# Patient Record
Sex: FEMALE | Race: WHITE | NOT HISPANIC OR LATINO | Employment: OTHER | URBAN - METROPOLITAN AREA
[De-identification: names, ages, dates, MRNs, and addresses within clinical notes are randomized per-mention and may not be internally consistent; named-entity substitution may affect disease eponyms.]

---

## 2017-02-22 ENCOUNTER — HOSPITAL ENCOUNTER (OUTPATIENT)
Dept: NUCLEAR MEDICINE | Facility: HOSPITAL | Age: 77
Discharge: HOME | End: 2017-02-22
Attending: INTERNAL MEDICINE | Admitting: INTERNAL MEDICINE

## 2017-04-10 ENCOUNTER — HOSPITAL ENCOUNTER (OUTPATIENT)
Dept: OTHER | Facility: HOSPITAL | Age: 77
Discharge: HOME | End: 2017-04-10
Attending: NURSE PRACTITIONER | Admitting: NURSE PRACTITIONER

## 2017-04-19 ENCOUNTER — OFFICE (AMBULATORY)
Dept: URBAN - METROPOLITAN AREA CLINIC 64 | Facility: CLINIC | Age: 77
End: 2017-04-19
Payer: COMMERCIAL

## 2017-04-19 VITALS
HEIGHT: 64 IN | DIASTOLIC BLOOD PRESSURE: 78 MMHG | HEART RATE: 64 BPM | WEIGHT: 200 LBS | SYSTOLIC BLOOD PRESSURE: 127 MMHG

## 2017-04-19 DIAGNOSIS — R07.9 CHEST PAIN, UNSPECIFIED: ICD-10-CM

## 2017-04-19 DIAGNOSIS — K21.9 GASTRO-ESOPHAGEAL REFLUX DISEASE WITHOUT ESOPHAGITIS: ICD-10-CM

## 2017-04-19 PROCEDURE — 99213 OFFICE O/P EST LOW 20 MIN: CPT

## 2017-05-17 ENCOUNTER — ON CAMPUS - OUTPATIENT (AMBULATORY)
Dept: URBAN - METROPOLITAN AREA HOSPITAL 2 | Facility: HOSPITAL | Age: 77
End: 2017-05-17
Payer: COMMERCIAL

## 2017-05-17 VITALS
RESPIRATION RATE: 16 BRPM | HEART RATE: 63 BPM | TEMPERATURE: 97.1 F | SYSTOLIC BLOOD PRESSURE: 142 MMHG | SYSTOLIC BLOOD PRESSURE: 158 MMHG | HEART RATE: 80 BPM | HEIGHT: 64 IN | OXYGEN SATURATION: 99 % | SYSTOLIC BLOOD PRESSURE: 148 MMHG | SYSTOLIC BLOOD PRESSURE: 164 MMHG | HEART RATE: 68 BPM | WEIGHT: 202 LBS | DIASTOLIC BLOOD PRESSURE: 89 MMHG | OXYGEN SATURATION: 97 % | SYSTOLIC BLOOD PRESSURE: 150 MMHG | HEART RATE: 77 BPM | DIASTOLIC BLOOD PRESSURE: 80 MMHG | OXYGEN SATURATION: 96 % | OXYGEN SATURATION: 100 % | DIASTOLIC BLOOD PRESSURE: 75 MMHG | HEART RATE: 60 BPM | OXYGEN SATURATION: 98 % | HEART RATE: 67 BPM | RESPIRATION RATE: 18 BRPM | DIASTOLIC BLOOD PRESSURE: 69 MMHG | DIASTOLIC BLOOD PRESSURE: 62 MMHG

## 2017-05-17 DIAGNOSIS — R10.13 EPIGASTRIC PAIN: ICD-10-CM

## 2017-05-17 DIAGNOSIS — R07.9 CHEST PAIN, UNSPECIFIED: ICD-10-CM

## 2017-05-17 PROCEDURE — 43235 EGD DIAGNOSTIC BRUSH WASH: CPT

## 2017-05-17 RX ADMIN — PROPOFOL: 10 INJECTION, EMULSION INTRAVENOUS at 13:23

## 2018-04-11 ENCOUNTER — HOSPITAL ENCOUNTER (OUTPATIENT)
Dept: GENERAL RADIOLOGY | Facility: HOSPITAL | Age: 78
Discharge: HOME | End: 2018-04-11
Attending: NURSE PRACTITIONER | Admitting: NURSE PRACTITIONER

## 2018-05-15 ENCOUNTER — HOSPITAL ENCOUNTER (OUTPATIENT)
Dept: CARDIOLOGY | Facility: HOSPITAL | Age: 78
Discharge: HOME | End: 2018-05-15
Attending: INTERNAL MEDICINE | Admitting: INTERNAL MEDICINE

## 2018-09-10 ENCOUNTER — HOSPITAL ENCOUNTER (OUTPATIENT)
Dept: PHYSICAL THERAPY | Facility: HOSPITAL | Age: 78
Setting detail: RECURRING SERIES
Discharge: HOME | End: 2018-11-07
Attending: ORTHOPAEDIC SURGERY | Admitting: ORTHOPAEDIC SURGERY

## 2019-04-30 ENCOUNTER — HOSPITAL ENCOUNTER (OUTPATIENT)
Dept: OTHER | Facility: HOSPITAL | Age: 79
Discharge: HOME | End: 2019-04-30
Attending: NURSE PRACTITIONER | Admitting: NURSE PRACTITIONER

## 2019-05-16 ENCOUNTER — HOSPITAL ENCOUNTER (OUTPATIENT)
Dept: NUCLEAR MEDICINE | Facility: HOSPITAL | Age: 79
Discharge: HOME | End: 2019-05-16
Attending: INTERNAL MEDICINE | Admitting: INTERNAL MEDICINE

## 2019-06-27 ENCOUNTER — OFFICE (AMBULATORY)
Dept: URBAN - METROPOLITAN AREA CLINIC 64 | Facility: CLINIC | Age: 79
End: 2019-06-27
Payer: COMMERCIAL

## 2019-06-27 VITALS
WEIGHT: 208 LBS | SYSTOLIC BLOOD PRESSURE: 136 MMHG | HEIGHT: 64 IN | DIASTOLIC BLOOD PRESSURE: 77 MMHG | HEART RATE: 77 BPM

## 2019-06-27 DIAGNOSIS — R13.10 DYSPHAGIA, UNSPECIFIED: ICD-10-CM

## 2019-06-27 PROCEDURE — 99214 OFFICE O/P EST MOD 30 MIN: CPT | Performed by: INTERNAL MEDICINE

## 2019-07-09 PROBLEM — R32 URINARY INCONTINENCE: Status: ACTIVE | Noted: 2019-04-25

## 2019-07-09 PROBLEM — R06.02 SHORTNESS OF BREATH: Status: ACTIVE | Noted: 2019-05-08

## 2019-07-09 PROBLEM — R93.6 ABNORMAL FINDINGS ON DIAGNOSTIC IMAGING OF LIMBS: Status: ACTIVE | Noted: 2018-08-24

## 2019-07-09 PROBLEM — R13.10 DYSPHAGIA: Status: ACTIVE | Noted: 2019-04-25

## 2019-07-09 PROBLEM — K76.0 FATTY LIVER: Status: ACTIVE | Noted: 2017-06-06

## 2019-07-09 PROBLEM — R94.39 ABNORMAL CARDIOVASCULAR STRESS TEST: Status: ACTIVE | Noted: 2017-03-21

## 2019-07-09 PROBLEM — M51.34 DEGENERATION OF INTERVERTEBRAL DISC OF THORACIC REGION: Status: ACTIVE | Noted: 2017-03-18

## 2019-07-09 PROBLEM — I51.7 CARDIOMEGALY: Status: ACTIVE | Noted: 2017-06-06

## 2019-07-09 PROBLEM — E66.3 OVERWEIGHT: Status: ACTIVE | Noted: 2018-03-29

## 2019-07-09 PROBLEM — K82.8 BILIARY DYSKINESIA: Status: ACTIVE | Noted: 2017-06-08

## 2019-07-09 PROBLEM — I10 HYPERTENSION: Status: ACTIVE | Noted: 2019-07-09

## 2019-07-09 PROBLEM — Z95.0 PRESENCE OF CARDIAC PACEMAKER: Status: ACTIVE | Noted: 2017-10-27

## 2019-07-09 PROBLEM — I34.0 MITRAL VALVE INSUFFICIENCY: Status: ACTIVE | Noted: 2017-03-20

## 2019-07-09 RX ORDER — LEVOTHYROXINE SODIUM 0.05 MG/1
TABLET ORAL
COMMUNITY
Start: 2018-05-08 | End: 2019-08-05 | Stop reason: SDUPTHER

## 2019-07-09 RX ORDER — NYSTATIN 100000 [USP'U]/G
POWDER TOPICAL
COMMUNITY
Start: 2018-03-29 | End: 2019-10-15

## 2019-07-09 RX ORDER — METOPROLOL TARTRATE 50 MG/1
25 TABLET, FILM COATED ORAL EVERY 12 HOURS
COMMUNITY
Start: 2018-07-03 | End: 2020-07-06

## 2019-07-09 RX ORDER — SODIUM PHOSPHATE,MONO-DIBASIC 19G-7G/118
1 ENEMA (ML) RECTAL EVERY 12 HOURS
COMMUNITY
Start: 2014-12-29 | End: 2021-01-21

## 2019-07-09 RX ORDER — ZONISAMIDE 100 MG/1
CAPSULE ORAL
COMMUNITY
Start: 2012-11-21 | End: 2019-10-16

## 2019-07-09 RX ORDER — DEXLANSOPRAZOLE 60 MG/1
1 CAPSULE, DELAYED RELEASE ORAL EVERY 24 HOURS
COMMUNITY
Start: 2019-01-21 | End: 2019-07-22 | Stop reason: SDUPTHER

## 2019-07-09 RX ORDER — ESCITALOPRAM OXALATE 10 MG/1
5 TABLET ORAL EVERY 24 HOURS
COMMUNITY
Start: 2018-07-26 | End: 2021-06-03

## 2019-07-09 RX ORDER — LOSARTAN POTASSIUM 25 MG/1
25 TABLET ORAL EVERY 24 HOURS
COMMUNITY
Start: 2018-05-01 | End: 2020-05-18

## 2019-07-18 ENCOUNTER — CLINICAL SUPPORT (OUTPATIENT)
Dept: FAMILY MEDICINE CLINIC | Facility: CLINIC | Age: 79
End: 2019-07-18

## 2019-07-18 DIAGNOSIS — M81.0 OSTEOPOROSIS, UNSPECIFIED OSTEOPOROSIS TYPE, UNSPECIFIED PATHOLOGICAL FRACTURE PRESENCE: Primary | ICD-10-CM

## 2019-07-18 PROCEDURE — 96372 THER/PROPH/DIAG INJ SC/IM: CPT | Performed by: NURSE PRACTITIONER

## 2019-07-22 RX ORDER — DEXLANSOPRAZOLE 60 MG/1
CAPSULE, DELAYED RELEASE ORAL
Qty: 90 CAPSULE | Refills: 1 | Status: SHIPPED | OUTPATIENT
Start: 2019-07-22 | End: 2020-01-13

## 2019-07-25 ENCOUNTER — OFFICE VISIT (OUTPATIENT)
Dept: FAMILY MEDICINE CLINIC | Facility: CLINIC | Age: 79
End: 2019-07-25

## 2019-07-25 ENCOUNTER — LAB REQUISITION (OUTPATIENT)
Dept: LAB | Facility: HOSPITAL | Age: 79
End: 2019-07-25

## 2019-07-25 VITALS
TEMPERATURE: 97.7 F | OXYGEN SATURATION: 96 % | WEIGHT: 205 LBS | BODY MASS INDEX: 34.11 KG/M2 | SYSTOLIC BLOOD PRESSURE: 125 MMHG | RESPIRATION RATE: 18 BRPM | HEART RATE: 79 BPM | DIASTOLIC BLOOD PRESSURE: 76 MMHG

## 2019-07-25 DIAGNOSIS — M51.36 DDD (DEGENERATIVE DISC DISEASE), LUMBAR: ICD-10-CM

## 2019-07-25 DIAGNOSIS — M54.50 CHRONIC MIDLINE LOW BACK PAIN WITHOUT SCIATICA: Primary | ICD-10-CM

## 2019-07-25 DIAGNOSIS — D48.5 NEOPLASM OF UNCERTAIN BEHAVIOR OF SKIN: ICD-10-CM

## 2019-07-25 DIAGNOSIS — M43.06 PARS DEFECT OF LUMBAR SPINE: ICD-10-CM

## 2019-07-25 DIAGNOSIS — M43.10 ANTEROLISTHESIS: ICD-10-CM

## 2019-07-25 DIAGNOSIS — G89.29 CHRONIC MIDLINE LOW BACK PAIN WITHOUT SCIATICA: Primary | ICD-10-CM

## 2019-07-25 PROBLEM — M51.369 DDD (DEGENERATIVE DISC DISEASE), LUMBAR: Status: ACTIVE | Noted: 2019-07-25

## 2019-07-25 PROCEDURE — 88305 TISSUE EXAM BY PATHOLOGIST: CPT | Performed by: PLASTIC SURGERY

## 2019-07-25 PROCEDURE — 99213 OFFICE O/P EST LOW 20 MIN: CPT | Performed by: NURSE PRACTITIONER

## 2019-07-25 NOTE — PATIENT INSTRUCTIONS
Degenerative Disk Disease  Degenerative disk disease is a condition caused by the changes that occur in spinal disks as you grow older. Spinal disks are soft and compressible disks located between the bones of your spine (vertebrae). These disks act like shock absorbers. Degenerative disk disease can affect the whole spine. However, the neck and lower back are most commonly affected. Many changes can occur in the spinal disks with aging, such as:  · The spinal disks may dry and shrink.  · Small tears may occur in the tough, outer covering of the disk (annulus).  · The disk space may become smaller due to loss of water.  · Abnormal growths in the bone (spurs) may occur. This can put pressure on the nerve roots exiting the spinal canal, causing pain.  · The spinal canal may become narrowed.    What increases the risk?  · Being overweight.  · Having a family history of degenerative disk disease.  · Smoking.  · There is increased risk if you are doing heavy lifting or have a sudden injury.  What are the signs or symptoms?  Symptoms vary from person to person and may include:  · Pain that varies in intensity. Some people have no pain, while others have severe pain. The location of the pain depends on the part of your backbone that is affected.  ? You will have neck or arm pain if a disk in the neck area is affected.  ? You will have pain in your back, buttocks, or legs if a disk in the lower back is affected.  · Pain that becomes worse while bending, reaching up, or with twisting movements.  · Pain that may start gradually and then get worse as time passes. It may also start after a major or minor injury.  · Numbness or tingling in the arms or legs.    How is this diagnosed?  Your health care provider will ask you about your symptoms and about activities or habits that may cause the pain. He or she may also ask about any injuries, diseases, or treatments you have had. Your health care provider will examine you to check  for the range of movement that is possible in the affected area, to check for strength in your extremities, and to check for sensation in the areas of the arms and legs supplied by different nerve roots. You may also have:  · An X-ray of the spine.  · Other imaging tests, such as MRI.    How is this treated?  Your health care provider will advise you on the best plan for treatment. Treatment may include:  · Medicines.  · Rehabilitation exercises.    Follow these instructions at home:  · Follow proper lifting and walking techniques as advised by your health care provider.  · Maintain good posture.  · Exercise regularly as advised by your health care provider.  · Perform relaxation exercises.  · Change your sitting, standing, and sleeping habits as advised by your health care provider.  · Change positions frequently.  · Lose weight or maintain a healthy weight as advised by your health care provider.  · Do not use any tobacco products, including cigarettes, chewing tobacco, or electronic cigarettes. If you need help quitting, ask your health care provider.  · Wear supportive footwear.  · Take medicines only as directed by your health care provider.  Contact a health care provider if:  · Your pain does not go away within 1-4 weeks.  · You have significant appetite or weight loss.  Get help right away if:  · Your pain is severe.  · You notice weakness in your arms, hands, or legs.  · You begin to lose control of your bladder or bowel movements.  · You have fevers or night sweats.  This information is not intended to replace advice given to you by your health care provider. Make sure you discuss any questions you have with your health care provider.  Document Released: 10/14/2008 Document Revised: 05/25/2017 Document Reviewed: 04/21/2015  [employer] Interactive Patient Education © 2019 [employer]

## 2019-07-25 NOTE — PROGRESS NOTES
Chief Complaint   Patient presents with   • Back Pain     f/u on xray        Subjective     Anila Spencer  has a past medical history of Allergic rhinitis, Biliary dyskinesia, Cardiomegaly, Cervical spinal stenosis, Chronic constipation, Chronic insomnia, DDD (degenerative disc disease), cervical, Diplopia, Emphysema of lung (CMS/HCC), Fatty liver, Fracture of multiple ribs (07/2018), GERD (gastroesophageal reflux disease), Hypertension, Hypothyroidism, Kidney stone, Mitral regurgitation, Nasal fracture (07/2018), Osteoarthritis, Osteoporosis, Prediabetes, Rotator cuff tear, Seizure disorder (CMS/HCC), Sick sinus syndrome (CMS/HCC), Squamous cell skin cancer, face, Traumatic brain injury (CMS/HCC), and Urinary incontinence.    Back Pain   This is a chronic problem. The current episode started more than 1 year ago. The problem occurs constantly. The problem has been gradually worsening since onset. The pain is present in the lumbar spine. The quality of the pain is described as aching. The pain does not radiate. The pain is at a severity of 10/10. The pain is severe. The pain is worse during the day. The symptoms are aggravated by bending, sitting, standing and twisting. Stiffness is present all day. Pertinent negatives include no abdominal pain, chest pain or weight loss. Risk factors include menopause, obesity, lack of exercise, poor posture, sedentary lifestyle and history of osteoporosis. She has tried analgesics for the symptoms. The treatment provided mild relief.       No Known Allergies    Current Outpatient Medications:   •  Calcium Carbonate-Vitamin D3 (CALCIUM 600-D) 600-400 MG-UNIT tablet, Daily., Disp: , Rfl:   •  DEXILANT 60 MG capsule, TAKE ONE CAPSULE BY MOUTH ONCE DAILY, Disp: 90 capsule, Rfl: 1  •  escitalopram (LEXAPRO) 10 MG tablet, Daily., Disp: , Rfl:   •  glucosamine-chondroitin (GLUCOSAMINE CHONDR COMPLEX) 500-400 MG capsule capsule, 1 capsule Every 12 (Twelve) Hours., Disp: , Rfl:   •   levothyroxine (SYNTHROID, LEVOTHROID) 50 MCG tablet, LEVOTHYROXINE SODIUM 50 MCG TABS, Disp: , Rfl:   •  losartan (COZAAR) 25 MG tablet, Daily., Disp: , Rfl:   •  metoprolol tartrate (LOPRESSOR) 50 MG tablet, Every 12 (Twelve) Hours., Disp: , Rfl:   •  Multiple Vitamins-Minerals (MULTI VITAMIN/MINERALS) tablet, MULTI VITAMIN/MINERALS TABS, Disp: , Rfl:   •  nystatin (MYCOSTATIN) 489059 UNIT/GM powder, NYSTATIN [id number] UNIT/GM POWD, Disp: , Rfl:   •  zonisamide (ZONEGRAN) 100 MG capsule, ZONISAMIDE 100 MG CAPS, Disp: , Rfl:   Past Medical History:   Diagnosis Date   • Allergic rhinitis    • Biliary dyskinesia    • Cardiomegaly    • Cervical spinal stenosis    • Chronic constipation    • Chronic insomnia    • DDD (degenerative disc disease), cervical     C-spine, T-spine, L-Spine   • Diplopia     Dr. Laird   • Emphysema of lung (CMS/HCC)    • Fatty liver    • Fracture of multiple ribs 07/2018    Left 4th-8th   • GERD (gastroesophageal reflux disease)    • Hypertension    • Hypothyroidism    • Kidney stone    • Mitral regurgitation     Dr. Agrawal   • Nasal fracture 07/2018   • Osteoarthritis    • Osteoporosis     h/o tx with Fosamax but quit in 2012 due to fears of complications   • Prediabetes    • Rotator cuff tear     right   • Seizure disorder (CMS/HCC)     Dr. Shannon Bennett   • Sick sinus syndrome (CMS/HCC)     Dr. Agrawal   • Squamous cell skin cancer, face    • Traumatic brain injury (CMS/HCC)     from fall   • Urinary incontinence      Past Surgical History:   Procedure Laterality Date   • BILATERAL SALPINGO OOPHORECTOMY      for benign cysts   • CARDIAC CATHETERIZATION  08/25/2009    25% LAD. L Cx luminal irregularities. Normal L main   • CARDIAC CATHETERIZATION  03/29/2017    25% LAD. 10-20% LCX. ER 65%. Dr. Agrawal.    • CATARACT EXTRACTION  2006   • ENDOSCOPY  05/17/2017    Normal, Dr. Gutierrez   • KNEE ARTHROSCOPY Left 1992   • OTHER SURGICAL HISTORY Right 07/2017    Right eye, YAG Capsuloyomy , Dr. Lemus   •  PACEMAKER IMPLANTATION  2009   • ROTATOR CUFF REPAIR Right     Dr. Goldberg   • TONSILLECTOMY     • TOTAL HIP ARTHROPLASTY Bilateral      Social History     Socioeconomic History   • Marital status:      Spouse name: Not on file   • Number of children: Not on file   • Years of education: Not on file   • Highest education level: Not on file   Tobacco Use   • Smoking status: Never Smoker   • Smokeless tobacco: Never Used   Substance and Sexual Activity   • Alcohol use: No     Frequency: Never   • Drug use: No     Family History   Problem Relation Age of Onset   • Heart disease Mother    • Pancreatic cancer Mother    • Prostate cancer Father    • Breast cancer Sister    • Pancreatic cancer Brother    • Colon cancer Brother    • Stroke Maternal Grandmother        Family history, surgical history, past medical history, Allergies and med's reviewed with patient today and updated in [facility] EMR.     ROS:  Review of Systems   Constitutional: Negative for activity change, appetite change, diaphoresis, fatigue, unexpected weight gain and unexpected weight loss.   Eyes: Negative for blurred vision and visual disturbance.   Respiratory: Negative for apnea, cough, shortness of breath and wheezing.    Cardiovascular: Negative for chest pain, palpitations and leg swelling.   Gastrointestinal: Negative for abdominal pain, constipation, diarrhea, nausea and vomiting.   Endocrine: Negative for cold intolerance and heat intolerance.   Genitourinary: Negative for frequency.   Musculoskeletal: Positive for back pain and gait problem. Negative for myalgias.   Neurological: Negative for dizziness, syncope and headache.   Psychiatric/Behavioral: Negative for depressed mood.       OBJECTIVE:  Vitals:    07/25/19 1032   BP: 125/76   BP Location: Right arm   Patient Position: Sitting   Cuff Size: Large Adult   Pulse: 79   Resp: 18   Temp: 97.7 °F (36.5 °C)   TempSrc: Oral   SpO2: 96%   Weight: 93 kg (205 lb)     Body mass index is 34.11  kg/m².    Physical Exam  General:      Alert. Appears stated age. Cooperative. In no apparent distress. Well nourished. Well hydrated.  Neck:      Non-tender. No lymphadenopathy or masses.   Trachea midline. No bruit.  Thyroid of normal size and consistency.   Chest Wall:      Normal excursion with symmetric chest walls.   Quiet, even and easy respiratory effort without use of accessory muscles.  Lungs:      Clear breath sounds bilaterally without rales, rhonchi or wheezes.  Heart:      No gallop or rub. Normal heart sounds. Regular rate and rhythm. No murmurs.   Abdomen:      Soft, nontender. No palpable masses. No hernia. Bowel sounds normal. No hepatosplenomegaly.   Msk:      Slow, cautious gate, walking with cane. Stooped forward. Generalized weakness. Decreased ROM of all extremities.  No deformities noted.   Pain with palpation across left-side of lower back, no abnormalities palpated. -SLR bilaterally.   Extremities:      no clubbing, cyanosis, edema  Neurologic:      no focal deficits  Skin:      Warm, dry. No rashes noted.      No visits with results within 30 Day(s) from this visit.   Latest known visit with results is:   Office Visit Converted on 04/25/2019   Component Date Value Ref Range Status   • Albumin 03/23/2017 4.0  3.6 - 5.1 g/dL Final   • Alkaline Phosphatase 03/23/2017 71  33 - 130 units/L Final   • Basophils Absolute 03/23/2017 50 CELLS/UL  0 - 200 10*3/mm3 Final   • Basophil Rel % 03/23/2017 0.6  % Final   • Total Bilirubin 03/23/2017 0.5  0.2 - 1.2 mg/dL Final   • BUN 03/23/2017 26* 7 - 25 mg/dL Final   • BUN/Creatinine Ratio 03/23/2017 31 (calc)* 6 - 22 Final   • Calcium 03/23/2017 9.1  8.6 - 10.4 mg/dL Final   • Chloride 03/23/2017 110  98 - 110 mmol/L Final   • CO2 CONTENT  03/23/2017 28  20 - 31 mmol/L Final   • Creatinine 03/23/2017 0.85  0.60 - 0.93 mg/dL Final   • eGFR African Am 03/23/2017 66  > OR = 60 mL/min/1.73m2 Final   • eGFR Non  Am 03/23/2017 76  > OR = 60  mL/min/1.73m2 Final   • Eosinophils Absolute 03/23/2017 92 CELLS/UL  15 - 500 10*3/mm3 Final   • Eosinophil Rel % 03/23/2017 1.1  % Final   • Globulin 03/23/2017 2.8 G/DL (CALC)  1.9 - 3.7 g/dL Final   • Glucose 03/23/2017 93  65 - 99 mg/dL Final   • Hematocrit 03/23/2017 42.5  35.0 - 45.0 % Final   • Hemoglobin 03/23/2017 13.5  11.7 - 15.5 g/dL Final   • % Hgb A1C 03/23/2017 5.7 % OF TOTAL HGB* <5.7 % Final   • Lymphocytes Absolute 03/23/2017 2428 CELLS/UL  850 - 3900 10*3/mm3 Final   • Lymphocyte Rel % 03/23/2017 28.9  % Final   • MCH 03/23/2017 30.0  27.0 - 33.0 pg Final   • MCHC 03/23/2017 31.8 G/DL* 32.0 - 36.0 % Final   • MCV 03/23/2017 94.2  80.0 - 100.0 fL Final   • Monocyte Rel % 03/23/2017 6.4  % Final   • Monocytes Absolute 03/23/2017 538 CELLS/UL  200 - 950 10*3/microliter Final   • MPV 03/23/2017 9.4  7.5 - 12.5 fL Final   • Neutrophils Absolute 03/23/2017 5292 CELLS/UL  1500 - 7800 10*3/mm3 Final   • Platelets 03/23/2017 222 THOUSAND/UL  140 - 400 10*3/mm3 Final   • Neutrophils, Fluid 03/23/2017 63.0  % Final   • Potassium 03/23/2017 4.5  3.5 - 5.3 mmol/L Final   • Total Protein 03/23/2017 6.8  6.1 - 8.1 g/dL Final   • RBC 03/23/2017 4.51 MILLION/UL  3.80 - 5.10 10*6/mm3 Final   • RDW 03/23/2017 14.9  11.0 - 15.0 % Final   • AST (SGOT) 03/23/2017 13  10 - 35 units/L Final   • ALT (SGPT) 03/23/2017 9  6 - 29 units/L Final   • Sodium 03/23/2017 143  135 - 146 mmol/L Final   • TSH 03/23/2017 4.61* 0.40 - 4.50 microintl units/mL Final   • WBC 03/23/2017 8.4 THOUSAND/UL  3.8 - 10.8 K/uL Final   • A/G Ratio 03/23/2017 1.4 (calc)  1.0 - 2.5 Final   • TSH 06/09/2017 3.12  0.40 - 4.50 microintl units/mL Final   • Albumin 09/27/2017 3.8  3.6 - 5.1 g/dL Final   • Alkaline Phosphatase 09/27/2017 74  33 - 130 units/L Final   • Basophils Absolute 09/27/2017 33 CELLS/UL  0 - 200 10*3/mm3 Final   • Basophil Rel % 09/27/2017 0.4  % Final   • Total Bilirubin 09/27/2017 0.4  0.2 - 1.2 mg/dL Final   • BUN 09/27/2017 23  7  - 25 mg/dL Final   • BUN/Creatinine Ratio 09/27/2017 24 (calc)* 6 - 22 Final   • Calcium 09/27/2017 8.8  8.6 - 10.4 mg/dL Final   • Chloride 09/27/2017 109  98 - 110 mmol/L Final   • CO2 CONTENT  09/27/2017 24  20 - 31 mmol/L Final   • Creatinine 09/27/2017 0.95* 0.60 - 0.93 mg/dL Final   • eGFR  Am 09/27/2017 57* > OR = 60 mL/min/1.73m2 Final   • eGFR Non  Am 09/27/2017 66  > OR = 60 mL/min/1.73m2 Final   • Eosinophils Absolute 09/27/2017 74 CELLS/UL  15 - 500 10*3/mm3 Final   • Eosinophil Rel % 09/27/2017 0.9  % Final   • Globulin 09/27/2017 2.6 G/DL (CALC)  1.9 - 3.7 g/dL Final   • Glucose 09/27/2017 91  65 - 99 mg/dL Final   • Hematocrit 09/27/2017 39.1  35.0 - 45.0 % Final   • Hemoglobin 09/27/2017 13.3  11.7 - 15.5 g/dL Final   • % Hgb A1C 09/27/2017 5.2 % OF TOTAL HGB  <5.7 % Final   • Lymphocytes Absolute 09/27/2017 2714 CELLS/UL  850 - 3900 10*3/mm3 Final   • Lymphocyte Rel % 09/27/2017 33.1  % Final   • MCH 09/27/2017 32.2  27.0 - 33.0 pg Final   • MCHC 09/27/2017 34.0 G/DL  32.0 - 36.0 % Final   • MCV 09/27/2017 94.7  80.0 - 100.0 fL Final   • Monocyte Rel % 09/27/2017 7.9  % Final   • Monocytes Absolute 09/27/2017 648 CELLS/UL  200 - 950 10*3/microliter Final   • MPV 09/27/2017 11.0  7.5 - 12.5 fL Final   • Neutrophils Absolute 09/27/2017 4731 CELLS/UL  1500 - 7800 10*3/mm3 Final   • Platelets 09/27/2017 235 THOUSAND/UL  140 - 400 10*3/mm3 Final   • Neutrophils, Fluid 09/27/2017 57.7  % Final   • Potassium 09/27/2017 4.1  3.5 - 5.3 mmol/L Final   • Total Protein 09/27/2017 6.4  6.1 - 8.1 g/dL Final   • RBC 09/27/2017 4.13 MILLION/UL  3.80 - 5.10 10*6/mm3 Final   • RDW 09/27/2017 12.7  11.0 - 15.0 % Final   • AST (SGOT) 09/27/2017 14  10 - 35 units/L Final   • ALT (SGPT) 09/27/2017 11  6 - 29 units/L Final   • Sodium 09/27/2017 141  135 - 146 mmol/L Final   • TSH 09/27/2017 2.39  0.40 - 4.50 microintl units/mL Final   • WBC 09/27/2017 8.2 THOUSAND/UL  3.8 - 10.8 K/uL Final   • A/G Ratio  09/27/2017 1.5 (calc)  1.0 - 2.5 Final   • BUN 01/09/2018 23  7 - 25 mg/dL Final   • BUN/Creatinine Ratio 01/09/2018 NOT APPLICABLE (calc)  6 - 22 Final   • Calcium 01/09/2018 9.4  8.6 - 10.4 mg/dL Final   • Chloride 01/09/2018 106  98 - 110 mmol/L Final   • CO2 CONTENT  01/09/2018 24  20 - 31 mmol/L Final   • Creatinine 01/09/2018 0.84  0.60 - 0.93 mg/dL Final   • eGFR African Am 01/09/2018 66  > OR = 60 mL/min/1.73m2 Final   • eGFR Non  Am 01/09/2018 77  > OR = 60 mL/min/1.73m2 Final   • Glucose 01/09/2018 86  65 - 99 mg/dL Final   • Potassium 01/09/2018 4.5  3.5 - 5.3 mmol/L Final   • Sodium 01/09/2018 139  135 - 146 mmol/L Final   • Albumin 03/29/2018 4.1  3.6 - 5.1 g/dL Final   • Alkaline Phosphatase 03/29/2018 85  33 - 130 units/L Final   • Basophils Absolute 03/29/2018 31 CELLS/UL  0 - 200 10*3/mm3 Final   • Basophil Rel % 03/29/2018 0.3  % Final   • Total Bilirubin 03/29/2018 0.4  0.2 - 1.2 mg/dL Final   • BUN 03/29/2018 24  7 - 25 mg/dL Final   • BUN/Creatinine Ratio 03/29/2018 NOT APPLICABLE (calc)  6 - 22 Final   • Calcium 03/29/2018 9.3  8.6 - 10.4 mg/dL Final   • Chloride 03/29/2018 106  98 - 110 mmol/L Final   • CO2 CONTENT  03/29/2018 24  20 - 31 mmol/L Final   • Creatinine 03/29/2018 0.92  0.60 - 0.93 mg/dL Final   • eGFR African Am 03/29/2018 59* > OR = 60 mL/min/1.73m2 Final   • eGFR Non African Am 03/29/2018 69  > OR = 60 mL/min/1.73m2 Final   • Eosinophils Absolute 03/29/2018 71 CELLS/UL  15 - 500 10*3/mm3 Final   • Eosinophil Rel % 03/29/2018 0.7  % Final   • Globulin 03/29/2018 2.9 G/DL (CALC)  1.9 - 3.7 g/dL Final   • Glucose 03/29/2018 89  65 - 99 mg/dL Final   • Hematocrit 03/29/2018 40.8  35.0 - 45.0 % Final   • Hemoglobin 03/29/2018 14.3  11.7 - 15.5 g/dL Final   • % Hgb A1C 03/29/2018 5.4 % OF TOTAL HGB  <5.7 % Final   • Lymphocytes Absolute 03/29/2018 3560 CELLS/UL  850 - 3900 10*3/mm3 Final   • Lymphocyte Rel % 03/29/2018 34.9  % Final   • MCH 03/29/2018 31.9  27.0 - 33.0 pg  Final   • MCHC 03/29/2018 35.0 G/DL  32.0 - 36.0 % Final   • MCV 03/29/2018 91.1  80.0 - 100.0 fL Final   • Monocyte Rel % 03/29/2018 6.7  % Final   • Monocytes Absolute 03/29/2018 683 CELLS/UL  200 - 950 10*3/microliter Final   • MPV 03/29/2018 10.8  7.5 - 12.5 fL Final   • Neutrophils Absolute 03/29/2018 5855 CELLS/UL  1500 - 7800 10*3/mm3 Final   • Platelets 03/29/2018 288 THOUSAND/UL  140 - 400 10*3/mm3 Final   • Neutrophils, Fluid 03/29/2018 57.4  % Final   • Potassium 03/29/2018 4.7  3.5 - 5.3 mmol/L Final   • Total Protein 03/29/2018 7.0  6.1 - 8.1 g/dL Final   • RBC 03/29/2018 4.48 MILLION/UL  3.80 - 5.10 10*6/mm3 Final   • RDW 03/29/2018 12.5  11.0 - 15.0 % Final   • AST (SGOT) 03/29/2018 14  10 - 35 units/L Final   • ALT (SGPT) 03/29/2018 10  6 - 29 units/L Final   • Sodium 03/29/2018 141  135 - 146 mmol/L Final   • TSH 03/29/2018 2.34  0.40 - 4.50 microintl units/mL Final   • WBC 03/29/2018 10.2 THOUSAND/UL  3.8 - 10.8 K/uL Final   • A/G Ratio 03/29/2018 1.4 (calc)  1.0 - 2.5 Final   • Urine Culture 04/25/2019 *   Final       ASSESSMENT/ PLAN:    Diagnoses and all orders for this visit:    1. Chronic midline low back pain without sciatica (Primary)  Comments:  Reviewed x-ray results w/pt. Pain worsening. Will proceed with CT.   May need Pain Mgmt and/or Spine referral.  Orders:  -     CT Lumbar Spine Without Contrast; Future    2. Anterolisthesis  -     CT Lumbar Spine Without Contrast; Future    3. Pars defect of lumbar spine  -     CT Lumbar Spine Without Contrast; Future    4. DDD (degenerative disc disease), lumbar        Orders Placed Today:     No orders of the defined types were placed in this encounter.       Management Plan:     An After Visit Summary was printed and given to the patient at discharge.    Follow-up: No Follow-up on file.    VALENTINO Power 7/25/2019 11:15 AM  This note was electronically signed.Referring Provider:  Helena Mas NP  Primary Provider:  Tg FREEDMAN NP

## 2019-07-27 LAB
CYTO UR: NORMAL
LAB AP CASE REPORT: NORMAL
LAB AP CLINICAL INFORMATION: NORMAL
PATH REPORT.FINAL DX SPEC: NORMAL
PATH REPORT.GROSS SPEC: NORMAL

## 2019-07-31 DIAGNOSIS — M54.50 CHRONIC MIDLINE LOW BACK PAIN WITHOUT SCIATICA: ICD-10-CM

## 2019-07-31 DIAGNOSIS — M48.062 SPINAL STENOSIS OF LUMBAR REGION WITH NEUROGENIC CLAUDICATION: Primary | ICD-10-CM

## 2019-07-31 DIAGNOSIS — M43.06 PARS DEFECT OF LUMBAR SPINE: ICD-10-CM

## 2019-07-31 DIAGNOSIS — M43.10 ANTEROLISTHESIS: ICD-10-CM

## 2019-07-31 DIAGNOSIS — M51.36 DDD (DEGENERATIVE DISC DISEASE), LUMBAR: ICD-10-CM

## 2019-07-31 DIAGNOSIS — G89.29 CHRONIC MIDLINE LOW BACK PAIN WITHOUT SCIATICA: ICD-10-CM

## 2019-08-06 RX ORDER — LEVOTHYROXINE SODIUM 0.05 MG/1
TABLET ORAL
Qty: 90 TABLET | Refills: 1 | Status: SHIPPED | OUTPATIENT
Start: 2019-08-06 | End: 2020-07-30

## 2019-08-15 PROCEDURE — 88305 TISSUE EXAM BY PATHOLOGIST: CPT | Performed by: PLASTIC SURGERY

## 2019-08-16 ENCOUNTER — LAB REQUISITION (OUTPATIENT)
Dept: LAB | Facility: HOSPITAL | Age: 79
End: 2019-08-16

## 2019-08-16 DIAGNOSIS — D48.5 NEOPLASM OF UNCERTAIN BEHAVIOR OF SKIN: ICD-10-CM

## 2019-08-19 LAB
CYTO UR: NORMAL
LAB AP CASE REPORT: NORMAL
LAB AP CLINICAL INFORMATION: NORMAL
LAB AP DIAGNOSIS COMMENT: NORMAL
PATH REPORT.FINAL DX SPEC: NORMAL
PATH REPORT.GROSS SPEC: NORMAL

## 2019-08-21 ENCOUNTER — ANESTHESIA EVENT (OUTPATIENT)
Dept: GASTROENTEROLOGY | Facility: HOSPITAL | Age: 79
End: 2019-08-21

## 2019-08-22 ENCOUNTER — ON CAMPUS - OUTPATIENT (AMBULATORY)
Dept: URBAN - METROPOLITAN AREA HOSPITAL 85 | Facility: HOSPITAL | Age: 79
End: 2019-08-22
Payer: COMMERCIAL

## 2019-08-22 ENCOUNTER — HOSPITAL ENCOUNTER (OUTPATIENT)
Facility: HOSPITAL | Age: 79
Setting detail: HOSPITAL OUTPATIENT SURGERY
Discharge: HOME | End: 2019-08-22
Attending: INTERNAL MEDICINE | Admitting: INTERNAL MEDICINE

## 2019-08-22 ENCOUNTER — ANESTHESIA (OUTPATIENT)
Dept: GASTROENTEROLOGY | Facility: HOSPITAL | Age: 79
End: 2019-08-22

## 2019-08-22 VITALS
OXYGEN SATURATION: 96 % | SYSTOLIC BLOOD PRESSURE: 172 MMHG | HEIGHT: 64 IN | TEMPERATURE: 98.2 F | WEIGHT: 205.91 LBS | HEART RATE: 60 BPM | DIASTOLIC BLOOD PRESSURE: 73 MMHG | BODY MASS INDEX: 35.15 KG/M2 | RESPIRATION RATE: 16 BRPM

## 2019-08-22 DIAGNOSIS — R13.10 DYSPHAGIA, UNSPECIFIED: ICD-10-CM

## 2019-08-22 PROCEDURE — 43450 DILATE ESOPHAGUS 1/MULT PASS: CPT | Performed by: INTERNAL MEDICINE

## 2019-08-22 PROCEDURE — 25010000002 PROPOFOL 200 MG/20ML EMULSION: Performed by: ANESTHESIOLOGY

## 2019-08-22 PROCEDURE — 43235 EGD DIAGNOSTIC BRUSH WASH: CPT | Performed by: INTERNAL MEDICINE

## 2019-08-22 RX ORDER — SODIUM CHLORIDE 0.9 % (FLUSH) 0.9 %
3 SYRINGE (ML) INJECTION EVERY 12 HOURS SCHEDULED
Status: DISCONTINUED | OUTPATIENT
Start: 2019-08-22 | End: 2019-08-22 | Stop reason: HOSPADM

## 2019-08-22 RX ORDER — LIDOCAINE HYDROCHLORIDE 20 MG/ML
INJECTION, SOLUTION EPIDURAL; INFILTRATION; INTRACAUDAL; PERINEURAL AS NEEDED
Status: DISCONTINUED | OUTPATIENT
Start: 2019-08-22 | End: 2019-08-22 | Stop reason: SURG

## 2019-08-22 RX ORDER — SODIUM CHLORIDE 0.9 % (FLUSH) 0.9 %
3-10 SYRINGE (ML) INJECTION AS NEEDED
Status: DISCONTINUED | OUTPATIENT
Start: 2019-08-22 | End: 2019-08-22 | Stop reason: HOSPADM

## 2019-08-22 RX ORDER — PROPOFOL 10 MG/ML
INJECTION, EMULSION INTRAVENOUS AS NEEDED
Status: DISCONTINUED | OUTPATIENT
Start: 2019-08-22 | End: 2019-08-22 | Stop reason: SURG

## 2019-08-22 RX ORDER — ONDANSETRON 2 MG/ML
4 INJECTION INTRAMUSCULAR; INTRAVENOUS ONCE AS NEEDED
Status: DISCONTINUED | OUTPATIENT
Start: 2019-08-22 | End: 2019-08-22 | Stop reason: HOSPADM

## 2019-08-22 RX ORDER — SODIUM CHLORIDE 9 MG/ML
9 INJECTION, SOLUTION INTRAVENOUS CONTINUOUS PRN
Status: DISCONTINUED | OUTPATIENT
Start: 2019-08-22 | End: 2019-08-22 | Stop reason: HOSPADM

## 2019-08-22 RX ADMIN — LIDOCAINE HYDROCHLORIDE 80 MG: 20 INJECTION, SOLUTION EPIDURAL; INFILTRATION; INTRACAUDAL; PERINEURAL at 11:10

## 2019-08-22 RX ADMIN — PROPOFOL 150 MG: 10 INJECTION, EMULSION INTRAVENOUS at 11:21

## 2019-08-22 RX ADMIN — SODIUM CHLORIDE 9 ML/HR: 0.9 INJECTION, SOLUTION INTRAVENOUS at 09:48

## 2019-08-22 NOTE — ANESTHESIA POSTPROCEDURE EVALUATION
Patient: Anila Spencer    Procedure Summary     Date:  08/22/19 Room / Location:  [facility] ENDOSCOPY 4 / [facility] ENDOSCOPY    Anesthesia Start:  1109 Anesthesia Stop:  1124    Procedure:  ESOPHAGOGASTRODUODENOSCOPY WITH DILATATION (BOUGIE #46,50) (N/A ) Diagnosis:  (DYSPHAGIA)    Surgeon:  Joaquin Story MD Provider:  Jocelin Joseph MD    Anesthesia Type:  MAC ASA Status:  3          Anesthesia Type: MAC  Last vitals  BP   172/73 (08/22/19 1145)   Temp   98.2 °F (36.8 °C) (08/22/19 0920)   Pulse   60 (08/22/19 1145)   Resp   16 (08/22/19 0920)     SpO2   96 % (08/22/19 1145)     Post Anesthesia Care and Evaluation    Patient location during evaluation: PACU  Patient participation: complete - patient participated  Level of consciousness: awake  Pain scale: See nurse's notes for pain score.  Pain management: adequate  Airway patency: patent  Anesthetic complications: No anesthetic complications  PONV Status: none  Cardiovascular status: acceptable  Respiratory status: acceptable  Hydration status: acceptable    Comments: Patient seen and examined postoperatively; vital signs stable; SpO2 greater than or equal to 90%; cardiopulmonary status stable; nausea/vomiting adequately controlled; pain adequately controlled; no apparent anesthesia complications; patient discharged from anesthesia care when discharge criteria were met

## 2019-08-22 NOTE — OP NOTE
ESOPHAGOGASTRODUODENOSCOPY Procedure Report    Patient Name:  Anila Spencer  YOB: 1940    Date of Surgery:  8/22/2019     Pre-Op Diagnosis:  DYSPHAGIA         Procedure/CPT® Codes:      Procedure(s):  ESOPHAGOGASTRODUODENOSCOPY WITH DILATATION (BOUGIE #46,50)    Staff:  Surgeon(s):  Joaquin Story MD      Anesthesia: Monitored Anesthesia Care    Description of Procedure:  A description of the procedure as well as risks, benefits and alternative methods were explained to the patient who voiced understanding and signed the corresponding consent form. A physical exam was performed and vital signs were monitored throughout the procedure.    An upper GI endoscope was placed into the mouth and proceeded through the esophagus, stomach and second portion of the duodenum without difficulty. The scope was then retroflexed and the fundus was visualized. The procedure was not difficult and there were no immediate complications.    Findings:  Normal EGD.  Esophagus normal.  Stomach normal.  Duodenum normal.  Because of the patient's history of dysphasia a 46 French Myles dilator was passed with minimal resistance felt.  The endoscope was passed and there was no trauma noted.  Therefore a 50 French Myles dilator was passed with mild resistance felt.  The endoscope was passed and there was no trauma noted.    Impression:  Normal EGD status post 50 French Myles dilation    Recommendations:  Regular diet as tolerated.  Follow-up in my office as needed.  If she has recurrent dysphasia I would recommend esophageal manometry.      Joaquin Story MD     Date: 8/22/2019    Time: 11:22 AM      .

## 2019-08-22 NOTE — CONSULTS
GI CONSULT  NOTE:    Referring Provider:    Helena Mas, VALENTINO  [unfilled]    Chief complaint: <principal problem not specified>    Subjective .     History of present illness:      Anila Spencer is a 78 y.o. female who presents today for Procedure(s):  ESOPHAGOGASTRODUODENOSCOPY for the indications listed below.     The updated Patient Profile was reviewed prior to the procedure, in conjunction with the Physical Exam, including medical conditions, surgical procedures, medications, allergies, family history and social history.     Pre-operatively, I reviewed the indication(s) for the procedure, the risks of the procedure [including but not limited to: unexpected bleeding possibly requiring hospitalization and/or unplanned repeat procedures, perforation possibly requiring surgical treatment, missed lesions and complications of sedation/MAC (also explained by anesthesia staff)].     I have evaluated the patient for risks associated with the planned anesthesia and the procedure to be performed and find the patient an acceptable candidate for IV sedation.    Multiple opportunities were provided for any questions or concerns, and all questions were answered satisfactorily before any anesthesia was administered. We will proceed with the planned procedure.    Past Medical History:  Past Medical History:   Diagnosis Date   • Allergic rhinitis    • Basal cell carcinoma (BCC) of face    • Biliary dyskinesia    • Cardiomegaly    • Cervical spinal stenosis    • Chronic constipation    • Chronic insomnia    • DDD (degenerative disc disease), cervical     C-spine, T-spine, L-Spine   • Diplopia     Dr. Laird   • Emphysema of lung (CMS/HCC)    • Fatty liver    • Fracture of multiple ribs 07/2018    Left 4th-8th   • GERD (gastroesophageal reflux disease)    • Hypertension    • Hypothyroidism    • Kidney stone    • Mitral regurgitation     Dr. Agrawal   • Nasal fracture 07/2018   • Osteoarthritis    • Osteoporosis     h/o tx  with Fosamax but quit in 2012 due to fears of complications   • Prediabetes    • Rotator cuff tear     right   • Seizure disorder (CMS/HCC)     Dr. Shannon Bennett   • Sick sinus syndrome (CMS/HCC)     Dr. Agrawal   • Squamous cell skin cancer, face    • Traumatic brain injury (CMS/[facility])     from fall   • Urinary incontinence        Past Surgical History:  Past Surgical History:   Procedure Laterality Date   • BILATERAL SALPINGO OOPHORECTOMY      for benign cysts   • CARDIAC CATHETERIZATION  08/25/2009    25% LAD. L Cx luminal irregularities. Normal L main   • CARDIAC CATHETERIZATION  03/29/2017    25% LAD. 10-20% LCX. ER 65%. Dr. Agrawal.    • CATARACT EXTRACTION  2006   • ENDOSCOPY  05/17/2017    Normal, Dr. Gutierrez   • KNEE ARTHROSCOPY Left 1992   • OTHER SURGICAL HISTORY Right 07/2017    Right eye, YAG Capsuloyomy , Dr. Lemus   • PACEMAKER IMPLANTATION  2009   • ROTATOR CUFF REPAIR Right     Dr. Goldberg   • TONSILLECTOMY     • TOTAL HIP ARTHROPLASTY Bilateral        Social History:  Social History     Tobacco Use   • Smoking status: Never Smoker   • Smokeless tobacco: Never Used   Substance Use Topics   • Alcohol use: No     Frequency: Never   • Drug use: No       Family History:  Family History   Problem Relation Age of Onset   • Heart disease Mother    • Pancreatic cancer Mother    • Prostate cancer Father    • Breast cancer Sister    • Pancreatic cancer Brother    • Colon cancer Brother    • Stroke Maternal Grandmother        Medications:  Medications Prior to Admission   Medication Sig Dispense Refill Last Dose   • Calcium Carbonate-Vitamin D3 (CALCIUM 600-D) 600-400 MG-UNIT tablet Daily.   Taking   • DEXILANT 60 MG capsule TAKE ONE CAPSULE BY MOUTH ONCE DAILY (Patient taking differently: TAKE ONE CAPSULE BY MOUTH EVERY NIGHT) 90 capsule 1 Taking   • escitalopram (LEXAPRO) 10 MG tablet Daily.   Taking   • glucosamine-chondroitin (GLUCOSAMINE CHONDR COMPLEX) 500-400 MG capsule capsule 1 capsule Every 12 (Twelve)  "Hours.   Taking   • levothyroxine (SYNTHROID, LEVOTHROID) 50 MCG tablet TAKE ONE TABLET BY MOUTH EVERY MORNING 30 MINUTES BEFORE EATING OR TAKING ANY OTHER MEDICATION 90 tablet 1    • losartan (COZAAR) 25 MG tablet Daily.   Taking   • metoprolol tartrate (LOPRESSOR) 50 MG tablet Every 12 (Twelve) Hours.   Taking   • Multiple Vitamins-Minerals (MULTI VITAMIN/MINERALS) tablet MULTI VITAMIN/MINERALS TABS   Taking   • nystatin (MYCOSTATIN) 844735 UNIT/GM powder NYSTATIN 541207 UNIT/GM POWD   Taking   • zonisamide (ZONEGRAN) 100 MG capsule ZONISAMIDE 100 MG CAPS EVERY NIGHT   Taking       Scheduled Meds:  sodium chloride 3 mL Intravenous Q12H     Continuous Infusions:  sodium chloride 9 mL/hr     PRN Meds:.sodium chloride  •  sodium chloride    ALLERGIES:  Patient has no known allergies.    ROS:  The following systems were reviewed and negative; none  Constitution:  No fevers, chills, no unintentional weight loss  Skin: no rash, no jaundice  Eyes:  No blurry vision, no eye pain  HENT:  No change in hearing or smell  Resp:  No dyspnea or cough  CV:  No chest pain or palpitations  :  No dysuria, hematuria  Musculoskeletal:  No leg cramps or arthralgias  Neuro:  No tremor, no numbness  Psych:  No depression or confsuion    Objective     Vital Signs:   Vitals:    08/15/19 1425   Weight: 90.7 kg (200 lb)   Height: 162.6 cm (64\")       Physical Exam: Abdomen nondistended good bowel sounds nontender      General Appearance:    Awake and alert, in no acute distress   Head:    Normocephalic, without obvious abnormality, atraumatic   Throat:   No oral lesions, no thrush, oral mucosa moist   Lungs:     respirations regular, even and unlabored   Skin:   No rash, no jaundice       Results Review:  Lab Results (last 24 hours)     ** No results found for the last 24 hours. **          Imaging Results (last 24 hours)     ** No results found for the last 24 hours. **           I reviewed the patient's labs and imaging.    ASSESSMENT " AND PLAN: 78-year-old woman with dysphagia.  Plan EGD today.      Active Problems:    * No active hospital problems. *       Procedure(s):  ESOPHAGOGASTRODUODENOSCOPY      I discussed the patients findings and my recommendations with the patient.    Joaquin Story MD  08/22/19  9:17 AM

## 2019-08-22 NOTE — ANESTHESIA PREPROCEDURE EVALUATION
Anesthesia Evaluation     Nursing notes reviewed   NPO Solid Status: > 8 hours  NPO Liquid Status: > 8 hours           Airway   No difficulty expected  Dental      Pulmonary    (+) COPD, shortness of breath,   Cardiovascular     (+) hypertension, valvular problems/murmurs,     ROS comment: Cardiomegaly.  pacemaker    Neuro/Psych  (+) seizures,     GI/Hepatic/Renal/Endo    (+) obesity,  GERD,  liver disease fatty liver disease, hypothyroidism,     ROS Comment: bmi 35.3    Musculoskeletal     Abdominal   (+) obese,    Substance History      OB/GYN          Other   (+) arthritis   history of cancer                    Anesthesia Plan    ASA 3     MAC     Anesthetic plan, all risks, benefits, and alternatives have been provided, discussed and informed consent has been obtained with: patient.

## 2019-09-17 ENCOUNTER — OFFICE VISIT (OUTPATIENT)
Dept: ORTHOPEDIC SURGERY | Facility: CLINIC | Age: 79
End: 2019-09-17

## 2019-09-17 VITALS
HEART RATE: 73 BPM | DIASTOLIC BLOOD PRESSURE: 81 MMHG | SYSTOLIC BLOOD PRESSURE: 137 MMHG | BODY MASS INDEX: 34.72 KG/M2 | WEIGHT: 203.4 LBS | HEIGHT: 64 IN

## 2019-09-17 DIAGNOSIS — M43.17 SPONDYLOLISTHESIS OF LUMBOSACRAL REGION: Primary | ICD-10-CM

## 2019-09-17 PROCEDURE — 99204 OFFICE O/P NEW MOD 45 MIN: CPT | Performed by: ORTHOPAEDIC SURGERY

## 2019-09-17 RX ORDER — LORATADINE 10 MG/1
10 TABLET ORAL DAILY
COMMUNITY
End: 2019-11-05

## 2019-09-17 NOTE — PROGRESS NOTES
[facility] New patient    Patient Name and Age: Anila Spencer         78 y.o.    Chief Complaint :  Chief Complaint   Patient presents with   • Lumbar Spine - Pain     X several months. Did have a fall on Sunday, blacked out after standing from sitting position, hit face on floor, cut mouth. Did not seek treatment at that time, head hurts and now front of right shoulder hurts.       History of Present Illness: Patient is a pleasant 78 years old white female who is here for the first time patient is a complaining of intractable axial lower back pain with radiation of pain to her buttocks and difficulty walking the patient was diagnosed with heart block patient underwent a plantation of a pacemaker in 2009 the patient blacked out recently and she fell the patient has been seen by Dr. Agrawal for evaluation of her heart the patient is going to have a complete work-up with Dr. Agrawal and due to having back pain patient had a CT scan of lumbar spine referred here for further evaluation and treatment.  Patient stated to me her pain is getting better with lying down aggravated with doing activity, walking and standing the patient denies any bowel and bladder problem.    Vitals:  Vitals:    09/17/19 1405   BP: 137/81   Pulse: 73       Patient's Family and Social History:  Family History   Problem Relation Age of Onset   • Heart disease Mother    • Pancreatic cancer Mother    • Prostate cancer Father    • Breast cancer Sister    • Pancreatic cancer Brother    • Colon cancer Brother    • Stroke Maternal Grandmother      Social History     Socioeconomic History   • Marital status:      Spouse name: Not on file   • Number of children: Not on file   • Years of education: Not on file   • Highest education level: Not on file   Tobacco Use   • Smoking status: Never Smoker   • Smokeless tobacco: Never Used   Substance and Sexual Activity   • Alcohol use: No     Frequency: Never   • Drug use: No   • Sexual  activity: Defer       Patients Medical and Surgical History:  Past Medical History:   Diagnosis Date   • Allergic rhinitis    • Basal cell carcinoma (BCC) of face    • Biliary dyskinesia    • Cardiomegaly    • Cervical spinal stenosis    • Chronic constipation    • Chronic insomnia    • DDD (degenerative disc disease), cervical     C-spine, T-spine, L-Spine   • Diplopia     Dr. Laird   • Emphysema of lung (CMS/HCC)    • Fatty liver    • Fracture of multiple ribs 07/2018    Left 4th-8th   • GERD (gastroesophageal reflux disease)    • Hypertension    • Hypothyroidism    • Kidney stone    • Mitral regurgitation     Dr. Agrawal   • Nasal fracture 07/2018   • Osteoarthritis    • Osteoporosis     h/o tx with Fosamax but quit in 2012 due to fears of complications   • Prediabetes    • Rotator cuff tear     right   • Seizure disorder (CMS/HCC)     Dr. Shannon Bennett   • Sick sinus syndrome (CMS/HCC)     Dr. Agrawal   • Squamous cell skin cancer, face    • Traumatic brain injury (CMS/HCC)     from fall   • Urinary incontinence      Past Surgical History:   Procedure Laterality Date   • BILATERAL SALPINGO OOPHORECTOMY      for benign cysts   • CARDIAC CATHETERIZATION  08/25/2009    25% LAD. L Cx luminal irregularities. Normal L main   • CARDIAC CATHETERIZATION  03/29/2017    25% LAD. 10-20% LCX. ER 65%. Dr. Agrawal.    • CATARACT EXTRACTION  2006   • ENDOSCOPY  05/17/2017    Normal, Dr. Gutierrez   • ENDOSCOPY N/A 8/22/2019    Procedure: ESOPHAGOGASTRODUODENOSCOPY WITH DILATATION (BOUGIE #[id number]);  Surgeon: Joaquin Story MD;  Location: [facility] ENDOSCOPY;  Service: Gastroenterology   • KNEE ARTHROSCOPY Left 1992   • OTHER SURGICAL HISTORY Right 07/2017    Right eye, YAG Capsuloyomy , Dr. Lemus   • PACEMAKER IMPLANTATION  2009   • ROTATOR CUFF REPAIR Right     Dr. Goldberg   • TONSILLECTOMY     • TOTAL HIP ARTHROPLASTY Bilateral          Review of Systems   Constitutional: Negative for activity change and diaphoresis.   HENT: Negative  for congestion, mouth sores and voice change.    Eyes: Negative for discharge, redness and visual disturbance.   Respiratory: Negative for cough, chest tightness and shortness of breath.    Cardiovascular: Positive for palpitations and leg swelling.   Gastrointestinal: Negative for abdominal distention, nausea and vomiting.   Endocrine: Negative for cold intolerance, polydipsia and polyphagia.   Genitourinary: Negative for difficulty urinating, frequency and urgency.   Musculoskeletal: Positive for back pain and myalgias.   Skin: Negative for color change, pallor and rash.   Neurological: Positive for dizziness, syncope and light-headedness.   Hematological: Negative for adenopathy. Bruises/bleeds easily.   Psychiatric/Behavioral: Negative for agitation and confusion.       Physical Exam   Constitutional: She is oriented to person, place, and time. She appears well-developed and well-nourished.   HENT:   Head: Normocephalic.   Nose: Nose normal.   Eyes: Conjunctivae and EOM are normal.   Multiple bruised area in her face and forehead   Neck: Normal range of motion. Neck supple.   Cardiovascular: Normal rate.   History of blackouts in the past underwent implantation of pacemaker   Pulmonary/Chest: Effort normal and breath sounds normal. No respiratory distress. She exhibits no tenderness.   Abdominal: Bowel sounds are normal. She exhibits no distension and no mass.   Musculoskeletal: She exhibits tenderness.   Neurological: She is alert and oriented to person, place, and time. She displays normal reflexes. No cranial nerve deficit.   Skin: Skin is warm and dry. Capillary refill takes more than 3 seconds.   Psychiatric: Her behavior is normal. Thought content normal.   Nursing note and vitals reviewed.    Back Exam     Tenderness   The patient is experiencing tenderness in the lumbar.    Range of Motion   Extension:  30 abnormal   Flexion:  50 abnormal   Lateral bend right:  50 abnormal   Lateral bend left:  50  abnormal   Rotation right:  20 abnormal   Rotation left:  30 abnormal     Muscle Strength   The patient has normal back strength.    Tests   Straight leg raise right: negative  Straight leg raise left: negative    Reflexes   Patellar: normal  Achilles: normal  Biceps: normal  Babinski's sign: normal     Other   Toe walk: normal  Heel walk: normal  Gait: abnormal               MEDICAL DECISION MAKING    Imaging: CT scan of the lumbar spine is demonstrating a spondylolytic spondylolisthesis of L5-S1 profound facet arthropathy central and foraminal stenosis.    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 with facet arthropathy of L5-S1    Plan: I will dispense patient to proceed with pain management, lumbar epidural block and facet block of L5-S1 see this patient in my office next 3 months    Orders Placed This Encounter   Procedures   • Ambulatory Referral to Physical Therapy Evaluate and treat     Referral Priority:   Routine     Referral Type:   Therapy     Referral Reason:   Specialty Services Required     Requested Specialty:   Physical Therapy     Number of Visits Requested:   1   • Ambulatory Referral to Pain Management     Referral Priority:   Routine     Referral Type:   Pain Management     Referral Reason:   Specialty Services Required     Referred to Provider:   Christiano Langford MD     Requested Specialty:   Pain Medicine     Number of Visits Requested:   1                 Jeanne Rowell MD  09/17/19  4:04 PM

## 2019-09-18 ENCOUNTER — TELEPHONE (OUTPATIENT)
Dept: CARDIOLOGY | Facility: CLINIC | Age: 79
End: 2019-09-18

## 2019-09-20 ENCOUNTER — HOSPITAL ENCOUNTER (OUTPATIENT)
Dept: OTHER | Facility: HOSPITAL | Age: 79
Discharge: HOME | End: 2019-09-20

## 2019-09-20 DIAGNOSIS — Z00.6 EXAMINATION FOR NORMAL COMPARISON OR CONTROL IN CLINICAL RESEARCH: ICD-10-CM

## 2019-09-20 NOTE — TELEPHONE ENCOUNTER
Sunday night having using the bathroom she stood up and passed out has been feeling fine but doesn't know if she should be seen  I told her she needs to be seen

## 2019-09-25 ENCOUNTER — OFFICE VISIT (OUTPATIENT)
Dept: CARDIOLOGY | Facility: CLINIC | Age: 79
End: 2019-09-25

## 2019-09-25 VITALS
DIASTOLIC BLOOD PRESSURE: 80 MMHG | HEART RATE: 68 BPM | WEIGHT: 205 LBS | SYSTOLIC BLOOD PRESSURE: 132 MMHG | BODY MASS INDEX: 35 KG/M2 | HEIGHT: 64 IN

## 2019-09-25 DIAGNOSIS — I34.0 MITRAL VALVE INSUFFICIENCY, UNSPECIFIED ETIOLOGY: ICD-10-CM

## 2019-09-25 DIAGNOSIS — R55 SYNCOPE AND COLLAPSE: Primary | ICD-10-CM

## 2019-09-25 DIAGNOSIS — R07.89 CHEST PAIN, ATYPICAL: ICD-10-CM

## 2019-09-25 DIAGNOSIS — I10 ESSENTIAL HYPERTENSION: ICD-10-CM

## 2019-09-25 DIAGNOSIS — Z95.0 PRESENCE OF CARDIAC PACEMAKER: ICD-10-CM

## 2019-09-25 DIAGNOSIS — I51.7 CARDIOMEGALY: ICD-10-CM

## 2019-09-25 PROBLEM — R32 URINARY INCONTINENCE: Status: RESOLVED | Noted: 2019-04-25 | Resolved: 2019-09-25

## 2019-09-25 PROBLEM — K82.8 BILIARY DYSKINESIA: Status: RESOLVED | Noted: 2017-06-08 | Resolved: 2019-09-25

## 2019-09-25 PROBLEM — K76.0 FATTY LIVER: Status: RESOLVED | Noted: 2017-06-06 | Resolved: 2019-09-25

## 2019-09-25 PROBLEM — M51.34 DEGENERATION OF INTERVERTEBRAL DISC OF THORACIC REGION: Status: RESOLVED | Noted: 2017-03-18 | Resolved: 2019-09-25

## 2019-09-25 PROBLEM — M51.369 DDD (DEGENERATIVE DISC DISEASE), LUMBAR: Status: RESOLVED | Noted: 2019-07-25 | Resolved: 2019-09-25

## 2019-09-25 PROBLEM — R13.10 DYSPHAGIA: Status: RESOLVED | Noted: 2019-04-25 | Resolved: 2019-09-25

## 2019-09-25 PROBLEM — R06.02 SHORTNESS OF BREATH: Status: RESOLVED | Noted: 2019-05-08 | Resolved: 2019-09-25

## 2019-09-25 PROBLEM — R94.39 ABNORMAL CARDIOVASCULAR STRESS TEST: Status: RESOLVED | Noted: 2017-03-21 | Resolved: 2019-09-25

## 2019-09-25 PROBLEM — M51.36 DDD (DEGENERATIVE DISC DISEASE), LUMBAR: Status: RESOLVED | Noted: 2019-07-25 | Resolved: 2019-09-25

## 2019-09-25 PROCEDURE — 93000 ELECTROCARDIOGRAM COMPLETE: CPT | Performed by: INTERNAL MEDICINE

## 2019-09-25 PROCEDURE — 99214 OFFICE O/P EST MOD 30 MIN: CPT | Performed by: INTERNAL MEDICINE

## 2019-09-25 NOTE — PROGRESS NOTES
Date of Office Visit: 2019  Encounter Provider: Dr. Adam Agrawal  Place of Service: [facility] CARDIOLOGY [place]  Patient Name: Anila Spencer  :1940  Helena Mas APRN    Chief Complaint   Patient presents with   • Chest Pain     4 month follow up /stress test   • Syncope   • Hypertension     History of Present Illness    I am pleased to see Mrs. Anila Spencer in my office today as a followup.    As you know, patient is 78-year-old white female whose past medical history significant for hypertension, sick sinus syndrome, tachybradycardia syndrome, history of syncope, who came today for followup.    Previously patient had repeated episodes of syncope. She had extensive workup which showed that she had underlying sick sinus syndrome. She underwent permanent P pacemaker implantation. Since then patient symptom of syncope have completely resolved. Previously, patient was also evaluated with cardiac catheterization because of her symptoms of angina pectoris and she was noted to have no significant coronary artery disease. Her previous CAT scan of chest was also negative for pulmonary embolism and she had pulmonary function test which was negative for any significant pulmonary airway disease. It was noted that her symptoms of chest pain at that time was probably related to gastroesophageal reflux disease and she was appropriately treated.    Since the previous visit, patient came today for unscheduled visit.  About 2 weeks ago patient had episode of syncope.  Patient reports that early morning she went to restroom and when she stood up she fell down.  She did not have any recollection.  She probably lost the consciousness.  Patient did not have any prodromal symptoms.  Patient denies any dizziness and lightheadedness before that.  Patient denies any prodromal symptoms of palpitation or chest pain.  Patient complain of occasional dizziness now.  She had significant impact to her face and had  bruising but she did not go to the hospital.  Patient is not having any active chest pain.  Patient denies any palpitation.    EKG showed atrial paced rhythm.  No ST changes.    Patient had an episode of syncope.  I am very concerned about that.  I strongly believe that it could be due to her underlying seizure as she did not have any prodromal symptoms.  Possibility of vasovagal/neurocardiogenic syncope may be there.  I would proceed with tilt table test.  I would also proceed with echocardiogram.  In May 2019, patient had stress test which was negative.  Pacemaker would be interrogated in 2 weeks.  Past Medical History:   Diagnosis Date   • Abnormal cardiovascular stress test 3/21/2017   • Allergic rhinitis    • Basal cell carcinoma (BCC) of face    • Biliary dyskinesia    • Cardiomegaly    • Cervical spinal stenosis    • Chronic constipation    • Chronic insomnia    • DDD (degenerative disc disease), cervical     C-spine, T-spine, L-Spine   • DDD (degenerative disc disease), lumbar 7/25/2019   • Degeneration of intervertebral disc of thoracic region 3/18/2017   • Diplopia     Dr. Laird   • Dysphagia 4/25/2019   • Emphysema of lung (CMS/HCC)    • Fatty liver    • Fracture of multiple ribs 07/2018    Left 4th-8th   • Gastroesophageal reflux disease 12/4/2012   • GERD (gastroesophageal reflux disease)    • Hematuria 12/29/2014   • Hypertension    • Hypothyroidism    • Insomnia 3/7/2016   • Kidney stone    • Mitral regurgitation     Dr. Agrawal   • Nasal fracture 07/2018   • Osteoarthritis    • Osteoporosis     h/o tx with Fosamax but quit in 2012 due to fears of complications   • Prediabetes    • Rotator cuff tear     right   • Seizure disorder (CMS/HCC)     Dr. Shannon Bennett   • Shortness of breath 5/8/2019   • Sick sinus syndrome (CMS/HCC)     Dr. Agrawal   • Spinal stenosis of cervical region 2/11/2015   • Squamous cell skin cancer, face    • Traumatic brain injury (CMS/HCC)     from fall   • Urinary incontinence           Past Surgical History:   Procedure Laterality Date   • BILATERAL SALPINGO OOPHORECTOMY      for benign cysts   • CARDIAC CATHETERIZATION  08/25/2009    25% LAD. L Cx luminal irregularities. Normal L main   • CARDIAC CATHETERIZATION  03/29/2017    25% LAD. 10-20% LCX. ER 65%. Dr. Agrawal.    • CATARACT EXTRACTION  2006   • ENDOSCOPY  05/17/2017    Normal, Dr. Gutierrez   • ENDOSCOPY N/A 8/22/2019    Procedure: ESOPHAGOGASTRODUODENOSCOPY WITH DILATATION (BOUGIE #[id number]);  Surgeon: Joaquin Story MD;  Location: [facility] ENDOSCOPY;  Service: Gastroenterology   • INSERT / REPLACE / REMOVE PACEMAKER     • KNEE ARTHROSCOPY Left 1992   • OTHER SURGICAL HISTORY Right 07/2017    Right eye, YAG Capsuloyomy , Dr. Lemus   • PACEMAKER IMPLANTATION  2009   • ROTATOR CUFF REPAIR Right     Dr. Goldberg   • TONSILLECTOMY     • TOTAL HIP ARTHROPLASTY Bilateral            Current Outpatient Medications:   •  Calcium Carbonate-Vitamin D3 (CALCIUM 600-D) 600-400 MG-UNIT tablet, Daily., Disp: , Rfl:   •  DEXILANT 60 MG capsule, TAKE ONE CAPSULE BY MOUTH ONCE DAILY (Patient taking differently: TAKE ONE CAPSULE BY MOUTH EVERY NIGHT), Disp: 90 capsule, Rfl: 1  •  escitalopram (LEXAPRO) 10 MG tablet, Daily., Disp: , Rfl:   •  glucosamine-chondroitin (GLUCOSAMINE CHONDR COMPLEX) 500-400 MG capsule capsule, 1 capsule Every 12 (Twelve) Hours., Disp: , Rfl:   •  levothyroxine (SYNTHROID, LEVOTHROID) 50 MCG tablet, TAKE ONE TABLET BY MOUTH EVERY MORNING 30 MINUTES BEFORE EATING OR TAKING ANY OTHER MEDICATION, Disp: 90 tablet, Rfl: 1  •  loratadine (CLARITIN) 10 MG tablet, Take 10 mg by mouth Daily., Disp: , Rfl:   •  losartan (COZAAR) 25 MG tablet, Take 25 mg by mouth Daily., Disp: , Rfl:   •  metoprolol tartrate (LOPRESSOR) 50 MG tablet, Take 50 mg by mouth Every 12 (Twelve) Hours., Disp: , Rfl:   •  Multiple Vitamins-Minerals (MULTI VITAMIN/MINERALS) tablet, MULTI VITAMIN/MINERALS TABS, Disp: , Rfl:   •  nystatin (MYCOSTATIN) 261163  "UNIT/GM powder, NYSTATIN 274187 UNIT/GM POWD, as needed, Disp: , Rfl:   •  zonisamide (ZONEGRAN) 100 MG capsule, 3 capsules daily, Disp: , Rfl:       Social History     Socioeconomic History   • Marital status:      Spouse name: Not on file   • Number of children: Not on file   • Years of education: Not on file   • Highest education level: Not on file   Tobacco Use   • Smoking status: Never Smoker   • Smokeless tobacco: Never Used   Substance and Sexual Activity   • Alcohol use: No     Frequency: Never   • Drug use: No   • Sexual activity: Defer         Review of Systems   Constitution: Negative for chills and fever.   HENT: Negative for ear discharge and nosebleeds.    Eyes: Negative for discharge and redness.   Cardiovascular: Positive for syncope. Negative for chest pain, orthopnea, palpitations and paroxysmal nocturnal dyspnea.   Respiratory: Positive for shortness of breath. Negative for cough and wheezing.    Endocrine: Negative for heat intolerance.   Skin: Negative for rash.   Musculoskeletal: Positive for arthritis. Negative for myalgias.   Gastrointestinal: Negative for abdominal pain, melena, nausea and vomiting.   Genitourinary: Negative for dysuria and hematuria.   Neurological: Positive for dizziness. Negative for light-headedness, numbness and tremors.   Psychiatric/Behavioral: Negative for depression. The patient is not nervous/anxious.        Procedures      ECG 12 Lead  Date/Time: 9/25/2019 3:39 PM  Performed by: Adam Agrawal MD  Authorized by: Adam Agrawal MD   Comparison: compared with previous ECG   Comparison to previous ECG: No change from previous EKG  Rhythm: paced    Clinical impression: abnormal EKG            ECG 12 Lead    (Results Pending)           Objective:    /80   Pulse 68   Ht 162.6 cm (64\")   Wt 93 kg (205 lb)   BMI 35.19 kg/m²         Physical Exam   Constitutional: She is oriented to person, place, and time. She appears well-developed and well-nourished. "   HENT:   Head: Normocephalic and atraumatic.   Eyes: No scleral icterus.   Neck: No thyromegaly present.   Cardiovascular: Normal rate, regular rhythm and normal heart sounds. Exam reveals no gallop and no friction rub.   No murmur heard.  Pulmonary/Chest: Effort normal and breath sounds normal. No respiratory distress. She has no wheezes. She has no rales.   Abdominal: There is no tenderness.   Musculoskeletal: She exhibits no edema.   Lymphadenopathy:     She has no cervical adenopathy.   Neurological: She is alert and oriented to person, place, and time.   Skin: No rash noted. No erythema.   Psychiatric: She has a normal mood and affect.           Assessment:       Diagnosis Plan   1. Syncope and collapse  ECG 12 Lead    Tilt Table    Adult Transthoracic Echo Complete W/ Cont if Necessary Per Protocol   2. Cardiomegaly  ECG 12 Lead    Tilt Table    Adult Transthoracic Echo Complete W/ Cont if Necessary Per Protocol   3. Essential hypertension  ECG 12 Lead    Tilt Table    Adult Transthoracic Echo Complete W/ Cont if Necessary Per Protocol   4. Mitral valve insufficiency, unspecified etiology  ECG 12 Lead    Tilt Table    Adult Transthoracic Echo Complete W/ Cont if Necessary Per Protocol   5. Presence of cardiac pacemaker  ECG 12 Lead    Tilt Table    Adult Transthoracic Echo Complete W/ Cont if Necessary Per Protocol   6. Chest pain, atypical  ECG 12 Lead    Tilt Table    Adult Transthoracic Echo Complete W/ Cont if Necessary Per Protocol            Plan:       At this stage, I would recommend to proceed with echocardiogram and tilt table test.  Patient is advised to monitor the blood pressure at home.  I advised strongly to the patient that she should call our neurologist for possible underlying seizure disorder aggravation.  Pacemaker would be interrogated next week

## 2019-09-26 ENCOUNTER — TREATMENT (OUTPATIENT)
Dept: PHYSICAL THERAPY | Facility: CLINIC | Age: 79
End: 2019-09-26

## 2019-09-26 DIAGNOSIS — G89.29 CHRONIC BILATERAL LOW BACK PAIN WITH BILATERAL SCIATICA: Primary | ICD-10-CM

## 2019-09-26 DIAGNOSIS — M43.17 SPONDYLOLISTHESIS AT L5-S1 LEVEL: ICD-10-CM

## 2019-09-26 DIAGNOSIS — M48.061 SPINAL STENOSIS OF LUMBAR REGION, UNSPECIFIED WHETHER NEUROGENIC CLAUDICATION PRESENT: ICD-10-CM

## 2019-09-26 DIAGNOSIS — M54.41 CHRONIC BILATERAL LOW BACK PAIN WITH BILATERAL SCIATICA: Primary | ICD-10-CM

## 2019-09-26 DIAGNOSIS — M54.42 CHRONIC BILATERAL LOW BACK PAIN WITH BILATERAL SCIATICA: Primary | ICD-10-CM

## 2019-09-26 PROCEDURE — 97140 MANUAL THERAPY 1/> REGIONS: CPT | Performed by: PHYSICAL THERAPIST

## 2019-09-26 PROCEDURE — 97110 THERAPEUTIC EXERCISES: CPT | Performed by: PHYSICAL THERAPIST

## 2019-09-26 PROCEDURE — 97116 GAIT TRAINING THERAPY: CPT | Performed by: PHYSICAL THERAPIST

## 2019-09-26 PROCEDURE — 97161 PT EVAL LOW COMPLEX 20 MIN: CPT | Performed by: PHYSICAL THERAPIST

## 2019-09-26 NOTE — PROGRESS NOTES
Physical Therapy Initial Evaluation and Plan of Care    Patient: Anila Spencer   : 1940  Diagnosis/ICD-10 Code:  Chronic bilateral low back pain with bilateral sciatica [M54.42, M54.41, G89.29]  Referring practitioner: Jeanne Rowell MD  Date of Initial Visit: 2019  Today's Date: 2019  Patient seen for 1 sessions             Subjective Evaluation    History of Present Illness  Mechanism of injury: Patient is an 78 y.o. WF who presents with worsening chronic B back pain.  Recent diagnosis of L5-S1 spondylolisthesis and stenosis.  Pain increases with standing and walking and decreases with sitting.  She is only able to perform 15 min washing dishes or cooking before needing to sit until pain subsides.  She has difficulty stepping up curbs due to L knee pain and weakness L leg.  She has difficulty rolling in bed so sleeps in recliner.  Discussed rollator walker to aid walking tolerance and hospital bed with rails for better bed mobility.  Patient attends aquatic exercise class 2-3x's per week.      Quality of life: good    Pain  Current pain ratin  At best pain ratin  At worst pain rating: 10  Location: B low back    Patient Goals  Patient goals for therapy: decreased pain and return to sport/leisure activities  Patient goal: increased standing, walking tolerance           Objective  Oswestry indicates 54% impairment with limitations in pain, walking, standing, walking.  Lumbar ROM is WFL except extension.  MMT:  4/5 L hip flexion, knee extension.  Slow gait speed, short step length, scuffs feet, using cane.  Repeated sit to stand = 6 reps in 30 sec using arm rests.      Assessment & Plan     Assessment  Impairments: abnormal or restricted ROM, activity intolerance and lacks appropriate home exercise program  Barriers to therapy: obesity, OA B knees, B KELECHI  Prognosis: good  Functional Limitations: carrying objects, lifting, walking, uncomfortable because of pain, moving in bed and  standing  Goals  Plan Goals: Patient independent with HEP in 2 weeks  Tolerate 10 min Nustep in 2 weeks  Able to roll in bed and step up curb in 2 weeks  Improve function as evidenced by Oswestry score of 20% or less by discharge (54% impairment initially)  Able to ambulate with rollator 6 min by discharge  Perform 10 reps of repeated sit to stand without arms in 30 seconds by discharge       Plan  Planned modality interventions: thermotherapy (hydrocollator packs)  Other planned modality interventions: PACEMAKER CONTRAINDICATIONS for modalities  Planned therapy interventions: transfer training, strengthening, manual therapy, abdominal trunk stabilization, flexibility, gait training and home exercise program  Treatment plan discussed with: patient  Plan details: Plan to continue PT 2x's per week up to 30 visits if needed.        Timed:         Manual Therapy:    15     mins  31994;     Therapeutic Exercise:    15     mins  32059;     Neuromuscular Mike:        mins  91467;    Therapeutic Activity:          mins  39033;     Gait Training:      15     mins  41203;     Ultrasound:          mins  56064;    Ionto                                   mins   19733  Self-care  ____ mins 06536    Un-Timed:  Electrical Stimulation:         mins  04503 ( );  Dry Needling          mins self-pay  Traction          mins 01410  Low Eval     15     Mins  72036  Mod Eval          Mins  16397  High Eval                            Mins  52204  Canal repositioning _____ mins  24231        Timed Treatment:   45   mins   Total Treatment:     60   mins    PT SIGNATURE: Robin A Sprigler, PT   DATE TREATMENT INITIATED: 9/26/2019    Initial Certification  Certification Period: 12/25/2019  I certify that the therapy services are furnished while this patient is under my care.  The services outlined above are required by this patient, and will be reviewed every 90 days.     PHYSICIAN: Jeanne Rowell,  MD ___________________________________________________________________________________________________     DATE: _________________________________________________________________________________________________________________________________    Please sign and return via fax to  .. Thank you, [facility].

## 2019-10-07 ENCOUNTER — TREATMENT (OUTPATIENT)
Dept: PHYSICAL THERAPY | Facility: CLINIC | Age: 79
End: 2019-10-07

## 2019-10-07 DIAGNOSIS — M48.061 SPINAL STENOSIS OF LUMBAR REGION, UNSPECIFIED WHETHER NEUROGENIC CLAUDICATION PRESENT: ICD-10-CM

## 2019-10-07 DIAGNOSIS — G89.29 CHRONIC BILATERAL LOW BACK PAIN WITH BILATERAL SCIATICA: Primary | ICD-10-CM

## 2019-10-07 DIAGNOSIS — M54.42 CHRONIC BILATERAL LOW BACK PAIN WITH BILATERAL SCIATICA: Primary | ICD-10-CM

## 2019-10-07 DIAGNOSIS — M43.17 SPONDYLOLISTHESIS AT L5-S1 LEVEL: ICD-10-CM

## 2019-10-07 DIAGNOSIS — M54.41 CHRONIC BILATERAL LOW BACK PAIN WITH BILATERAL SCIATICA: Primary | ICD-10-CM

## 2019-10-07 PROCEDURE — 97110 THERAPEUTIC EXERCISES: CPT | Performed by: PHYSICAL THERAPIST

## 2019-10-07 NOTE — PROGRESS NOTES
Physical Therapy Daily Progress Note      Patient: Anila Spencer   : 1940  Diagnosis/ICD-10 Code:  Chronic bilateral low back pain with bilateral sciatica [M54.42, M54.41, G89.29]  Referring practitioner: Jeanne Rowell MD  Date of Initial Visit: Type: THERAPY  Noted: 2019  Today's Date: 10/7/2019  Patient seen for 2 sessions             Subjective Pt says that she had an injection the other day and she felt better for a while.  However, the injection seems to have worn off and now her back is hurting.    Objective   See Exercise, Manual, and Modality Logs for complete treatment.       Assessment/Plan  Pt tolerated progression and addition of strengthening exercises well today.  She does need some assistance with sit to stand and with standing up from leg press machine.      Progress per Plan of Care           Timed:         Manual Therapy:         mins  81248;     Therapeutic Exercise:    38     mins  53755;     Neuromuscular Mike:        mins  24077;    Therapeutic Activity:          mins  68108;     Gait Training:           mins  29498;     Ultrasound:          mins  18943;    Ionto                                   mins   86108  Self Care                            mins   39937      Un-Timed:  Electrical Stimulation:         mins  27474 ( );  Dry Needling          mins self-pay  Traction          mins 49418      Timed Treatment:   38   mins   Total Treatment:     48   mins    Fredy Henry PTA  Physical Therapist

## 2019-10-09 ENCOUNTER — TREATMENT (OUTPATIENT)
Dept: PHYSICAL THERAPY | Facility: CLINIC | Age: 79
End: 2019-10-09

## 2019-10-09 DIAGNOSIS — M43.17 SPONDYLOLISTHESIS AT L5-S1 LEVEL: ICD-10-CM

## 2019-10-09 DIAGNOSIS — M48.061 SPINAL STENOSIS OF LUMBAR REGION, UNSPECIFIED WHETHER NEUROGENIC CLAUDICATION PRESENT: ICD-10-CM

## 2019-10-09 DIAGNOSIS — G89.29 CHRONIC BILATERAL LOW BACK PAIN WITH BILATERAL SCIATICA: Primary | ICD-10-CM

## 2019-10-09 DIAGNOSIS — M54.41 CHRONIC BILATERAL LOW BACK PAIN WITH BILATERAL SCIATICA: Primary | ICD-10-CM

## 2019-10-09 DIAGNOSIS — M54.42 CHRONIC BILATERAL LOW BACK PAIN WITH BILATERAL SCIATICA: Primary | ICD-10-CM

## 2019-10-09 PROCEDURE — 97110 THERAPEUTIC EXERCISES: CPT | Performed by: PHYSICAL THERAPIST

## 2019-10-09 NOTE — PROGRESS NOTES
Physical Therapy Daily Progress Note      Patient: Anila Spencer   : 1940  Diagnosis/ICD-10 Code:  Chronic bilateral low back pain with bilateral sciatica [M54.42, M54.41, G89.29]  Referring practitioner: Jeanne Rowell MD  Date of Initial Visit: Type: THERAPY  Noted: 2019  Today's Date: 10/9/2019  Patient seen for 3 sessions             Subjective Pt does not have anything new to report this afternoon.      Objective   See Exercise, Manual, and Modality Logs for complete treatment.       Assessment/Plan  Overall, pt tolerated progression and addition of strengthening exercises.  However, she did have c/o fatigue and slight difficulty with balance exercises and trying to perform sit to stand without using arm rests.  Lack of confidence is big factor.    Progress per Plan of Care           Timed:         Manual Therapy:         mins  17916;     Therapeutic Exercise:    38     mins  29325;     Neuromuscular Mike:    6    mins  05875;    Therapeutic Activity:          mins  38163;     Gait Training:           mins  36095;     Ultrasound:          mins  68018;    Ionto                                   mins   27525  Self Care                            mins   77157      Un-Timed:  Electrical Stimulation:         mins  69381 ( );  Dry Needling          mins self-pay  Traction          mins 99484      Timed Treatment:   44   mins   Total Treatment:     60   mins    Fredy Henry PTA  Physical Therapist

## 2019-10-14 ENCOUNTER — TREATMENT (OUTPATIENT)
Dept: PHYSICAL THERAPY | Facility: CLINIC | Age: 79
End: 2019-10-14

## 2019-10-14 DIAGNOSIS — M54.41 CHRONIC BILATERAL LOW BACK PAIN WITH BILATERAL SCIATICA: Primary | ICD-10-CM

## 2019-10-14 DIAGNOSIS — G89.29 CHRONIC BILATERAL LOW BACK PAIN WITH BILATERAL SCIATICA: Primary | ICD-10-CM

## 2019-10-14 DIAGNOSIS — M48.061 SPINAL STENOSIS OF LUMBAR REGION, UNSPECIFIED WHETHER NEUROGENIC CLAUDICATION PRESENT: ICD-10-CM

## 2019-10-14 DIAGNOSIS — M54.42 CHRONIC BILATERAL LOW BACK PAIN WITH BILATERAL SCIATICA: Primary | ICD-10-CM

## 2019-10-14 DIAGNOSIS — M43.17 SPONDYLOLISTHESIS AT L5-S1 LEVEL: ICD-10-CM

## 2019-10-14 PROCEDURE — 97110 THERAPEUTIC EXERCISES: CPT | Performed by: PHYSICAL THERAPIST

## 2019-10-14 NOTE — PROGRESS NOTES
Physical Therapy Daily Progress Note    Patient: Anila Spencer   : 1940  Diagnosis/ICD-10 Code:  Chronic bilateral low back pain with bilateral sciatica [M54.42, M54.41, G89.29]  Referring practitioner: Jeanne Rowell MD  Date of Initial Visit: Type: THERAPY  Noted: 2019  Today's Date: 10/14/2019  Patient seen for 4 sessions             Subjective Patient reports she's just come from the therapy pool at the  and requests  prior to exercise.  Still sleeping in recliner.  Has had one epidural, second one scheduled for 10/18.    Objective   See Exercise, Manual, and Modality Logs for complete treatment. Ambulates with cane with antalgia, slow speed. Able to walk 6 min with rollator walker before needing rest break.  VCs to avoid scuffing heels when walking. Able to perform 10 repeated sit to  30 sec using arms.  Moist heat prior to exercise. Improved sit to stand transfer.    Assessment/Plan  Patient independent with HEP in 2 weeks - PARTIALLY MET  Tolerate 10 min Nustep in 2 weeks - MET  Able to roll in bed and step up curb in 2 weeks - NOT MET  Improve function as evidenced by Oswestry score of 20% or less by discharge (54% impairment initially) - NOT MET  Able to ambulate with rollator 6 min by discharge - MET  Perform 10 reps of repeated sit to stand without arms in 30 seconds by discharge -  NOT MET    Progress per Plan of Care           Timed:         Manual Therapy:         mins  81334;     Therapeutic Exercise:    38     mins  43685;     Neuromuscular Mike:    6    mins  08497;    Therapeutic Activity:          mins  90188;     Gait Training:           mins  19981;     Ultrasound:          mins  85325;    Ionto                                   mins   95172  Self Care                            mins   94212      Un-Timed:  Electrical Stimulation:         mins  26194 ( );  Dry Needling          mins self-pay  Traction          mins 98817  Low Eval          Mins  27685  Mod  Eval          Mins  88313  High Eval                            Mins  40796  Canalith Repos                   mins  95146    Timed Treatment:   44   mins   Total Treatment:     60   mins    Robin A Sprigler, PT  Physical Therapist

## 2019-10-15 ENCOUNTER — HOSPITAL ENCOUNTER (OUTPATIENT)
Dept: CARDIOLOGY | Facility: HOSPITAL | Age: 79
Discharge: HOME | End: 2019-10-15

## 2019-10-15 ENCOUNTER — HOSPITAL ENCOUNTER (OUTPATIENT)
Dept: CARDIOLOGY | Facility: HOSPITAL | Age: 79
Discharge: HOME | End: 2019-10-15
Admitting: INTERNAL MEDICINE

## 2019-10-15 VITALS
TEMPERATURE: 97.7 F | BODY MASS INDEX: 36.09 KG/M2 | HEIGHT: 63 IN | RESPIRATION RATE: 17 BRPM | SYSTOLIC BLOOD PRESSURE: 155 MMHG | WEIGHT: 203.71 LBS | DIASTOLIC BLOOD PRESSURE: 76 MMHG | OXYGEN SATURATION: 96 % | HEART RATE: 62 BPM

## 2019-10-15 VITALS
SYSTOLIC BLOOD PRESSURE: 155 MMHG | DIASTOLIC BLOOD PRESSURE: 76 MMHG | BODY MASS INDEX: 36.65 KG/M2 | HEIGHT: 65 IN | WEIGHT: 220 LBS

## 2019-10-15 DIAGNOSIS — R07.89 CHEST PAIN, ATYPICAL: ICD-10-CM

## 2019-10-15 DIAGNOSIS — I34.0 MITRAL VALVE INSUFFICIENCY, UNSPECIFIED ETIOLOGY: ICD-10-CM

## 2019-10-15 DIAGNOSIS — Z95.0 PRESENCE OF CARDIAC PACEMAKER: ICD-10-CM

## 2019-10-15 DIAGNOSIS — R55 SYNCOPE AND COLLAPSE: ICD-10-CM

## 2019-10-15 DIAGNOSIS — I51.7 CARDIOMEGALY: ICD-10-CM

## 2019-10-15 DIAGNOSIS — I10 ESSENTIAL HYPERTENSION: ICD-10-CM

## 2019-10-15 LAB — BH CV TILT AGENT USED: NORMAL

## 2019-10-15 PROCEDURE — 93306 TTE W/DOPPLER COMPLETE: CPT

## 2019-10-15 PROCEDURE — 93660 TILT TABLE EVALUATION: CPT

## 2019-10-15 PROCEDURE — 93660 TILT TABLE EVALUATION: CPT | Performed by: INTERNAL MEDICINE

## 2019-10-15 PROCEDURE — 63710000001 NITROGLYCERIN 0.4 MG/SPRAY SOLUTION 4.9 G BOTTLE: Performed by: INTERNAL MEDICINE

## 2019-10-15 PROCEDURE — A9270 NON-COVERED ITEM OR SERVICE: HCPCS | Performed by: INTERNAL MEDICINE

## 2019-10-15 RX ORDER — ZONISAMIDE 100 MG/1
300 CAPSULE ORAL EVERY EVENING
COMMUNITY
Start: 2019-09-30

## 2019-10-15 RX ORDER — NITROGLYCERIN 400 UG/1
1 SPRAY ORAL
Status: COMPLETED | OUTPATIENT
Start: 2019-10-15 | End: 2019-10-15

## 2019-10-15 RX ADMIN — NITROGLYCERIN 1 SPRAY: 400 SPRAY ORAL at 12:34

## 2019-10-16 ENCOUNTER — TELEPHONE (OUTPATIENT)
Dept: CARDIOLOGY | Facility: CLINIC | Age: 79
End: 2019-10-16

## 2019-10-16 ENCOUNTER — OFFICE VISIT (OUTPATIENT)
Dept: CARDIOLOGY | Facility: CLINIC | Age: 79
End: 2019-10-16

## 2019-10-16 ENCOUNTER — CLINICAL SUPPORT NO REQUIREMENTS (OUTPATIENT)
Dept: CARDIOLOGY | Facility: CLINIC | Age: 79
End: 2019-10-16

## 2019-10-16 ENCOUNTER — TREATMENT (OUTPATIENT)
Dept: PHYSICAL THERAPY | Facility: CLINIC | Age: 79
End: 2019-10-16

## 2019-10-16 VITALS
HEIGHT: 63 IN | BODY MASS INDEX: 37.03 KG/M2 | DIASTOLIC BLOOD PRESSURE: 76 MMHG | SYSTOLIC BLOOD PRESSURE: 136 MMHG | HEART RATE: 80 BPM | WEIGHT: 209 LBS

## 2019-10-16 DIAGNOSIS — M54.42 CHRONIC BILATERAL LOW BACK PAIN WITH BILATERAL SCIATICA: Primary | ICD-10-CM

## 2019-10-16 DIAGNOSIS — I49.5 SICK SINUS SYNDROME (HCC): ICD-10-CM

## 2019-10-16 DIAGNOSIS — I10 ESSENTIAL HYPERTENSION: Primary | ICD-10-CM

## 2019-10-16 DIAGNOSIS — G89.29 CHRONIC BILATERAL LOW BACK PAIN WITH BILATERAL SCIATICA: Primary | ICD-10-CM

## 2019-10-16 DIAGNOSIS — R55 SYNCOPE AND COLLAPSE: ICD-10-CM

## 2019-10-16 DIAGNOSIS — M54.41 CHRONIC BILATERAL LOW BACK PAIN WITH BILATERAL SCIATICA: Primary | ICD-10-CM

## 2019-10-16 DIAGNOSIS — M48.061 SPINAL STENOSIS OF LUMBAR REGION, UNSPECIFIED WHETHER NEUROGENIC CLAUDICATION PRESENT: ICD-10-CM

## 2019-10-16 DIAGNOSIS — Z95.0 PRESENCE OF CARDIAC PACEMAKER: ICD-10-CM

## 2019-10-16 DIAGNOSIS — M43.17 SPONDYLOLISTHESIS AT L5-S1 LEVEL: ICD-10-CM

## 2019-10-16 PROCEDURE — 93280 PM DEVICE PROGR EVAL DUAL: CPT | Performed by: NURSE PRACTITIONER

## 2019-10-16 PROCEDURE — 97110 THERAPEUTIC EXERCISES: CPT | Performed by: PHYSICAL THERAPIST

## 2019-10-16 PROCEDURE — 99213 OFFICE O/P EST LOW 20 MIN: CPT | Performed by: NURSE PRACTITIONER

## 2019-10-16 PROCEDURE — 93000 ELECTROCARDIOGRAM COMPLETE: CPT | Performed by: NURSE PRACTITIONER

## 2019-10-16 PROCEDURE — 97112 NEUROMUSCULAR REEDUCATION: CPT | Performed by: PHYSICAL THERAPIST

## 2019-10-16 RX ORDER — MIDODRINE HYDROCHLORIDE 5 MG/1
TABLET ORAL
COMMUNITY
Start: 2019-10-15 | End: 2019-11-05 | Stop reason: SDUPTHER

## 2019-10-16 NOTE — PROGRESS NOTES
"     Physical Therapy Daily Progress Note      Patient: Anila Spencer   : 1940  Diagnosis/ICD-10 Code:  Chronic bilateral low back pain with bilateral sciatica [M54.42, M54.41, G89.29]  Referring practitioner: Jeanne Rowell MD  Date of Initial Visit: Type: THERAPY  Noted: 2019  Today's Date: 10/16/2019  Patient seen for 5 sessions         Anila Spencer reports: she is doing much better today however yesterday she was completely \"wiped out\" by her tilt table test and EKG testing at the hospital.     Objective   See Exercise, Manual, and Modality Logs for complete treatment.     Assessment/Plan  Pt. Tolerated treatment well today stating she felt good throughout however fatigue was noted at the end of treatment and Pt. Was instructed rest would appropriate this evening to recoup from today and yesterday.    Progress per Plan of Care           Timed:         Manual Therapy:         mins  61410;     Therapeutic Exercise:    18     mins  94243;     Neuromuscular Mike:    11    mins  53860;    Therapeutic Activity:          mins  21392;     Gait Training:           mins  31582;     Ultrasound:          mins  92441;    Ionto                                   mins   20186  Self Care                            mins   27515  Canalith Repos                   mins  4209    Un-Timed:  Electrical Stimulation:         mins  47693 ( );  Dry Needling          mins self-pay  Traction          mins 26000  Low Eval          Mins  93981  Mod Eval          Mins  05219  High Eval                            Mins  90256    Timed Treatment:   29   mins   Total Treatment:     39   mins    Phyllis Ross PTA  Physical Therapist Assistant License #[id number]      "

## 2019-10-19 LAB
BH CV ECHO MEAS - ACS: 1.8 CM
BH CV ECHO MEAS - AO MAX PG (FULL): 1.5 MMHG
BH CV ECHO MEAS - AO MAX PG: 4.5 MMHG
BH CV ECHO MEAS - AO MEAN PG (FULL): 0.92 MMHG
BH CV ECHO MEAS - AO MEAN PG: 2.5 MMHG
BH CV ECHO MEAS - AO ROOT AREA (BSA CORRECTED): 1.5
BH CV ECHO MEAS - AO ROOT AREA: 7 CM^2
BH CV ECHO MEAS - AO ROOT DIAM: 3 CM
BH CV ECHO MEAS - AO V2 MAX: 106.1 CM/SEC
BH CV ECHO MEAS - AO V2 MEAN: 73.3 CM/SEC
BH CV ECHO MEAS - AO V2 VTI: 25 CM
BH CV ECHO MEAS - ASC AORTA: 3 CM
BH CV ECHO MEAS - AVA(I,A): 2.5 CM^2
BH CV ECHO MEAS - AVA(I,D): 2.5 CM^2
BH CV ECHO MEAS - AVA(V,A): 2.5 CM^2
BH CV ECHO MEAS - AVA(V,D): 2.5 CM^2
BH CV ECHO MEAS - BSA(HAYCOCK): 2.1 M^2
BH CV ECHO MEAS - BSA: 1.9 M^2
BH CV ECHO MEAS - BZI_BMI: 36 KILOGRAMS/M^2
BH CV ECHO MEAS - BZI_METRIC_HEIGHT: 160 CM
BH CV ECHO MEAS - BZI_METRIC_WEIGHT: 92.1 KG
BH CV ECHO MEAS - EDV(CUBED): 101.8 ML
BH CV ECHO MEAS - EDV(MOD-SP4): 65.5 ML
BH CV ECHO MEAS - EDV(TEICH): 100.8 ML
BH CV ECHO MEAS - EF(CUBED): 73.4 %
BH CV ECHO MEAS - EF(MOD-BP): 68 %
BH CV ECHO MEAS - EF(MOD-SP4): 68.2 %
BH CV ECHO MEAS - EF(TEICH): 65.2 %
BH CV ECHO MEAS - ESV(CUBED): 27.1 ML
BH CV ECHO MEAS - ESV(MOD-SP4): 20.8 ML
BH CV ECHO MEAS - ESV(TEICH): 35.1 ML
BH CV ECHO MEAS - FS: 35.7 %
BH CV ECHO MEAS - IVS/LVPW: 1.1
BH CV ECHO MEAS - IVSD: 1.3 CM
BH CV ECHO MEAS - LA DIMENSION(2D): 2.7 CM
BH CV ECHO MEAS - LV DIASTOLIC VOL/BSA (35-75): 33.7 ML/M^2
BH CV ECHO MEAS - LV MASS(C)D: 219 GRAMS
BH CV ECHO MEAS - LV MASS(C)DI: 112.6 GRAMS/M^2
BH CV ECHO MEAS - LV MAX PG: 3 MMHG
BH CV ECHO MEAS - LV MEAN PG: 1.5 MMHG
BH CV ECHO MEAS - LV SYSTOLIC VOL/BSA (12-30): 10.7 ML/M^2
BH CV ECHO MEAS - LV V1 MAX: 86.9 CM/SEC
BH CV ECHO MEAS - LV V1 MEAN: 57.9 CM/SEC
BH CV ECHO MEAS - LV V1 VTI: 20.6 CM
BH CV ECHO MEAS - LVIDD: 4.7 CM
BH CV ECHO MEAS - LVIDS: 3 CM
BH CV ECHO MEAS - LVOT AREA: 3 CM^2
BH CV ECHO MEAS - LVOT DIAM: 2 CM
BH CV ECHO MEAS - LVPWD: 1.2 CM
BH CV ECHO MEAS - MV A MAX VEL: 100.9 CM/SEC
BH CV ECHO MEAS - MV DEC SLOPE: 324 CM/SEC^2
BH CV ECHO MEAS - MV DEC TIME: 0.21 SEC
BH CV ECHO MEAS - MV E MAX VEL: 67 CM/SEC
BH CV ECHO MEAS - MV E/A: 0.66
BH CV ECHO MEAS - MV MAX PG: 5.9 MMHG
BH CV ECHO MEAS - MV MEAN PG: 1.8 MMHG
BH CV ECHO MEAS - MV V2 MAX: 121.1 CM/SEC
BH CV ECHO MEAS - MV V2 MEAN: 62.8 CM/SEC
BH CV ECHO MEAS - MV V2 VTI: 28.7 CM
BH CV ECHO MEAS - MVA(VTI): 2.2 CM^2
BH CV ECHO MEAS - PA ACC TIME: 0.16 SEC
BH CV ECHO MEAS - PA MAX PG (FULL): 0 MMHG
BH CV ECHO MEAS - PA MAX PG: 2.6 MMHG
BH CV ECHO MEAS - PA MEAN PG (FULL): 0.45 MMHG
BH CV ECHO MEAS - PA MEAN PG: 1.7 MMHG
BH CV ECHO MEAS - PA PR(ACCEL): 6.3 MMHG
BH CV ECHO MEAS - PA V2 MAX: 81.3 CM/SEC
BH CV ECHO MEAS - PA V2 MEAN: 62.1 CM/SEC
BH CV ECHO MEAS - PA V2 VTI: 20.6 CM
BH CV ECHO MEAS - PULM A REVS DUR: 0.15 SEC
BH CV ECHO MEAS - PULM A REVS VEL: 26.2 CM/SEC
BH CV ECHO MEAS - PULM DIAS VEL: 35.7 CM/SEC
BH CV ECHO MEAS - PULM S/D: 2
BH CV ECHO MEAS - PULM SYS VEL: 69.9 CM/SEC
BH CV ECHO MEAS - RAP SYSTOLE: 3 MMHG
BH CV ECHO MEAS - RV MAX PG: 2.6 MMHG
BH CV ECHO MEAS - RV MEAN PG: 1.2 MMHG
BH CV ECHO MEAS - RV V1 MAX: 81.3 CM/SEC
BH CV ECHO MEAS - RV V1 MEAN: 51.7 CM/SEC
BH CV ECHO MEAS - RV V1 VTI: 18.6 CM
BH CV ECHO MEAS - RVDD: 1.7 CM
BH CV ECHO MEAS - RVSP: 29.7 MMHG
BH CV ECHO MEAS - SI(AO): 90.5 ML/M^2
BH CV ECHO MEAS - SI(CUBED): 38.4 ML/M^2
BH CV ECHO MEAS - SI(LVOT): 32 ML/M^2
BH CV ECHO MEAS - SI(MOD-SP4): 23 ML/M^2
BH CV ECHO MEAS - SI(TEICH): 33.8 ML/M^2
BH CV ECHO MEAS - SV(AO): 176.1 ML
BH CV ECHO MEAS - SV(CUBED): 74.8 ML
BH CV ECHO MEAS - SV(LVOT): 62.4 ML
BH CV ECHO MEAS - SV(MOD-SP4): 44.7 ML
BH CV ECHO MEAS - SV(TEICH): 65.8 ML
BH CV ECHO MEAS - TR MAX VEL: 258.5 CM/SEC

## 2019-10-19 PROCEDURE — 93306 TTE W/DOPPLER COMPLETE: CPT | Performed by: INTERNAL MEDICINE

## 2019-10-21 ENCOUNTER — TREATMENT (OUTPATIENT)
Dept: PHYSICAL THERAPY | Facility: CLINIC | Age: 79
End: 2019-10-21

## 2019-10-21 ENCOUNTER — TELEPHONE (OUTPATIENT)
Dept: CARDIOLOGY | Facility: CLINIC | Age: 79
End: 2019-10-21

## 2019-10-21 DIAGNOSIS — G89.29 CHRONIC BILATERAL LOW BACK PAIN WITH BILATERAL SCIATICA: Primary | ICD-10-CM

## 2019-10-21 DIAGNOSIS — M54.42 CHRONIC BILATERAL LOW BACK PAIN WITH BILATERAL SCIATICA: Primary | ICD-10-CM

## 2019-10-21 DIAGNOSIS — M48.061 SPINAL STENOSIS OF LUMBAR REGION, UNSPECIFIED WHETHER NEUROGENIC CLAUDICATION PRESENT: ICD-10-CM

## 2019-10-21 DIAGNOSIS — M54.41 CHRONIC BILATERAL LOW BACK PAIN WITH BILATERAL SCIATICA: Primary | ICD-10-CM

## 2019-10-21 DIAGNOSIS — M43.17 SPONDYLOLISTHESIS AT L5-S1 LEVEL: ICD-10-CM

## 2019-10-21 PROCEDURE — 97110 THERAPEUTIC EXERCISES: CPT | Performed by: PHYSICAL THERAPIST

## 2019-10-21 PROCEDURE — 97112 NEUROMUSCULAR REEDUCATION: CPT | Performed by: PHYSICAL THERAPIST

## 2019-10-21 NOTE — PROGRESS NOTES
Physical Therapy Daily Progress Note    Patient: Anila Spencer   : 1940  Diagnosis/ICD-10 Code:  Chronic bilateral low back pain with bilateral sciatica [M54.42, M54.41, G89.29]  Referring practitioner: Jeanne Rowell MD  Date of Initial Visit: Type: THERAPY  Noted: 2019  Today's Date: 10/21/2019  Patient seen for 6 sessions             Subjective Patient reports she's doing well today.  Her 2nd epidural on 10/18 went much better than her first one.  The 3rd epidural is scheduled , then she follows up with the nurse practitioner on  to see how her pain is doing.    Objective   See Exercise, Manual, and Modality Logs for complete treatment.        Assessment/Plan  Patient independent with HEP in 2 weeks - MET  Tolerate 10 min Nustep in 2 weeks - MET  Able to roll in bed and step up curb in 2 weeks - NOT MET  Improve function as evidenced by Oswestry score of 20% or less by discharge (54% impairment initially) - NOT MET  Able to ambulate with rollator 6 min by discharge - NOT MET  Perform 10 reps of repeated sit to stand without arms in 30 seconds by discharge - NOT MET    Progress per Plan of Care  Add step up NEXT           Timed:         Manual Therapy:         mins  07208;     Therapeutic Exercise:    18     mins  22396;     Neuromuscular Mike:    12    mins  97001;    Therapeutic Activity:          mins  84186;     Gait Training:           mins  14422;     Ultrasound:          mins  40727;    Ionto                                   mins   95936  Self Care                            mins   07468      Un-Timed:  Electrical Stimulation:         mins  80058 ( );  Dry Needling          mins self-pay  Traction          mins 66668  Low Eval          Mins  28224  Mod Eval          Mins  12489  High Eval                            Mins  89422  Canalith Repos                   mins  49831    Timed Treatment:   30   mins   Total Treatment:     30 mins    Robin A Sprigler, PT  Physical  Therapist

## 2019-10-22 PROBLEM — R55 SYNCOPE AND COLLAPSE: Status: ACTIVE | Noted: 2019-10-22

## 2019-10-22 PROBLEM — I49.5 SICK SINUS SYNDROME (HCC): Status: ACTIVE | Noted: 2019-10-22

## 2019-10-22 NOTE — PROGRESS NOTES
Cardiology Office Follow Up Visit      Primary Care Provider:  Helena Mas APRN    Reason for f/u:     syncope      Subjective     CC:    No recurrent syncope, chest pain or dyspnea      History of Present Illness       Anila Spencer is a 78 y.o. female. Her past medical history significant for hypertension, sick sinus syndrome, tachybradycardia syndrome, history of syncope.     Previously patient had repeated episodes of syncope. She had extensive workup which showed that she had underlying sick sinus syndrome. She underwent permanent  pacemaker implantation. Since then patient symptoms of syncope had completely resolved. Previously, patient was also evaluated with cardiac catheterization because of her symptoms of angina pectoris and she was noted to have no significant coronary artery disease. Her previous CAT scan of chest was also negative for pulmonary embolism and she had pulmonary function test which was negative for any significant pulmonary airway disease. It was noted that her symptoms of chest pain at that time was probably related to gastroesophageal reflux disease and she was appropriately treated.     About 4 weeks ago patient had episode of syncope.  Patient reports that early in the  morning she went to restroom and when she stood up she fell down.  She did not have any recollection.  She probably lost the consciousness.  Patient did not have any prodromal symptoms.  Patient denies any dizziness and lightheadedness before that.  Patient denies any prodromal symptoms of palpitation or chest pain.    She had significant impact to her face and had bruising but she did not go to the hospital.  Patient is not having any active chest pain.  Patient denies any palpitation.     PM interrogation did not show any arrhythmias.     Past Medical History:   Diagnosis Date   • Abnormal cardiovascular stress test 3/21/2017   • Allergic rhinitis    • Anxiety    • Basal cell carcinoma (BCC) of face    • Biliary  dyskinesia    • Cardiomegaly    • Cataract    • Cervical spinal stenosis    • Chronic constipation    • Chronic insomnia    • DDD (degenerative disc disease), cervical     C-spine, T-spine, L-Spine   • DDD (degenerative disc disease), lumbar 7/25/2019   • Degeneration of intervertebral disc of thoracic region 3/18/2017   • Diplopia     Dr. Laird   • Dysphagia 4/25/2019   • Emphysema of lung (CMS/[facility])    • Fatty liver    • Fracture of multiple ribs 07/2018    Left 4th-8th   • Gastroesophageal reflux disease 12/4/2012   • GERD (gastroesophageal reflux disease)    • Hematuria 12/29/2014   • Hypothyroidism    • Injury of back    • Insomnia 3/7/2016   • Kidney stone    • Nasal fracture 07/2018   • Osteoarthritis    • Osteoporosis     h/o tx with Fosamax but quit in 2012 due to fears of complications   • Prediabetes    • Rotator cuff tear     right   • Seizure disorder (CMS/[facility])     Dr. Shannon Bennett   • Shortness of breath 5/8/2019   • Sick sinus syndrome (CMS/[facility])     Dr. Agrawal   • Spinal stenosis of cervical region 2/11/2015   • Squamous cell skin cancer, face    • Traumatic brain injury (CMS/[facility])     from fall   • Urinary incontinence        Past Surgical History:   Procedure Laterality Date   • BILATERAL SALPINGO OOPHORECTOMY      for benign cysts   • CARDIAC CATHETERIZATION  08/25/2009    25% LAD. L Cx luminal irregularities. Normal L main   • CARDIAC CATHETERIZATION  03/29/2017    25% LAD. 10-20% LCX. ER 65%. Dr. Agrawal.    • CATARACT EXTRACTION  2006   • ENDOSCOPY  05/17/2017    Normal, Dr. Gutierrez   • ENDOSCOPY N/A 8/22/2019    Procedure: ESOPHAGOGASTRODUODENOSCOPY WITH DILATATION (BOUGIE #[id number]);  Surgeon: Joaquin Story MD;  Location: [facility] ENDOSCOPY;  Service: Gastroenterology   • INSERT / REPLACE / REMOVE PACEMAKER     • KNEE ARTHROSCOPY Left 1992   • OTHER SURGICAL HISTORY Right 07/2017    Right eye, YAG Capsuloyomy , Dr. Lemus   • PACEMAKER IMPLANTATION  2009   • ROTATOR CUFF REPAIR Right       Yusuf   • TONSILLECTOMY     • TOTAL HIP ARTHROPLASTY Bilateral          Current Outpatient Medications:   •  Calcium Carbonate-Vitamin D3 (CALCIUM 600-D) 600-400 MG-UNIT tablet, Take 2 capsules by mouth Daily., Disp: , Rfl:   •  DEXILANT 60 MG capsule, TAKE ONE CAPSULE BY MOUTH ONCE DAILY (Patient taking differently: TAKE ONE CAPSULE BY MOUTH EVERY NIGHT), Disp: 90 capsule, Rfl: 1  •  escitalopram (LEXAPRO) 10 MG tablet, Daily., Disp: , Rfl:   •  glucosamine-chondroitin (GLUCOSAMINE CHONDR COMPLEX) 500-400 MG capsule capsule, 1 capsule Every 12 (Twelve) Hours., Disp: , Rfl:   •  levothyroxine (SYNTHROID, LEVOTHROID) 50 MCG tablet, TAKE ONE TABLET BY MOUTH EVERY MORNING 30 MINUTES BEFORE EATING OR TAKING ANY OTHER MEDICATION, Disp: 90 tablet, Rfl: 1  •  loratadine (CLARITIN) 10 MG tablet, Take 10 mg by mouth Daily., Disp: , Rfl:   •  losartan (COZAAR) 25 MG tablet, Take 25 mg by mouth Daily., Disp: , Rfl:   •  metoprolol tartrate (LOPRESSOR) 50 MG tablet, Take 25 mg by mouth Every 12 (Twelve) Hours., Disp: , Rfl:   •  midodrine (PROAMATINE) 5 MG tablet, , Disp: , Rfl:   •  Multiple Vitamins-Minerals (MULTI VITAMIN/MINERALS) tablet, MULTI VITAMIN/MINERALS TABS, Disp: , Rfl:   •  zonisamide (ZONEGRAN) 100 MG capsule, TAKE FOUR CAPSULES BY MOUTH ONCE DAILY, Disp: , Rfl:     Social History     Socioeconomic History   • Marital status:      Spouse name: Not on file   • Number of children: Not on file   • Years of education: Not on file   • Highest education level: Not on file   Tobacco Use   • Smoking status: Never Smoker   • Smokeless tobacco: Never Used   Substance and Sexual Activity   • Alcohol use: No     Frequency: Never   • Drug use: No   • Sexual activity: Defer       Family History   Problem Relation Age of Onset   • Heart disease Mother    • Pancreatic cancer Mother    • Prostate cancer Father    • Breast cancer Sister    • Pancreatic cancer Brother    • Colon cancer Brother    • Stroke Maternal  "Grandmother        The following portions of the patient's history were reviewed and updated as appropriate: allergies, current medications, past family history, past medical history, past social history, past surgical history and problem list.    Review of Systems   Constitution: Negative for decreased appetite, diaphoresis and weakness.   HENT: Negative for congestion, hearing loss and nosebleeds.    Cardiovascular: Negative for chest pain, claudication, dyspnea on exertion, irregular heartbeat, leg swelling, near-syncope, orthopnea, palpitations, paroxysmal nocturnal dyspnea and syncope.   Respiratory: Negative for cough, shortness of breath and sleep disturbances due to breathing.    Endocrine: Negative for polyuria.   Hematologic/Lymphatic: Does not bruise/bleed easily.   Skin: Negative for itching and rash.   Musculoskeletal: Negative for back pain, muscle weakness and myalgias.   Gastrointestinal: Negative for abdominal pain, change in bowel habit and nausea.   Genitourinary: Negative for dysuria, flank pain, frequency and hesitancy.   Neurological: Negative for dizziness and tremors.   Psychiatric/Behavioral: Negative for altered mental status. The patient does not have insomnia.      /76   Pulse 80   Ht 160 cm (63\")   Wt 94.8 kg (209 lb)   BMI 37.02 kg/m² .  Objective     Physical Exam   Constitutional: She is oriented to person, place, and time. She appears well-developed and well-nourished. No distress.   HENT:   Head: Normocephalic and atraumatic.   Eyes: Pupils are equal, round, and reactive to light.   Neck: Normal range of motion. Neck supple. No JVD present.   Cardiovascular: Normal rate, regular rhythm, S1 normal, S2 normal, normal heart sounds and intact distal pulses.   No murmur heard.  Pulmonary/Chest: Effort normal and breath sounds normal.   Abdominal: Soft. Normal appearance. She exhibits no distension. There is no tenderness.   Musculoskeletal: Normal range of motion. She exhibits " no edema.   Neurological: She is alert and oriented to person, place, and time.   Skin: Skin is warm and dry.   Psychiatric: She has a normal mood and affect.           ECG 12 Lead  Date/Time: 10/16/2019 1:29 PM  Performed by: Zee Bunch APRN  Authorized by: Zee Bunch APRN   Rhythm: paced  Rate: normal  BPM: 80  Comments: Atrial paced intact ventricular conduction no ST or T wave abnormalities                In Office Device Interrogation:     DEVICE INTERROGATION:  IN OFFICE    DEVICE TYPE:   Dual-chamber pacemaker    :   Tronic    BATTERY:  Stable    TIME TO ELECTIVE REPLACEMENT INDICATORS:   16 months    CHARGE TIME:   Not applicable        LEAD DATA:    Atrial:   Paced mV, 440 ohms, 0.65 V@0.4 ms    Ventricular:     16 mV, 476 ohms, 0.75 V@0.4 ms    LV:      Atrial pacing percentage: 100 %    Ventricular pacing percentage: Less than 1 %      Arrhythmia Logbook Reviewed: No A. fib        Summary:    Stable Device Function    No significant arhythmia burden.     Battery status is stable.      NEXT IN OFFICE DEVICE CHECK DUE: 6 months    REMOTE DEVICE INTERROGATIONS: Not applicable      Diagnoses and all orders for this visit:    1. Essential hypertension (Primary)  Comments:  Stable on current medications  Orders:  -     ECG 12 Lead    2. Sick sinus syndrome (CMS/HCC)  Comments:  Resolved after pacemaker implant    3. Presence of cardiac pacemaker  Comments:  Stable device function    4. Syncope and collapse  Comments:  Has had no recurrent syncope, no associated arrhythmias           Plan:  No evidence of arrhythmia.  The patient had no recurrent syncopal episodes.  Continue current medications and treatment her Midrin has been refilled patient advised to contact office if she has new or worsening problems.

## 2019-10-25 ENCOUNTER — TREATMENT (OUTPATIENT)
Dept: PHYSICAL THERAPY | Facility: CLINIC | Age: 79
End: 2019-10-25

## 2019-10-25 DIAGNOSIS — M48.061 SPINAL STENOSIS OF LUMBAR REGION, UNSPECIFIED WHETHER NEUROGENIC CLAUDICATION PRESENT: ICD-10-CM

## 2019-10-25 DIAGNOSIS — M43.17 SPONDYLOLISTHESIS AT L5-S1 LEVEL: ICD-10-CM

## 2019-10-25 DIAGNOSIS — M54.41 CHRONIC BILATERAL LOW BACK PAIN WITH BILATERAL SCIATICA: Primary | ICD-10-CM

## 2019-10-25 DIAGNOSIS — G89.29 CHRONIC BILATERAL LOW BACK PAIN WITH BILATERAL SCIATICA: Primary | ICD-10-CM

## 2019-10-25 DIAGNOSIS — M54.42 CHRONIC BILATERAL LOW BACK PAIN WITH BILATERAL SCIATICA: Primary | ICD-10-CM

## 2019-10-25 PROCEDURE — 97110 THERAPEUTIC EXERCISES: CPT | Performed by: PHYSICAL THERAPIST

## 2019-10-25 PROCEDURE — 97112 NEUROMUSCULAR REEDUCATION: CPT | Performed by: PHYSICAL THERAPIST

## 2019-10-25 NOTE — PROGRESS NOTES
Physical Therapy Daily Progress Note      Patient: Anila Spencer   : 1940  Diagnosis/ICD-10 Code:  Chronic bilateral low back pain with bilateral sciatica [M54.42, M54.41, G89.29]  Referring practitioner: Jeanne Rowell MD  Date of Initial Visit: Type: THERAPY  Noted: 2019  Today's Date: 10/25/2019  Patient seen for 7 sessions         Anila Spencer reports: she is doing well today and would like to skip heat to start because her back is feeling good.    Objective   See Exercise, Manual, and Modality Logs for complete treatment.     Assessment/Plan   Pt. Continues to show improved ability from sit to stand and tolerates balance activities well. Pt. ambulates with Rolator for 6 minutes without complaints of fatigue and maintains upright posture well with VC's.    Progress per Plan of Care           Timed:         Manual Therapy:         mins  55161;     Therapeutic Exercise:    17     mins  95860;     Neuromuscular Mike:    18    mins  08419;    Therapeutic Activity:          mins  54809;     Gait Training:           mins  70742;     Ultrasound:          mins  17072;    Ionto                                   mins   99502  Self Care                            mins   10507  Canalith Repos                   mins  4209    Un-Timed:  Electrical Stimulation:         mins  71865 ( );  Dry Needling          mins self-pay  Traction          mins 52952  Low Eval          Mins  44194  Mod Eval          Mins  93871  High Eval                            Mins  92066    Timed Treatment:   35   mins   Total Treatment:     35   mins    Phyllis Ross PTA  Physical Therapist Assistant License #[id number]

## 2019-10-28 ENCOUNTER — TREATMENT (OUTPATIENT)
Dept: PHYSICAL THERAPY | Facility: CLINIC | Age: 79
End: 2019-10-28

## 2019-10-28 DIAGNOSIS — M48.061 SPINAL STENOSIS OF LUMBAR REGION, UNSPECIFIED WHETHER NEUROGENIC CLAUDICATION PRESENT: ICD-10-CM

## 2019-10-28 DIAGNOSIS — M43.17 SPONDYLOLISTHESIS AT L5-S1 LEVEL: ICD-10-CM

## 2019-10-28 DIAGNOSIS — M54.41 CHRONIC BILATERAL LOW BACK PAIN WITH BILATERAL SCIATICA: Primary | ICD-10-CM

## 2019-10-28 DIAGNOSIS — G89.29 CHRONIC BILATERAL LOW BACK PAIN WITH BILATERAL SCIATICA: Primary | ICD-10-CM

## 2019-10-28 DIAGNOSIS — M54.42 CHRONIC BILATERAL LOW BACK PAIN WITH BILATERAL SCIATICA: Primary | ICD-10-CM

## 2019-10-28 PROCEDURE — 97112 NEUROMUSCULAR REEDUCATION: CPT | Performed by: PHYSICAL THERAPIST

## 2019-10-28 PROCEDURE — 97110 THERAPEUTIC EXERCISES: CPT | Performed by: PHYSICAL THERAPIST

## 2019-10-28 NOTE — PROGRESS NOTES
"     Physical Therapy Daily Progress Note    Patient: Anila Spencer   : 1940  Diagnosis/ICD-10 Code:  Chronic bilateral low back pain with bilateral sciatica [M54.42, M54.41, G89.29]  Referring practitioner: Jeanne Rowell MD  Date of Initial Visit: Type: THERAPY  Noted: 2019  Today's Date: 10/28/2019  Patient seen for 8 sessions             Subjective Patient has her 3rd epidural on , scheduled for follow up with Dr. Rowell on .  She sees her PCP next week and plans to ask for prescription for rollator walker, recommended DR[employer] brand with seat and basket.      Objective   See Exercise, Manual, and Modality Logs for complete treatment. Added 4\" step up lateral B to help with navigating curbs.  Reassess Oswestry NEXT.      Assessment/Plan  Patient independent with HEP in 2 weeks - MET  Tolerate 10 min Nustep in 2 weeks - MET  Able to roll in bed and step up curb in 2 weeks - NOT MET  Improve function as evidenced by Oswestry score of 20% or less by discharge (54% impairment initially) - NOT MET  Able to ambulate with rollator 6 min by discharge - MET  Perform 10 reps of repeated sit to stand without arms in 30 seconds by discharge - NOT MET    Progress per Plan of Care           Timed:         Manual Therapy:         mins  49846;     Therapeutic Exercise:    15     mins  52551;     Neuromuscular Mike:    15    mins  17421;    Therapeutic Activity:          mins  95637;     Gait Training:           mins  12932;     Ultrasound:          mins  84531;    Ionto                                   mins   11755  Self Care                            mins   21129      Un-Timed:  Electrical Stimulation:         mins  79718 ( );  Dry Needling          mins self-pay  Traction          mins 62407  Low Eval          Mins  72845  Mod Eval          Mins  37584  High Eval                            Mins  94555  Canalith Repos                   mins  70164    Timed Treatment:   30   mins   Total Treatment: "     30   mins    Robin A Sprigler, PT  Physical Therapist

## 2019-10-31 ENCOUNTER — TREATMENT (OUTPATIENT)
Dept: PHYSICAL THERAPY | Facility: CLINIC | Age: 79
End: 2019-10-31

## 2019-10-31 DIAGNOSIS — M43.17 SPONDYLOLISTHESIS AT L5-S1 LEVEL: ICD-10-CM

## 2019-10-31 DIAGNOSIS — M54.42 CHRONIC BILATERAL LOW BACK PAIN WITH BILATERAL SCIATICA: Primary | ICD-10-CM

## 2019-10-31 DIAGNOSIS — M54.41 CHRONIC BILATERAL LOW BACK PAIN WITH BILATERAL SCIATICA: Primary | ICD-10-CM

## 2019-10-31 DIAGNOSIS — M48.061 SPINAL STENOSIS OF LUMBAR REGION, UNSPECIFIED WHETHER NEUROGENIC CLAUDICATION PRESENT: ICD-10-CM

## 2019-10-31 DIAGNOSIS — G89.29 CHRONIC BILATERAL LOW BACK PAIN WITH BILATERAL SCIATICA: Primary | ICD-10-CM

## 2019-10-31 PROCEDURE — 97110 THERAPEUTIC EXERCISES: CPT | Performed by: PHYSICAL THERAPIST

## 2019-10-31 PROCEDURE — 97530 THERAPEUTIC ACTIVITIES: CPT | Performed by: PHYSICAL THERAPIST

## 2019-10-31 NOTE — PROGRESS NOTES
Physical Therapy Daily Progress Note    Patient: Anila Spencer   : 1940  Diagnosis/ICD-10 Code:  Chronic bilateral low back pain with bilateral sciatica [M54.42, M54.41, G89.29]  Referring practitioner: Jeanne Rowell MD  Date of Initial Visit: Type: THERAPY  Noted: 2019  Today's Date: 10/31/2019  Patient seen for 9 sessions             Subjective last epidural scheduled tomorrow.  Sees PCP and pain management .  Patient voices readiness to decrease to once per week next week so she can get back to the [facility].  She was able to step up on her van running board yesterday so she can tell she's getting stronger.    Objective   See Exercise, Manual, and Modality Logs for complete treatment. Reassessed Oswestry today, indicates 38% impairment, improved from 54% initially.      Assessment/Plan  Patient independent with HEP in 2 weeks - MET  Tolerate 10 min Nustep in 2 weeks - MET  Able to roll in bed and step up curb in 2 weeks - NOT MET  Improve function as evidenced by Oswestry score of 20% or less by discharge (54% impairment initially) - NOT MET, 38% as of 10/31  Able to ambulate with rollator 6 min by discharge - MET  Perform 10 reps of repeated sit to stand without arms in 30 seconds by discharge - NOT MET, still needs to use arms    Progress per Plan of Care           Timed:         Manual Therapy:         mins  68171;     Therapeutic Exercise:    15     mins  41716;     Neuromuscular Mike:        mins  86582;    Therapeutic Activity:     15     mins  07376;     Gait Training:           mins  06372;     Ultrasound:          mins  76059;    Ionto                                   mins   97740  Self Care                            mins   12740      Un-Timed:  Electrical Stimulation:         mins  28162 ( );  Dry Needling          mins self-pay  Traction          mins 91747  Low Eval          Mins  46891  Mod Eval          Mins  60873  High Eval                            Mins   68975  [facility]                   mins  53577    Timed Treatment:   30   mins   Total Treatment:     30   mins    Robin A Sprigler, PT  Physical Therapist

## 2019-11-05 ENCOUNTER — OFFICE VISIT (OUTPATIENT)
Dept: FAMILY MEDICINE CLINIC | Facility: CLINIC | Age: 79
End: 2019-11-05

## 2019-11-05 VITALS
SYSTOLIC BLOOD PRESSURE: 164 MMHG | RESPIRATION RATE: 18 BRPM | TEMPERATURE: 97.6 F | HEART RATE: 67 BPM | OXYGEN SATURATION: 96 % | BODY MASS INDEX: 36.89 KG/M2 | HEIGHT: 63 IN | WEIGHT: 208.2 LBS | DIASTOLIC BLOOD PRESSURE: 80 MMHG

## 2019-11-05 DIAGNOSIS — R73.03 PREDIABETES: ICD-10-CM

## 2019-11-05 DIAGNOSIS — E03.9 ACQUIRED HYPOTHYROIDISM: ICD-10-CM

## 2019-11-05 DIAGNOSIS — Z74.09 MOBILITY IMPAIRED: ICD-10-CM

## 2019-11-05 DIAGNOSIS — I10 ESSENTIAL HYPERTENSION: Primary | ICD-10-CM

## 2019-11-05 DIAGNOSIS — S01.511S LIP LACERATION, SEQUELA: ICD-10-CM

## 2019-11-05 PROCEDURE — 99214 OFFICE O/P EST MOD 30 MIN: CPT | Performed by: NURSE PRACTITIONER

## 2019-11-05 RX ORDER — MIDODRINE HYDROCHLORIDE 5 MG/1
TABLET ORAL
Qty: 30 TABLET | Refills: 0 | Status: SHIPPED | OUTPATIENT
Start: 2019-11-05 | End: 2019-12-13 | Stop reason: SDUPTHER

## 2019-11-05 NOTE — PROGRESS NOTES
Chief Complaint   Patient presents with   • Hypothyroidism   • Prediabetes      Subjective     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 (3/18/2017), Diplopia, Dysphagia (4/25/2019), Emphysema of lung (CMS/[facility]), Fatty liver, Fracture of multiple ribs (07/2018), Gastroesophageal reflux disease (12/4/2012), GERD (gastroesophageal reflux disease), Hematuria (12/29/2014), Hypothyroidism, Injury of back, Insomnia (3/7/2016), Kidney stone, Nasal fracture (07/2018), Osteoarthritis, Osteoporosis, Prediabetes, Rotator cuff tear, Seizure disorder (CMS/[facility]), Shortness of breath (5/8/2019), Sick sinus syndrome (CMS/[facility]), Spinal stenosis of cervical region (2/11/2015), Squamous cell skin cancer, face, Traumatic brain injury (CMS/[facility]), and Urinary incontinence.    Hypothyroidism   This is a chronic problem. The current episode started more than 1 year ago. The problem occurs constantly. The problem has been unchanged. Associated symptoms include fatigue and weakness. Pertinent negatives include no abdominal pain, chest pain, coughing, diaphoresis, myalgias, nausea, numbness, rash or vomiting. Treatments tried: Synthroid.   Blood Sugar Problem   This is a chronic problem. The current episode started more than 1 year ago. The problem has been unchanged. Associated symptoms include fatigue and weakness. Pertinent negatives include no abdominal pain, chest pain, coughing, diaphoresis, myalgias, nausea, numbness, rash or vomiting.   Hypertension   This is a chronic problem. The current episode started more than 1 year ago. The problem has been waxing and waning  since onset. Pertinent negatives include no blurred vision, chest pain, palpitations or shortness of breath. Associated agents: Midodrine. Current antihypertension treatment includes beta blockers and angiotensin blockers. The current treatment provides moderate improvement.      Scar on lip:  She has a scar on her upper lip that is tender and makes it difficult to move the upper lip. She developed this scar after a fall and teeth went through the upper lip. Her dentist said that she might need to see a Plastic Surgeon about getting this repaired.    Difficulty walking:  She has been seeing physical therapy for her back pain and to work on leg strength. She is still weak and has to stop to rest while walking frequently. She has had falls in the past year. Her physical therapist recommended that she get a rollator walker.       PHQ-2 Depression Screening  Little interest or pleasure in doing things? 0   Feeling down, depressed, or hopeless? 0   PHQ-2 Total Score 0     No Known Allergies      Current Outpatient Medications:   •  Calcium Carbonate-Vitamin D3 (CALCIUM 600-D) 600-400 MG-UNIT tablet, Take 2 capsules by mouth Daily., Disp: , Rfl:   •  DEXILANT 60 MG capsule, TAKE ONE CAPSULE BY MOUTH ONCE DAILY (Patient taking differently: TAKE ONE CAPSULE BY MOUTH EVERY NIGHT), Disp: 90 capsule, Rfl: 1  •  escitalopram (LEXAPRO) 10 MG tablet, Daily., Disp: , Rfl:   •  glucosamine-chondroitin (GLUCOSAMINE CHONDR COMPLEX) 500-400 MG capsule capsule, 1 capsule Every 12 (Twelve) Hours., Disp: , Rfl:   •  levothyroxine (SYNTHROID, LEVOTHROID) 50 MCG tablet, TAKE ONE TABLET BY MOUTH EVERY MORNING 30 MINUTES BEFORE EATING OR TAKING ANY OTHER MEDICATION, Disp: 90 tablet, Rfl: 1  •  losartan (COZAAR) 25 MG tablet, Take 25 mg by mouth Daily., Disp: , Rfl:   •  metoprolol tartrate (LOPRESSOR) 50 MG tablet, Take 25 mg by mouth Every 12 (Twelve) Hours., Disp: , Rfl:   •  midodrine (PROAMATINE) 5 MG tablet, TAKE ONE TABLET BY MOUTH  ONCE DAILY, Disp: 30 tablet, Rfl: 0  •  Multiple Vitamins-Minerals (MULTI VITAMIN/MINERALS) tablet, MULTI VITAMIN/MINERALS TABS, Disp: , Rfl:   •  zonisamide (ZONEGRAN) 100 MG capsule, TAKE FOUR CAPSULES BY MOUTH ONCE DAILY, Disp: , Rfl:     Past Medical History:   Diagnosis Date   • Abnormal cardiovascular stress test 3/21/2017   • Allergic rhinitis    • Anxiety    • Basal cell carcinoma (BCC) of face    • Biliary dyskinesia    • Cardiomegaly    • Cataract    • Cervical spinal stenosis    • Chronic constipation    • Chronic insomnia    • DDD (degenerative disc disease), cervical     C-spine, T-spine, L-Spine   • DDD (degenerative disc disease), lumbar 7/25/2019   • Degeneration of intervertebral disc of thoracic region 3/18/2017   • Diplopia     Dr. Laird   • Dysphagia 4/25/2019   • Emphysema of lung (CMS/HCC)    • Fatty liver    • Fracture of multiple ribs 07/2018    Left 4th-8th   • Gastroesophageal reflux disease 12/4/2012   • GERD (gastroesophageal reflux disease)    • Hematuria 12/29/2014   • Hypothyroidism    • Injury of back    • Insomnia 3/7/2016   • Kidney stone    • Nasal fracture 07/2018   • Osteoarthritis    • Osteoporosis     h/o tx with Fosamax but quit in 2012 due to fears of complications   • Prediabetes    • Rotator cuff tear     right   • Seizure disorder (CMS/HCC)     Dr. Shannon Bennett   • Shortness of breath 5/8/2019   • Sick sinus syndrome (CMS/HCC)     Dr. Agrawal   • Spinal stenosis of cervical region 2/11/2015   • Squamous cell skin cancer, face    • Traumatic brain injury (CMS/HCC)     from fall   • Urinary incontinence        Past Surgical History:   Procedure Laterality Date   • BILATERAL SALPINGO OOPHORECTOMY      for benign cysts   • CARDIAC CATHETERIZATION  08/25/2009    25% LAD. L Cx luminal irregularities. Normal L main   • CARDIAC CATHETERIZATION  03/29/2017    25% LAD. 10-20% LCX. ER 65%. Dr. Agrawal.    • CATARACT EXTRACTION  2006   • ENDOSCOPY  05/17/2017    [facility], Dr. Gutierrez   •  ENDOSCOPY N/A 8/22/2019    Procedure: ESOPHAGOGASTRODUODENOSCOPY WITH DILATATION (BOUGIE #46,50);  Surgeon: Joaquin Story MD;  Location: [facility] ENDOSCOPY;  Service: Gastroenterology   • INSERT / REPLACE / REMOVE PACEMAKER     • KNEE ARTHROSCOPY Left 1992   • OTHER SURGICAL HISTORY Right 07/2017    Right eye, YAG Capsuloyomy , Dr. Lemus   • PACEMAKER IMPLANTATION  2009   • ROTATOR CUFF REPAIR Right     Dr. Goldberg   • TONSILLECTOMY     • TOTAL HIP ARTHROPLASTY Bilateral        Social History     Socioeconomic History   • Marital status:      Spouse name: Not on file   • Number of children: Not on file   • Years of education: Not on file   • Highest education level: Not on file   Tobacco Use   • Smoking status: Never Smoker   • Smokeless tobacco: Never Used   Substance and Sexual Activity   • Alcohol use: No     Frequency: Never   • Drug use: No   • Sexual activity: Defer       Family History   Problem Relation Age of Onset   • Heart disease Mother    • Pancreatic cancer Mother    • Prostate cancer Father    • Breast cancer Sister    • Pancreatic cancer Brother    • Colon cancer Brother    • Stroke Maternal Grandmother      Family history, surgical history, past medical history, Allergies and med's reviewed with patient today and updated in [facility] EMR.     ROS:  Review of Systems   Constitutional: Positive for fatigue. Negative for activity change, appetite change, diaphoresis, unexpected weight gain and unexpected weight loss.   Eyes: Negative for blurred vision and visual disturbance.   Respiratory: Negative for apnea, cough, shortness of breath and wheezing.    Cardiovascular: Negative for chest pain, palpitations and leg swelling.   Gastrointestinal: Negative for abdominal pain, constipation, diarrhea, nausea and vomiting.   Endocrine: Negative for cold intolerance, heat intolerance, polydipsia, polyphagia and polyuria.   Genitourinary: Negative for frequency.   Musculoskeletal: Negative for  "myalgias.   Skin: Negative for rash.   Neurological: Positive for weakness. Negative for dizziness, syncope, numbness and headache.   Psychiatric/Behavioral: Negative for depressed mood.       OBJECTIVE:  Vitals:    11/05/19 1120   BP: 164/80   BP Location: Right arm   Patient Position: Sitting   Cuff Size: Large Adult   Pulse: 67   Resp: 18   Temp: 97.6 °F (36.4 °C)   TempSrc: Oral   SpO2: 96%   Weight: 94.4 kg (208 lb 3.2 oz)   Height: 160 cm (63\")       Body mass index is 36.88 kg/m².      Physical Exam   Constitutional: She is oriented to person, place, and time. She appears well-developed and well-nourished.   Neck: Trachea normal and normal range of motion. Neck supple. Carotid bruit is not present. No thyromegaly present.   Cardiovascular: Normal rate, regular rhythm, normal heart sounds and intact distal pulses. Exam reveals no gallop and no friction rub.   No murmur heard.  Pulmonary/Chest: Effort normal and breath sounds normal. She has no wheezes. She has no rales.   Abdominal: Soft. Bowel sounds are normal. There is no hepatosplenomegaly. No hernia.   Musculoskeletal: Normal range of motion. She exhibits no edema.   Generalized weakness   Lymphadenopathy:     She has no cervical adenopathy.   Neurological: She is alert and oriented to person, place, and time. No cranial nerve deficit or sensory deficit. Gait abnormal.   Skin: Skin is warm and dry.   Pale area of scarring at the center of the upper lip.   Psychiatric: She has a normal mood and affect. Her behavior is normal. Judgment and thought content normal.       ASSESSMENT/ PLAN:    Diagnoses and all orders for this visit:    1. Essential hypertension (Primary)  Comments:  Elevated, but will defer management to Cardiology given her syncopal episodes and + tilt table testing.   Follow-up with Dr. Agrawal.  Orders:  -     CBC & Differential  -     Comprehensive Metabolic Panel    2. Acquired hypothyroidism  -     TSH    3. Prediabetes  -     Hemoglobin " A1c    4. Lip laceration, sequela  -     Ambulatory Referral to Plastic Surgery    5. Mobility impaired  Comments:  Recommend use of rollator device for her safety.         Orders Placed Today:     No orders of the defined types were placed in this encounter.       Management Plan:     An After Visit Summary was printed and given to the patient at discharge.    Follow-up: Return in about 6 months (around 4/30/2020) for Medicare Wellness.    VALENTINO Power 11/5/2019 12:53 PM  This note was electronically signed.

## 2019-11-06 LAB
ALBUMIN SERPL-MCNC: 4.1 G/DL (ref 3.5–4.7)
ALBUMIN/GLOB SERPL: 1.7 {RATIO} (ref 1.2–2.2)
ALP SERPL-CCNC: 61 IU/L (ref 39–117)
ALT SERPL-CCNC: 15 IU/L (ref 0–32)
AST SERPL-CCNC: 15 IU/L (ref 0–40)
BASOPHILS # BLD AUTO: 0 X10E3/UL (ref 0–0.2)
BASOPHILS NFR BLD AUTO: 0 %
BILIRUB SERPL-MCNC: 0.3 MG/DL (ref 0–1.2)
BUN SERPL-MCNC: 25 MG/DL (ref 8–27)
BUN/CREAT SERPL: 27 (ref 12–28)
CALCIUM SERPL-MCNC: 9.2 MG/DL (ref 8.7–10.3)
CHLORIDE SERPL-SCNC: 108 MMOL/L (ref 96–106)
CO2 SERPL-SCNC: 20 MMOL/L (ref 20–29)
CREAT SERPL-MCNC: 0.92 MG/DL (ref 0.57–1)
EOSINOPHIL # BLD AUTO: 0.1 X10E3/UL (ref 0–0.4)
EOSINOPHIL NFR BLD AUTO: 1 %
ERYTHROCYTE [DISTWIDTH] IN BLOOD BY AUTOMATED COUNT: 13.6 % (ref 12.3–15.4)
GLOBULIN SER CALC-MCNC: 2.4 G/DL (ref 1.5–4.5)
GLUCOSE SERPL-MCNC: 89 MG/DL (ref 65–99)
HBA1C MFR BLD: 5.4 % (ref 4.8–5.6)
HCT VFR BLD AUTO: 40.5 % (ref 34–46.6)
HGB BLD-MCNC: 13.2 G/DL (ref 11.1–15.9)
IMM GRANULOCYTES # BLD AUTO: 0 X10E3/UL (ref 0–0.1)
IMM GRANULOCYTES NFR BLD AUTO: 0 %
LYMPHOCYTES # BLD AUTO: 3.2 X10E3/UL (ref 0.7–3.1)
LYMPHOCYTES NFR BLD AUTO: 32 %
MCH RBC QN AUTO: 32.2 PG (ref 26.6–33)
MCHC RBC AUTO-ENTMCNC: 32.6 G/DL (ref 31.5–35.7)
MCV RBC AUTO: 99 FL (ref 79–97)
MONOCYTES # BLD AUTO: 0.8 X10E3/UL (ref 0.1–0.9)
MONOCYTES NFR BLD AUTO: 8 %
NEUTROPHILS # BLD AUTO: 5.9 X10E3/UL (ref 1.4–7)
NEUTROPHILS NFR BLD AUTO: 59 %
PLATELET # BLD AUTO: 257 X10E3/UL (ref 150–450)
POTASSIUM SERPL-SCNC: 4.6 MMOL/L (ref 3.5–5.2)
PROT SERPL-MCNC: 6.5 G/DL (ref 6–8.5)
RBC # BLD AUTO: 4.1 X10E6/UL (ref 3.77–5.28)
SODIUM SERPL-SCNC: 144 MMOL/L (ref 134–144)
TSH SERPL DL<=0.005 MIU/L-ACNC: 2.84 UIU/ML (ref 0.45–4.5)
WBC # BLD AUTO: 10 X10E3/UL (ref 3.4–10.8)

## 2019-11-07 ENCOUNTER — TREATMENT (OUTPATIENT)
Dept: PHYSICAL THERAPY | Facility: CLINIC | Age: 79
End: 2019-11-07

## 2019-11-07 ENCOUNTER — TELEPHONE (OUTPATIENT)
Dept: FAMILY MEDICINE CLINIC | Facility: CLINIC | Age: 79
End: 2019-11-07

## 2019-11-07 DIAGNOSIS — G89.29 CHRONIC BILATERAL LOW BACK PAIN WITH BILATERAL SCIATICA: Primary | ICD-10-CM

## 2019-11-07 DIAGNOSIS — M54.42 CHRONIC BILATERAL LOW BACK PAIN WITH BILATERAL SCIATICA: Primary | ICD-10-CM

## 2019-11-07 DIAGNOSIS — M54.41 CHRONIC BILATERAL LOW BACK PAIN WITH BILATERAL SCIATICA: Primary | ICD-10-CM

## 2019-11-07 DIAGNOSIS — M43.17 SPONDYLOLISTHESIS AT L5-S1 LEVEL: ICD-10-CM

## 2019-11-07 DIAGNOSIS — M48.061 SPINAL STENOSIS OF LUMBAR REGION, UNSPECIFIED WHETHER NEUROGENIC CLAUDICATION PRESENT: ICD-10-CM

## 2019-11-07 PROCEDURE — 97530 THERAPEUTIC ACTIVITIES: CPT | Performed by: PHYSICAL THERAPIST

## 2019-11-07 PROCEDURE — 97110 THERAPEUTIC EXERCISES: CPT | Performed by: PHYSICAL THERAPIST

## 2019-11-07 NOTE — PROGRESS NOTES
Physical Therapy Daily Progress Note    Patient: Anila Spencer   : 1940  Diagnosis/ICD-10 Code:  Chronic bilateral low back pain with bilateral sciatica [M54.42, M54.41, G89.29]  Referring practitioner: Jeanne Rowell MD  Date of Initial Visit: Type: THERAPY  Noted: 2019  Today's Date: 2019  Patient seen for 10 sessions             Subjective Patient had her last epidural and was not impressed by the provider, plans to inform her surgeon.  PCP is going to order rollator walker for her.    Objective   See Exercise, Manual, and Modality Logs for complete treatment. Able to perform sit to stand without arms with 2 inch cushion on chair.      Assessment/Plan  Patient independent with HEP in 2 weeks - MET  Tolerate 10 min Nustep in 2 weeks - MET  Able to roll in bed and step up curb in 2 weeks - NOT MET  Improve function as evidenced by Oswestry score of 20% or less by discharge (54% impairment initially) - NOT MET, 38% as of 10/31  Able to ambulate with rollator 6 min by discharge - MET  Perform 10 reps of repeated sit to stand without arms in 30 seconds by discharge - NOT MET, still needs to use arms     Progress per Plan of Care           Timed:         Manual Therapy:         mins  49612;     Therapeutic Exercise:    15     mins  66282;     Neuromuscular Mike:        mins  96706;    Therapeutic Activity:     15     mins  41265;     Gait Training:           mins  11442;     Ultrasound:          mins  46474;    Ionto                                   mins   05093  Self Care                            mins   28249      Un-Timed:  Electrical Stimulation:         mins  48766 ( );  Dry Needling          mins self-pay  Traction          mins 32501  Low Eval          Mins  75716  Mod Eval          Mins  55224  High Eval                            Mins  53903  Canalith Repos                   mins  09113    Timed Treatment:   30   mins   Total Treatment:     30   mins    Robin A Sprigler,  PT  Physical Therapist

## 2019-11-07 NOTE — TELEPHONE ENCOUNTER
[place] pharmacy doesn't bill for walkers any more and the walker that patient is wanting is $99. [facility] pharmacy doesn't have them. Patient is going to call around and find out who has these walkers and will let me know where she wants an Rx sent.     [employer] Rollator Walker.

## 2019-11-14 ENCOUNTER — TREATMENT (OUTPATIENT)
Dept: PHYSICAL THERAPY | Facility: CLINIC | Age: 79
End: 2019-11-14

## 2019-11-14 DIAGNOSIS — M54.42 CHRONIC BILATERAL LOW BACK PAIN WITH BILATERAL SCIATICA: Primary | ICD-10-CM

## 2019-11-14 DIAGNOSIS — M54.41 CHRONIC BILATERAL LOW BACK PAIN WITH BILATERAL SCIATICA: Primary | ICD-10-CM

## 2019-11-14 DIAGNOSIS — M48.061 SPINAL STENOSIS OF LUMBAR REGION, UNSPECIFIED WHETHER NEUROGENIC CLAUDICATION PRESENT: ICD-10-CM

## 2019-11-14 DIAGNOSIS — G89.29 CHRONIC BILATERAL LOW BACK PAIN WITH BILATERAL SCIATICA: Primary | ICD-10-CM

## 2019-11-14 DIAGNOSIS — M43.17 SPONDYLOLISTHESIS AT L5-S1 LEVEL: ICD-10-CM

## 2019-11-14 PROCEDURE — 97530 THERAPEUTIC ACTIVITIES: CPT | Performed by: PHYSICAL THERAPIST

## 2019-11-14 PROCEDURE — 97110 THERAPEUTIC EXERCISES: CPT | Performed by: PHYSICAL THERAPIST

## 2019-11-14 NOTE — PROGRESS NOTES
"     Physical Therapy Daily Progress Note    Patient: Anila Spencer   : 1940  Diagnosis/ICD-10 Code:  Chronic bilateral low back pain with bilateral sciatica [M54.42, M54.41, G89.29]  Referring practitioner: Jeanne Rowell MD  Date of Initial Visit: Type: THERAPY  Noted: 2019  Today's Date: 2019  Patient seen for 11 sessions             Subjective Patient is doing well, has not yet purchased her rollator, having trouble finding the preferred brand.  Will try [employer].  She went back to aquatic class at the  with good tolerance.    Objective   See Exercise, Manual, and Modality Logs for complete treatment. Increased step up to 6\" with good tolerance.      Assessment/Plan  Patient independent with HEP in 2 weeks - MET  Tolerate 10 min Nustep in 2 weeks - MET  Able to roll in bed and step up curb in 2 weeks - NOT MET  Improve function as evidenced by Oswestry score of 20% or less by discharge (54% impairment initially) - NOT MET, 38% as of 10/31  Able to ambulate with rollator 6 min by discharge - MET  Perform 10 reps of repeated sit to stand without arms in 30 seconds by discharge - MET    Progress per Plan of Care Plan to continue one more visit then attempt self-management.           Timed:         Manual Therapy:         mins  31935;     Therapeutic Exercise:    15     mins  76378;     Neuromuscular Mike:        mins  00765;    Therapeutic Activity:     15     mins  95978;     Gait Training:           mins  90484;     Ultrasound:          mins  49005;    Ionto                                   mins   31412  Self Care                            mins   27943      Un-Timed:  Electrical Stimulation:         mins  72123 ( );  Dry Needling          mins self-pay  Traction          mins 43657  Low Eval          Mins  49504  Mod Eval          Mins  88640  High Eval                            Mins  87205  Canalith Repos                   mins  73707    Timed Treatment:   30   mins "   Total Treatment:     30   mins    Robin A Sprigler, PT  Physical Therapist

## 2019-11-19 ENCOUNTER — OFFICE VISIT (OUTPATIENT)
Dept: ORTHOPEDIC SURGERY | Facility: CLINIC | Age: 79
End: 2019-11-19

## 2019-11-19 VITALS
SYSTOLIC BLOOD PRESSURE: 148 MMHG | HEIGHT: 63 IN | BODY MASS INDEX: 36.36 KG/M2 | HEART RATE: 83 BPM | DIASTOLIC BLOOD PRESSURE: 83 MMHG | WEIGHT: 205.2 LBS

## 2019-11-19 DIAGNOSIS — M43.17 SPONDYLOLISTHESIS OF LUMBOSACRAL REGION: Primary | ICD-10-CM

## 2019-11-19 PROCEDURE — 99213 OFFICE O/P EST LOW 20 MIN: CPT | Performed by: ORTHOPAEDIC SURGERY

## 2019-11-19 NOTE — PROGRESS NOTES
Visit Type: Follow Up  Referring Provider: No ref. provider found  Primary Care Provider: Helena Mas APRN    Patient Name: Anila Spencer  Patient Age: 79 y.o.  Chief Complaint: had concerns including Follow-up of the Lumbar Spine (PT at [facility] and has had 3 shots in back at [facility].).    Subjective   History of Present Illness: This patient is a pleasant 79 years old female who complains of axial lower back pain with radiation of pain to her buttocks and bilateral knee pain.  She was diagnosed with bilateral knee osteoarthritis, spondylolytic a spondylolisthesis of L5-S1 patient tried physical therapy with good benefit the patient tried pain management without any benefit.      Review of Systems   Musculoskeletal: Positive for arthralgias, back pain and myalgias.       The following portions of the patient's history were reviewed and updated as appropriate: allergies, current medications, past family history, past medical history, past social history, past surgical history and problem list.    Past Medical History:   Diagnosis Date   • Abnormal cardiovascular stress test 3/21/2017   • Allergic rhinitis    • Anxiety    • Basal cell carcinoma (BCC) of face    • Biliary dyskinesia    • Cardiomegaly    • Cataract    • Cervical spinal stenosis    • Chronic constipation    • Chronic insomnia    • DDD (degenerative disc disease), cervical     C-spine, T-spine, L-Spine   • DDD (degenerative disc disease), lumbar 7/25/2019   • Degeneration of intervertebral disc of thoracic region 3/18/2017   • Diplopia     Dr. Laird   • Dysphagia 4/25/2019   • Emphysema of lung (CMS/HCC)    • Fatty liver    • Fracture of multiple ribs 07/2018    Left 4th-8th   • Gastroesophageal reflux disease 12/4/2012   • GERD (gastroesophageal reflux disease)    • Hematuria 12/29/2014   • Hypothyroidism    • Injury of back    • Insomnia 3/7/2016   • Kidney stone    • Nasal fracture 07/2018   • Osteoarthritis    • Osteoporosis      h/o tx with Fosamax but quit in 2012 due to fears of complications   • Prediabetes    • Rotator cuff tear     right   • Seizure disorder (CMS/HCC)     Dr. Shannon Bennett   • Shortness of breath 5/8/2019   • Sick sinus syndrome (CMS/HCC)     Dr. Agrawal   • Spinal stenosis of cervical region 2/11/2015   • Squamous cell skin cancer, face    • Traumatic brain injury (CMS/HCC)     from fall   • Urinary incontinence        Past Surgical History:   Procedure Laterality Date   • BILATERAL SALPINGO OOPHORECTOMY      for benign cysts   • CARDIAC CATHETERIZATION  08/25/2009    25% LAD. L Cx luminal irregularities. Normal L main   • CARDIAC CATHETERIZATION  03/29/2017    25% LAD. 10-20% LCX. ER 65%. Dr. Agrawal.    • CATARACT EXTRACTION  2006   • ENDOSCOPY  05/17/2017    [facility], Dr. Gutierrez   • ENDOSCOPY N/A 8/22/2019    Procedure: ESOPHAGOGASTRODUODENOSCOPY WITH DILATATION (BOUGIE #46,50);  Surgeon: Joaquin Story MD;  Location: [facility] ENDOSCOPY;  Service: Gastroenterology   • INSERT / REPLACE / REMOVE PACEMAKER     • KNEE ARTHROSCOPY Left 1992   • OTHER SURGICAL HISTORY Right 07/2017    Right eye, YAG Capsuloyomy , Dr. Lemus   • PACEMAKER IMPLANTATION  2009   • ROTATOR CUFF REPAIR Right     Dr. Goldberg   • TONSILLECTOMY     • TOTAL HIP ARTHROPLASTY Bilateral        Vitals:    11/19/19 1422   BP: 148/83   Pulse: 83       Patient Active Problem List   Diagnosis   • Cardiomegaly   • Essential hypertension   • Mitral valve insufficiency   • Presence of cardiac pacemaker   • DDD (degenerative disc disease), lumbar   • Chest pain, atypical   • Sick sinus syndrome (CMS/HCC)   • Syncope and collapse       Family History Summary:  family history includes Breast cancer in her sister; Colon cancer in her brother; Heart disease in her mother; Pancreatic cancer in her brother and mother; Prostate cancer in her father; Stroke in her maternal grandmother.    Social History     Socioeconomic History   • Marital status:       Spouse name: Not on file   • Number of children: Not on file   • Years of education: Not on file   • Highest education level: Not on file   Tobacco Use   • Smoking status: Never Smoker   • Smokeless tobacco: Never Used   Substance and Sexual Activity   • Alcohol use: No     Frequency: Never   • Drug use: No   • Sexual activity: Defer         Current Outpatient Medications:   •  Calcium Carbonate-Vitamin D3 (CALCIUM 600-D) 600-400 MG-UNIT tablet, Take 2 capsules by mouth Daily., Disp: , Rfl:   •  DEXILANT 60 MG capsule, TAKE ONE CAPSULE BY MOUTH ONCE DAILY (Patient taking differently: TAKE ONE CAPSULE BY MOUTH EVERY NIGHT), Disp: 90 capsule, Rfl: 1  •  escitalopram (LEXAPRO) 10 MG tablet, Daily., Disp: , Rfl:   •  glucosamine-chondroitin (GLUCOSAMINE CHONDR COMPLEX) 500-400 MG capsule capsule, 1 capsule Every 12 (Twelve) Hours., Disp: , Rfl:   •  levothyroxine (SYNTHROID, LEVOTHROID) 50 MCG tablet, TAKE ONE TABLET BY MOUTH EVERY MORNING 30 MINUTES BEFORE EATING OR TAKING ANY OTHER MEDICATION, Disp: 90 tablet, Rfl: 1  •  losartan (COZAAR) 25 MG tablet, Take 25 mg by mouth Daily., Disp: , Rfl:   •  metoprolol tartrate (LOPRESSOR) 50 MG tablet, Take 25 mg by mouth Every 12 (Twelve) Hours., Disp: , Rfl:   •  midodrine (PROAMATINE) 5 MG tablet, TAKE ONE TABLET BY MOUTH ONCE DAILY, Disp: 30 tablet, Rfl: 0  •  Multiple Vitamins-Minerals (MULTI VITAMIN/MINERALS) tablet, MULTI VITAMIN/MINERALS TABS, Disp: , Rfl:   •  zonisamide (ZONEGRAN) 100 MG capsule, TAKE FOUR CAPSULES BY MOUTH ONCE DAILY, Disp: , Rfl:     No Known Allergies        Objective   Physical Exam  Back Exam     Tenderness   The patient is experiencing tenderness in the lumbar.    Range of Motion   Extension:  40 abnormal   Flexion:  30 abnormal   Lateral bend right:  30 abnormal   Lateral bend left:  30 abnormal   Rotation right:  20 abnormal   Rotation left:  20 abnormal     Muscle Strength   The patient has normal back strength.    Reflexes   Patellar:  Hyporeflexic  Biceps: Hyporeflexic    Other   Toe walk: normal  Heel walk: normal  Sensation: normal  Gait: normal               Impression and Plan: This patient is here today for follow-up the patient was diagnosed with spondylolytic a spondylolisthesis of L5-S1 degenerative disc disease of L5-S1 and bilateral knee osteoarthritis the patient tried pain management without any benefit she tried physical therapy and she is pretty happy with the outcome of her treatment with physical therapist, I do not see any red flag I do not see any surgical solution for her problem at this time due to her osteoporosis, her age and multiple medical issue my recommendation is to continue physical therapy and rehabilitation I would like to see this patient my office in a as needed basis I am going to give her rolling walker    Spondylolisthesis of lumbosacral region [M43.17]     Orders Placed This Encounter   Procedures   • Miscellaneous DME     Order Specific Question:   Type of DME     Answer:   walker with front wheels     Order Specific Question:   Length of Need (99 Months = Lifetime)     Answer:   99 Months = Lifetime               Jeanne Rowell MD  11/19/19  3:14 PM

## 2019-11-20 ENCOUNTER — TREATMENT (OUTPATIENT)
Dept: PHYSICAL THERAPY | Facility: CLINIC | Age: 79
End: 2019-11-20

## 2019-11-20 DIAGNOSIS — M54.42 CHRONIC BILATERAL LOW BACK PAIN WITH BILATERAL SCIATICA: Primary | ICD-10-CM

## 2019-11-20 DIAGNOSIS — M54.41 CHRONIC BILATERAL LOW BACK PAIN WITH BILATERAL SCIATICA: Primary | ICD-10-CM

## 2019-11-20 DIAGNOSIS — M43.17 SPONDYLOLISTHESIS AT L5-S1 LEVEL: ICD-10-CM

## 2019-11-20 DIAGNOSIS — G89.29 CHRONIC BILATERAL LOW BACK PAIN WITH BILATERAL SCIATICA: Primary | ICD-10-CM

## 2019-11-20 DIAGNOSIS — M48.061 SPINAL STENOSIS OF LUMBAR REGION, UNSPECIFIED WHETHER NEUROGENIC CLAUDICATION PRESENT: ICD-10-CM

## 2019-11-20 PROCEDURE — 97110 THERAPEUTIC EXERCISES: CPT | Performed by: PHYSICAL THERAPIST

## 2019-11-20 PROCEDURE — 97530 THERAPEUTIC ACTIVITIES: CPT | Performed by: PHYSICAL THERAPIST

## 2019-11-20 NOTE — PROGRESS NOTES
Physical Therapy Daily Progress Note    Patient: Anila Spencer   : 1940  Diagnosis/ICD-10 Code:  Chronic bilateral low back pain with bilateral sciatica [M54.42, M54.41, G89.29]  Referring practitioner: Jeanne Rowell MD  Date of Initial Visit: Type: THERAPY  Noted: 2019  Today's Date: 2019  Patient seen for 12 sessions             Subjective Patient saw Dr. Rowell, he wants her to continue 1x per week as long as it is helpful.    Objective   See Exercise, Manual, and Modality Logs for complete treatment.       Assessment/Plan  Patient independent with HEP in 2 weeks - MET  Tolerate 10 min Nustep in 2 weeks - MET  Able to roll in bed and step up curb in 2 weeks - NOT MET  Improve function as evidenced by Oswestry score of 20% or less by discharge (54% impairment initially) - NOT MET, 38% as of 10/31  Able to ambulate with rollator 6 min by discharge - MET  Perform 10 reps of repeated sit to stand without arms in 30 seconds by discharge - MET    Progress per Plan of Care           Timed:         Manual Therapy:         mins  51000;     Therapeutic Exercise:    15     mins  87100;     Neuromuscular Mike:        mins  39787;    Therapeutic Activity:     15     mins  60402;     Gait Training:           mins  17094;     Ultrasound:          mins  22955;    Ionto                                   mins   98062  Self Care                            mins   64680      Un-Timed:  Electrical Stimulation:         mins  19763 ( );  Dry Needling          mins self-pay  Traction          mins 69391  Low Eval          Mins  45891  Mod Eval          Mins  32148  High Eval                            Mins  69521  Canalith Repos                   mins  78909    Timed Treatment:   30   mins   Total Treatment:     30   mins    Robin A Sprigler, PT  Physical Therapist

## 2019-11-21 ENCOUNTER — OFFICE VISIT (OUTPATIENT)
Dept: CARDIOLOGY | Facility: CLINIC | Age: 79
End: 2019-11-21

## 2019-11-21 VITALS
WEIGHT: 205 LBS | BODY MASS INDEX: 36.32 KG/M2 | HEART RATE: 79 BPM | SYSTOLIC BLOOD PRESSURE: 148 MMHG | DIASTOLIC BLOOD PRESSURE: 90 MMHG | HEIGHT: 63 IN

## 2019-11-21 DIAGNOSIS — I34.0 MITRAL VALVE INSUFFICIENCY, UNSPECIFIED ETIOLOGY: ICD-10-CM

## 2019-11-21 DIAGNOSIS — Z95.0 PRESENCE OF CARDIAC PACEMAKER: ICD-10-CM

## 2019-11-21 DIAGNOSIS — R07.89 CHEST PAIN, ATYPICAL: ICD-10-CM

## 2019-11-21 DIAGNOSIS — I51.7 CARDIOMEGALY: Primary | ICD-10-CM

## 2019-11-21 DIAGNOSIS — I10 ESSENTIAL HYPERTENSION: ICD-10-CM

## 2019-11-21 DIAGNOSIS — R55 SYNCOPE AND COLLAPSE: ICD-10-CM

## 2019-11-21 PROCEDURE — 99214 OFFICE O/P EST MOD 30 MIN: CPT | Performed by: INTERNAL MEDICINE

## 2019-11-21 NOTE — PROGRESS NOTES
Date of Office Visit: 2019  Encounter Provider: Dr. Adam Agrawal  Place of Service: [facility] CARDIOLOGY [place]  Patient Name: Anila Spencer  :1940  Helena Mas APRN    Chief Complaint   Patient presents with   • Chest Pain     1 month follow up echo/tilt table/stress test   • Hypertension   • Syncope   • Mitral Valve Prolapse     History of Present Illness    Anila Spencer is a 79 y.o. female. Her past medical history significant for hypertension, sick sinus syndrome, tachybradycardia syndrome, history of syncope.     Previously patient had repeated episodes of syncope. She had extensive workup which showed that she had underlying sick sinus syndrome. She underwent permanent  pacemaker implantation. Since then patient symptoms of syncope had completely resolved. Previously, patient was also evaluated with cardiac catheterization because of her symptoms of angina pectoris and she was noted to have no significant coronary artery disease. Her previous CAT scan of chest was also negative for pulmonary embolism and she had pulmonary function test which was negative for any significant pulmonary airway disease. It was noted that her symptoms of chest pain at that time was probably related to gastroesophageal reflux disease and she was appropriately treated.    In 2019, patient had an episode of syncope at home.  Patient underwent tilt table test and it showed hypotensive response to head up tilt.  Patient was started on midodrine 5 mg twice daily.  Echocardiogram showed normal left ventricular size and function.  LVEF was 60 to 65%.  Mild mitral regurgitation was noted.  Stress test was negative for ischemia.    At this stage, I will continue current treatment.  Patient denies any symptom of angina pectoris or congestive heart failure.  Since the midodrine is started patient has not had any further dizzy spells or lightheaded or dizziness.  No syncope or presyncope.  No chest pain.   No orthopnea or PND.    Her blood pressure is slightly elevated today in my office but I would accept high pressure in this lady because of high risk for fall.  Continue midodrine.  Blood pressure monitoring is recommended    Past Medical History:   Diagnosis Date   • Abnormal cardiovascular stress test 3/21/2017   • Allergic rhinitis    • Anxiety    • Basal cell carcinoma (BCC) of face    • Biliary dyskinesia    • Cardiomegaly    • Cataract    • Cervical spinal stenosis    • Chronic constipation    • Chronic insomnia    • DDD (degenerative disc disease), cervical     C-spine, T-spine, L-Spine   • DDD (degenerative disc disease), lumbar 7/25/2019   • Degeneration of intervertebral disc of thoracic region 3/18/2017   • Diplopia     Dr. Laird   • Dysphagia 4/25/2019   • Emphysema of lung (CMS/HCC)    • Fatty liver    • Fracture of multiple ribs 07/2018    Left 4th-8th   • Gastroesophageal reflux disease 12/4/2012   • GERD (gastroesophageal reflux disease)    • Hematuria 12/29/2014   • Hypothyroidism    • Injury of back    • Insomnia 3/7/2016   • Kidney stone    • Nasal fracture 07/2018   • Osteoarthritis    • Osteoporosis     h/o tx with Fosamax but quit in 2012 due to fears of complications   • Prediabetes    • Rotator cuff tear     right   • Seizure disorder (CMS/HCC)     Dr. Shannon Bennett   • Shortness of breath 5/8/2019   • Sick sinus syndrome (CMS/HCC)     Dr. Agrawal   • Spinal stenosis of cervical region 2/11/2015   • Squamous cell skin cancer, face    • Traumatic brain injury (CMS/HCC)     from fall   • Urinary incontinence          Past Surgical History:   Procedure Laterality Date   • BILATERAL SALPINGO OOPHORECTOMY      for benign cysts   • CARDIAC CATHETERIZATION  08/25/2009    25% LAD. L Cx luminal irregularities. Normal L main   • CARDIAC CATHETERIZATION  03/29/2017    25% LAD. 10-20% LCX. ER 65%. Dr. Agrawal.    • CARDIOVASCULAR STRESS TEST  2019   • CATARACT EXTRACTION  2006   • ECHO - CONVERTED  2019   •  ENDOSCOPY  05/17/2017    Normal, Dr. Gutierrez   • ENDOSCOPY N/A 8/22/2019    Procedure: ESOPHAGOGASTRODUODENOSCOPY WITH DILATATION (BOUGIE #[id number]);  Surgeon: Joaquin Story MD;  Location: [facility] ENDOSCOPY;  Service: Gastroenterology   • INSERT / REPLACE / REMOVE PACEMAKER     • KNEE ARTHROSCOPY Left 1992   • OTHER SURGICAL HISTORY Right 07/2017    Right eye, YAG Capsuloyomy , Dr. Lemus   • PACEMAKER IMPLANTATION  2009   • ROTATOR CUFF REPAIR Right     Dr. Goldberg   • TONSILLECTOMY     • TOTAL HIP ARTHROPLASTY Bilateral            Current Outpatient Medications:   •  Calcium Carbonate-Vitamin D3 (CALCIUM 600-D) 600-400 MG-UNIT tablet, Take 2 capsules by mouth Daily., Disp: , Rfl:   •  DEXILANT 60 MG capsule, TAKE ONE CAPSULE BY MOUTH ONCE DAILY (Patient taking differently: TAKE ONE CAPSULE BY MOUTH EVERY NIGHT), Disp: 90 capsule, Rfl: 1  •  escitalopram (LEXAPRO) 10 MG tablet, Daily., Disp: , Rfl:   •  glucosamine-chondroitin (GLUCOSAMINE CHONDR COMPLEX) 500-400 MG capsule capsule, 1 capsule Every 12 (Twelve) Hours., Disp: , Rfl:   •  levothyroxine (SYNTHROID, LEVOTHROID) 50 MCG tablet, TAKE ONE TABLET BY MOUTH EVERY MORNING 30 MINUTES BEFORE EATING OR TAKING ANY OTHER MEDICATION, Disp: 90 tablet, Rfl: 1  •  losartan (COZAAR) 25 MG tablet, Take 25 mg by mouth Daily., Disp: , Rfl:   •  metoprolol tartrate (LOPRESSOR) 50 MG tablet, Take 25 mg by mouth Every 12 (Twelve) Hours., Disp: , Rfl:   •  midodrine (PROAMATINE) 5 MG tablet, TAKE ONE TABLET BY MOUTH ONCE DAILY, Disp: 30 tablet, Rfl: 0  •  Multiple Vitamins-Minerals (MULTI VITAMIN/MINERALS) tablet, MULTI VITAMIN/MINERALS TABS, Disp: , Rfl:   •  zonisamide (ZONEGRAN) 100 MG capsule, TAKE FOUR CAPSULES BY MOUTH ONCE DAILY, Disp: , Rfl:       Social History     Socioeconomic History   • Marital status:      Spouse name: Not on file   • Number of children: Not on file   • Years of education: Not on file   • Highest education level: Not on file   Tobacco  "Use   • Smoking status: Never Smoker   • Smokeless tobacco: Never Used   Substance and Sexual Activity   • Alcohol use: No     Frequency: Never   • Drug use: No   • Sexual activity: Defer         Review of Systems   Constitution: Negative for chills and fever.   HENT: Negative for ear discharge and nosebleeds.    Eyes: Negative for discharge and redness.   Cardiovascular: Negative for chest pain, orthopnea, palpitations, paroxysmal nocturnal dyspnea and syncope.   Respiratory: Positive for shortness of breath. Negative for cough and wheezing.    Endocrine: Negative for heat intolerance.   Skin: Negative for rash.   Musculoskeletal: Positive for arthritis and joint swelling. Negative for myalgias.   Gastrointestinal: Negative for abdominal pain, melena, nausea and vomiting.   Genitourinary: Negative for dysuria and hematuria.   Neurological: Negative for dizziness, light-headedness, numbness and tremors.   Psychiatric/Behavioral: Negative for depression. The patient is not nervous/anxious.        Procedures    Procedures    No orders to display           Objective:    /90   Pulse 79   Ht 160 cm (63\")   Wt 93 kg (205 lb)   BMI 36.31 kg/m²         Physical Exam   Constitutional: She is oriented to person, place, and time. She appears well-developed and well-nourished.   HENT:   Head: Normocephalic and atraumatic.   Eyes: No scleral icterus.   Neck: No thyromegaly present.   Cardiovascular: Normal rate, regular rhythm and normal heart sounds. Exam reveals no gallop and no friction rub.   No murmur heard.  Pulmonary/Chest: Effort normal and breath sounds normal. No respiratory distress. She has no wheezes. She has no rales.   Abdominal: There is no tenderness.   Musculoskeletal: She exhibits no edema.   Lymphadenopathy:     She has no cervical adenopathy.   Neurological: She is alert and oriented to person, place, and time.   Skin: No rash noted. No erythema.   Psychiatric: She has a normal mood and affect. "           Assessment:       Diagnosis Plan   1. Cardiomegaly     2. Essential hypertension     3. Presence of cardiac pacemaker     4. Mitral valve insufficiency, unspecified etiology     5. Syncope and collapse     6. Chest pain, atypical              Plan:       At this stage, I would recommend that patient should proceed with current therapy.  Blood pressure monitoring is recommended.  I will see the patient in 6 months.  Patient had abnormal tilt table test with hypotensive response.  Midodrine is working.

## 2019-11-25 ENCOUNTER — TREATMENT (OUTPATIENT)
Dept: PHYSICAL THERAPY | Facility: CLINIC | Age: 79
End: 2019-11-25

## 2019-11-25 DIAGNOSIS — G89.29 CHRONIC BILATERAL LOW BACK PAIN WITH BILATERAL SCIATICA: Primary | ICD-10-CM

## 2019-11-25 DIAGNOSIS — M54.41 CHRONIC BILATERAL LOW BACK PAIN WITH BILATERAL SCIATICA: Primary | ICD-10-CM

## 2019-11-25 DIAGNOSIS — M43.17 SPONDYLOLISTHESIS AT L5-S1 LEVEL: ICD-10-CM

## 2019-11-25 DIAGNOSIS — M48.061 SPINAL STENOSIS OF LUMBAR REGION, UNSPECIFIED WHETHER NEUROGENIC CLAUDICATION PRESENT: ICD-10-CM

## 2019-11-25 DIAGNOSIS — M54.42 CHRONIC BILATERAL LOW BACK PAIN WITH BILATERAL SCIATICA: Primary | ICD-10-CM

## 2019-11-25 PROCEDURE — 97530 THERAPEUTIC ACTIVITIES: CPT | Performed by: PHYSICAL THERAPIST

## 2019-11-25 PROCEDURE — 97110 THERAPEUTIC EXERCISES: CPT | Performed by: PHYSICAL THERAPIST

## 2019-11-25 NOTE — PROGRESS NOTES
Physical Therapy Daily Progress Note    Patient: Anila Spencer   : 1940  Diagnosis/ICD-10 Code:  Chronic bilateral low back pain with bilateral sciatica [M54.42, M54.41, G89.29]  Referring practitioner: Jeanne Rowell MD  Date of Initial Visit: Type: THERAPY  Noted: 2019  Today's Date: 2019  Patient seen for 13 sessions             Subjective Patient reports she already exercised in the warm water pool today and is tired, asks to hold walking here.    Objective   See Exercise, Manual, and Modality Logs for complete treatment.       Assessment/Plan  Patient independent with HEP in 2 weeks - MET  Tolerate 10 min Nustep in 2 weeks - MET  Able to roll in bed and step up curb in 2 weeks - NOT MET  Improve function as evidenced by Oswestry score of 20% or less by discharge (54% impairment initially) - NOT MET, 38% as of 10/31  Able to ambulate with rollator 6 min by discharge - MET  Perform 10 reps of repeated sit to stand without arms in 30 seconds by discharge - MET    Progress per Plan of Care           Timed:         Manual Therapy:         mins  27919;     Therapeutic Exercise:    15     mins  54368;     Neuromuscular Mike:        mins  99982;    Therapeutic Activity:     15     mins  52358;     Gait Training:           mins  03253;     Ultrasound:          mins  80972;    Ionto                                   mins   42365  Self Care                            mins   91473      Un-Timed:  Electrical Stimulation:         mins  90900 ( );  Dry Needling          mins self-pay  Traction          mins 94362  Low Eval          Mins  08118  Mod Eval          Mins  14227  High Eval                            Mins  43730  Canalith Repos                   mins  43624    Timed Treatment:   30   mins   Total Treatment:     30   mins    Robin A Sprigler, PT  Physical Therapist

## 2019-12-02 ENCOUNTER — TREATMENT (OUTPATIENT)
Dept: PHYSICAL THERAPY | Facility: CLINIC | Age: 79
End: 2019-12-02

## 2019-12-02 DIAGNOSIS — M48.061 SPINAL STENOSIS OF LUMBAR REGION, UNSPECIFIED WHETHER NEUROGENIC CLAUDICATION PRESENT: ICD-10-CM

## 2019-12-02 DIAGNOSIS — M43.17 SPONDYLOLISTHESIS AT L5-S1 LEVEL: ICD-10-CM

## 2019-12-02 DIAGNOSIS — M54.42 CHRONIC BILATERAL LOW BACK PAIN WITH BILATERAL SCIATICA: Primary | ICD-10-CM

## 2019-12-02 DIAGNOSIS — M54.41 CHRONIC BILATERAL LOW BACK PAIN WITH BILATERAL SCIATICA: Primary | ICD-10-CM

## 2019-12-02 DIAGNOSIS — G89.29 CHRONIC BILATERAL LOW BACK PAIN WITH BILATERAL SCIATICA: Primary | ICD-10-CM

## 2019-12-02 PROCEDURE — 97110 THERAPEUTIC EXERCISES: CPT | Performed by: PHYSICAL THERAPIST

## 2019-12-02 PROCEDURE — 97530 THERAPEUTIC ACTIVITIES: CPT | Performed by: PHYSICAL THERAPIST

## 2019-12-02 NOTE — PROGRESS NOTES
Physical Therapy Daily Progress Note    Patient: Anila Spencer   : 1940  Diagnosis/ICD-10 Code:  Chronic bilateral low back pain with bilateral sciatica [M54.42, M54.41, G89.29]  Referring practitioner: Jeanne Rowell MD  Date of Initial Visit: Type: THERAPY  Noted: 2019  Today's Date: 2019  Patient seen for 14 sessions             Subjective No new complaints. She left a message with PCP to order rollator at [facility].    Objective   See Exercise, Manual, and Modality Logs for complete treatment. Good balance and improved speed and balance today.      Assessment/Plan  Patient independent with HEP in 2 weeks - MET  Tolerate 10 min Nustep in 2 weeks - MET  Able to roll in bed and step up curb in 2 weeks - NOT MET  Improve function as evidenced by Oswestry score of 20% or less by discharge (54% impairment initially) - NOT MET, 38% as of 10/31  Able to ambulate with rollator 6 min by discharge - MET  Perform 10 reps of repeated sit to stand without arms in 30 seconds by discharge - MET     Progress per Plan of Care           Timed:         Manual Therapy:         mins  93196;     Therapeutic Exercise:    15     mins  00329;     Neuromuscular Mike:        mins  13990;    Therapeutic Activity:     15     mins  64696;     Gait Training:           mins  97488;     Ultrasound:          mins  90974;    Ionto                                   mins   27040  Self Care                            mins   36989      Un-Timed:  Electrical Stimulation:         mins  04377 ( );  Dry Needling          mins self-pay  Traction          mins 55289  Low Eval          Mins  30486  Mod Eval          Mins  62270  High Eval                            Mins  29802  Canalith Repos                   mins  13043    Timed Treatment:   30   mins   Total Treatment:     30   mins    Robin A Sprigler, PT  Physical Therapist

## 2019-12-12 ENCOUNTER — TREATMENT (OUTPATIENT)
Dept: PHYSICAL THERAPY | Facility: CLINIC | Age: 79
End: 2019-12-12

## 2019-12-12 DIAGNOSIS — G89.29 CHRONIC BILATERAL LOW BACK PAIN WITH BILATERAL SCIATICA: Primary | ICD-10-CM

## 2019-12-12 DIAGNOSIS — M43.17 SPONDYLOLISTHESIS AT L5-S1 LEVEL: ICD-10-CM

## 2019-12-12 DIAGNOSIS — M48.061 SPINAL STENOSIS OF LUMBAR REGION, UNSPECIFIED WHETHER NEUROGENIC CLAUDICATION PRESENT: ICD-10-CM

## 2019-12-12 DIAGNOSIS — M54.42 CHRONIC BILATERAL LOW BACK PAIN WITH BILATERAL SCIATICA: Primary | ICD-10-CM

## 2019-12-12 DIAGNOSIS — M54.41 CHRONIC BILATERAL LOW BACK PAIN WITH BILATERAL SCIATICA: Primary | ICD-10-CM

## 2019-12-12 PROCEDURE — 97110 THERAPEUTIC EXERCISES: CPT | Performed by: PHYSICAL THERAPIST

## 2019-12-12 PROCEDURE — 97530 THERAPEUTIC ACTIVITIES: CPT | Performed by: PHYSICAL THERAPIST

## 2019-12-12 NOTE — PROGRESS NOTES
Physical Therapy Daily Progress Note    Patient: Anila Spencer   : 1940  Diagnosis/ICD-10 Code:  Chronic bilateral low back pain with bilateral sciatica [M54.42, M54.41, G89.29]  Referring practitioner: Jeanne Rowell MD  Date of Initial Visit: Type: THERAPY  Noted: 2019  Today's Date: 2019  Patient seen for 15 sessions             Subjective No new complaints, patient is doing well.  She went out of town and was able to get up/down from a chair without arms or assist from her .  She is excited not to need his help so much as he is tired from dialysis.  She purchased a rollator walker and is enjoying having that for community ambulation.    Objective   See Exercise, Manual, and Modality Logs for complete treatment. VCs for longer step length, no scuffing.      Assessment/Plan  Patient independent with HEP in 2 weeks - MET  Tolerate 10 min Nustep in 2 weeks - MET  Able to roll in bed and step up curb in 2 weeks - NOT MET  Improve function as evidenced by Oswestry score of 20% or less by discharge (54% impairment initially) - NOT MET, 38% as of 10/31  Able to ambulate with rollator 6 min by discharge - MET  Perform 10 reps of repeated sit to stand without arms in 30 seconds by discharge - MET     Progress per Plan of Care           Timed:         Manual Therapy:         mins  81289;     Therapeutic Exercise:    15     mins  57315;     Neuromuscular Mike:        mins  35384;    Therapeutic Activity:     15     mins  49960;     Gait Training:           mins  77725;     Ultrasound:          mins  26990;    Ionto                                   mins   28694  Self Care                            mins   99587      Un-Timed:  Electrical Stimulation:         mins  68929 ( );  Dry Needling          mins self-pay  Traction          mins 23581  Low Eval          Mins  14680  Mod Eval          Mins  76573  High Eval                            Mins  21687  [facility]                    mins  89045    Timed Treatment:   30   mins   Total Treatment:     30   mins    Robin A Sprigler, PT  Physical Therapist

## 2019-12-13 ENCOUNTER — TELEPHONE (OUTPATIENT)
Dept: CARDIOLOGY | Facility: CLINIC | Age: 79
End: 2019-12-13

## 2019-12-13 RX ORDER — MIDODRINE HYDROCHLORIDE 5 MG/1
5 TABLET ORAL DAILY
Qty: 90 TABLET | Refills: 1 | Status: SHIPPED | OUTPATIENT
Start: 2019-12-13 | End: 2020-06-02

## 2019-12-13 NOTE — TELEPHONE ENCOUNTER
Needs a refill on midodrine 5 mg  She would like a 90 day supply   said to call the office and it would be filled for 90 days  She is out

## 2019-12-19 ENCOUNTER — TREATMENT (OUTPATIENT)
Dept: PHYSICAL THERAPY | Facility: CLINIC | Age: 79
End: 2019-12-19

## 2019-12-19 DIAGNOSIS — G89.29 CHRONIC BILATERAL LOW BACK PAIN WITH BILATERAL SCIATICA: Primary | ICD-10-CM

## 2019-12-19 DIAGNOSIS — M43.17 SPONDYLOLISTHESIS AT L5-S1 LEVEL: ICD-10-CM

## 2019-12-19 DIAGNOSIS — M54.41 CHRONIC BILATERAL LOW BACK PAIN WITH BILATERAL SCIATICA: Primary | ICD-10-CM

## 2019-12-19 DIAGNOSIS — M48.061 SPINAL STENOSIS OF LUMBAR REGION, UNSPECIFIED WHETHER NEUROGENIC CLAUDICATION PRESENT: ICD-10-CM

## 2019-12-19 DIAGNOSIS — M54.42 CHRONIC BILATERAL LOW BACK PAIN WITH BILATERAL SCIATICA: Primary | ICD-10-CM

## 2019-12-19 PROCEDURE — 97110 THERAPEUTIC EXERCISES: CPT | Performed by: PHYSICAL THERAPIST

## 2019-12-19 PROCEDURE — 97530 THERAPEUTIC ACTIVITIES: CPT | Performed by: PHYSICAL THERAPIST

## 2019-12-19 NOTE — PROGRESS NOTES
Physical Therapy Daily Progress Note    Patient: Anila Spencer   : 1940  Diagnosis/ICD-10 Code:  Chronic bilateral low back pain with bilateral sciatica [M54.42, M54.41, G89.29]  Referring practitioner: Jeanne Rowell MD  Date of Initial Visit: Type: THERAPY  Noted: 2019  Today's Date: 2019  Patient seen for 16 sessions             Subjective Patient reports no new complaints.    Objective   See Exercise, Manual, and Modality Logs for complete treatment. Leg movement a little sluggish today but able to perform all activities.      Assessment/Plan  Patient independent with HEP in 2 weeks - MET  Tolerate 10 min Nustep in 2 weeks - MET  Able to roll in bed and step up curb in 2 weeks - NOT MET  Improve function as evidenced by Oswestry score of 20% or less by discharge (54% impairment initially) - NOT MET, 38% as of 10/31  Able to ambulate with rollator 6 min by discharge - MET  Perform 10 reps of repeated sit to stand without arms in 30 seconds by discharge - MET     Progress per Plan of Care           Timed:         Manual Therapy:         mins  34190;     Therapeutic Exercise:    15     mins  79818;     Neuromuscular Mike:        mins  44956;    Therapeutic Activity:     15     mins  33002;     Gait Training:           mins  83518;     Ultrasound:          mins  57334;    Ionto                                   mins   66887  Self Care                            mins   42469      Un-Timed:  Electrical Stimulation:         mins  55387 ( );  Dry Needling          mins self-pay  Traction          mins 18927  Low Eval          Mins  91534  Mod Eval          Mins  73235  High Eval                            Mins  20798  Canalith Repos                   mins  86964    Timed Treatment:   30   mins   Total Treatment:     30   mins    Robin A Sprigler, PT  Physical Therapist

## 2019-12-30 ENCOUNTER — TREATMENT (OUTPATIENT)
Dept: PHYSICAL THERAPY | Facility: CLINIC | Age: 79
End: 2019-12-30

## 2019-12-30 DIAGNOSIS — M48.061 SPINAL STENOSIS OF LUMBAR REGION, UNSPECIFIED WHETHER NEUROGENIC CLAUDICATION PRESENT: ICD-10-CM

## 2019-12-30 DIAGNOSIS — M43.17 SPONDYLOLISTHESIS AT L5-S1 LEVEL: ICD-10-CM

## 2019-12-30 DIAGNOSIS — M54.41 CHRONIC BILATERAL LOW BACK PAIN WITH BILATERAL SCIATICA: Primary | ICD-10-CM

## 2019-12-30 DIAGNOSIS — G89.29 CHRONIC BILATERAL LOW BACK PAIN WITH BILATERAL SCIATICA: Primary | ICD-10-CM

## 2019-12-30 DIAGNOSIS — M54.42 CHRONIC BILATERAL LOW BACK PAIN WITH BILATERAL SCIATICA: Primary | ICD-10-CM

## 2019-12-30 PROCEDURE — 97530 THERAPEUTIC ACTIVITIES: CPT | Performed by: PHYSICAL THERAPIST

## 2019-12-30 PROCEDURE — 97110 THERAPEUTIC EXERCISES: CPT | Performed by: PHYSICAL THERAPIST

## 2019-12-30 NOTE — PROGRESS NOTES
Physical Therapy Daily Progress Note    Patient: Anila Spencer   : 1940  Diagnosis/ICD-10 Code:  Chronic bilateral low back pain with bilateral sciatica [M54.42, M54.41, G89.29]  Referring practitioner: Jeanne Rowell MD  Date of Initial Visit: Type: THERAPY  Noted: 2019  Today's Date: 2019  Patient seen for 17 sessions             Subjective Patient reports she is doing well, better with sit to stand without assist.  Going to the  2-3 days per week.    Objective   See Exercise, Manual, and Modality Logs for complete treatment. 5/6 goals met.      Assessment/Plan  Patient independent with HEP in 2 weeks - MET  Tolerate 10 min Nustep in 2 weeks - MET  Able to roll in bed and step up curb in 2 weeks - MET  Improve function as evidenced by Oswestry score of 20% or less by discharge (54% impairment initially) - NOT MET, 38% as of 10/31  Able to ambulate with rollator 6 min by discharge - MET  Perform 10 reps of repeated sit to stand without arms in 30 seconds by discharge - MET     Progress per Plan of Care           Timed:         Manual Therapy:         mins  63594;     Therapeutic Exercise:    15     mins  99158;     Neuromuscular Mike:        mins  97364;    Therapeutic Activity:     15     mins  25285;     Gait Training:           mins  88083;     Ultrasound:          mins  18870;    Ionto                                   mins   54969  Self Care                            mins   43507      Un-Timed:  Electrical Stimulation:         mins  43551 ( );  Dry Needling          mins self-pay  Traction          mins 33253  Low Eval          Mins  82244  Mod Eval          Mins  90131  High Eval                            Mins  63986  Canalith Repos                   mins  27400    Timed Treatment:   30   mins   Total Treatment:     30   mins    Robin A Sprigler, PT  Physical Therapist

## 2020-01-09 ENCOUNTER — TREATMENT (OUTPATIENT)
Dept: PHYSICAL THERAPY | Facility: CLINIC | Age: 80
End: 2020-01-09

## 2020-01-09 DIAGNOSIS — M54.41 CHRONIC BILATERAL LOW BACK PAIN WITH BILATERAL SCIATICA: Primary | ICD-10-CM

## 2020-01-09 DIAGNOSIS — G89.29 CHRONIC BILATERAL LOW BACK PAIN WITH BILATERAL SCIATICA: Primary | ICD-10-CM

## 2020-01-09 DIAGNOSIS — M54.42 CHRONIC BILATERAL LOW BACK PAIN WITH BILATERAL SCIATICA: Primary | ICD-10-CM

## 2020-01-09 DIAGNOSIS — M43.17 SPONDYLOLISTHESIS AT L5-S1 LEVEL: ICD-10-CM

## 2020-01-09 DIAGNOSIS — M48.061 SPINAL STENOSIS OF LUMBAR REGION, UNSPECIFIED WHETHER NEUROGENIC CLAUDICATION PRESENT: ICD-10-CM

## 2020-01-09 PROCEDURE — 97110 THERAPEUTIC EXERCISES: CPT | Performed by: PHYSICAL THERAPIST

## 2020-01-09 PROCEDURE — 97530 THERAPEUTIC ACTIVITIES: CPT | Performed by: PHYSICAL THERAPIST

## 2020-01-09 NOTE — PROGRESS NOTES
Physical Therapy Daily Progress Note    Patient: Anila Spencer   : 1940  Diagnosis/ICD-10 Code:  Chronic bilateral low back pain with bilateral sciatica [M54.42, M54.41, G89.29]  Referring practitioner: Jeanne Rowell MD  Date of Initial Visit: Type: THERAPY  Noted: 2019  Today's Date: 2020  Patient seen for 18 sessions             Subjective Patient received Notification that Dr. Rowell is no longer with [facility] and she will be transferred to Dr. Samuel Johnson.  She plans to call the office and schedule for February if possible.  She plans to continue PT until then.  She is continuing to do well.    Objective   See Exercise, Manual, and Modality Logs for complete treatment. 5/6 goals met.      Assessment/Plan  Patient independent with HEP in 2 weeks - MET  Tolerate 10 min Nustep in 2 weeks - MET  Able to roll in bed and step up curb in 2 weeks - MET  Improve function as evidenced by Oswestry score of 20% or less by discharge (54% impairment initially) - NOT MET, 38% as of 10/31  Able to ambulate with rollator 6 min by discharge - MET  Perform 10 reps of repeated sit to stand without arms in 30 seconds by discharge - MET     Progress per Plan of Care up to 30 visits if needed.           Timed:         Manual Therapy:         mins  22014;     Therapeutic Exercise:    15     mins  13496;     Neuromuscular Mike:        mins  32960;    Therapeutic Activity:     15     mins  36177;     Gait Training:           mins  02214;     Ultrasound:          mins  41786;    Ionto                                   mins   43994  Self Care                            mins   32291      Un-Timed:  Electrical Stimulation:         mins  86346 ( );  Dry Needling          mins self-pay  Traction          mins 69108  Low Eval          Mins  00051  Mod Eval          Mins  14286  High Eval                            Mins  17518  Canalith Repos                   mins  46691    Timed Treatment:   30   mins   Total  Treatment:     30   mins    Robin A Sprigler, PT  Physical Therapist

## 2020-01-13 RX ORDER — DEXLANSOPRAZOLE 60 MG/1
CAPSULE, DELAYED RELEASE ORAL
Qty: 90 CAPSULE | Refills: 1 | Status: SHIPPED | OUTPATIENT
Start: 2020-01-13 | End: 2020-10-08

## 2020-01-16 ENCOUNTER — TREATMENT (OUTPATIENT)
Dept: PHYSICAL THERAPY | Facility: CLINIC | Age: 80
End: 2020-01-16

## 2020-01-16 DIAGNOSIS — M54.41 CHRONIC BILATERAL LOW BACK PAIN WITH BILATERAL SCIATICA: Primary | ICD-10-CM

## 2020-01-16 DIAGNOSIS — M43.17 SPONDYLOLISTHESIS AT L5-S1 LEVEL: ICD-10-CM

## 2020-01-16 DIAGNOSIS — G89.29 CHRONIC BILATERAL LOW BACK PAIN WITH BILATERAL SCIATICA: Primary | ICD-10-CM

## 2020-01-16 DIAGNOSIS — M48.061 SPINAL STENOSIS OF LUMBAR REGION, UNSPECIFIED WHETHER NEUROGENIC CLAUDICATION PRESENT: ICD-10-CM

## 2020-01-16 DIAGNOSIS — M54.42 CHRONIC BILATERAL LOW BACK PAIN WITH BILATERAL SCIATICA: Primary | ICD-10-CM

## 2020-01-16 PROCEDURE — 97530 THERAPEUTIC ACTIVITIES: CPT | Performed by: PHYSICAL THERAPIST

## 2020-01-16 PROCEDURE — 97110 THERAPEUTIC EXERCISES: CPT | Performed by: PHYSICAL THERAPIST

## 2020-01-16 NOTE — PROGRESS NOTES
Physical Therapy Daily Progress Note    Patient: Anila Spencer   : 1940  Diagnosis/ICD-10 Code:  Chronic bilateral low back pain with bilateral sciatica [M54.42, M54.41, G89.29]  Referring practitioner: Jeanne Rowell MD  Date of Initial Visit: Type: THERAPY  Noted: 2019  Today's Date: 2020  Patient seen for 19 sessions             Subjective Patient [place] [facility] [place] [facility] [place].  Her  used to have to hold on to her, now he trusts her to do it herself.  She is scheduled with Dr. Johnson in early April, he is taking over her care from Dr. Rowell and told her to continue PT until she sees him since he can't see her before earlier.    Objective   See Exercise, Manual, and Modality Logs for complete treatment.       Assessment/Plan  Patient independent with HEP in 2 weeks - MET  Tolerate 10 min Nustep in 2 weeks - MET  Able to roll in bed and step up curb in 2 weeks - MET  Improve function as evidenced by Oswestry score of 20% or less by discharge (54% impairment initially) - NOT MET, 38% as of 10/31  Able to ambulate with rollator 6 min by discharge - MET  Perform 10 reps of repeated sit to stand without arms in 30 seconds by discharge - MET     Progress per Plan of Care up to 30 visits if needed.           Timed:         Manual Therapy:         mins  80695;     Therapeutic Exercise:    15     mins  73451;     Neuromuscular Mike:        mins  92681;    Therapeutic Activity:     15     mins  55866;     Gait Training:           mins  61592;     Ultrasound:          mins  14414;    Ionto                                   mins   53206  Self Care                            mins   18196      Un-Timed:  Electrical Stimulation:         mins  52256 ( );  Dry Needling          mins self-pay  Traction          mins 37260  Low Eval          Mins  00950  Mod Eval          Mins  01590  High Eval                            Mins  41016  Isael  Repos                   mins  37120    Timed Treatment:   30   mins   Total Treatment:     30   mins    Robin A Sprigler, PT  Physical Therapist

## 2020-01-20 ENCOUNTER — TREATMENT (OUTPATIENT)
Dept: PHYSICAL THERAPY | Facility: CLINIC | Age: 80
End: 2020-01-20

## 2020-01-20 DIAGNOSIS — M43.17 SPONDYLOLISTHESIS AT L5-S1 LEVEL: ICD-10-CM

## 2020-01-20 DIAGNOSIS — M54.42 CHRONIC BILATERAL LOW BACK PAIN WITH BILATERAL SCIATICA: Primary | ICD-10-CM

## 2020-01-20 DIAGNOSIS — M48.061 SPINAL STENOSIS OF LUMBAR REGION, UNSPECIFIED WHETHER NEUROGENIC CLAUDICATION PRESENT: ICD-10-CM

## 2020-01-20 DIAGNOSIS — M54.41 CHRONIC BILATERAL LOW BACK PAIN WITH BILATERAL SCIATICA: Primary | ICD-10-CM

## 2020-01-20 DIAGNOSIS — G89.29 CHRONIC BILATERAL LOW BACK PAIN WITH BILATERAL SCIATICA: Primary | ICD-10-CM

## 2020-01-20 PROCEDURE — 97110 THERAPEUTIC EXERCISES: CPT | Performed by: PHYSICAL THERAPIST

## 2020-01-20 PROCEDURE — 97530 THERAPEUTIC ACTIVITIES: CPT | Performed by: PHYSICAL THERAPIST

## 2020-01-20 NOTE — PROGRESS NOTES
Physical Therapy Daily Progress Note    Patient: Anila Spencer   : 1940  Diagnosis/ICD-10 Code:  Chronic bilateral low back pain with bilateral sciatica [M54.42, M54.41, G89.29]  Referring practitioner: Jeanne Rowell MD  Date of Initial Visit: Type: THERAPY  Noted: 2019  Today's Date: 2020  Patient seen for 20 sessions             Subjective Patient reports no new complaints other than stiffness from cold weather.    Objective   See Exercise, Manual, and Modality Logs for complete treatment.       Assessment/Plan  Patient independent with HEP in 2 weeks - MET  Tolerate 10 min Nustep in 2 weeks - MET  Able to roll in bed and step up curb in 2 weeks - MET  Improve function as evidenced by Oswestry score of 20% or less by discharge (54% impairment initially) - NOT MET, 38% as of 10/31  Able to ambulate with rollator 6 min by discharge - MET  Perform 10 reps of repeated sit to stand without arms in 30 seconds by discharge - MET     Progress per Plan of Care up to 30 visits if needed           Timed:         Manual Therapy:         mins  72498;     Therapeutic Exercise:    15     mins  44866;     Neuromuscular Mike:        mins  21430;    Therapeutic Activity:     15     mins  57228;     Gait Training:           mins  61809;     Ultrasound:          mins  78372;    Ionto                                   mins   04042  Self Care                            mins   97751      Un-Timed:  Electrical Stimulation:         mins  02861 ( );  Dry Needling          mins self-pay  Traction          mins 69157  Low Eval          Mins  94397  Mod Eval          Mins  13696  High Eval                            Mins  58169  Canalith Repos                   mins  08556    Timed Treatment:   30   mins   Total Treatment:     30   mins    Robin A Sprigler, PT  Physical Therapist

## 2020-01-30 ENCOUNTER — TREATMENT (OUTPATIENT)
Dept: PHYSICAL THERAPY | Facility: CLINIC | Age: 80
End: 2020-01-30

## 2020-01-30 DIAGNOSIS — M43.17 SPONDYLOLISTHESIS AT L5-S1 LEVEL: ICD-10-CM

## 2020-01-30 DIAGNOSIS — G89.29 CHRONIC BILATERAL LOW BACK PAIN WITH BILATERAL SCIATICA: Primary | ICD-10-CM

## 2020-01-30 DIAGNOSIS — M54.42 CHRONIC BILATERAL LOW BACK PAIN WITH BILATERAL SCIATICA: Primary | ICD-10-CM

## 2020-01-30 DIAGNOSIS — M54.41 CHRONIC BILATERAL LOW BACK PAIN WITH BILATERAL SCIATICA: Primary | ICD-10-CM

## 2020-01-30 DIAGNOSIS — M48.061 SPINAL STENOSIS OF LUMBAR REGION, UNSPECIFIED WHETHER NEUROGENIC CLAUDICATION PRESENT: ICD-10-CM

## 2020-01-30 PROCEDURE — 97530 THERAPEUTIC ACTIVITIES: CPT | Performed by: PHYSICAL THERAPIST

## 2020-01-30 PROCEDURE — 97110 THERAPEUTIC EXERCISES: CPT | Performed by: PHYSICAL THERAPIST

## 2020-01-30 NOTE — PROGRESS NOTES
Physical Therapy Daily Progress Note    Patient: Anila Spencer   : 1940  Diagnosis/ICD-10 Code:  Chronic bilateral low back pain with bilateral sciatica [M54.42, M54.41, G89.29]  Referring practitioner: Jeanne Rowell MD  Date of Initial Visit: Type: THERAPY  Noted: 2019  Today's Date: 2020  Patient seen for 21 sessions             Subjective Patient has no new complaints.      Objective   See Exercise, Manual, and Modality Logs for complete treatment. Reassessed Oswestry score today, showing 66% impairment, this is higher than last score but appears to be an accurate reflection of function as this time.        Assessment/Plan  Patient independent with HEP in 2 weeks - MET  Tolerate 10 min Nustep in 2 weeks - MET  Able to roll in bed and step up curb in 2 weeks - MET  Improve function as evidenced by Oswestry score of 20% or less by discharge (54% impairment initially) - NOT MET, 38% as of 10/31  Able to ambulate with rollator 6 min by discharge - MET  Perform 10 reps of repeated sit to stand without arms in 30 seconds by discharge - MET     Progress per Plan of Care up to 30 visits if needed           Timed:         Manual Therapy:         mins  96117;     Therapeutic Exercise:    15     mins  10187;     Neuromuscular Mike:        mins  83220;    Therapeutic Activity:     15     mins  76089;     Gait Training:           mins  44832;     Ultrasound:          mins  64473;    Ionto                                   mins   83085  Self Care                            mins   13990      Un-Timed:  Electrical Stimulation:         mins  62969 (MC );  Dry Needling          mins self-pay  Traction          mins 90134  Low Eval          Mins  64954  Mod Eval          Mins  54774  High Eval                            Mins  37993  Canalith Repos                   mins  70570    Timed Treatment:   30   mins   Total Treatment:     30   mins    Robin A Sprigler, PT  Physical Therapist

## 2020-02-06 ENCOUNTER — TREATMENT (OUTPATIENT)
Dept: PHYSICAL THERAPY | Facility: CLINIC | Age: 80
End: 2020-02-06

## 2020-02-06 DIAGNOSIS — G89.29 CHRONIC BILATERAL LOW BACK PAIN WITH BILATERAL SCIATICA: Primary | ICD-10-CM

## 2020-02-06 DIAGNOSIS — M54.41 CHRONIC BILATERAL LOW BACK PAIN WITH BILATERAL SCIATICA: Primary | ICD-10-CM

## 2020-02-06 DIAGNOSIS — M48.061 SPINAL STENOSIS OF LUMBAR REGION, UNSPECIFIED WHETHER NEUROGENIC CLAUDICATION PRESENT: ICD-10-CM

## 2020-02-06 DIAGNOSIS — M54.42 CHRONIC BILATERAL LOW BACK PAIN WITH BILATERAL SCIATICA: Primary | ICD-10-CM

## 2020-02-06 DIAGNOSIS — M43.17 SPONDYLOLISTHESIS AT L5-S1 LEVEL: ICD-10-CM

## 2020-02-06 PROCEDURE — 97110 THERAPEUTIC EXERCISES: CPT | Performed by: PHYSICAL THERAPIST

## 2020-02-06 PROCEDURE — 97530 THERAPEUTIC ACTIVITIES: CPT | Performed by: PHYSICAL THERAPIST

## 2020-02-06 NOTE — PROGRESS NOTES
Physical Therapy Daily Progress Note    Patient: Anila Spencer   : 1940  Diagnosis/ICD-10 Code:  Chronic bilateral low back pain with bilateral sciatica [M54.42, M54.41, G89.29]  Referring practitioner: Jeanne Rowell MD  Date of Initial Visit: Type: THERAPY  Noted: 2019  Today's Date: 2020  Patient seen for 22 sessions             Subjective No new complaints.    Objective   See Exercise, Manual, and Modality Logs for complete treatment. Diffiulty raising R foot up today after Nustep.  Able to perform after rest break and doing other exercises first.      Assessment/Plan  Patient independent with HEP in 2 weeks - MET  Tolerate 10 min Nustep in 2 weeks - MET  Able to roll in bed and step up curb in 2 weeks - MET  Improve function as evidenced by Oswestry score of 20% or less by discharge (54% impairment initially) - NOT MET, 38% as of 10/31  Able to ambulate with rollator 6 min by discharge - MET  Perform 10 reps of repeated sit to stand without arms in 30 seconds by discharge - MET     Progress per Plan of Care up to 30 visits if needed           Timed:         Manual Therapy:         mins  07238;     Therapeutic Exercise:    15     mins  58671;     Neuromuscular Mike:        mins  51588;    Therapeutic Activity:     15     mins  41302;     Gait Training:           mins  60342;     Ultrasound:          mins  39191;    Ionto                                   mins   21087  Self Care                            mins   81579      Un-Timed:  Electrical Stimulation:         mins  65334 (MC );  Dry Needling          mins self-pay  Traction          mins 03576  Low Eval          Mins  84863  Mod Eval          Mins  08284  High Eval                            Mins  78651  Canalith Repos                   mins  84191    Timed Treatment:   30   mins   Total Treatment:     30   mins    Robin A Sprigler, PT  Physical Therapist

## 2020-02-13 ENCOUNTER — TREATMENT (OUTPATIENT)
Dept: PHYSICAL THERAPY | Facility: CLINIC | Age: 80
End: 2020-02-13

## 2020-02-13 DIAGNOSIS — M54.42 CHRONIC BILATERAL LOW BACK PAIN WITH BILATERAL SCIATICA: Primary | ICD-10-CM

## 2020-02-13 DIAGNOSIS — M43.17 SPONDYLOLISTHESIS AT L5-S1 LEVEL: ICD-10-CM

## 2020-02-13 DIAGNOSIS — M54.41 CHRONIC BILATERAL LOW BACK PAIN WITH BILATERAL SCIATICA: Primary | ICD-10-CM

## 2020-02-13 DIAGNOSIS — G89.29 CHRONIC BILATERAL LOW BACK PAIN WITH BILATERAL SCIATICA: Primary | ICD-10-CM

## 2020-02-13 DIAGNOSIS — M48.061 SPINAL STENOSIS OF LUMBAR REGION, UNSPECIFIED WHETHER NEUROGENIC CLAUDICATION PRESENT: ICD-10-CM

## 2020-02-13 PROCEDURE — 97530 THERAPEUTIC ACTIVITIES: CPT | Performed by: PHYSICAL THERAPIST

## 2020-02-13 PROCEDURE — 97110 THERAPEUTIC EXERCISES: CPT | Performed by: PHYSICAL THERAPIST

## 2020-02-13 NOTE — PROGRESS NOTES
Physical Therapy Daily Progress Note    Patient: Anila Spencer   : 1940  Diagnosis/ICD-10 Code:  Chronic bilateral low back pain with bilateral sciatica [M54.42, M54.41, G89.29]  Referring practitioner: Jeanne Rowell MD  Date of Initial Visit: Type: THERAPY  Noted: 2019  Today's Date: 2020  Patient seen for 23 sessions             Subjective Patient reports she had a rough day yesterday, feeling weak, her  had to help her up the curb when she went to the  but she went and did her exercises anyway.    Objective   See Exercise, Manual, and Modality Logs for complete treatment. Slower gait speed than usual, lower energy level noted but patient denies feeling ill, SOA, congestion, etc.      Assessment/Plan  Patient independent with HEP in 2 weeks - MET  Tolerate 10 min Nustep in 2 weeks - MET  Able to roll in bed and step up curb in 2 weeks - MET  Improve function as evidenced by Oswestry score of 20% or less by discharge (54% impairment initially) - NOT MET, 38% as of 10/31  Able to ambulate with rollator 6 min by discharge - MET  Perform 10 reps of repeated sit to stand without arms in 30 seconds by discharge - MET     Progress per Plan of Care up to 30 visits if needed           Timed:         Manual Therapy:         mins  30178;     Therapeutic Exercise:    15     mins  77716;     Neuromuscular Mike:        mins  12193;    Therapeutic Activity:     15     mins  48342;     Gait Training:           mins  55951;     Ultrasound:          mins  75301;    Ionto                                   mins   02221  Self Care                            mins   87547      Un-Timed:  Electrical Stimulation:         mins  74215 ( );  Dry Needling          mins self-pay  Traction          mins 92529  Low Eval          Mins  40965  Mod Eval          Mins  50414  High Eval                            Mins  33429  Canalith Repos                   mins  75284    Timed Treatment:   30   mins    Total Treatment:     30   mins    Robin A Sprigler, PT  Physical Therapist

## 2020-02-20 ENCOUNTER — TREATMENT (OUTPATIENT)
Dept: PHYSICAL THERAPY | Facility: CLINIC | Age: 80
End: 2020-02-20

## 2020-02-20 DIAGNOSIS — M43.17 SPONDYLOLISTHESIS AT L5-S1 LEVEL: ICD-10-CM

## 2020-02-20 DIAGNOSIS — G89.29 CHRONIC BILATERAL LOW BACK PAIN WITH BILATERAL SCIATICA: Primary | ICD-10-CM

## 2020-02-20 DIAGNOSIS — M54.42 CHRONIC BILATERAL LOW BACK PAIN WITH BILATERAL SCIATICA: Primary | ICD-10-CM

## 2020-02-20 DIAGNOSIS — M54.41 CHRONIC BILATERAL LOW BACK PAIN WITH BILATERAL SCIATICA: Primary | ICD-10-CM

## 2020-02-20 DIAGNOSIS — M48.061 SPINAL STENOSIS OF LUMBAR REGION, UNSPECIFIED WHETHER NEUROGENIC CLAUDICATION PRESENT: ICD-10-CM

## 2020-02-20 PROCEDURE — 97110 THERAPEUTIC EXERCISES: CPT | Performed by: PHYSICAL THERAPIST

## 2020-02-20 PROCEDURE — 97530 THERAPEUTIC ACTIVITIES: CPT | Performed by: PHYSICAL THERAPIST

## 2020-02-20 NOTE — PROGRESS NOTES
Physical Therapy Daily Progress Note    Patient: Anila Spencer   : 1940  Diagnosis/ICD-10 Code:  Chronic bilateral low back pain with bilateral sciatica [M54.42, M54.41, G89.29]  Referring practitioner: Jeanne Rowell MD  Date of Initial Visit: Type: THERAPY  Noted: 2019  Today's Date: 2020  Patient seen for 24 sessions             Subjective Patient reports she was so tired last week after having a mild seizure.  She believes it was brought on by stress over scheduling a surgery on her lip.  She is scheduled for a pacemaker check next Friday and will reschedule the surgery for March.    Objective   See Exercise, Manual, and Modality Logs for complete treatment. Better tolerance of activity today compared to last week.      Assessment/Plan  Patient independent with HEP in 2 weeks - MET  Tolerate 10 min Nustep in 2 weeks - MET  Able to roll in bed and step up curb in 2 weeks - MET  Improve function as evidenced by Oswestry score of 20% or less by discharge (54% impairment initially) - NOT MET, 38% as of 10/31  Able to ambulate with rollator 6 min by discharge - MET  Perform 10 reps of repeated sit to stand without arms in 30 seconds by discharge - MET     Progress per Plan of Care up to 30 visits if needed           Timed:         Manual Therapy:         mins  56709;     Therapeutic Exercise:    15     mins  40295;     Neuromuscular Mike:        mins  08438;    Therapeutic Activity:     15     mins  61789;     Gait Training:           mins  26531;     Ultrasound:          mins  44239;    Ionto                                   mins   97218  Self Care                            mins   36525      Un-Timed:  Electrical Stimulation:         mins  25820 ( );  Dry Needling          mins self-pay  Traction          mins 08021  Low Eval          Mins  14249  Mod Eval          Mins  39440  High Eval                            Mins  38534  Canalith Repos                   mins  37689    Timed  Treatment:   30   mins   Total Treatment:     30   mins    Robin A Sprigler, PT  Physical Therapist

## 2020-02-25 PROBLEM — H26.493 PCO (POSTERIOR CAPSULAR OPACIFICATION), BILATERAL: Status: ACTIVE | Noted: 2020-02-25

## 2020-02-25 PROBLEM — H53.2 DIPLOPIA: Status: ACTIVE | Noted: 2017-03-15

## 2020-02-25 PROBLEM — Z96.1 PSEUDOPHAKIA OF BOTH EYES: Status: ACTIVE | Noted: 2020-02-25

## 2020-02-25 PROBLEM — H40.009 GLAUCOMA SUSPECT: Status: ACTIVE | Noted: 2017-03-15

## 2020-02-25 RX ORDER — LORATADINE 10 MG/1
1 TABLET ORAL DAILY
COMMUNITY
Start: 2019-10-29 | End: 2020-02-28

## 2020-02-25 RX ORDER — METOPROLOL SUCCINATE 50 MG/1
1 TABLET, EXTENDED RELEASE ORAL DAILY
COMMUNITY
Start: 2019-10-29 | End: 2020-02-28

## 2020-02-27 ENCOUNTER — TREATMENT (OUTPATIENT)
Dept: PHYSICAL THERAPY | Facility: CLINIC | Age: 80
End: 2020-02-27

## 2020-02-27 ENCOUNTER — CLINICAL SUPPORT (OUTPATIENT)
Dept: FAMILY MEDICINE CLINIC | Facility: CLINIC | Age: 80
End: 2020-02-27

## 2020-02-27 DIAGNOSIS — M54.42 CHRONIC BILATERAL LOW BACK PAIN WITH BILATERAL SCIATICA: Primary | ICD-10-CM

## 2020-02-27 DIAGNOSIS — G89.29 CHRONIC BILATERAL LOW BACK PAIN WITH BILATERAL SCIATICA: Primary | ICD-10-CM

## 2020-02-27 DIAGNOSIS — M81.0 OSTEOPOROSIS, UNSPECIFIED OSTEOPOROSIS TYPE, UNSPECIFIED PATHOLOGICAL FRACTURE PRESENCE: Primary | ICD-10-CM

## 2020-02-27 DIAGNOSIS — M54.41 CHRONIC BILATERAL LOW BACK PAIN WITH BILATERAL SCIATICA: Primary | ICD-10-CM

## 2020-02-27 DIAGNOSIS — M48.061 SPINAL STENOSIS OF LUMBAR REGION, UNSPECIFIED WHETHER NEUROGENIC CLAUDICATION PRESENT: ICD-10-CM

## 2020-02-27 DIAGNOSIS — M43.17 SPONDYLOLISTHESIS AT L5-S1 LEVEL: ICD-10-CM

## 2020-02-27 PROCEDURE — 96372 THER/PROPH/DIAG INJ SC/IM: CPT | Performed by: NURSE PRACTITIONER

## 2020-02-27 PROCEDURE — 97110 THERAPEUTIC EXERCISES: CPT | Performed by: PHYSICAL THERAPIST

## 2020-02-27 PROCEDURE — 97530 THERAPEUTIC ACTIVITIES: CPT | Performed by: PHYSICAL THERAPIST

## 2020-02-27 NOTE — PROGRESS NOTES
Physical Therapy Daily Progress Note    Patient: Anila Spencer   : 1940  Diagnosis/ICD-10 Code:  Chronic bilateral low back pain with bilateral sciatica [M54.42, M54.41, G89.29]  Referring practitioner: Jeanne Rowell MD  Date of Initial Visit: Type: THERAPY  Noted: 2019  Today's Date: 2020  Patient seen for 25 sessions             Subjective Patient doing well except for various aches and pains.    Objective   See Exercise, Manual, and Modality Logs for complete treatment. Continuing improved strength and balance with dynamic standing activities noted.      Assessment/Plan  Patient independent with HEP in 2 weeks - MET  Tolerate 10 min Nustep in 2 weeks - MET  Able to roll in bed and step up curb in 2 weeks - MET  Improve function as evidenced by Oswestry score of 20% or less by discharge (54% impairment initially) - NOT MET, 38% as of 10/31  Able to ambulate with rollator 6 min by discharge - MET  Perform 10 reps of repeated sit to stand without arms in 30 seconds by discharge - MET     Progress per Plan of Care up to 30 visits if needed           Timed:         Manual Therapy:         mins  30566;     Therapeutic Exercise:    15     mins  29659;     Neuromuscular Mike:        mins  68731;    Therapeutic Activity:     15     mins  31605;     Gait Training:           mins  24364;     Ultrasound:          mins  94274;    Ionto                                   mins   98559  Self Care                            mins   14506      Un-Timed:  Electrical Stimulation:         mins  64798 ( );  Dry Needling          mins self-pay  Traction          mins 87744  Low Eval          Mins  82122  Mod Eval          Mins  61950  High Eval                            Mins  33898  Canalith Repos                   mins  28972    Timed Treatment:   30   mins   Total Treatment:     30   mins    Robin A Sprigler, PT  Physical Therapist

## 2020-02-28 ENCOUNTER — OFFICE VISIT (OUTPATIENT)
Dept: CARDIOLOGY | Facility: CLINIC | Age: 80
End: 2020-02-28

## 2020-02-28 VITALS
DIASTOLIC BLOOD PRESSURE: 70 MMHG | WEIGHT: 207 LBS | BODY MASS INDEX: 36.68 KG/M2 | SYSTOLIC BLOOD PRESSURE: 107 MMHG | HEART RATE: 81 BPM | HEIGHT: 63 IN

## 2020-02-28 DIAGNOSIS — R55 SYNCOPE AND COLLAPSE: ICD-10-CM

## 2020-02-28 DIAGNOSIS — I49.5 SICK SINUS SYNDROME (HCC): ICD-10-CM

## 2020-02-28 DIAGNOSIS — Z95.0 PRESENCE OF CARDIAC PACEMAKER: ICD-10-CM

## 2020-02-28 DIAGNOSIS — R07.89 CHEST PAIN, ATYPICAL: ICD-10-CM

## 2020-02-28 DIAGNOSIS — I10 ESSENTIAL HYPERTENSION: Primary | ICD-10-CM

## 2020-02-28 DIAGNOSIS — I34.0 MITRAL VALVE INSUFFICIENCY, UNSPECIFIED ETIOLOGY: ICD-10-CM

## 2020-02-28 PROCEDURE — 99214 OFFICE O/P EST MOD 30 MIN: CPT | Performed by: INTERNAL MEDICINE

## 2020-02-28 NOTE — PROGRESS NOTES
Date of Office Visit: 2020  Encounter Provider: Dr. Adam Agrawal  Place of Service: [facility]ARDIOLOG[facility] [place]  Patient Name: Anila Spencer  :1940  Helena Mas APRN    Chief Complaint   Patient presents with   • Hypertension     3 Month follow up device check   • Chest Pain   • Syncope   • Mitral Insufficiency     History of Present Illness    Anila Spencer is a 79 y.o. female. Her past medical history significant for hypertension, sick sinus syndrome, tachybradycardia syndrome, history of syncope came today for follow-up.     Previously patient had repeated episodes of syncope. She had extensive workup which showed that she had underlying sick sinus syndrome. She underwent permanent  pacemaker implantation. Since then patient symptoms of syncope had completely resolved. Previously, patient was also evaluated with cardiac catheterization because of her symptoms of angina pectoris and she was noted to have no significant coronary artery disease. Her previous CAT scan of chest was also negative for pulmonary embolism and she had pulmonary function test which was negative for any significant pulmonary airway disease. It was noted that her symptoms of chest pain at that time was probably related to gastroesophageal reflux disease and she was appropriately treated.    In 2019, patient had an episode of syncope at home.  Patient underwent tilt table test and it showed hypotensive response to head up tilt.  Patient was started on midodrine 5 mg twice daily.  Echocardiogram showed normal left ventricular size and function.  LVEF was 60 to 65%.  Mild mitral regurgitation was noted.  Stress test was negative for ischemia.    This is unscheduled visit.  Patient is being considered for upper lip surgery.  Patient came today for evaluation.  Patient denies any chest pain.  Patient has not fallen down again.  No syncopal episode.  Her blood pressure is still low occasionally.  Patient denies  any orthopnea, PND, leg edema.    Pacemaker is interrogated today and it is functioning appropriately.    At this stage, patient is cleared for the surgery.  Pacemaker is functioning appropriately.  Her blood pressure is still low.  I would recommend to decrease Lopressor to 25 mg twice daily.  Patient is advised to not take antihypertensive if the blood pressure is less than 110.  Continue Midrin    Past Medical History:   Diagnosis Date   • Abnormal cardiovascular stress test 3/21/2017   • Allergic rhinitis    • Anxiety    • Basal cell carcinoma (BCC) of face    • Biliary dyskinesia    • Cardiomegaly    • Cataract    • Cervical spinal stenosis    • Chronic constipation    • Chronic insomnia    • DDD (degenerative disc disease), cervical     C-spine, T-spine, L-Spine   • DDD (degenerative disc disease), lumbar 7/25/2019   • Degeneration of intervertebral disc of thoracic region 3/18/2017   • Diplopia     Dr. Laird   • Dysphagia 4/25/2019   • Emphysema of lung (CMS/HCC)    • Fatty liver    • Fracture of multiple ribs 07/2018    Left 4th-8th   • Gastroesophageal reflux disease 12/4/2012   • GERD (gastroesophageal reflux disease)    • Hematuria 12/29/2014   • Hypothyroidism    • Injury of back    • Insomnia 3/7/2016   • Kidney stone    • Nasal fracture 07/2018   • Osteoarthritis    • Osteoporosis     h/o tx with Fosamax but quit in 2012 due to fears of complications   • Prediabetes    • Rotator cuff tear     right   • Seizure disorder (CMS/HCC)     Dr. Shannon Bennett   • Shortness of breath 5/8/2019   • Sick sinus syndrome (CMS/HCC)     Dr. Agrawal   • Spinal stenosis of cervical region 2/11/2015   • Squamous cell skin cancer, face    • Traumatic brain injury (CMS/HCC)     from fall   • Urinary incontinence          Past Surgical History:   Procedure Laterality Date   • BILATERAL SALPINGO OOPHORECTOMY      for benign cysts   • CARDIAC CATHETERIZATION  08/25/2009    25% LAD. L Cx luminal irregularities. Normal L main   •  CARDIAC CATHETERIZATION  03/29/2017    25% LAD. 10-20% LCX. ER 65%. Dr. Agrawal.    • CARDIOVASCULAR STRESS TEST  2019   • CATARACT EXTRACTION  2006   • ECHO - CONVERTED  2019   • ENDOSCOPY  05/17/2017    Normal, Dr. Gutierrez   • ENDOSCOPY N/A 8/22/2019    Procedure: ESOPHAGOGASTRODUODENOSCOPY WITH DILATATION (BOUGIE #[id number]);  Surgeon: Joaquin Story MD;  Location: [facility] END[facility];  Service: Gastroenterology   • INSERT / REPLACE / REMOVE PACEMAKER     • KNEE ARTHROSCOPY Left 1992   • OTHER SURGICAL HISTORY Right 07/2017    Right eye, YAG Capsuloyomy , Dr. Lemus   • PACEMAKER IMPLANTATION  2009   • ROTATOR CUFF REPAIR Right     Dr. Goldberg   • TONSILLECTOMY     • TOTAL HIP ARTHROPLASTY Bilateral            Current Outpatient Medications:   •  Calcium Carbonate-Vitamin D3 (CALCIUM 600-D) 600-400 MG-UNIT tablet, Take 2 capsules by mouth Daily., Disp: , Rfl:   •  DEXILANT 60 MG capsule, TAKE ONE CAPSULE BY MOUTH ONCE DAILY, Disp: 90 capsule, Rfl: 1  •  escitalopram (LEXAPRO) 10 MG tablet, Take 10 mg by mouth Daily., Disp: , Rfl:   •  glucosamine-chondroitin (GLUCOSAMINE CHONDR COMPLEX) 500-400 MG capsule capsule, 1 capsule Every 12 (Twelve) Hours., Disp: , Rfl:   •  levothyroxine (SYNTHROID, LEVOTHROID) 50 MCG tablet, TAKE ONE TABLET BY MOUTH EVERY MORNING 30 MINUTES BEFORE EATING OR TAKING ANY OTHER MEDICATION, Disp: 90 tablet, Rfl: 1  •  losartan (COZAAR) 25 MG tablet, Take 25 mg by mouth Daily., Disp: , Rfl:   •  metoprolol tartrate (LOPRESSOR) 50 MG tablet, Take 25 mg by mouth Every 12 (Twelve) Hours., Disp: , Rfl:   •  midodrine (PROAMATINE) 5 MG tablet, Take 1 tablet by mouth Daily., Disp: 90 tablet, Rfl: 1  •  Multiple Vitamins-Minerals (MULTI VITAMIN/MINERALS) tablet, MULTI VITAMIN/MINERALS TABS, Disp: , Rfl:   •  zonisamide (ZONEGRAN) 100 MG capsule, TAKE FOUR CAPSULES BY MOUTH ONCE DAILY, Disp: , Rfl:       Social History     Socioeconomic History   • Marital status:      Spouse name: Not on file  "  • Number of children: Not on file   • Years of education: Not on file   • Highest education level: Not on file   Tobacco Use   • Smoking status: Never Smoker   • Smokeless tobacco: Never Used   • Tobacco comment: Patient is advised not to smoke.   Substance and Sexual Activity   • Alcohol use: No     Frequency: Never   • Drug use: No   • Sexual activity: Defer         Review of Systems   Constitution: Negative for chills and fever.   HENT: Negative for ear discharge and nosebleeds.    Eyes: Negative for discharge and redness.   Cardiovascular: Negative for chest pain, orthopnea, palpitations, paroxysmal nocturnal dyspnea and syncope.   Respiratory: Positive for shortness of breath. Negative for cough and wheezing.    Endocrine: Negative for heat intolerance.   Skin: Negative for rash.   Musculoskeletal: Positive for arthritis and joint pain. Negative for myalgias.   Gastrointestinal: Negative for abdominal pain, melena, nausea and vomiting.   Genitourinary: Negative for dysuria and hematuria.   Neurological: Negative for dizziness, light-headedness, numbness and tremors.   Psychiatric/Behavioral: Negative for depression. The patient is not nervous/anxious.        Procedures    Procedures    No orders to display           Objective:    /70   Pulse 81   Ht 160 cm (62.99\")   Wt 93.9 kg (207 lb)   BMI 36.68 kg/m²         Physical Exam   Constitutional: She is oriented to person, place, and time. She appears well-developed and well-nourished.   HENT:   Head: Normocephalic and atraumatic.   Eyes: No scleral icterus.   Neck: No thyromegaly present.   Cardiovascular: Normal rate, regular rhythm and normal heart sounds. Exam reveals no gallop and no friction rub.   No murmur heard.  Pulmonary/Chest: Effort normal and breath sounds normal. No respiratory distress. She has no wheezes. She has no rales.   Abdominal: There is no tenderness.   Musculoskeletal: She exhibits no edema.   Lymphadenopathy:     She has no " cervical adenopathy.   Neurological: She is alert and oriented to person, place, and time.   Skin: No rash noted. No erythema.   Psychiatric: She has a normal mood and affect.           Assessment:       Diagnosis Plan   1. Essential hypertension     2. Mitral valve insufficiency, unspecified etiology     3. Presence of cardiac pacemaker     4. Sick sinus syndrome (CMS/HCC)     5. Syncope and collapse     6. Chest pain, atypical              Plan:       I would recommend that patient should proceed with surgery as planned.  I would decrease Lopressor to 25 mg daily.  Twice daily.  Blood pressure monitoring is recommended

## 2020-03-06 ENCOUNTER — TREATMENT (OUTPATIENT)
Dept: PHYSICAL THERAPY | Facility: CLINIC | Age: 80
End: 2020-03-06

## 2020-03-06 DIAGNOSIS — G89.29 CHRONIC BILATERAL LOW BACK PAIN WITH BILATERAL SCIATICA: Primary | ICD-10-CM

## 2020-03-06 DIAGNOSIS — M43.17 SPONDYLOLISTHESIS AT L5-S1 LEVEL: ICD-10-CM

## 2020-03-06 DIAGNOSIS — M54.42 CHRONIC BILATERAL LOW BACK PAIN WITH BILATERAL SCIATICA: Primary | ICD-10-CM

## 2020-03-06 DIAGNOSIS — M54.41 CHRONIC BILATERAL LOW BACK PAIN WITH BILATERAL SCIATICA: Primary | ICD-10-CM

## 2020-03-06 DIAGNOSIS — M48.061 SPINAL STENOSIS OF LUMBAR REGION, UNSPECIFIED WHETHER NEUROGENIC CLAUDICATION PRESENT: ICD-10-CM

## 2020-03-06 PROCEDURE — 97530 THERAPEUTIC ACTIVITIES: CPT | Performed by: PHYSICAL THERAPIST

## 2020-03-06 PROCEDURE — 97110 THERAPEUTIC EXERCISES: CPT | Performed by: PHYSICAL THERAPIST

## 2020-03-06 NOTE — PROGRESS NOTES
Physical Therapy Daily Progress Note    Patient: Anila Spencer   : 1940  Diagnosis/ICD-10 Code:  Chronic bilateral low back pain with bilateral sciatica [M54.42, M54.41, G89.29]  Referring practitioner: Jeanne Rowell MD  Date of Initial Visit: Type: THERAPY  Noted: 2019  Today's Date: 3/6/2020  Patient seen for 26 sessions             Subjective Patient reports she's tired but doing well.    Objective   See Exercise, Manual, and Modality Logs for complete treatment. Continued strength and balance improvement noted with dynamic standing activities.      Assessment/Plan  Patient independent with HEP in 2 weeks - MET  Tolerate 10 min Nustep in 2 weeks - MET  Able to roll in bed and step up curb in 2 weeks - MET  Improve function as evidenced by Oswestry score of 20% or less by discharge (54% impairment initially) - NOT MET, 38% as of 10/31  Able to ambulate with rollator 6 min by discharge - MET  Perform 10 reps of repeated sit to stand without arms in 30 seconds by discharge - MET    Progress per Plan of Care up to 30 sessions if needed           Timed:         Manual Therapy:         mins  37510;     Therapeutic Exercise:    15     mins  90644;     Neuromuscular Mike:        mins  33966;    Therapeutic Activity:     15     mins  94598;     Gait Training:           mins  66787;     Ultrasound:          mins  44598;    Ionto                                   mins   36811  Self Care                            mins   91709      Un-Timed:  Electrical Stimulation:         mins  17838 ( );  Dry Needling          mins self-pay  Traction          mins 12667  Low Eval          Mins  75956  Mod Eval          Mins  73385  High Eval                            Mins  11107  Canalith Repos                   mins  86110    Timed Treatment:   30   mins   Total Treatment:     30   mins    Robin A Sprigler, PT  Physical Therapist

## 2020-03-23 ENCOUNTER — DOCUMENTATION (OUTPATIENT)
Dept: PHYSICAL THERAPY | Facility: CLINIC | Age: 80
End: 2020-03-23

## 2020-03-23 NOTE — PROGRESS NOTES
Discharge Summary  Discharge Summary from Physical Therapy Report        Number of Visits: 26     Goals: Partially Met    Discharge Plan: Continue with current home exercise program as instructed    Comments   Last seen on 3/6/2020  Patient seen for 26 sessions                  Subjective Patient reports she's tired but doing well.  Opts to discharge to HEP and self-management at this time due to COVID-19 concerns.     Objective   See Exercise, Manual, and Modality Logs for complete treatment. Continued strength and balance improvement noted with dynamic standing activities.        Assessment/Plan  Patient independent with HEP in 2 weeks - MET  Tolerate 10 min Nustep in 2 weeks - MET  Able to roll in bed and step up curb in 2 weeks - MET  Improve function as evidenced by Oswestry score of 20% or less by discharge (54% impairment initially) - NOT MET, 38% as of 10/31  Able to ambulate with rollator 6 min by discharge - MET  Perform 10 reps of repeated sit to stand without arms in 30 seconds by discharge - MET      Date of Discharge 3/23/2020        Robin A Sprigler, PT  Physical Therapist

## 2020-05-18 RX ORDER — LOSARTAN POTASSIUM 25 MG/1
TABLET ORAL
Qty: 90 TABLET | Refills: 3 | Status: SHIPPED | OUTPATIENT
Start: 2020-05-18 | End: 2021-05-17

## 2020-06-02 RX ORDER — MIDODRINE HYDROCHLORIDE 5 MG/1
TABLET ORAL
Qty: 90 TABLET | Refills: 1 | Status: SHIPPED | OUTPATIENT
Start: 2020-06-02 | End: 2020-12-06

## 2020-07-06 RX ORDER — METOPROLOL TARTRATE 50 MG/1
TABLET, FILM COATED ORAL
Qty: 180 TABLET | Refills: 3 | Status: SHIPPED | OUTPATIENT
Start: 2020-07-06 | End: 2021-04-13 | Stop reason: SDUPTHER

## 2020-07-16 ENCOUNTER — OFFICE VISIT (OUTPATIENT)
Dept: FAMILY MEDICINE CLINIC | Facility: CLINIC | Age: 80
End: 2020-07-16

## 2020-07-16 VITALS
SYSTOLIC BLOOD PRESSURE: 150 MMHG | DIASTOLIC BLOOD PRESSURE: 70 MMHG | HEART RATE: 80 BPM | BODY MASS INDEX: 35.61 KG/M2 | WEIGHT: 201 LBS | RESPIRATION RATE: 16 BRPM | HEIGHT: 63 IN | OXYGEN SATURATION: 96 % | TEMPERATURE: 97.9 F

## 2020-07-16 DIAGNOSIS — R07.89 CHEST PAIN, ATYPICAL: ICD-10-CM

## 2020-07-16 DIAGNOSIS — J43.9 PULMONARY EMPHYSEMA, UNSPECIFIED EMPHYSEMA TYPE (HCC): ICD-10-CM

## 2020-07-16 DIAGNOSIS — E03.9 ACQUIRED HYPOTHYROIDISM: ICD-10-CM

## 2020-07-16 DIAGNOSIS — N39.46 MIXED STRESS AND URGE URINARY INCONTINENCE: ICD-10-CM

## 2020-07-16 DIAGNOSIS — F51.04 CHRONIC INSOMNIA: ICD-10-CM

## 2020-07-16 DIAGNOSIS — K21.9 GASTROESOPHAGEAL REFLUX DISEASE, ESOPHAGITIS PRESENCE NOT SPECIFIED: ICD-10-CM

## 2020-07-16 DIAGNOSIS — I10 ESSENTIAL HYPERTENSION: ICD-10-CM

## 2020-07-16 DIAGNOSIS — F41.9 ANXIETY: ICD-10-CM

## 2020-07-16 DIAGNOSIS — Z12.4 SCREENING FOR CERVICAL CANCER: ICD-10-CM

## 2020-07-16 DIAGNOSIS — J30.9 ALLERGIC RHINITIS, UNSPECIFIED SEASONALITY, UNSPECIFIED TRIGGER: ICD-10-CM

## 2020-07-16 DIAGNOSIS — G40.909 SEIZURE DISORDER (HCC): ICD-10-CM

## 2020-07-16 DIAGNOSIS — M15.9 PRIMARY OSTEOARTHRITIS INVOLVING MULTIPLE JOINTS: ICD-10-CM

## 2020-07-16 DIAGNOSIS — R73.03 PREDIABETES: ICD-10-CM

## 2020-07-16 DIAGNOSIS — E66.01 CLASS 2 SEVERE OBESITY DUE TO EXCESS CALORIES WITH SERIOUS COMORBIDITY AND BODY MASS INDEX (BMI) OF 35.0 TO 35.9 IN ADULT (HCC): ICD-10-CM

## 2020-07-16 DIAGNOSIS — S06.9X0D TRAUMATIC BRAIN INJURY, WITHOUT LOSS OF CONSCIOUSNESS, SUBSEQUENT ENCOUNTER: ICD-10-CM

## 2020-07-16 DIAGNOSIS — Z00.01 ENCOUNTER FOR GENERAL ADULT MEDICAL EXAMINATION WITH ABNORMAL FINDINGS: Primary | ICD-10-CM

## 2020-07-16 DIAGNOSIS — Z12.31 BREAST CANCER SCREENING BY MAMMOGRAM: ICD-10-CM

## 2020-07-16 DIAGNOSIS — K59.09 CHRONIC CONSTIPATION: ICD-10-CM

## 2020-07-16 DIAGNOSIS — M81.0 AGE-RELATED OSTEOPOROSIS WITHOUT CURRENT PATHOLOGICAL FRACTURE: ICD-10-CM

## 2020-07-16 PROBLEM — M51.34 DEGENERATION OF INTERVERTEBRAL DISC OF THORACIC REGION: Status: ACTIVE | Noted: 2017-03-18

## 2020-07-16 PROBLEM — S22.49XA FRACTURE OF MULTIPLE RIBS: Status: ACTIVE | Noted: 2018-07-01

## 2020-07-16 PROBLEM — E66.812 CLASS 2 SEVERE OBESITY DUE TO EXCESS CALORIES WITH SERIOUS COMORBIDITY AND BODY MASS INDEX (BMI) OF 35.0 TO 35.9 IN ADULT: Status: ACTIVE | Noted: 2020-07-16

## 2020-07-16 PROBLEM — S02.2XXA NASAL FRACTURE: Status: ACTIVE | Noted: 2018-07-01

## 2020-07-16 LAB
BILIRUB BLD-MCNC: NEGATIVE MG/DL
CLARITY, POC: CLEAR
COLOR UR: YELLOW
GLUCOSE UR STRIP-MCNC: NEGATIVE MG/DL
KETONES UR QL: NEGATIVE
LEUKOCYTE EST, POC: NEGATIVE
NITRITE UR-MCNC: NEGATIVE MG/ML
PH UR: 7 [PH] (ref 5–8)
PROT UR STRIP-MCNC: NEGATIVE MG/DL
RBC # UR STRIP: NEGATIVE /UL
SP GR UR: 1.02 (ref 1–1.03)
UROBILINOGEN UR QL: NORMAL

## 2020-07-16 PROCEDURE — 99214 OFFICE O/P EST MOD 30 MIN: CPT | Performed by: NURSE PRACTITIONER

## 2020-07-16 PROCEDURE — G0439 PPPS, SUBSEQ VISIT: HCPCS | Performed by: NURSE PRACTITIONER

## 2020-07-16 PROCEDURE — 81003 URINALYSIS AUTO W/O SCOPE: CPT | Performed by: NURSE PRACTITIONER

## 2020-07-16 PROCEDURE — G0101 CA SCREEN;PELVIC/BREAST EXAM: HCPCS | Performed by: NURSE PRACTITIONER

## 2020-07-16 RX ORDER — DOCUSATE SODIUM 100 MG/1
200 CAPSULE, LIQUID FILLED ORAL NIGHTLY
Qty: 180 CAPSULE | Refills: 3 | Status: SHIPPED | OUTPATIENT
Start: 2020-07-16 | End: 2021-04-20 | Stop reason: SDUPTHER

## 2020-07-16 NOTE — ASSESSMENT & PLAN NOTE
Encouraged weightbearing exercise and increased calcium intake.  She has declined prescription medication treatment.  We will repeat DEXA in 2021.

## 2020-07-16 NOTE — ASSESSMENT & PLAN NOTE
Elevated today.  She has a history of hypotension and midodrine has been added by Dr. Agrawal.  We will defer to Dr. Agrawal.  She will be seeing him next month.

## 2020-07-16 NOTE — ASSESSMENT & PLAN NOTE
Encouraged healthy diet.  She is walking in her driveway every evening.  Will call with lab results and further recommendations.

## 2020-07-16 NOTE — PROGRESS NOTES
Medicare Annual Wellness Visit  Chief Complaint   Patient presents with   • Medicare Wellness-subsequent   • Hypertension   • Hypothyroidism   • Prediabetes     Subjective     Anila Spencer is a 79 y.o. female who presents for an Annual Wellness Visit. In addition, we addressed the following health issues:    Hypothyroidism   This is a chronic problem. The current episode started more than 1 year ago. The problem occurs constantly. The problem has been unchanged. Associated symptoms include arthralgias, chest pain, fatigue, neck pain and weakness. Pertinent negatives include no abdominal pain, chills, congestion, coughing, diaphoresis, fever, joint swelling, myalgias, nausea, numbness, rash, sore throat, swollen glands or vomiting. Treatments tried: Synthroid.   Blood Sugar Problem   This is a chronic problem. The current episode started more than 1 year ago. The problem has been unchanged. Associated symptoms include arthralgias, chest pain, fatigue, neck pain and weakness. Pertinent negatives include no abdominal pain, chills, congestion, coughing, diaphoresis, fever, joint swelling, myalgias, nausea, numbness, rash, sore throat, swollen glands or vomiting.   Hypertension   This is a chronic problem. The current episode started more than 1 year ago. The problem has been waxing and waning since onset. Associated symptoms include chest pain, neck pain and shortness of breath. Pertinent negatives include no blurred vision or palpitations. Associated agents: Midodrine. Current antihypertension treatment includes beta blockers and angiotensin blockers. The current treatment provides moderate improvement.     Medications    Current Outpatient Medications:   •  Calcium Carbonate-Vitamin D3 (CALCIUM 600-D) 600-400 MG-UNIT tablet, Take 2 capsules by mouth Daily., Disp: , Rfl:   •  DEXILANT 60 MG capsule, TAKE ONE CAPSULE BY MOUTH ONCE DAILY, Disp: 90 capsule, Rfl: 1  •  escitalopram (LEXAPRO) 10 MG tablet, Take 5 mg by  mouth Daily., Disp: , Rfl:   •  glucosamine-chondroitin (GLUCOSAMINE CHONDR COMPLEX) 500-400 MG capsule capsule, 1 capsule Every 12 (Twelve) Hours., Disp: , Rfl:   •  levothyroxine (SYNTHROID, LEVOTHROID) 50 MCG tablet, TAKE ONE TABLET BY MOUTH EVERY MORNING 30 MINUTES BEFORE EATING OR TAKING ANY OTHER MEDICATION, Disp: 90 tablet, Rfl: 1  •  losartan (COZAAR) 25 MG tablet, TAKE ONE TABLET BY MOUTH ONCE DAILY FOR BLOOD PRESSURE, Disp: 90 tablet, Rfl: 3  •  metoprolol tartrate (LOPRESSOR) 50 MG tablet, TAKE ONE TABLET BY MOUTH TWO TIMES A DAY FOR HEART AND FOR BLOOD PRESSURE, Disp: 180 tablet, Rfl: 3  •  midodrine (PROAMATINE) 5 MG tablet, TAKE ONE TABLET BY MOUTH ONCE DAILY, Disp: 90 tablet, Rfl: 1  •  Multiple Vitamins-Minerals (MULTI VITAMIN/MINERALS) tablet, MULTI VITAMIN/MINERALS TABS, Disp: , Rfl:   •  zonisamide (ZONEGRAN) 100 MG capsule, TAKE FOUR CAPSULES BY MOUTH ONCE DAILY, Disp: , Rfl:   •  docusate sodium (Colace) 100 MG capsule, Take 2 capsules by mouth Every Night., Disp: 180 capsule, Rfl: 3     Problem List  Patient Active Problem List   Diagnosis   • Cardiomegaly   • Essential hypertension   • Mitral valve insufficiency   • Presence of cardiac pacemaker   • DDD (degenerative disc disease), lumbar   • Chest pain, atypical   • Sick sinus syndrome (CMS/HCC)   • Syncope and collapse   • PCO (posterior capsular opacification), bilateral   • Diplopia   • Myopia   • Glaucoma suspect   • Presbyopia   • Pseudophakia of both eyes   • Prediabetes   • Hypothyroidism   • Urinary incontinence   • Squamous cell skin cancer, face   • Spinal stenosis of cervical region   • Seizure disorder (CMS/HCC)   • Rotator cuff tear   • Osteoporosis   • Osteoarthritis   • Kidney stone   • GERD (gastroesophageal reflux disease)   • Fracture of multiple ribs   • Emphysema of lung (CMS/HCC)   • Fatty liver   • Degeneration of intervertebral disc of thoracic region   • DDD (degenerative disc disease), cervical   • Chronic  constipation   • Cervical spinal stenosis   • Biliary dyskinesia   • Basal cell carcinoma (BCC) of face   • Anxiety   • Traumatic brain injury (CMS/HCC)   • Nasal fracture   • Chronic insomnia   • Allergic rhinitis   • Class 2 severe obesity due to excess calories with serious comorbidity and body mass index (BMI) of 35.0 to 35.9 in adult (CMS/HCC)       Surgical History  Past Surgical History:   Procedure Laterality Date   • BILATERAL SALPINGO OOPHORECTOMY      for benign cysts   • CARDIAC CATHETERIZATION  08/25/2009    25% LAD. L Cx luminal irregularities. Normal L main   • CARDIAC CATHETERIZATION  03/29/2017    25% LAD. 10-20% LCX. ER 65%. Dr. gArawal.    • CARDIOVASCULAR STRESS TEST  2019   • CATARACT EXTRACTION  2006   • COSMETIC SURGERY      Lip   • ECHO - CONVERTED  2019   • ENDOSCOPY  05/17/2017    [place], Dr. Gutierrez   • ENDOSCOPY N/A 8/22/2019    Procedure: ESOPHAGOGASTRODUODENOSCOPY WITH DILATATION (BOUGIE #[id number]);  Surgeon: Joaquin Story MD;  Location: [facility] ENDOSCOPY;  Service: Gastroenterology   • INSERT / REPLACE / REMOVE PACEMAKER     • KNEE ARTHROSCOPY Left 1992   • OTHER SURGICAL HISTORY Right 07/2017    Right eye, YAG Capsuloyomy , Dr. Lemus   • PACEMAKER IMPLANTATION  2009   • ROTATOR CUFF REPAIR Right     Dr. Goldberg   • TONSILLECTOMY     • TOTAL HIP ARTHROPLASTY Bilateral         Social History  Social History     Socioeconomic History   • Marital status:      Spouse name: Not on file   • Number of children: Not on file   • Years of education: Not on file   • Highest education level: Not on file   Tobacco Use   • Smoking status: Never Smoker   • Smokeless tobacco: Never Used   • Tobacco comment: Patient is advised not to smoke.   Substance and Sexual Activity   • Alcohol use: No     Frequency: Never   • Drug use: No   • Sexual activity: Defer        Family History  Family History   Problem Relation Age of Onset   • Heart disease Mother    • Pancreatic cancer Mother    • Prostate  cancer Father    • Breast cancer Sister    • Pancreatic cancer Brother    • Colon cancer Brother    • Stroke Maternal Grandmother         Health Risk Assessment:  The patient has completed a Health Risk Assessment and it has been reviewed.    Current Medical Providers:  Patient Care Team:  Helena Mas APRN as PCP - General  Adam Agrawal MD as PCP - Shannon Grimes DO as Consulting Physician (Neurology)  Adam Agrawal MD as Consulting Physician (Cardiology)  Deneen Laird MD (Ophthalmology)  León Berg MD (Orthopedic Surgery)  Joaquin Story MD as Consulting Physician (Gastroenterology)  Banet, Duane Edward, MD as Consulting Physician (Dermatology)    Age-appropriate Screening Schedule:  Refer to the list below for future screening recommendations based on patient's age. Orders for these recommended tests are listed in the plan section. The patient has been provided with a written plan.    Health Maintenance   Topic Date Due   • INFLUENZA VACCINE  08/01/2020   • DXA SCAN  05/03/2021   • MEDICARE ANNUAL WELLNESS  07/16/2021   • TDAP/TD VACCINES (2 - Td) 04/10/2028   • Pneumococcal Vaccine Once at 65 Years Old  Completed   • ZOSTER VACCINE  Completed       Depression Screen:   PHQ-2/PHQ-9 Depression Screening 7/16/2020   Little interest or pleasure in doing things 0   Feeling down, depressed, or hopeless 0   Trouble falling or staying asleep, or sleeping too much 0   Feeling tired or having little energy 0   Poor appetite or overeating 0   Feeling bad about yourself - or that you are a failure or have let yourself or your family down 0   Trouble concentrating on things, such as reading the newspaper or watching television 0   Moving or speaking so slowly that other people could have noticed. Or the opposite - being so fidgety or restless that you have been moving around a lot more than usual 0   Thoughts that you would be better off dead, or of hurting yourself in some  way 0   Total Score 0   If you checked off any problems, how difficult have these problems made it for you to do your work, take care of things at home, or get along with other people? Not difficult at all       Functional and Cognitive Screening:  Functional & Cognitive Status 7/16/2020   Do you have difficulty preparing food and eating? No   Do you have difficulty bathing yourself, getting dressed or grooming yourself? No   Do you have difficulty using the toilet? No   Do you have difficulty moving around from place to place? No   Do you have trouble with steps or getting out of a bed or a chair? No   Current Diet Well Balanced Diet   Dental Exam Up to date   Eye Exam Up to date   Exercise (times per week) 5 times per week   Current Exercise Activities Include Walking   Do you need help using the phone?  No   Are you deaf or do you have serious difficulty hearing?  No   Do you need help with transportation? No   Do you need help shopping? No   Do you need help preparing meals?  No   Do you need help with housework?  No   Do you need help with laundry? No   Do you need help taking your medications? No   Do you need help managing money? No   Do you ever drive or ride in a car without wearing a seat belt? No   Have you felt unusual stress, anger or loneliness in the last month? No   Who do you live with? Spouse   If you need help, do you have trouble finding someone available to you? No   Have you been bothered in the last four weeks by sexual problems? No   Do you have difficulty concentrating, remembering or making decisions? No       Does the patient have evidence of cognitive impairment? No    ATTENTION  What is the year: correct (07/16/20 1400)  What is the month of the year: correct (07/16/20 1400)  What is the day of the week?: correct (07/16/20 1400)  What is the date?: correct (07/16/20 1400)  MEMORY  Repeat address three times, only score third attempt: Aleksandar Jernigan [address] [place] [address]: 7  (20 1400)  HOW MANY ANIMALS DID THE PATIENT NAME  Verbal Fluency -- Animal Names (0-25): 22+ (20 1400)  CLOCK DRAWING  Clock Drawing: All Correct (20)  MEMORY RECALL  Tell me what you remember about that name and address we were repeating at the beginnin (20 1400)  ACE TOTAL SCORE  Total ACE Score - <25/30 strongly suggests cognitive impairment; <21/30 almost certainly shows dementia: 30 (20 1400)    Advanced Care Planning:  ACP discussion was held with the patient during this visit. Patient has an advance directive in EMR which is still valid.     Identification of Risk Factors:  Risk factors include: Advance Directive Discussion  Breast Cancer/Mammogram Screening  Fall Risk  Immunizations Discussed/Encouraged (specific immunizations; Influenza )  Obesity/Overweight   Osteoprorosis Risk.    Review of Systems   Constitutional: Positive for fatigue. Negative for appetite change, chills, diaphoresis, fever, unexpected weight gain and unexpected weight loss.   HENT: Negative for congestion, ear discharge, ear pain, hearing loss, mouth sores, nosebleeds, postnasal drip, rhinorrhea, sinus pressure, sneezing, sore throat, swollen glands and trouble swallowing.    Eyes: Positive for double vision. Negative for blurred vision, pain, discharge, redness and itching.   Respiratory: Positive for shortness of breath. Negative for cough, choking, wheezing and stridor.         Mild ZARCO - no worsening   Cardiovascular: Positive for chest pain. Negative for palpitations and leg swelling.        Mild exertional chest pain - Dr. Agrawal aware and has worked up   Gastrointestinal: Positive for constipation. Negative for abdominal distention, abdominal pain, anal bleeding, blood in stool, diarrhea, nausea, rectal pain, vomiting, GERD and indigestion.   Endocrine: Negative for cold intolerance, heat intolerance, polydipsia, polyphagia and polyuria.   Genitourinary: Positive for urinary incontinence.  "Negative for breast discharge, breast lump, breast pain, difficulty urinating, dysuria, flank pain, frequency, genital sores, hematuria, pelvic pain, urgency, vaginal bleeding, vaginal discharge and vaginal pain.   Musculoskeletal: Positive for arthralgias, back pain, gait problem and neck pain. Negative for joint swelling, myalgias and neck stiffness.   Skin: Negative for color change, rash and skin lesions.   Neurological: Positive for weakness. Negative for dizziness, tremors, seizures, syncope, speech difficulty, light-headedness, numbness, headache, memory problem and confusion.   Hematological: Negative for adenopathy. Does not bruise/bleed easily.   Psychiatric/Behavioral: Negative for self-injury, sleep disturbance, suicidal ideas, depressed mood and stress. The patient is not nervous/anxious.        Objective     Vitals:    07/16/20 1410 07/16/20 1453   BP: 152/81 150/70   BP Location: Left arm    Patient Position: Sitting    Cuff Size: Large Adult    Pulse: 80    Resp: 16    Temp: 97.9 °F (36.6 °C)    TempSrc: Temporal    SpO2: 96%    Weight: 91.2 kg (201 lb)    Height: 160 cm (63\")          07/16/20  1410   Weight: 91.2 kg (201 lb)     Body mass index is 35.61 kg/m².    Physical Exam   Constitutional: She is oriented to person, place, and time. She appears well-developed and well-nourished. She is active. No distress.   HENT:   Head: Normocephalic and atraumatic.   Right Ear: Tympanic membrane, external ear and ear canal normal. Tympanic membrane is not injected, not erythematous, not retracted and not bulging.   Left Ear: Tympanic membrane, external ear and ear canal normal. Tympanic membrane is not injected, not erythematous, not retracted and not bulging.   Nose: Nose normal. No mucosal edema or rhinorrhea. Right sinus exhibits no maxillary sinus tenderness and no frontal sinus tenderness. Left sinus exhibits no maxillary sinus tenderness and no frontal sinus tenderness.   Mouth/Throat: Uvula is " midline, oropharynx is clear and moist and mucous membranes are normal. No oral lesions. No oropharyngeal exudate, posterior oropharyngeal edema or posterior oropharyngeal erythema.   Eyes: Pupils are equal, round, and reactive to light. Conjunctivae, EOM and lids are normal. Right eye exhibits no discharge. Left eye exhibits no discharge.   Neck: Normal range of motion. Neck supple. No JVD present. Carotid bruit is not present. No tracheal deviation present. No thyroid mass and no thyromegaly present.   Cardiovascular: Normal rate, regular rhythm, normal heart sounds and intact distal pulses. Exam reveals no gallop and no friction rub.   No murmur heard.  Pulmonary/Chest: Effort normal and breath sounds normal. No respiratory distress. She has no wheezes. She has no rales. Right breast exhibits no inverted nipple, no mass, no nipple discharge, no skin change and no tenderness. Left breast exhibits no inverted nipple, no mass, no nipple discharge, no skin change and no tenderness. Breasts are symmetrical.   Abdominal: Soft. Bowel sounds are normal. She exhibits no distension and no mass. There is no hepatosplenomegaly. There is no tenderness. No hernia.   Genitourinary: Rectum normal and uterus normal. Pelvic exam was performed with patient supine. There is no rash, tenderness or lesion on the right labia. There is no rash, tenderness or lesion on the left labia. Uterus is not tender. Cervix exhibits no motion tenderness, no discharge and no friability. Right adnexum displays no mass, no tenderness and no fullness. Left adnexum displays no mass, no tenderness and no fullness. No erythema or tenderness in the vagina. No vaginal discharge found.   Genitourinary Comments: Atrophic vaginal mucosa   Musculoskeletal: Normal range of motion. She exhibits no edema or deformity.   Lymphadenopathy:     She has no cervical adenopathy.     She has no axillary adenopathy. No inguinal adenopathy noted on the right or left side.    Neurological: She is alert and oriented to person, place, and time. She has normal strength and normal reflexes. No cranial nerve deficit or sensory deficit. Gait abnormal.   Walking with cane   Skin: Skin is warm, dry and intact. No lesion and no rash noted.   Psychiatric: She has a normal mood and affect. Her speech is normal and behavior is normal. Judgment and thought content normal. Cognition and memory are normal.   Vitals reviewed.    Office Visit on 07/16/2020   Component Date Value Ref Range Status   • Color 07/16/2020 Yellow  Yellow, Straw, Dark Yellow, Fariha Final   • Clarity, UA 07/16/2020 Clear  Clear Final   • Specific Gravity  07/16/2020 1.020  1.005 - 1.030 Final   • pH, Urine 07/16/2020 7.0  5.0 - 8.0 Final   • Leukocytes 07/16/2020 Negative  Negative Final   • Nitrite, UA 07/16/2020 Negative  Negative Final   • Protein, POC 07/16/2020 Negative  Negative mg/dL Final   • Glucose, UA 07/16/2020 Negative  Negative, 1000 mg/dL (3+) mg/dL Final   • Ketones, UA 07/16/2020 Negative  Negative Final   • Urobilinogen, UA 07/16/2020 Normal  Normal Final   • Bilirubin 07/16/2020 Negative  Negative Final   • Blood, UA 07/16/2020 Negative  Negative Final   ]    Assessment/Plan   Patient Self-Management and Personalized Health Advice  The patient has been provided with information about: diet, exercise, weight management, prevention of cardiac or vascular disease, fall prevention and designing advance directives and preventive services including:   · Annual Wellness Visit (AWV)  · Influenza Vaccine and Administration  · Screening Mammography   · Screening Pelvic Examinations (Includes a clinical breast examination).    Discussed the patient's BMI with her. The BMI is above average; BMI management plan is completed.    Diagnoses and all orders for this visit:    1. Encounter for general adult medical examination with abnormal findings (Primary)  Comments:  Reviewed age-appropriate health maintenance.  Recommended  influenza vaccine this fall.    2. Prediabetes  Assessment & Plan:  Encouraged healthy diet.  She is walking in her driveway every evening.  Will call with lab results and further recommendations.    Orders:  -     Cancel: Hemoglobin A1c  -     Hemoglobin A1c; Future    3. Acquired hypothyroidism  -     Cancel: TSH  -     TSH; Future    4. Mixed stress and urge urinary incontinence  Assessment & Plan:  She declines trial of medication but is agreeable to urology evaluation.    Orders:  -     Ambulatory Referral to Urology    5. Chronic constipation  Assessment & Plan:  Start daily stool softener.      6. Essential hypertension  Assessment & Plan:  Elevated today.  She has a history of hypotension and midodrine has been added by Dr. Agrawal.  We will defer to Dr. Agrawal.  She will be seeing him next month.    Orders:  -     POC Urinalysis Dipstick, Automated  -     Cancel: CBC & Differential  -     Cancel: Comprehensive Metabolic Panel  -     Comprehensive Metabolic Panel; Future  -     CBC & Differential; Future    7. Allergic rhinitis, unspecified seasonality, unspecified trigger  Assessment & Plan:  Stable.      8. Gastroesophageal reflux disease, esophagitis presence not specified  Assessment & Plan:  Symptoms have been controlled with PPI.  Continue follow-up with Dr. Story.      9. Chronic insomnia  Assessment & Plan:  Does well with as needed use of OTC melatonin.      10. Primary osteoarthritis involving multiple joints  Assessment & Plan:  Continue follow-up with Dr. Goldberg.      11. Pulmonary emphysema, unspecified emphysema type (CMS/HCC)  Assessment & Plan:  She has mild dyspnea on exertion that is not worsening.  This is believed to be due to poor conditioning.  She is to report any worsening.          12. Chest pain, atypical  Assessment & Plan:  Continue follow-up with Dr. Agrawal.      13. Seizure disorder (CMS/[facility])  Assessment & Plan:  No recent seizure activity.  Continue follow-up with   Donald.      14. Traumatic brain injury, without loss of consciousness, subsequent encounter  Assessment & Plan:  Continue follow-up with Dr. Bennett.      15. Age-related osteoporosis without current pathological fracture  Assessment & Plan:  Encouraged weightbearing exercise and increased calcium intake.  She has declined prescription medication treatment.  We will repeat DEXA in 2021.      16. Class 2 severe obesity due to excess calories with serious comorbidity and body mass index (BMI) of 35.0 to 35.9 in adult (CMS/[facility])  Assessment & Plan:  Encouraged healthy diet and regular physical activity as tolerated.      17. Anxiety  Assessment & Plan:  Doing well on current medication.  This is being managed by her neurologist.      18. Breast cancer screening by mammogram  -     Mammo Screening Digital Tomosynthesis Bilateral With CAD; Future    19. Screening for cervical cancer    Other orders  -     docusate sodium (Colace) 100 MG capsule; Take 2 capsules by mouth Every Night.  Dispense: 180 capsule; Refill: 3      Orders:  Orders Placed This Encounter   Procedures   • Mammo Screening Digital Tomosynthesis Bilateral With CAD   • TSH   • Hemoglobin A1c   • Comprehensive Metabolic Panel   • Ambulatory Referral to Urology   • POC Urinalysis Dipstick, Automated   • CBC & Differential       Follow Up:  Return in about 6 months (around 1/16/2021) for Follow-Up.     An After Visit Summary and PPPS with all of these plans were given to the patient.

## 2020-07-16 NOTE — ASSESSMENT & PLAN NOTE
She has mild dyspnea on exertion that is not worsening.  This is believed to be due to poor conditioning.  She is to report any worsening.

## 2020-07-21 ENCOUNTER — RESULTS ENCOUNTER (OUTPATIENT)
Dept: FAMILY MEDICINE CLINIC | Facility: CLINIC | Age: 80
End: 2020-07-21

## 2020-07-21 DIAGNOSIS — R73.03 PREDIABETES: ICD-10-CM

## 2020-07-21 DIAGNOSIS — I10 ESSENTIAL HYPERTENSION: ICD-10-CM

## 2020-07-21 DIAGNOSIS — E03.9 ACQUIRED HYPOTHYROIDISM: ICD-10-CM

## 2020-07-30 RX ORDER — LEVOTHYROXINE SODIUM 0.05 MG/1
TABLET ORAL
Qty: 90 TABLET | Refills: 1 | Status: SHIPPED | OUTPATIENT
Start: 2020-07-30 | End: 2021-01-14 | Stop reason: SDUPTHER

## 2020-08-06 ENCOUNTER — HOSPITAL ENCOUNTER (OUTPATIENT)
Dept: MAMMOGRAPHY | Facility: HOSPITAL | Age: 80
Discharge: HOME | End: 2020-08-06
Admitting: NURSE PRACTITIONER

## 2020-08-06 DIAGNOSIS — Z12.31 BREAST CANCER SCREENING BY MAMMOGRAM: ICD-10-CM

## 2020-08-06 PROCEDURE — 77067 SCR MAMMO BI INCL CAD: CPT

## 2020-08-06 PROCEDURE — 77063 BREAST TOMOSYNTHESIS BI: CPT

## 2020-08-20 ENCOUNTER — CLINICAL SUPPORT (OUTPATIENT)
Dept: FAMILY MEDICINE CLINIC | Facility: CLINIC | Age: 80
End: 2020-08-20

## 2020-08-20 DIAGNOSIS — M81.0 AGE-RELATED OSTEOPOROSIS WITHOUT CURRENT PATHOLOGICAL FRACTURE: Primary | ICD-10-CM

## 2020-08-20 PROCEDURE — 96372 THER/PROPH/DIAG INJ SC/IM: CPT | Performed by: NURSE PRACTITIONER

## 2020-09-22 ENCOUNTER — TELEPHONE (OUTPATIENT)
Dept: FAMILY MEDICINE CLINIC | Facility: CLINIC | Age: 80
End: 2020-09-22

## 2020-09-22 DIAGNOSIS — N28.9 RENAL INSUFFICIENCY: Primary | ICD-10-CM

## 2020-09-23 ENCOUNTER — RESULTS ENCOUNTER (OUTPATIENT)
Dept: FAMILY MEDICINE CLINIC | Facility: CLINIC | Age: 80
End: 2020-09-23

## 2020-09-23 DIAGNOSIS — N28.9 RENAL INSUFFICIENCY: ICD-10-CM

## 2020-09-30 ENCOUNTER — CLINICAL SUPPORT NO REQUIREMENTS (OUTPATIENT)
Dept: CARDIOLOGY | Facility: CLINIC | Age: 80
End: 2020-09-30

## 2020-09-30 ENCOUNTER — OFFICE VISIT (OUTPATIENT)
Dept: CARDIOLOGY | Facility: CLINIC | Age: 80
End: 2020-09-30

## 2020-09-30 VITALS
SYSTOLIC BLOOD PRESSURE: 122 MMHG | HEIGHT: 63 IN | BODY MASS INDEX: 35.61 KG/M2 | DIASTOLIC BLOOD PRESSURE: 72 MMHG | WEIGHT: 201 LBS | HEART RATE: 60 BPM

## 2020-09-30 DIAGNOSIS — I49.5 SICK SINUS SYNDROME (HCC): ICD-10-CM

## 2020-09-30 DIAGNOSIS — Z95.0 PRESENCE OF CARDIAC PACEMAKER: ICD-10-CM

## 2020-09-30 DIAGNOSIS — I49.5 SICK SINUS SYNDROME (HCC): Primary | ICD-10-CM

## 2020-09-30 DIAGNOSIS — I10 ESSENTIAL HYPERTENSION: Primary | ICD-10-CM

## 2020-09-30 DIAGNOSIS — R55 SYNCOPE AND COLLAPSE: ICD-10-CM

## 2020-09-30 DIAGNOSIS — I34.0 MITRAL VALVE INSUFFICIENCY, UNSPECIFIED ETIOLOGY: ICD-10-CM

## 2020-09-30 PROCEDURE — 99214 OFFICE O/P EST MOD 30 MIN: CPT | Performed by: INTERNAL MEDICINE

## 2020-09-30 PROCEDURE — 93280 PM DEVICE PROGR EVAL DUAL: CPT | Performed by: NURSE PRACTITIONER

## 2020-09-30 RX ORDER — FESOTERODINE FUMARATE 8 MG/1
8 TABLET, EXTENDED RELEASE ORAL EVERY EVENING
COMMUNITY
End: 2022-08-09

## 2020-09-30 NOTE — PROGRESS NOTES
Date of Office Visit: 2020  Encounter Provider: Dr. Adam Agrawal  Place of Service: [facility] CARDIOLOGY [place]  Patient Name: Anila Spencer  :1940  Helena Mas APRN    Chief Complaint   Patient presents with   • Mitral Insufficiency     6 month follow up/device check   • Hypertension   • Syncope     History of Present Illness    Anila Spencer is a 79 y.o. female. Her past medical history significant for hypertension, sick sinus syndrome, tachybradycardia syndrome, history of syncope came today for follow-up.     Previously patient had repeated episodes of syncope. She had extensive workup which showed that she had underlying sick sinus syndrome. She underwent permanent  pacemaker implantation. Since then patient symptoms of syncope had completely resolved. Previously, patient was also evaluated with cardiac catheterization because of her symptoms of angina pectoris and she was noted to have no significant coronary artery disease. Her previous CAT scan of chest was also negative for pulmonary embolism and she had pulmonary function test which was negative for any significant pulmonary airway disease. It was noted that her symptoms of chest pain at that time was probably related to gastroesophageal reflux disease and she was appropriately treated.    In 2019, patient had an episode of syncope at home.  Patient underwent tilt table test and it showed hypotensive response to head up tilt.  Patient was started on midodrine 5 mg twice daily.  Echocardiogram showed normal left ventricular size and function.  LVEF was 60 to 65%.  Mild mitral regurgitation was noted.  Stress test was negative for ischemia.    This is a routine follow-up appointment.  Patient is doing well.  Patient denies any symptom of chest pain or tightness or heaviness.  Mild shortness of breath noted.  No fall.  Patient is unsteady on her gait.  No orthopnea, PND, syncope or presyncope.  No leg edema  noted.    Pacemaker is interrogated and it is functioning appropriately.  No change in programming is done.  Patient is reaching end-of-life.    Overall patient is doing well but her pacemaker is reaching end-of-life.  We will recommend to see the patient in 3 months.  Continue current therapy.        Past Medical History:   Diagnosis Date   • Abnormal cardiovascular stress test 3/21/2017   • Allergic rhinitis    • Anxiety    • Basal cell carcinoma (BCC) of face    • Biliary dyskinesia    • Cardiomegaly    • Cataract    • Cervical spinal stenosis    • Chronic constipation    • Chronic insomnia    • DDD (degenerative disc disease), cervical     C-spine, T-spine, L-Spine   • DDD (degenerative disc disease), lumbar 7/25/2019   • Degeneration of intervertebral disc of thoracic region 3/18/2017   • Diplopia     Dr. Laird   • Dysphagia 4/25/2019   • Emphysema of lung (CMS/HCC)    • Fatty liver    • Fracture of multiple ribs 07/2018    Left 4th-8th   • Gastroesophageal reflux disease 12/4/2012   • GERD (gastroesophageal reflux disease)    • Hematuria 12/29/2014   • Hypertension    • Hypothyroidism    • Injury of back    • Insomnia 3/7/2016   • Kidney stone    • Nasal fracture 07/2018   • Osteoarthritis    • Osteoporosis     h/o tx with Fosamax but quit in 2012 due to fears of complications   • Prediabetes    • Rotator cuff tear     right   • Seizure disorder (CMS/HCC)     Dr. Shannon Bennett   • Shortness of breath 5/8/2019   • Sick sinus syndrome (CMS/HCC)     Dr. Agrawal   • Spinal stenosis of cervical region 2/11/2015   • Squamous cell skin cancer, face    • Traumatic brain injury (CMS/HCC)     from fall   • Urinary incontinence          Past Surgical History:   Procedure Laterality Date   • BILATERAL SALPINGO OOPHORECTOMY      for benign cysts   • BREAST CYST ASPIRATION Left    • CARDIAC CATHETERIZATION  08/25/2009    25% LAD. L Cx luminal irregularities. Normal L main   • CARDIAC CATHETERIZATION  03/29/2017    25% LAD.  10-20% LCX. ER 65%. Dr. Agrawal.    • CARDIOVASCULAR STRESS TEST  2019   • CATARACT EXTRACTION  2006   • COSMETIC SURGERY      Lip   • ECHO - CONVERTED  2019   • ENDOSCOPY  05/17/2017    Normal, Dr. Gutierrez   • ENDOSCOPY N/A 8/22/2019    Procedure: ESOPHAGOGASTRODUODENOSCOPY WITH DILATATION (BOUGIE #46,50);  Surgeon: Joaquin Story MD;  Location: [facility] ENDOSCOPY;  Service: Gastroenterology   • INSERT / REPLACE / REMOVE PACEMAKER     • KNEE ARTHROSCOPY Left 1992   • OOPHORECTOMY     • OTHER SURGICAL HISTORY Right 07/2017    Right eye, YAG Capsuloyomy , Dr. Lemus   • PACEMAKER IMPLANTATION  2009   • ROTATOR CUFF REPAIR Right     Dr. Goldberg   • TONSILLECTOMY     • TOTAL HIP ARTHROPLASTY Bilateral            Current Outpatient Medications:   •  Calcium Carbonate-Vitamin D3 (CALCIUM 600-D) 600-400 MG-UNIT tablet, Take 2 capsules by mouth Daily., Disp: , Rfl:   •  DEXILANT 60 MG capsule, TAKE ONE CAPSULE BY MOUTH ONCE DAILY, Disp: 90 capsule, Rfl: 1  •  docusate sodium (Colace) 100 MG capsule, Take 2 capsules by mouth Every Night., Disp: 180 capsule, Rfl: 3  •  escitalopram (LEXAPRO) 10 MG tablet, Take 5 mg by mouth Daily., Disp: , Rfl:   •  fesoterodine fumarate (TOVIAZ ER) 8 MG tablet sustained-release 24 hour tablet, Take 8 mg by mouth Daily., Disp: , Rfl:   •  glucosamine-chondroitin (GLUCOSAMINE CHONDR COMPLEX) 500-400 MG capsule capsule, 1 capsule Every 12 (Twelve) Hours., Disp: , Rfl:   •  levothyroxine (SYNTHROID, LEVOTHROID) 50 MCG tablet, TAKE ONE TABLET BY MOUTH EVERY MORNING 30 MINUTES BEFORE EATING OR TAKING ANY OTHER MEDICATIONS, Disp: 90 tablet, Rfl: 1  •  losartan (COZAAR) 25 MG tablet, TAKE ONE TABLET BY MOUTH ONCE DAILY FOR BLOOD PRESSURE, Disp: 90 tablet, Rfl: 3  •  metoprolol tartrate (LOPRESSOR) 50 MG tablet, TAKE ONE TABLET BY MOUTH TWO TIMES A DAY FOR HEART AND FOR BLOOD PRESSURE, Disp: 180 tablet, Rfl: 3  •  midodrine (PROAMATINE) 5 MG tablet, TAKE ONE TABLET BY MOUTH ONCE DAILY, Disp: 90  "tablet, Rfl: 1  •  Multiple Vitamins-Minerals (MULTI VITAMIN/MINERALS) tablet, MULTI VITAMIN/MINERALS TABS, Disp: , Rfl:   •  zonisamide (ZONEGRAN) 100 MG capsule, TAKE FOUR CAPSULES BY MOUTH ONCE DAILY, Disp: , Rfl:       Social History     Socioeconomic History   • Marital status:      Spouse name: Not on file   • Number of children: Not on file   • Years of education: Not on file   • Highest education level: Not on file   Tobacco Use   • Smoking status: Never Smoker   • Smokeless tobacco: Never Used   • Tobacco comment: Patient is advised not to smoke.   Substance and Sexual Activity   • Alcohol use: No     Frequency: Never   • Drug use: No   • Sexual activity: Defer         Review of Systems   Respiratory: Positive for shortness of breath.    Musculoskeletal: Positive for arthritis and joint pain.   Neurological: Positive for disturbances in coordination.       Procedures    Procedures    No orders to display           Objective:    /72   Pulse 60   Ht 160 cm (62.99\")   Wt 91.2 kg (201 lb)   BMI 35.61 kg/m²         Constitutional:       Appearance: Well-developed.   Eyes:      General: No scleral icterus.        Right eye: No discharge.   HENT:      Head: Normocephalic and atraumatic.   Neck:      Thyroid: No thyromegaly.      Lymphadenopathy: No cervical adenopathy.   Pulmonary:      Effort: Pulmonary effort is normal. No respiratory distress.      Breath sounds: Normal breath sounds. No wheezing. No rales.   Cardiovascular:      Normal rate. Regular rhythm.      No gallop.   Abdominal:      Tenderness: There is no abdominal tenderness.   Skin:     Findings: No erythema or rash.   Neurological:      Mental Status: Alert and oriented to person, place, and time.             Assessment:       Diagnosis Plan   1. Essential hypertension     2. Mitral valve insufficiency, unspecified etiology     3. Presence of cardiac pacemaker     4. Sick sinus syndrome (CMS/HCC)     5. Syncope and collapse   "            Plan:       At this stage, I would recommend that patient should be integrated for pacemaker in 3 months.  Blood pressure monitoring is recommended.  Continue current therapy

## 2020-10-08 RX ORDER — DEXLANSOPRAZOLE 60 MG/1
CAPSULE, DELAYED RELEASE ORAL
Qty: 90 CAPSULE | Refills: 1 | Status: SHIPPED | OUTPATIENT
Start: 2020-10-08 | End: 2021-07-05

## 2020-10-08 NOTE — PROGRESS NOTES
DEVICE INTERROGATION:  IN OFFICE    DEVICE TYPE:   Dual chamber pacemaker    :   Medtronic    BATTERY:  Stable    TIME TO ELECTIVE REPLACEMENT INDICATORS:   7 months    CHARGE TIME:   Not applicable        LEAD DATA:       DEVICE REPROGRAMMED FOR TESTING      Atrial:   Paced mV, 435 ohms, 0.75 V@0.4 ms    Ventricular:     16 mV, 486 ohms, 0.875 V@0.4 ms    LV:      Atrial pacing percentage: 99.3%    Ventricular pacing percentage: Less than 1%      Arrhythmia Logbook Reviewed: No A. fib        Summary:    Stable Device Function    No significant arhythmia burden.     Battery status is stable.    Reviewed with: Dr. Agrawal      NEXT IN OFFICE DEVICE CHECK DUE: 3 months    REMOTE DEVICE INTERROGATIONS: Not applicable

## 2020-10-19 ENCOUNTER — TELEPHONE (OUTPATIENT)
Dept: CARDIOLOGY | Facility: CLINIC | Age: 80
End: 2020-10-19

## 2020-10-19 NOTE — TELEPHONE ENCOUNTER
Needs a call back  Wants to talk to you about  fall she had  She didn't want to go into detail  Wants to talk to you

## 2020-10-19 NOTE — TELEPHONE ENCOUNTER
Patient states on sept 30 th had a dizzy spell and passed out and hit her head she went to the er and everything was normal but patient is still having episodes of dizzy spells and ha   I informed patient she should go to her pcp as soon as you can

## 2020-11-17 ENCOUNTER — OFFICE VISIT (OUTPATIENT)
Dept: FAMILY MEDICINE CLINIC | Facility: CLINIC | Age: 80
End: 2020-11-17

## 2020-11-17 VITALS
RESPIRATION RATE: 16 BRPM | OXYGEN SATURATION: 97 % | HEIGHT: 63 IN | SYSTOLIC BLOOD PRESSURE: 146 MMHG | WEIGHT: 201.4 LBS | HEART RATE: 70 BPM | DIASTOLIC BLOOD PRESSURE: 77 MMHG | TEMPERATURE: 97.4 F | BODY MASS INDEX: 35.68 KG/M2

## 2020-11-17 DIAGNOSIS — R42 DIZZINESS: ICD-10-CM

## 2020-11-17 DIAGNOSIS — I49.5 SICK SINUS SYNDROME (HCC): ICD-10-CM
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
## 2020-11-17 DIAGNOSIS — M79.602 LEFT ARM PAIN: Primary | ICD-10-CM

## 2020-11-17 DIAGNOSIS — Z95.0 PRESENCE OF CARDIAC PACEMAKER: ICD-10-CM

## 2020-11-17 PROCEDURE — 99214 OFFICE O/P EST MOD 30 MIN: CPT | Performed by: NURSE PRACTITIONER

## 2020-11-17 NOTE — PROGRESS NOTES
Chief Complaint   Patient presents with   • Fall     10/25/2020   • Arm Pain     left arm pain after a fall on 10/25/2020.         Subjective     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 (3/18/2017), Diplopia, Dysphagia (4/25/2019), Emphysema of lung (CMS/[facility]), Fatty liver, Fracture of multiple ribs (07/2018), Gastroesophageal reflux disease (12/4/2012), GERD (gastroesophageal reflux disease), Hematuria (12/29/2014), Hypertension, Hypothyroidism, Injury of back, Insomnia (3/7/2016), Kidney stone, Nasal fracture (07/2018), Osteoarthritis, Osteoporosis, Prediabetes, Rotator cuff tear, Seizure disorder (CMS/HCC), Shortness of breath (5/8/2019), Sick sinus syndrome (CMS/[facility]), Spinal stenosis of cervical region (2/11/2015), Squamous cell skin cancer, face, Traumatic brain injury (CMS/[facility]), and Urinary incontinence.      HPI:  Falls  Patient was evaluated in [facility] emergency room on 10/1/2020 due to syncope and fall.  Prior night she had bent over to pull some weeds and then the next thing she remembers she was lying on the ground.  Has been stated that he heard her fall and that she had a brief loss of consciousness.  She had no preceding symptoms of lightheadedness, dizziness, chest pain, or palpitations.  She reported that this had happened to her before about 2 years prior and she had a work-up by Dr. Agrawal who felt it was due to a drop in her blood pressure.  During this fall she hit the back of her head, she denied neck pain.  She did report some transient blurred vision.  And she had left buttocks pain when she tried to  change positions.  At that time she denied chest pain, abdominal pain, shortness of breath, any or other extremity complaints.    She had lab work (CBC, CMP troponin-I, TSH) that was unremarkable, other than possible mild dehydration.  An x-ray of the pelvis did not show any acute process.  CT scan of the brain without contrast showed no acute abnormality.  CT scan of the cervical spine without contrast showed no acute cervical spine injury.  Her EKG showed a paced rhythm without acute abnormalities.  She was discharged home with instructions to follow-up with her cardiologist.    She had a work-up in 2018 by Dr. Agrawal for repeated episodes of syncope.  Work-up showed that she had underlying sick sinus syndrome and she underwent permanent pacemaker implantation.  She last had her pacemaker interrogated on 9/30/2020. She sees Dr. Agrawal again in January for follow-up.     She fell again on 10/25/20. She tripped on a rug in her home and fell. She fell onto her left side onto a portable fan, then the floor. She did not hit her head and did not have loss of consciousness. She wasn't hurting initially, but after a few days she started to have pain in left arm. It hurts her to lie on the left arm.  is having to help her get dressed because it hurts to move the arm. Taking Tylenol PM at night to get some rest. Sometimes pain radiates up to shoulder and sometimes down the left arm. No bruising. Still having dizziness off & on, but that was not the cause of this fall. No syncope since the ER visit.     No Known Allergies      Current Outpatient Medications:   •  Calcium Carbonate-Vitamin D3 (CALCIUM 600-D) 600-400 MG-UNIT tablet, Take 2 capsules by mouth Daily., Disp: , Rfl:   •  Dexilant 60 MG capsule, TAKE ONE CAPSULE BY MOUTH ONCE DAILY, Disp: 90 capsule, Rfl: 1  •  docusate sodium (Colace) 100 MG capsule, Take 2 capsules by mouth Every Night., Disp: 180 capsule, Rfl: 3  •  escitalopram (LEXAPRO) 10 MG tablet, Take 5  mg by mouth Daily., Disp: , Rfl:   •  fesoterodine fumarate (TOVIAZ ER) 8 MG tablet sustained-release 24 hour tablet, Take 8 mg by mouth Daily., Disp: , Rfl:   •  glucosamine-chondroitin (GLUCOSAMINE CHONDR COMPLEX) 500-400 MG capsule capsule, 1 capsule Every 12 (Twelve) Hours., Disp: , Rfl:   •  levothyroxine (SYNTHROID, LEVOTHROID) 50 MCG tablet, TAKE ONE TABLET BY MOUTH EVERY MORNING 30 MINUTES BEFORE EATING OR TAKING ANY OTHER MEDICATIONS, Disp: 90 tablet, Rfl: 1  •  losartan (COZAAR) 25 MG tablet, TAKE ONE TABLET BY MOUTH ONCE DAILY FOR BLOOD PRESSURE, Disp: 90 tablet, Rfl: 3  •  metoprolol tartrate (LOPRESSOR) 50 MG tablet, TAKE ONE TABLET BY MOUTH TWO TIMES A DAY FOR HEART AND FOR BLOOD PRESSURE, Disp: 180 tablet, Rfl: 3  •  midodrine (PROAMATINE) 5 MG tablet, TAKE ONE TABLET BY MOUTH ONCE DAILY, Disp: 90 tablet, Rfl: 1  •  Multiple Vitamins-Minerals (MULTI VITAMIN/MINERALS) tablet, MULTI VITAMIN/MINERALS TABS, Disp: , Rfl:   •  zonisamide (ZONEGRAN) 100 MG capsule, TAKE FOUR CAPSULES BY MOUTH ONCE DAILY, Disp: , Rfl:     Patient Active Problem List   Diagnosis   • Cardiomegaly   • Essential hypertension   • Mitral valve insufficiency   • Presence of cardiac pacemaker   • DDD (degenerative disc disease), lumbar   • Chest pain, atypical   • Sick sinus syndrome (CMS/HCC)   • Syncope and collapse   • PCO (posterior capsular opacification), bilateral   • Diplopia   • Myopia   • Glaucoma suspect   • Presbyopia   • Pseudophakia of both eyes   • Prediabetes   • Hypothyroidism   • Urinary incontinence   • Squamous cell skin cancer, face   • Spinal stenosis of cervical region   • Seizure disorder (CMS/HCC)   • Rotator cuff tear   • Osteoporosis   • Osteoarthritis   • Kidney stone   • GERD (gastroesophageal reflux disease)   • Fracture of multiple ribs   • Emphysema of lung (CMS/HCC)   • Fatty liver   • Degeneration of intervertebral disc of thoracic region   • DDD (degenerative disc disease), cervical   • Chronic  constipation   • Cervical spinal stenosis   • Biliary dyskinesia   • Basal cell carcinoma (BCC) of face   • Anxiety   • Traumatic brain injury (CMS/HCC)   • Nasal fracture   • Chronic insomnia   • Allergic rhinitis   • Class 2 severe obesity due to excess calories with serious comorbidity and body mass index (BMI) of 35.0 to 35.9 in adult (CMS/HCC)        Past Surgical History:   Procedure Laterality Date   • BILATERAL SALPINGO OOPHORECTOMY      for benign cysts   • BREAST CYST ASPIRATION Left    • CARDIAC CATHETERIZATION  08/25/2009    25% LAD. L Cx luminal irregularities. Normal L main   • CARDIAC CATHETERIZATION  03/29/2017    25% LAD. 10-20% LCX. ER 65%. Dr. Agrawal.    • CARDIOVASCULAR STRESS TEST  2019   • CATARACT EXTRACTION  2006   • COSMETIC SURGERY      Lip   • ECHO - CONVERTED  2019   • ENDOSCOPY  05/17/2017    Dr. Matt Owens   • ENDOSCOPY N/A 8/22/2019    Procedure: ESOPHAGOGASTRODUODENOSCOPY WITH DILATATION (BOUGIE #46,50);  Surgeon: Joaquin Story MD;  Location: [facility] ENDOSCOPY;  Service: Gastroenterology   • INSERT / REPLACE / REMOVE PACEMAKER     • KNEE ARTHROSCOPY Left 1992   • OOPHORECTOMY     • OTHER SURGICAL HISTORY Right 07/2017    Right eye, YAG Capsuloyomy , Dr. Lemus   • PACEMAKER IMPLANTATION  2009   • ROTATOR CUFF REPAIR Right     Dr. Goldberg   • TONSILLECTOMY     • TOTAL HIP ARTHROPLASTY Bilateral        Social History     Socioeconomic History   • Marital status:      Spouse name: Not on file   • Number of children: Not on file   • Years of education: Not on file   • Highest education level: Not on file   Tobacco Use   • Smoking status: Never Smoker   • Smokeless tobacco: Never Used   • Tobacco comment: Patient is advised not to smoke.   Substance and Sexual Activity   • Alcohol use: No     Frequency: Never   • Drug use: No   • Sexual activity: Defer       Family History   Problem Relation Age of Onset   • Heart disease Mother    • Pancreatic cancer Mother    • Prostate  "cancer Father    • Breast cancer Sister         4 sisters have had   • Pancreatic cancer Brother    • Colon cancer Brother    • Stroke Maternal Grandmother        Family history, surgical history, past medical history, Allergies and med's reviewed with patient today and updated in [facility] EMR.     ROS:  Review of Systems   Constitutional: Negative for activity change, appetite change, diaphoresis, fatigue, unexpected weight gain and unexpected weight loss.   Eyes: Negative for blurred vision and visual disturbance.   Respiratory: Negative for apnea, cough, shortness of breath and wheezing.    Cardiovascular: Negative for chest pain, palpitations and leg swelling.   Gastrointestinal: Negative for abdominal pain, constipation, diarrhea, nausea and vomiting.   Endocrine: Negative for cold intolerance and heat intolerance.   Genitourinary: Negative for frequency.   Musculoskeletal: Negative for back pain, joint swelling, myalgias and neck pain.   Skin: Negative for color change.   Neurological: Positive for dizziness. Negative for syncope, speech difficulty, numbness, headache, memory problem and confusion.   Psychiatric/Behavioral: Negative for depressed mood.       OBJECTIVE:  Vitals:    11/17/20 1039   BP: 146/77   BP Location: Right arm   Patient Position: Sitting   Cuff Size: Large Adult   Pulse: 70   Resp: 16   Temp: 97.4 °F (36.3 °C)   TempSrc: Temporal   SpO2: 97%   Weight: 91.4 kg (201 lb 6.4 oz)   Height: 160 cm (62.99\")     Body mass index is 35.69 kg/m².    Physical Exam  Constitutional:       Appearance: She is well-developed.   Neck:      Musculoskeletal: Normal range of motion and neck supple.      Thyroid: No thyromegaly.      Vascular: No carotid bruit.      Trachea: Trachea normal.   Cardiovascular:      Rate and Rhythm: Normal rate and regular rhythm.      Heart sounds: Murmur present. Systolic murmur present with a grade of 2/6. No friction rub. No gallop.    Pulmonary:      Effort: Pulmonary effort is " normal.      Breath sounds: Normal breath sounds. No wheezing or rales.   Abdominal:      General: Bowel sounds are normal.      Palpations: Abdomen is soft.      Tenderness: There is no abdominal tenderness.      Hernia: No hernia is present.   Musculoskeletal: Normal range of motion.   Lymphadenopathy:      Cervical: No cervical adenopathy.   Skin:     General: Skin is warm and dry.   Neurological:      Mental Status: She is alert and oriented to person, place, and time.   Psychiatric:         Behavior: Behavior normal.         Thought Content: Thought content normal.         Judgment: Judgment normal.         ASSESSMENT/ PLAN:    Diagnoses and all orders for this visit:    1. Left arm pain (Primary)  -     Cancel: XR Shoulder 2+ View Left; Future  -     Cancel: XR Humerus Left (In Office)  -     Cancel: XR Forearm 2 View Left (In Office)  -     XR Forearm 2 View Left (In Office); Future  -     XR Humerus Left (In Office); Future  -     XR Shoulder 2+ View Left; Future    2. Dizziness  -     Ambulatory Referral to Cardiology    3. Fall, initial encounter  -     Ambulatory Referral to Cardiology    4. Sick sinus syndrome (CMS/HCC)  -     Ambulatory Referral to Cardiology    5. Presence of cardiac pacemaker  -     Ambulatory Referral to Cardiology    Will call with x-ray results and further recommendations. Will try to get Cardiology appointment moved up in case problems with pacemaker or other cardiac etiology is causing her dizziness.     Orders Placed Today:     No orders of the defined types were placed in this encounter.       Management Plan:     An After Visit Summary was printed and given to the patient at discharge.    Follow-up: No follow-ups on file.    VALENTINO Power 11/17/2020 11:09 EST  This note was electronically signed.

## 2020-12-03 ENCOUNTER — OFFICE VISIT (OUTPATIENT)
Dept: CARDIOLOGY | Facility: CLINIC | Age: 80
End: 2020-12-03

## 2020-12-03 ENCOUNTER — CLINICAL SUPPORT NO REQUIREMENTS (OUTPATIENT)
Dept: CARDIOLOGY | Facility: CLINIC | Age: 80
End: 2020-12-03

## 2020-12-03 VITALS
HEIGHT: 63 IN | SYSTOLIC BLOOD PRESSURE: 140 MMHG | WEIGHT: 201 LBS | BODY MASS INDEX: 35.61 KG/M2 | DIASTOLIC BLOOD PRESSURE: 80 MMHG | HEART RATE: 80 BPM

## 2020-12-03 DIAGNOSIS — R55 SYNCOPE AND COLLAPSE: ICD-10-CM

## 2020-12-03 DIAGNOSIS — Z95.0 PRESENCE OF CARDIAC PACEMAKER: ICD-10-CM

## 2020-12-03 DIAGNOSIS — I34.0 MITRAL VALVE INSUFFICIENCY, UNSPECIFIED ETIOLOGY: ICD-10-CM

## 2020-12-03 DIAGNOSIS — I10 ESSENTIAL HYPERTENSION: ICD-10-CM

## 2020-12-03 DIAGNOSIS — I49.5 SICK SINUS SYNDROME (HCC): Primary | ICD-10-CM

## 2020-12-03 PROCEDURE — 93280 PM DEVICE PROGR EVAL DUAL: CPT | Performed by: NURSE PRACTITIONER

## 2020-12-03 PROCEDURE — 99214 OFFICE O/P EST MOD 30 MIN: CPT | Performed by: INTERNAL MEDICINE

## 2020-12-03 NOTE — PROGRESS NOTES
Date of Office Visit: 2020  Encounter Provider: Dr. Adam Agrawal  Place of Service: [facility] CARDIOLOGY [place]  Patient Name: Anila Spencer  :1940  Helena Mas APRN    Chief Complaint   Patient presents with   • Mitral Insufficiency     3 month follow up/device check   • Hypertension   • Syncope     History of Present Illness:    Anila Spencer is a 80 y.o. female. Her past medical history significant for hypertension, sick sinus syndrome, tachybradycardia syndrome, history of syncope came today for follow-up.     Previously patient had repeated episodes of syncope. She had extensive workup which showed that she had underlying sick sinus syndrome. She underwent permanent  pacemaker implantation. Since then patient symptoms of syncope had completely resolved. Previously, patient was also evaluated with cardiac catheterization because of her symptoms of angina pectoris and she was noted to have no significant coronary artery disease. Her previous CAT scan of chest was also negative for pulmonary embolism and she had pulmonary function test which was negative for any significant pulmonary airway disease. It was noted that her symptoms of chest pain at that time was probably related to gastroesophageal reflux disease and she was appropriately treated.    In 2019, patient had an episode of syncope at home.  Patient underwent tilt table test and it showed hypotensive response to head up tilt.  Patient was started on midodrine 5 mg twice daily.  Echocardiogram showed normal left ventricular size and function.  LVEF was 60 to 65%.  Mild mitral regurgitation was noted.  Stress test was negative for ischemia.    This is 3 months appointment for patient.  Since the previous visit, patient had 2 episodes of falls.  Patient felt dizzy and lightheaded.  On her first episode patient was bending forward to pull weeds out of her driveway.  She felt dizzy and lightheaded and fell down.  She did  not lose consciousness completely.  She had similar spells later on.  Patient denies any chest pain.  No orthopnea PND.  Occasional palpitation noted.    Pacemaker is interrogated today and 6 months of battery life is left.  I will recommend to repeat interrogation in 3 months.  Patient has not had any arrhythmia.    I discussed in detail with the patient that she needs to continue to monitor the blood pressure and heart rate at home.  She needs to take precautions for fall.  Patient is advised not to bend forward.  This posture would be very undesirable in this patient..      Past Medical History:   Diagnosis Date   • Abnormal cardiovascular stress test 3/21/2017   • Allergic rhinitis    • Anxiety    • Basal cell carcinoma (BCC) of face    • Biliary dyskinesia    • Cardiomegaly    • Cataract    • Cervical spinal stenosis    • Chronic constipation    • Chronic insomnia    • DDD (degenerative disc disease), cervical     C-spine, T-spine, L-Spine   • DDD (degenerative disc disease), lumbar 7/25/2019   • Degeneration of intervertebral disc of thoracic region 3/18/2017   • Diplopia     Dr. Laird   • Dysphagia 4/25/2019   • Emphysema of lung (CMS/formerly [facility])    • Fatty liver    • Fracture of multiple ribs 07/2018    Left 4th-8th   • Gastroesophageal reflux disease 12/4/2012   • GERD (gastroesophageal reflux disease)    • Hematuria 12/29/2014   • Hypertension    • Hypothyroidism    • Injury of back    • Insomnia 3/7/2016   • Kidney stone    • Nasal fracture 07/2018   • Osteoarthritis    • Osteoporosis     h/o tx with Fosamax but quit in 2012 due to fears of complications   • Prediabetes    • Rotator cuff tear     right   • Seizure disorder (CMS/formerly [facility])     Dr. Shannon Bennett   • Shortness of breath 5/8/2019   • Sick sinus syndrome (CMS/formerly [facility])     Dr. Agrawal   • Spinal stenosis of cervical region 2/11/2015   • Squamous cell skin cancer, face    • Traumatic brain injury (CMS/HCC)     from fall   • Urinary incontinence          Past  Surgical History:   Procedure Laterality Date   • BILATERAL SALPINGO OOPHORECTOMY      for benign cysts   • BREAST CYST ASPIRATION Left    • CARDIAC CATHETERIZATION  08/25/2009    25% LAD. L Cx luminal irregularities. Normal L main   • CARDIAC CATHETERIZATION  03/29/2017    25% LAD. 10-20% LCX. ER 65%. Dr. Agrawal.    • CARDIOVASCULAR STRESS TEST  2019   • CATARACT EXTRACTION  2006   • COSMETIC SURGERY      Lip   • ECHO - CONVERTED  2019   • ENDOSCOPY  05/17/2017    Normal, Dr. Gutierrez   • ENDOSCOPY N/A 8/22/2019    Procedure: ESOPHAGOGASTRODUODENOSCOPY WITH DILATATION (BOUGIE #46,50);  Surgeon: Joaquin Story MD;  Location: [facility] ENDOSCOPY;  Service: Gastroenterology   • INSERT / REPLACE / REMOVE PACEMAKER     • KNEE ARTHROSCOPY Left 1992   • OOPHORECTOMY     • OTHER SURGICAL HISTORY Right 07/2017    Right eye, YAG Capsuloyomy , Dr. Lemus   • PACEMAKER IMPLANTATION  2009   • ROTATOR CUFF REPAIR Right     Dr. Goldberg   • TONSILLECTOMY     • TOTAL HIP ARTHROPLASTY Bilateral            Current Outpatient Medications:   •  Calcium Carbonate-Vitamin D3 (CALCIUM 600-D) 600-400 MG-UNIT tablet, Take 2 capsules by mouth Daily., Disp: , Rfl:   •  Dexilant 60 MG capsule, TAKE ONE CAPSULE BY MOUTH ONCE DAILY, Disp: 90 capsule, Rfl: 1  •  docusate sodium (Colace) 100 MG capsule, Take 2 capsules by mouth Every Night., Disp: 180 capsule, Rfl: 3  •  escitalopram (LEXAPRO) 10 MG tablet, Take 5 mg by mouth Daily., Disp: , Rfl:   •  fesoterodine fumarate (TOVIAZ ER) 8 MG tablet sustained-release 24 hour tablet, Take 8 mg by mouth Daily., Disp: , Rfl:   •  glucosamine-chondroitin (GLUCOSAMINE CHONDR COMPLEX) 500-400 MG capsule capsule, 1 capsule Every 12 (Twelve) Hours., Disp: , Rfl:   •  levothyroxine (SYNTHROID, LEVOTHROID) 50 MCG tablet, TAKE ONE TABLET BY MOUTH EVERY MORNING 30 MINUTES BEFORE EATING OR TAKING ANY OTHER MEDICATIONS, Disp: 90 tablet, Rfl: 1  •  losartan (COZAAR) 25 MG tablet, TAKE ONE TABLET BY MOUTH ONCE  "DAILY FOR BLOOD PRESSURE, Disp: 90 tablet, Rfl: 3  •  metoprolol tartrate (LOPRESSOR) 50 MG tablet, TAKE ONE TABLET BY MOUTH TWO TIMES A DAY FOR HEART AND FOR BLOOD PRESSURE, Disp: 180 tablet, Rfl: 3  •  midodrine (PROAMATINE) 5 MG tablet, TAKE ONE TABLET BY MOUTH ONCE DAILY, Disp: 90 tablet, Rfl: 1  •  Multiple Vitamins-Minerals (MULTI VITAMIN/MINERALS) tablet, MULTI VITAMIN/MINERALS TABS, Disp: , Rfl:   •  zonisamide (ZONEGRAN) 100 MG capsule, TAKE FOUR CAPSULES BY MOUTH ONCE DAILY, Disp: , Rfl:       Social History     Socioeconomic History   • Marital status:      Spouse name: Not on file   • Number of children: Not on file   • Years of education: Not on file   • Highest education level: Not on file   Tobacco Use   • Smoking status: Never Smoker   • Smokeless tobacco: Never Used   • Tobacco comment: Patient is advised not to smoke.   Substance and Sexual Activity   • Alcohol use: No     Frequency: Never   • Drug use: No   • Sexual activity: Defer         Review of Systems   Constitution: Negative for chills and fever.   HENT: Negative for ear discharge and nosebleeds.    Eyes: Negative for discharge and redness.   Cardiovascular: Negative for chest pain, orthopnea, palpitations, paroxysmal nocturnal dyspnea and syncope.   Respiratory: Negative for cough, shortness of breath and wheezing.    Endocrine: Negative for heat intolerance.   Skin: Negative for rash.   Musculoskeletal: Negative for arthritis and myalgias.   Gastrointestinal: Negative for abdominal pain, melena, nausea and vomiting.   Genitourinary: Negative for dysuria and hematuria.   Neurological: Positive for dizziness. Negative for light-headedness, numbness and tremors.   Psychiatric/Behavioral: Negative for depression. The patient is not nervous/anxious.        Procedures    Procedures    No orders to display           Objective:    /80   Pulse 80   Ht 160 cm (62.99\")   Wt 91.2 kg (201 lb)   BMI 35.61 kg/m²         Constitutional:  "      Appearance: Well-developed.   Eyes:      General: No scleral icterus.        Right eye: No discharge.   HENT:      Head: Normocephalic and atraumatic.   Neck:      Thyroid: No thyromegaly.      Lymphadenopathy: No cervical adenopathy.   Pulmonary:      Effort: Pulmonary effort is normal. No respiratory distress.      Breath sounds: Normal breath sounds. No wheezing. No rales.   Cardiovascular:      Normal rate. Regular rhythm.      No gallop.   Abdominal:      Tenderness: There is no abdominal tenderness.   Skin:     Findings: No erythema or rash.   Neurological:      Mental Status: Alert and oriented to person, place, and time.             Assessment:       Diagnosis Plan   1. Sick sinus syndrome (CMS/HCC)     2. Mitral valve insufficiency, unspecified etiology     3. Presence of cardiac pacemaker     4. Essential hypertension     5. Syncope and collapse              Plan:       At this stage, I would recommend that patient can proceed with current treatment as planned.  Patient is advised to increase hydration and fall precautions are discussed.  I will see the patient in 3 months.  Her pacemaker is reaching end-of-life

## 2020-12-06 RX ORDER — MIDODRINE HYDROCHLORIDE 5 MG/1
TABLET ORAL
Qty: 90 TABLET | Refills: 1 | Status: SHIPPED | OUTPATIENT
Start: 2020-12-06 | End: 2021-05-24

## 2021-01-14 DIAGNOSIS — E03.9 ACQUIRED HYPOTHYROIDISM: Primary | ICD-10-CM

## 2021-01-14 RX ORDER — LEVOTHYROXINE SODIUM 0.05 MG/1
50 TABLET ORAL DAILY
Qty: 90 TABLET | Refills: 0 | Status: SHIPPED | OUTPATIENT
Start: 2021-01-14 | End: 2021-04-06

## 2021-01-21 ENCOUNTER — OFFICE VISIT (OUTPATIENT)
Dept: FAMILY MEDICINE CLINIC | Facility: CLINIC | Age: 81
End: 2021-01-21

## 2021-01-21 ENCOUNTER — TELEPHONE (OUTPATIENT)
Dept: FAMILY MEDICINE CLINIC | Facility: CLINIC | Age: 81
End: 2021-01-21

## 2021-01-21 VITALS
OXYGEN SATURATION: 99 % | BODY MASS INDEX: 36.32 KG/M2 | SYSTOLIC BLOOD PRESSURE: 125 MMHG | HEART RATE: 76 BPM | WEIGHT: 205 LBS | RESPIRATION RATE: 18 BRPM | DIASTOLIC BLOOD PRESSURE: 76 MMHG | HEIGHT: 63 IN | TEMPERATURE: 95.5 F

## 2021-01-21 DIAGNOSIS — Z95.0 PRESENCE OF CARDIAC PACEMAKER: ICD-10-CM

## 2021-01-21 DIAGNOSIS — I10 ESSENTIAL HYPERTENSION: ICD-10-CM
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
## 2021-01-21 DIAGNOSIS — M79.602 LEFT ARM PAIN: Primary | ICD-10-CM

## 2021-01-21 DIAGNOSIS — R42 DIZZINESS: ICD-10-CM

## 2021-01-21 PROCEDURE — 99214 OFFICE O/P EST MOD 30 MIN: CPT | Performed by: NURSE PRACTITIONER

## 2021-01-21 NOTE — PROGRESS NOTES
Chief Complaint  Arm Pain (Left arm pain f/u)    Subjective          History of Present Illness  Patient presents for follow-up hypertension, falls, left arm pain. She reports that she has not had any more falls since her last appointment.  She is using her cane at all times.  She is avoiding bending forward or quick position changes.  Occasional mild dizziness, but no syncope.  Since I last saw her she has her cardiologist and had pacemaker interrogation.  Her battery is nearing the end of life and she will be having this changed out in the near future.  She continues to have pain in her left arm since her last follow-up.  She had negative x-rays of the left shoulder, humerus, and tib-fib after the fall.  She describes pain in the upper arm just above the elbow.  Is worse with abduction of the arm.  She is unable to lift her arm up actively, she has to use her right arm to help place the left arm.  There is no swelling in the area.  There is no discoloration present.  She denies numbness and tingling in the extremity.      Current Outpatient Medications on File Prior to Visit   Medication Sig Dispense Refill   • Calcium Carbonate-Vitamin D3 (CALCIUM 600-D) 600-400 MG-UNIT tablet Take 2 capsules by mouth Daily.     • Dexilant 60 MG capsule TAKE ONE CAPSULE BY MOUTH ONCE DAILY 90 capsule 1   • docusate sodium (Colace) 100 MG capsule Take 2 capsules by mouth Every Night. 180 capsule 3   • escitalopram (LEXAPRO) 10 MG tablet Take 5 mg by mouth Daily.     • fesoterodine fumarate (TOVIAZ ER) 8 MG tablet sustained-release 24 hour tablet Take 8 mg by mouth Daily.     • levothyroxine (SYNTHROID, LEVOTHROID) 50 MCG tablet Take 1 tablet by mouth Daily. 90 tablet 0   • losartan (COZAAR) 25 MG tablet TAKE ONE TABLET BY MOUTH ONCE DAILY FOR BLOOD PRESSURE 90 tablet 3   • metoprolol tartrate (LOPRESSOR) 50 MG tablet TAKE ONE TABLET BY MOUTH TWO TIMES A DAY FOR HEART AND FOR BLOOD PRESSURE 180 tablet 3   • midodrine (PROAMATINE) 5  "MG tablet TAKE ONE TABLET BY MOUTH ONCE DAILY 90 tablet 1   • Multiple Vitamins-Minerals (MULTI VITAMIN/MINERALS) tablet MULTI VITAMIN/MINERALS TABS     • zonisamide (ZONEGRAN) 100 MG capsule TAKE FOUR CAPSULES BY MOUTH ONCE DAILY     • [DISCONTINUED] glucosamine-chondroitin (GLUCOSAMINE CHONDR COMPLEX) 500-400 MG capsule capsule 1 capsule Every 12 (Twelve) Hours.       No current facility-administered medications on file prior to visit.         Review of Systems   Constitutional: Negative for activity change, appetite change, diaphoresis, fatigue, unexpected weight gain and unexpected weight loss.   Respiratory: Negative for cough, shortness of breath and wheezing.    Cardiovascular: Negative for chest pain, palpitations and leg swelling.   Gastrointestinal: Negative for abdominal pain, constipation, diarrhea, nausea and vomiting.   Endocrine: Negative for cold intolerance and heat intolerance.   Genitourinary: Negative for frequency.   Musculoskeletal: Positive for myalgias. Negative for back pain, joint swelling and neck pain.   Skin: Negative for color change.   Neurological: Negative for syncope, speech difficulty, numbness, headache, memory problem and confusion.   Psychiatric/Behavioral: Negative for depressed mood.        Objective   Vital Signs:   Vitals:    01/21/21 1311   BP: 125/76   Pulse: 76   Resp: 18   Temp: 95.5 °F (35.3 °C)   SpO2: 99%   Weight: 93 kg (205 lb)   Height: 160 cm (63\")     Body mass index is 36.31 kg/m².      Physical Exam  Constitutional:       Appearance: She is well-developed.   Neck:      Musculoskeletal: Normal range of motion and neck supple.      Thyroid: No thyromegaly.      Vascular: No carotid bruit.      Trachea: Trachea normal.   Cardiovascular:      Rate and Rhythm: Normal rate and regular rhythm.      Heart sounds: Normal heart sounds. No murmur. No friction rub. No gallop.    Pulmonary:      Effort: Pulmonary effort is normal.      Breath sounds: Normal breath sounds. " No wheezing or rales.   Musculoskeletal:      Left shoulder: She exhibits decreased range of motion. She exhibits no tenderness, no swelling and no crepitus.      Left elbow: She exhibits decreased range of motion. She exhibits no swelling, no effusion, no deformity and no laceration.        Arms:    Lymphadenopathy:      Cervical: No cervical adenopathy.   Skin:     General: Skin is warm and dry.   Neurological:      Mental Status: She is alert and oriented to person, place, and time.   Psychiatric:         Behavior: Behavior normal.         Thought Content: Thought content normal.         Judgment: Judgment normal.          Result Review :   The following data was reviewed by: VALENTINO Power on 01/21/2021:  10/1/20 CBC, CMP done at [facility]    Data reviewed: Cardiology studies 12/3/20 Pacemaker Interrogation and Consultant notes 12/3/20 Cardiology Consult          Assessment and Plan    Diagnoses and all orders for this visit:    1. Left arm pain (Primary)  Assessment & Plan:  Continued pain.  I suspect she may have a tendon or ligament tear.  Discussed options of Ortho referral, PT, CT (she cannot have MRI).  She would like to have a CT to see if this shows what damage has been done.  We will then decide on further steps based on these results.    Orders:  -     CT Upper Extremity Left Without Contrast; Future    2. Essential hypertension  Assessment & Plan:  Stable.  Continue current treatment plan.      3. Presence of cardiac pacemaker  Assessment & Plan:  Continue follow-up with Dr. Agrawal.      4. Dizziness  Comments:  Mild, improved.  Continue follow-up with cardiology.    5. Fall, subsequent encounter  Comments:  No further falls.  Reviewed safety recommendations.          Follow Up   Return in about 6 months (around 7/20/2021) for Medicare Wellness.    Patient was given instructions and counseling regarding her condition and health maintenance advice. Please see specific information in the After Visit  Summary.     Helena Mas, APRN 1/21/2021 13:51 EST  This note was electronically signed.

## 2021-01-21 NOTE — ASSESSMENT & PLAN NOTE
Continued pain.  I suspect she may have a tendon or ligament tear.  Discussed options of Ortho referral, PT, CT (she cannot have MRI).  She would like to have a CT to see if this shows what damage has been done.  We will then decide on further steps based on these results.

## 2021-01-28 DIAGNOSIS — M79.602 LEFT ARM PAIN: ICD-10-CM

## 2021-01-28 DIAGNOSIS — M79.602 LEFT ARM PAIN: Primary | ICD-10-CM
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
## 2021-01-28 DIAGNOSIS — Z74.09 MOBILITY IMPAIRED: ICD-10-CM

## 2021-02-18 ENCOUNTER — TELEPHONE (OUTPATIENT)
Dept: PHYSICAL THERAPY | Facility: CLINIC | Age: 81
End: 2021-02-18

## 2021-03-04 ENCOUNTER — TREATMENT (OUTPATIENT)
Dept: PHYSICAL THERAPY | Facility: CLINIC | Age: 81
End: 2021-03-04

## 2021-03-04 DIAGNOSIS — M79.602 ARM PAIN, DIFFUSE, LEFT: Primary | ICD-10-CM

## 2021-03-04 PROCEDURE — 97530 THERAPEUTIC ACTIVITIES: CPT | Performed by: PHYSICAL THERAPIST

## 2021-03-04 PROCEDURE — 97110 THERAPEUTIC EXERCISES: CPT | Performed by: PHYSICAL THERAPIST

## 2021-03-04 PROCEDURE — 97161 PT EVAL LOW COMPLEX 20 MIN: CPT | Performed by: PHYSICAL THERAPIST

## 2021-03-04 PROCEDURE — 97140 MANUAL THERAPY 1/> REGIONS: CPT | Performed by: PHYSICAL THERAPIST

## 2021-03-04 NOTE — PROGRESS NOTES
Physical Therapy Initial Evaluation and Plan of Care    Patient: Anila Spencer   : 1940  Diagnosis/ICD-10 Code:  Arm pain, diffuse, left [M79.602]  Referring practitioner: VALENTINO Power  Date of Initial Visit: 3/4/2021  Today's Date: 3/4/2021  Patient seen for 1 sessions             Subjective Evaluation    History of Present Illness  Mechanism of injury: Patient is an 80 y.o. WF who returns to PT with L arm pain since falling last fall.  She had 2 falls, the first on  she passed out and fell backward on a gravel driveway after bending over repeatedly to pull weeds.  Next she fell on her left side in the house on 10/25.  She reports X-rays were unremarkable.  CT scan from 21 shows mild OA L GH joint as well as OA and hypertrophic spurring L AC joint.  She needs assistance with dressing, bathing.  Personal goals are to decrease pain and be independent with dressing.  Pain rating 5/10 and increases with reaching, raising, sleeping.    Patient Goals  Patient goals for therapy: decreased pain, increased motion, increased strength and independence with ADLs/IADLs             Objective QuickDASH indicates 68% impairment with limitations in ADLs, dressing, grooming, pain, sleep.  WOMAC indicates 72% impairment with limitations in stairs, shopping.  AROM UE:  L shoulder flexion limited to 40 deg, abd 40 deg, IR to 45 deg, ER lacks 30 deg from neutral with pain; otherwise WFL B.  MMT:  2/5 L shoulder flex, abd, IR, ER; otherwise WFL B UEs.  Patient ambulates slowly with decreased step length and scuffing L leg holding cane in R hand.  Tenderness to palpation L mid forearm laterally and L AC joint.        Assessment & Plan     Assessment  Impairments: abnormal muscle tone, abnormal or restricted ROM, activity intolerance, impaired physical strength, lacks appropriate home exercise program and pain with function  Functional Limitations: carrying objects, lifting, sleeping, pulling, pushing,  uncomfortable because of pain, moving in bed, reaching behind back and reaching overhead  Goals  Plan Goals: Patient independent with HEP in 2 weeks  Tolerate 10 min Nustep in 2 weeks  Decrease L arm pain 25% in 2 weeks  Improve function as evidenced by QuickDASH score of 20% or less by discharge (68% impairment initially)  Able to raise L arm to shoulder height by discharge  Able to dress independently by discharge       Plan  Therapy options: will be seen for skilled physical therapy services  Planned modality interventions: thermotherapy (hydrocollator packs)  Other planned modality interventions: PACEMAKER PROHIBITS electric modalities  Planned therapy interventions: manual therapy, balance/weight-bearing training, flexibility, functional ROM exercises, home exercise program, neuromuscular re-education, strengthening, stretching and therapeutic activities  Treatment plan discussed with: patient  Plan details: Plan to continue PT 1-2x's per week up to 12 visits if needed.        Timed:         Manual Therapy:    15     mins  79650;     Therapeutic Exercise:    15     mins  00878;     Neuromuscular Mike:        mins  20250;    Therapeutic Activity:     15     mins  17570;     Gait Training:           mins  02542;     Ultrasound:          mins  96032;    Ionto                                   mins   27471  Self-care  ____ mins 50635    Un-Timed:  Electrical Stimulation:         mins  55804 ( );  Dry Needling          mins self-pay  Traction          mins 50174  Low Eval     15     Mins  92671  Mod Eval          Mins  80813  High Eval                            Mins  36371  Canal repositioning _____ mins  34143        Timed Treatment:   45   mins   Total Treatment:     60   mins    PT SIGNATURE: Robin A Sprigler, PT   DATE TREATMENT INITIATED: 3/4/2021    Initial Certification  Certification Period: 6/2/2021  I certify that the therapy services are furnished while this patient is under my care.  The services  outlined above are required by this patient, and will be reviewed every 90 days.     PHYSICIAN: Helena Mas, VALENTINO ____________________________________________________________________________________________________________     DATE: _____________________________________________________________________________________________________________________________________    Please sign and return via fax to [phone]. Thank you, [facility].

## 2021-03-11 ENCOUNTER — TREATMENT (OUTPATIENT)
Dept: PHYSICAL THERAPY | Facility: CLINIC | Age: 81
End: 2021-03-11

## 2021-03-11 DIAGNOSIS — M79.602 ARM PAIN, DIFFUSE, LEFT: Primary | ICD-10-CM

## 2021-03-11 PROCEDURE — 97110 THERAPEUTIC EXERCISES: CPT | Performed by: PHYSICAL THERAPIST

## 2021-03-11 PROCEDURE — 97530 THERAPEUTIC ACTIVITIES: CPT | Performed by: PHYSICAL THERAPIST

## 2021-03-11 PROCEDURE — 97140 MANUAL THERAPY 1/> REGIONS: CPT | Performed by: PHYSICAL THERAPIST

## 2021-03-11 NOTE — PROGRESS NOTES
Physical Therapy Daily Progress Note    Patient: Anila Spencer   : 1940  Diagnosis/ICD-10 Code:  Arm pain, diffuse, left [M79.602]  Referring practitioner: VALENTINO Power  Date of Initial Visit: Type: THERAPY  Noted: 3/4/2021  Today's Date: 3/11/2021  Patient seen for 2 sessions             Subjective Patient reports she tried the pulleys at home but they are painful.    Objective   See Exercise, Manual, and Modality Logs for complete treatment. Good response to eccentric flexion/scaption L.  Added Nustep, upper row/lat pull, IR/ER today.  Trial of kinesiotape to mid humerus today.  Assess response to treatment next visit.  Finished with  over L AC and mid humerus.      Assessment/Plan  Patient independent with HEP in 2 weeks - NOT MET  Tolerate 10 min Nustep in 2 weeks - NOT MET  Decrease L arm pain 25% in 2 weeks - NOT MET  Improve function as evidenced by QuickDASH score of 20% or less by discharge (68% impairment initially) - NOT MET  Able to raise L arm to shoulder height by discharge - NOT MET  Able to dress independently by discharge - NOT MET     Progress per Plan of Care up to 12 visits if needed           Timed:         Manual Therapy:    15     mins  80692;     Therapeutic Exercise:    15     mins  33422;     Neuromuscular Mike:        mins  91642;    Therapeutic Activity:     10     mins  45926;     Gait Training:           mins  12736;     Ultrasound:          mins  19961;    Ionto                                   mins   11952  Self Care                            mins   03142      Un-Timed:  Electrical Stimulation:         mins  86529 ( );  Dry Needling          mins self-pay  Traction          mins 43768  Low Eval          Mins  40874  Mod Eval          Mins  72285  High Eval                            Mins  41246  Canalith Repos                   mins  45444    Timed Treatment:   40   mins   Total Treatment:     55 mins    Robin A Sprigler, PT  Physical Therapist

## 2021-03-18 ENCOUNTER — TREATMENT (OUTPATIENT)
Dept: PHYSICAL THERAPY | Facility: CLINIC | Age: 81
End: 2021-03-18

## 2021-03-18 ENCOUNTER — CLINICAL SUPPORT (OUTPATIENT)
Dept: FAMILY MEDICINE CLINIC | Facility: CLINIC | Age: 81
End: 2021-03-18

## 2021-03-18 DIAGNOSIS — M79.602 ARM PAIN, DIFFUSE, LEFT: Primary | ICD-10-CM

## 2021-03-18 DIAGNOSIS — M81.0 AGE-RELATED OSTEOPOROSIS WITHOUT CURRENT PATHOLOGICAL FRACTURE: Primary | ICD-10-CM

## 2021-03-18 PROCEDURE — 97530 THERAPEUTIC ACTIVITIES: CPT | Performed by: PHYSICAL THERAPIST

## 2021-03-18 PROCEDURE — 96372 THER/PROPH/DIAG INJ SC/IM: CPT | Performed by: NURSE PRACTITIONER

## 2021-03-18 PROCEDURE — 97110 THERAPEUTIC EXERCISES: CPT | Performed by: PHYSICAL THERAPIST

## 2021-03-18 PROCEDURE — 97140 MANUAL THERAPY 1/> REGIONS: CPT | Performed by: PHYSICAL THERAPIST

## 2021-03-18 NOTE — PROGRESS NOTES
Physical Therapy Daily Progress Note    Patient: Anila Spencer   : 1940  Diagnosis/ICD-10 Code:  Arm pain, diffuse, left [M79.602]  Referring practitioner: VALENTINO Power  Date of Initial Visit: Type: THERAPY  Noted: 3/4/2021  Today's Date: 3/18/2021  Patient seen for 3 sessions             Subjective Patient reports no change with kinesiotape and consents for trial of dry-needling today.    Objective   See Exercise, Manual, and Modality Logs for complete treatment. Added rope and pulley flexion, IR/ER with weighted pulley today.  Able to tolerate 8 min on Nustep today. Trial of dry needling to mid humerus trigger point and deep radial point today.  Assess response to treatment next visit.  Finished with  over L AC and mid humerus.      Assessment/Plan  Patient independent with HEP in 2 weeks - MET  Tolerate 10 min Nustep in 2 weeks - NOT MET  Decrease L arm pain 25% in 2 weeks - NOT MET  Improve function as evidenced by QuickDASH score of 20% or less by discharge (68% impairment initially) - NOT MET  Able to raise L arm to shoulder height by discharge - NOT MET  Able to dress independently by discharge - NOT MET     Progress per Plan of Care up to 12 visits if needed           Timed:         Manual Therapy:    15     mins  03727;     Therapeutic Exercise:    15     mins  21417;     Neuromuscular Mike:        mins  63096;    Therapeutic Activity:     10     mins  06027;     Gait Training:           mins  31115;     Ultrasound:          mins  92840;    Ionto                                   mins   99224  Self Care                            mins   44563      Un-Timed:  Electrical Stimulation:         mins  85953 ( );  Dry Needling          mins self-pay  Traction          mins 72548  Low Eval          Mins  73225  Mod Eval          Mins  81858  High Eval                            Mins  13842  Canalith Repos                   mins  78825    Timed Treatment:   40   mins   Total Treatment:      55   mins    Robin A Sprigler, PT  Physical Therapist

## 2021-03-24 NOTE — PROGRESS NOTES
Date of Office Visit: 2021  Encounter Provider: Dr. Adam Agrawal  Place of Service: [facility] CARDIOLOGY [place]  Patient Name: Anila Spencer  :1940  Helena Mas APRN    Chief Complaint   Patient presents with   • Mitral Insufficiency     3 month follow up/device check   • Hypertension     History of Present Illness    Anila Spencer is a 80 y.o. female. Her past medical history significant for hypertension, sick sinus syndrome, tachybradycardia syndrome, history of syncope came today for follow-up.     Previously patient had repeated episodes of syncope. She had extensive workup which showed that she had underlying sick sinus syndrome. She underwent permanent  pacemaker implantation. Since then patient symptoms of syncope had completely resolved. Previously, patient was also evaluated with cardiac catheterization because of her symptoms of angina pectoris and she was noted to have no significant coronary artery disease. Her previous CAT scan of chest was also negative for pulmonary embolism and she had pulmonary function test which was negative for any significant pulmonary airway disease. It was noted that her symptoms of chest pain at that time was probably related to gastroesophageal reflux disease and she was appropriately treated.    In 2019, patient had an episode of syncope at home.  Patient underwent tilt table test and it showed hypotensive response to head up tilt.  Patient was started on midodrine 5 mg twice daily.  Echocardiogram showed normal left ventricular size and function.  LVEF was 60 to 65%.  Mild mitral regurgitation was noted.  Stress test was negative for ischemia.    Patient came today for follow-up for pacemaker check.  Patient pacemaker is interrogated and it is functioning appropriately but generator has reached to the end-of-life.  I will arrange generator change with Dr. Mora.  Patient otherwise is doing reasonably well.  She complains of fatigue  and tiredness.  Patient is not very active.  She walks with a cane.  Patient denies any chest pain.  No syncope or presyncope.  No fall.  No orthopnea or PND.  Patient does complain of palpitation but pacemaker interrogation showed no arrhythmia.    Pacemaker is interrogated and it has reached end-of-life.  No change in programming done.    I would recommend to have follow-up with Dr. Mora for generator change.  Continue current treatment.      Past Medical History:   Diagnosis Date   • Abnormal cardiovascular stress test 3/21/2017   • Allergic rhinitis    • Anxiety    • Basal cell carcinoma (BCC) of face    • Biliary dyskinesia    • Cardiomegaly    • Cataract    • Cervical spinal stenosis    • Chronic constipation    • Chronic insomnia    • DDD (degenerative disc disease), cervical     C-spine, T-spine, L-Spine   • DDD (degenerative disc disease), lumbar 7/25/2019   • Degeneration of intervertebral disc of thoracic region 3/18/2017   • Diplopia     Dr. Laird   • Dysphagia 4/25/2019   • Emphysema of lung (CMS/HCC)    • Fatty liver    • Fracture of multiple ribs 07/2018    Left 4th-8th   • Gastroesophageal reflux disease 12/4/2012   • GERD (gastroesophageal reflux disease)    • Hematuria 12/29/2014   • History of recent fall    • Hypertension    • Hypothyroidism    • Injury of back    • Insomnia 3/7/2016   • Kidney stone    • Left arm pain    • Nasal fracture 07/2018   • Osteoarthritis    • Osteoporosis     h/o tx with Fosamax but quit in 2012 due to fears of complications   • Prediabetes    • Rotator cuff tear     right   • Seizure disorder (CMS/HCC)     Dr. Shannon Bennett   • Shortness of breath 5/8/2019   • Sick sinus syndrome (CMS/HCC)     Dr. Agrawal   • Spinal stenosis of cervical region 2/11/2015   • Squamous cell skin cancer, face    • Traumatic brain injury (CMS/HCC)     from fall   • Urinary incontinence          Past Surgical History:   Procedure Laterality Date   • BILATERAL SALPINGO OOPHORECTOMY       for benign cysts   • BREAST CYST ASPIRATION Left    • CARDIAC CATHETERIZATION  08/25/2009    25% LAD. L Cx luminal irregularities. Normal L main   • CARDIAC CATHETERIZATION  03/29/2017    25% LAD. 10-20% LCX. ER 65%. Dr. Agrawal.    • CARDIOVASCULAR STRESS TEST  2019   • CATARACT EXTRACTION  2006   • COSMETIC SURGERY      Lip   • ECHO - CONVERTED  2019   • ENDOSCOPY  05/17/2017    Normal, Dr. Gutierrez   • ENDOSCOPY N/A 8/22/2019    Procedure: ESOPHAGOGASTRODUODENOSCOPY WITH DILATATION (BOUGIE #46,50);  Surgeon: Joaquin Story MD;  Location: [facility] ENDOSCOPY;  Service: Gastroenterology   • INSERT / REPLACE / REMOVE PACEMAKER     • KNEE ARTHROSCOPY Left 1992   • OOPHORECTOMY     • OTHER SURGICAL HISTORY Right 07/2017    Right eye, YAG Capsuloyomy , Dr. Lemus   • PACEMAKER IMPLANTATION  2009   • ROTATOR CUFF REPAIR Right     Dr. Goldberg   • TONSILLECTOMY     • TOTAL HIP ARTHROPLASTY Bilateral            Current Outpatient Medications:   •  Calcium Carbonate-Vitamin D3 (CALCIUM 600-D) 600-400 MG-UNIT tablet, Take 2 capsules by mouth Daily., Disp: , Rfl:   •  Dexilant 60 MG capsule, TAKE ONE CAPSULE BY MOUTH ONCE DAILY, Disp: 90 capsule, Rfl: 1  •  docusate sodium (Colace) 100 MG capsule, Take 2 capsules by mouth Every Night., Disp: 180 capsule, Rfl: 3  •  escitalopram (LEXAPRO) 10 MG tablet, Take 5 mg by mouth Daily., Disp: , Rfl:   •  fesoterodine fumarate (TOVIAZ ER) 8 MG tablet sustained-release 24 hour tablet, Take 8 mg by mouth Daily., Disp: , Rfl:   •  levothyroxine (SYNTHROID, LEVOTHROID) 50 MCG tablet, Take 1 tablet by mouth Daily., Disp: 90 tablet, Rfl: 0  •  losartan (COZAAR) 25 MG tablet, TAKE ONE TABLET BY MOUTH ONCE DAILY FOR BLOOD PRESSURE, Disp: 90 tablet, Rfl: 3  •  metoprolol tartrate (LOPRESSOR) 50 MG tablet, TAKE ONE TABLET BY MOUTH TWO TIMES A DAY FOR HEART AND FOR BLOOD PRESSURE, Disp: 180 tablet, Rfl: 3  •  midodrine (PROAMATINE) 5 MG tablet, TAKE ONE TABLET BY MOUTH ONCE DAILY, Disp: 90  "tablet, Rfl: 1  •  Multiple Vitamins-Minerals (MULTI VITAMIN/MINERALS) tablet, MULTI VITAMIN/MINERALS TABS, Disp: , Rfl:   •  zonisamide (ZONEGRAN) 100 MG capsule, TAKE FOUR CAPSULES BY MOUTH ONCE DAILY, Disp: , Rfl:       Social History     Socioeconomic History   • Marital status:      Spouse name: Not on file   • Number of children: Not on file   • Years of education: Not on file   • Highest education level: Not on file   Tobacco Use   • Smoking status: Never Smoker   • Smokeless tobacco: Never Used   • Tobacco comment: Patient is advised not to smoke.   Vaping Use   • Vaping Use: Never used   Substance and Sexual Activity   • Alcohol use: No   • Drug use: No   • Sexual activity: Defer         Review of Systems   Constitutional: Negative for chills and fever.   HENT: Negative for ear discharge and nosebleeds.    Eyes: Negative for discharge and redness.   Cardiovascular: Negative for chest pain, orthopnea, palpitations, paroxysmal nocturnal dyspnea and syncope.   Respiratory: Positive for shortness of breath. Negative for cough and wheezing.    Endocrine: Negative for heat intolerance.   Skin: Negative for rash.   Musculoskeletal: Positive for arthritis and joint pain. Negative for myalgias.   Gastrointestinal: Negative for abdominal pain, melena, nausea and vomiting.   Genitourinary: Negative for dysuria and hematuria.   Neurological: Negative for dizziness, light-headedness, numbness and tremors.   Psychiatric/Behavioral: Negative for depression. The patient is not nervous/anxious.        Procedures    Procedures    No orders to display           Objective:    /76   Pulse 83   Ht 160 cm (62.99\")   Wt 93 kg (205 lb)   BMI 36.32 kg/m²         Constitutional:       Appearance: Well-developed.   Eyes:      General: No scleral icterus.        Right eye: No discharge.   HENT:      Head: Normocephalic and atraumatic.   Neck:      Thyroid: No thyromegaly.      Lymphadenopathy: No cervical adenopathy. "   Pulmonary:      Effort: Pulmonary effort is normal. No respiratory distress.      Breath sounds: Normal breath sounds. No wheezing. No rales.   Cardiovascular:      Normal rate. Regular rhythm.      No gallop.   Abdominal:      Tenderness: There is no abdominal tenderness.   Skin:     Findings: No erythema or rash.   Neurological:      Mental Status: Alert and oriented to person, place, and time.             Assessment:       Diagnosis Plan   1. Essential hypertension     2. Mitral valve insufficiency, unspecified etiology     3. Presence of cardiac pacemaker     4. Chest pain, atypical     5. Sick sinus syndrome (CMS/HCC)     6. Syncope and collapse              Plan:       MDM:    1.  S/p pacemaker:    Patient underwent pacemaker interrogation and it is functioning appropriately but has reached to the end of life.  I will refer to Dr. Mora for generator change    2.  Hypertension:    Blood pressure is very well controlled.    3.  Palpitation:    Patient is complaining of palpitation but pacemaker has not shown any arrhythmia.  I suspect that patient feels palpitation when there is ventricular pacing initiated by pacemaker.    4.  Syncope:    Patient has not had any further episode of syncope.  Recommend observation    5.  Mitral regurgitation:    Patient has mild mitral regurgitation.  Continue to observe

## 2021-03-25 ENCOUNTER — TREATMENT (OUTPATIENT)
Dept: PHYSICAL THERAPY | Facility: CLINIC | Age: 81
End: 2021-03-25

## 2021-03-25 ENCOUNTER — OFFICE VISIT (OUTPATIENT)
Dept: CARDIOLOGY | Facility: CLINIC | Age: 81
End: 2021-03-25

## 2021-03-25 ENCOUNTER — CLINICAL SUPPORT NO REQUIREMENTS (OUTPATIENT)
Dept: CARDIOLOGY | Facility: CLINIC | Age: 81
End: 2021-03-25

## 2021-03-25 VITALS
BODY MASS INDEX: 36.32 KG/M2 | HEART RATE: 83 BPM | SYSTOLIC BLOOD PRESSURE: 136 MMHG | WEIGHT: 205 LBS | DIASTOLIC BLOOD PRESSURE: 76 MMHG | HEIGHT: 63 IN

## 2021-03-25 DIAGNOSIS — I49.5 SICK SINUS SYNDROME (HCC): Primary | ICD-10-CM

## 2021-03-25 DIAGNOSIS — M79.602 ARM PAIN, DIFFUSE, LEFT: Primary | ICD-10-CM

## 2021-03-25 DIAGNOSIS — Z95.0 PRESENCE OF CARDIAC PACEMAKER: ICD-10-CM

## 2021-03-25 DIAGNOSIS — I34.0 MITRAL VALVE INSUFFICIENCY, UNSPECIFIED ETIOLOGY: ICD-10-CM

## 2021-03-25 DIAGNOSIS — R55 SYNCOPE AND COLLAPSE: ICD-10-CM

## 2021-03-25 DIAGNOSIS — I49.5 SICK SINUS SYNDROME (HCC): ICD-10-CM

## 2021-03-25 DIAGNOSIS — I10 ESSENTIAL HYPERTENSION: Primary | ICD-10-CM

## 2021-03-25 DIAGNOSIS — R07.89 CHEST PAIN, ATYPICAL: ICD-10-CM

## 2021-03-25 PROCEDURE — DRYNDL PR CUSTOM DRY NEEDLING SELF PAY: Performed by: PHYSICAL THERAPIST

## 2021-03-25 PROCEDURE — 97530 THERAPEUTIC ACTIVITIES: CPT | Performed by: PHYSICAL THERAPIST

## 2021-03-25 PROCEDURE — 97110 THERAPEUTIC EXERCISES: CPT | Performed by: PHYSICAL THERAPIST

## 2021-03-25 PROCEDURE — 93280 PM DEVICE PROGR EVAL DUAL: CPT | Performed by: NURSE PRACTITIONER

## 2021-03-25 PROCEDURE — 99214 OFFICE O/P EST MOD 30 MIN: CPT | Performed by: INTERNAL MEDICINE

## 2021-03-25 NOTE — PROGRESS NOTES
Physical Therapy Daily Progress Note    Patient: Anila Spencer   : 1940  Diagnosis/ICD-10 Code:  Arm pain, diffuse, left [M79.602]  Referring practitioner: VALENTINO Power  Date of Initial Visit: Type: THERAPY  Noted: 3/4/2021  Today's Date: 3/25/2021  Patient seen for 4 sessions             Subjective Patient reports feeling better since last visit and has been able to use L arm more.  As a result, her L shoulder is a little sore.  She requests to try TDN again.    Objective   See Exercise, Manual, and Modality Logs for complete treatment.  Able to tolerate progressed weight today in clinic and completed 10 min on Nustep.  Repeated TDN to trigger point L bicep and deep radial.      Assessment/Plan  Patient independent with HEP in 2 weeks - MET  Tolerate 10 min Nustep in 2 weeks - MET  Decrease L arm pain 25% in 2 weeks - MET  Improve function as evidenced by QuickDASH score of 20% or less by discharge (68% impairment initially) - NOT MET  Able to raise L arm to shoulder height by discharge - NOT MET  Able to dress independently by discharge - NOT MET     Progress per Plan of Care up to 12 visits if needed           Timed:         Manual Therapy:    5     mins  27822;     Therapeutic Exercise:    15     mins  96249;     Neuromuscular Mike:        mins  88234;    Therapeutic Activity:     10     mins  09000;     Gait Training:           mins  32115;     Ultrasound:          mins  70759;    Ionto                                   mins   23351  Self Care                            mins   17060      Un-Timed:  Electrical Stimulation:         mins  58486 ( );  Dry Needling     15     mins self-pay  Traction          mins 42314  Low Eval          Mins  36346  Mod Eval          Mins  41009  High Eval                            Mins  82190  Canalith Repos                   mins  35041    Timed Treatment:   30   mins   Total Treatment:     45   mins    Robin A Sprigler, PT  Physical Therapist

## 2021-03-26 NOTE — PROGRESS NOTES
DEVICE INTERROGATION:  IN OFFICE    DEVICE TYPE:   Dual-chamber pacemaker    :   Medtronic    BATTERY:  Stable    TIME TO ELECTIVE REPLACEMENT INDICATORS:   Less than 1 month    CHARGE TIME:   Not applicable        LEAD DATA:       DEVICE REPROGRAMMED FOR TESTING      Atrial:   Paced mV, 445 ohms, 0.75 V@0.4 ms    Ventricular:     16 mV, 41 ohms, 0.875 V@0.4 ms    LV:      Atrial pacing percentage: 100%    Ventricular pacing percentage: Less than 1%      Arrhythmia Logbook Reviewed: No A. fib        Summary:    Stable Device Function    No significant arhythmia burden.     Battery status is stable.    Reviewed with: Dr. Agrawal      NEXT IN OFFICE DEVICE CHECK DUE: Will require upcoming generator replacement    REMOTE DEVICE INTERROGATIONS: We will enroll in [facility] after generator replacement

## 2021-04-05 ENCOUNTER — OFFICE VISIT (OUTPATIENT)
Dept: CARDIOLOGY | Facility: CLINIC | Age: 81
End: 2021-04-05

## 2021-04-05 VITALS
HEART RATE: 63 BPM | DIASTOLIC BLOOD PRESSURE: 81 MMHG | SYSTOLIC BLOOD PRESSURE: 150 MMHG | BODY MASS INDEX: 35.08 KG/M2 | OXYGEN SATURATION: 94 % | WEIGHT: 198 LBS

## 2021-04-05 DIAGNOSIS — I49.5 SICK SINUS SYNDROME (HCC): Primary | ICD-10-CM

## 2021-04-05 DIAGNOSIS — Z95.0 PRESENCE OF CARDIAC PACEMAKER: ICD-10-CM

## 2021-04-05 DIAGNOSIS — R55 SYNCOPE AND COLLAPSE: ICD-10-CM

## 2021-04-05 DIAGNOSIS — I10 ESSENTIAL HYPERTENSION: ICD-10-CM

## 2021-04-05 PROCEDURE — 93280 PM DEVICE PROGR EVAL DUAL: CPT | Performed by: INTERNAL MEDICINE

## 2021-04-05 PROCEDURE — 99204 OFFICE O/P NEW MOD 45 MIN: CPT | Performed by: INTERNAL MEDICINE

## 2021-04-05 PROCEDURE — 93000 ELECTROCARDIOGRAM COMPLETE: CPT | Performed by: INTERNAL MEDICINE

## 2021-04-05 RX ORDER — SODIUM PHOSPHATE,MONO-DIBASIC 19G-7G/118
ENEMA (ML) RECTAL
COMMUNITY
End: 2021-06-02

## 2021-04-05 NOTE — PROGRESS NOTES
CC--- pacemaker in situ reaching close to battery depletion       Sub--80-year-old pleasant patient has a Medtronic dual-chamber pacemaker and recent pacemaker interrogation revealed longevity less than 1 month and was sent for pacemaker battery change.  Chest clinical history of hypertension and had prior history of vasovagal syncope currently on midodrine and also has sick sinus syndrome needing a pacemaker implantation in the past.  Prior work-up showed normal EF and negative work-up for pulmonary embolus or any coronary artery stenosis.  She complains of chronic mild fatigue.      Past Medical History:   Diagnosis Date   • Abnormal cardiovascular stress test 3/21/2017   • Allergic rhinitis    • Anxiety    • Basal cell carcinoma (BCC) of face    • Biliary dyskinesia    • Cardiomegaly    • Cataract    • Cervical spinal stenosis    • Chronic constipation    • Chronic insomnia    • DDD (degenerative disc disease), cervical     C-spine, T-spine, L-Spine   • DDD (degenerative disc disease), lumbar 7/25/2019   • Degeneration of intervertebral disc of thoracic region 3/18/2017   • Diplopia     Dr. Laird   • Dysphagia 4/25/2019   • Emphysema of lung (CMS/[facility])    • Fatty liver    • Fracture of multiple ribs 07/2018    Left 4th-8th   • Gastroesophageal reflux disease 12/4/2012   • GERD (gastroesophageal reflux disease)    • Hematuria 12/29/2014   • History of recent fall    • Hypertension    • Hypothyroidism    • Injury of back    • Insomnia 3/7/2016   • Kidney stone    • Left arm pain    • Nasal fracture 07/2018   • Osteoarthritis    • Osteoporosis     h/o tx with Fosamax but quit in 2012 due to fears of complications   • Prediabetes    • Rotator cuff tear     right   • Seizure disorder (CMS/[facility])     Dr. Shannon Bennett   • Shortness of breath 5/8/2019   • Sick sinus syndrome (CMS/[facility])     Dr. Agrawal   • Spinal stenosis of cervical region 2/11/2015   • Squamous cell skin cancer, face    • Traumatic brain injury (CMS/[facility])      from fall   • Urinary incontinence      Past Surgical History:   Procedure Laterality Date   • BILATERAL SALPINGO OOPHORECTOMY      for benign cysts   • BREAST CYST ASPIRATION Left    • CARDIAC CATHETERIZATION  08/25/2009    25% LAD. L Cx luminal irregularities. Normal L main   • CARDIAC CATHETERIZATION  03/29/2017    25% LAD. 10-20% LCX. ER 65%. Dr. Agrawal.    • CARDIOVASCULAR STRESS TEST  2019   • CATARACT EXTRACTION  2006   • COSMETIC SURGERY      Lip   • ECHO - CONVERTED  2019   • ENDOSCOPY  05/17/2017    Normal, Dr. Gutierrez   • ENDOSCOPY N/A 8/22/2019    Procedure: ESOPHAGOGASTRODUODENOSCOPY WITH DILATATION (BOUGIE #46,50);  Surgeon: Joaquin Story MD;  Location: [facility] ENDOSCOPY;  Service: Gastroenterology   • INSERT / REPLACE / REMOVE PACEMAKER     • KNEE ARTHROSCOPY Left 1992   • OOPHORECTOMY     • OTHER SURGICAL HISTORY Right 07/2017    Right eye, YAG Capsuloyomy , Dr. Lemus   • PACEMAKER IMPLANTATION  2009   • ROTATOR CUFF REPAIR Right     Dr. Goldberg   • TONSILLECTOMY     • TOTAL HIP ARTHROPLASTY Bilateral      Review of Systems   General:  positive for fatigue and tiredness  Eyes: No redness  Cardiovascular: No chest pain, no palpitations        Physical Exam  VITALS REVIEWED    General:      well developed, well nourished, in no acute distress.    Head:      normocephalic and atraumatic.    Eyes:      PERRL/EOM intact, conjunctiva and sclera clear with out nystagmus.    Neck:      no masses, thyromegaly,  trachea central with normal respiratory effort and PMI displaced laterally  Lungs:      clear bilaterally to auscultation.    Heart:       Sinus rhythm  without any murmurs gallops or rubs      Assessment plan    Sick sinus syndrome with Medtronic dual-chamber pacemaker in situ.  Pacemaker interrogation revealed battery longevity less than 1 month for DAHIANA.  Patient to be interrogated by Dr. Agrawal and Zee Bunch in the next few weeks and once she reaches RRT pacemaker generator change can be  done as an outpatient which was discussed with the patient.  Essential hypertension  History of hypothyroidism  Acid reflux  Discussed with Dr. Agrawal and the patient        ECG 12 Lead    Date/Time: 4/5/2021 3:55 PM  Performed by: Bharathi Mora MD  Authorized by: Bharathi Mora MD   Comparison: compared with previous ECG   Similar to previous ECG  Rhythm: sinus rhythm and paced  Rate: normal  QRS axis: normal  Comments: Sinus rhythm with atrial pacing with underlying artifacts          Electronically signed by Bharathi Mora MD, 04/05/21, 3:55 PM EDT.

## 2021-04-06 DIAGNOSIS — E03.9 ACQUIRED HYPOTHYROIDISM: ICD-10-CM

## 2021-04-06 RX ORDER — LEVOTHYROXINE SODIUM 0.05 MG/1
TABLET ORAL
Qty: 90 TABLET | Refills: 0 | Status: SHIPPED | OUTPATIENT
Start: 2021-04-06 | End: 2021-07-28

## 2021-04-08 ENCOUNTER — TREATMENT (OUTPATIENT)
Dept: PHYSICAL THERAPY | Facility: CLINIC | Age: 81
End: 2021-04-08

## 2021-04-08 DIAGNOSIS — M79.602 ARM PAIN, DIFFUSE, LEFT: Primary | ICD-10-CM

## 2021-04-08 PROCEDURE — 97530 THERAPEUTIC ACTIVITIES: CPT | Performed by: PHYSICAL THERAPIST

## 2021-04-08 PROCEDURE — 97110 THERAPEUTIC EXERCISES: CPT | Performed by: PHYSICAL THERAPIST

## 2021-04-08 NOTE — PROGRESS NOTES
Physical Therapy Daily Progress Note    Patient: Anila Spencer   : 1940  Diagnosis/ICD-10 Code:  Arm pain, diffuse, left [M79.602]  Referring practitioner: VALENTINO Power  Date of Initial Visit: Type: THERAPY  Noted: 3/4/2021  Today's Date: 2021  Patient seen for 5 sessions             Subjective Patient reports her arm is hurting again after she caught herself with her L arm on the door jamb as she was falling after a trip last Friday.  She was sick with an intestinal bug starting last Saturday.      Objective   See Exercise, Manual, and Modality Logs for complete treatment. Pain level has increased after straining to keep herself from falling to the floor last week.  Able to tolerate treatment today with some discomfort.  Increased tenderness at anterior GH joint and upper arm L today.  No TDN today due to increased tenderness.      Assessment/Plan  Patient independent with HEP in 2 weeks - MET  Tolerate 10 min Nustep in 2 weeks - MET  Decrease L arm pain 25% in 2 weeks - MET  Improve function as evidenced by QuickDASH score of 20% or less by discharge (68% impairment initially) - NOT MET  Able to raise L arm to shoulder height by discharge - NOT MET  Able to dress independently by discharge - NOT MET     Progress per Plan of Care up to 12 visits if needed.           Timed:         Manual Therapy:    5     mins  32201;     Therapeutic Exercise:    15     mins  26953;     Neuromuscular Mike:        mins  38988;    Therapeutic Activity:     10     mins  16817;     Gait Training:           mins  97604;     Ultrasound:          mins  12719;    Ionto                                   mins   82239  Self Care                            mins   55721      Un-Timed:  Electrical Stimulation:         mins  49232 ( );  Dry Needling          mins self-pay  Traction          mins 06182  Low Eval          Mins  11624  Mod Eval          Mins  04930  High Eval                            Mins   03580  [facility]                   mins  40325    Timed Treatment:   30   mins   Total Treatment:     30   mins    Robin A Sprigler, PT  Physical Therapist

## 2021-04-13 RX ORDER — METOPROLOL TARTRATE 50 MG/1
50 TABLET, FILM COATED ORAL 2 TIMES DAILY
Qty: 180 TABLET | Refills: 0 | Status: SHIPPED | OUTPATIENT
Start: 2021-04-13 | End: 2021-07-06

## 2021-04-15 ENCOUNTER — TREATMENT (OUTPATIENT)
Dept: PHYSICAL THERAPY | Facility: CLINIC | Age: 81
End: 2021-04-15

## 2021-04-15 DIAGNOSIS — M79.602 ARM PAIN, DIFFUSE, LEFT: Primary | ICD-10-CM

## 2021-04-15 PROCEDURE — 97140 MANUAL THERAPY 1/> REGIONS: CPT | Performed by: PHYSICAL THERAPIST

## 2021-04-15 PROCEDURE — 97110 THERAPEUTIC EXERCISES: CPT | Performed by: PHYSICAL THERAPIST

## 2021-04-15 PROCEDURE — 97530 THERAPEUTIC ACTIVITIES: CPT | Performed by: PHYSICAL THERAPIST

## 2021-04-15 NOTE — PROGRESS NOTES
Physical Therapy Daily Progress Note    Patient: Anila Spencer   : 1940  Diagnosis/ICD-10 Code:  Arm pain, diffuse, left [M79.602]  Referring practitioner: VALENTINO Power  Date of Initial Visit: Type: THERAPY  Noted: 3/4/2021  Today's Date: 4/15/2021  Patient seen for 6 sessions             Subjective Patient reports her arm is feeling better.  No pain in upper arm, a little at the elbow and up in the AC joint.  She still needs assistance getting her L sleeve on and fastening bra.      Objective   See Exercise, Manual, and Modality Logs for complete treatment. Able to raise arm to 90 deg L without increased pain.  Eccentric flexion strength improving.      Assessment/Plan  Patient independent with HEP in 2 weeks - MET  Tolerate 10 min Nustep in 2 weeks - MET  Decrease L arm pain 25% in 2 weeks - MET  Improve function as evidenced by QuickDASH score of 20% or less by discharge (68% impairment initially) - NOT MET  Able to raise L arm to shoulder height by discharge - MET  Able to dress independently by discharge - NOT MET (still needs assistance with bra fastener and putting L sleeve on)    Progress per Plan of Care up to 12 visits if needed           Timed:         Manual Therapy:    10     mins  45998;     Therapeutic Exercise:    15     mins  30622;     Neuromuscular Mike:        mins  29176;    Therapeutic Activity:     15     mins  95251;     Gait Training:           mins  23056;     Ultrasound:          mins  75827;    Ionto                                   mins   13693  Self Care                            mins   84844      Un-Timed:  Electrical Stimulation:         mins  43679 ( );  Dry Needling          mins self-pay  Traction          mins 01171  Low Eval          Mins  50215  Mod Eval          Mins  50420  High Eval                            Mins  51883  Canalith Repos                   mins  50080    Timed Treatment:   40   mins   Total Treatment:     50   mins    Royer CLEMONS  Sprigler, PT  Physical Therapist

## 2021-04-20 ENCOUNTER — OFFICE VISIT (OUTPATIENT)
Dept: FAMILY MEDICINE CLINIC | Facility: CLINIC | Age: 81
End: 2021-04-20

## 2021-04-20 ENCOUNTER — HOSPITAL ENCOUNTER (OUTPATIENT)
Dept: GENERAL RADIOLOGY | Facility: HOSPITAL | Age: 81
Discharge: HOME | End: 2021-04-20
Admitting: NURSE PRACTITIONER

## 2021-04-20 VITALS
DIASTOLIC BLOOD PRESSURE: 74 MMHG | TEMPERATURE: 98.1 F | SYSTOLIC BLOOD PRESSURE: 136 MMHG | WEIGHT: 198 LBS | HEIGHT: 63 IN | OXYGEN SATURATION: 98 % | HEART RATE: 68 BPM | RESPIRATION RATE: 16 BRPM | BODY MASS INDEX: 35.08 KG/M2

## 2021-04-20 DIAGNOSIS — R19.7 DIARRHEA, UNSPECIFIED TYPE: ICD-10-CM

## 2021-04-20 DIAGNOSIS — R10.30 LOWER ABDOMINAL PAIN: ICD-10-CM

## 2021-04-20 DIAGNOSIS — K59.00 CONSTIPATION, UNSPECIFIED CONSTIPATION TYPE: Primary | ICD-10-CM

## 2021-04-20 PROCEDURE — 99214 OFFICE O/P EST MOD 30 MIN: CPT | Performed by: NURSE PRACTITIONER

## 2021-04-20 PROCEDURE — 74018 RADEX ABDOMEN 1 VIEW: CPT

## 2021-04-20 RX ORDER — DOCUSATE SODIUM 100 MG/1
200 CAPSULE, LIQUID FILLED ORAL NIGHTLY
Qty: 180 CAPSULE | Refills: 3 | Status: SHIPPED | OUTPATIENT
Start: 2021-04-20 | End: 2021-06-03

## 2021-04-20 RX ORDER — POLYETHYLENE GLYCOL 3350 17 G/17G
17 POWDER, FOR SOLUTION ORAL DAILY
Qty: 100 PACKET | Refills: 1 | Status: SHIPPED | OUTPATIENT
Start: 2021-04-20 | End: 2021-06-03

## 2021-04-20 NOTE — PROGRESS NOTES
Chief Complaint  Diarrhea and Abdominal Pain    Subjective          Anila Spencer presents to [facility] for diarrhea constipation abdominal pain    History of Present Illness      Patient is here complaining of constipation that started about April 4, 2021.  She is not taking any Colace that was previously prescribed to her.  She said she was going regularly and stopped that medication.  Over the last couple weeks she has gone without a bowel movement for several days and then had diarrhea.  She has bloating and fullness but no fever or tenderness in her abdomen.  She reports during this time at one point she went 5 days without a bowel movement.  This past Sunday, April 18 2021 patient had not gone for several days and had diarrhea before she got up in the morning in her bed.  She is occasionally taken a Dulcolax over-the-counter.  She continues to have mostly liquid stool.  No bleeding.  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), Fatty liver, Fracture of multiple ribs (07/2018), Gastroesophageal reflux disease (12/4/2012), GERD (gastroesophageal reflux disease), Hematuria (12/29/2014), History of recent fall, Hypertension, Hypothyroidism, Injury of back, Insomnia (3/7/2016), Kidney stone, Left arm pain, Nasal fracture (07/2018), Osteoarthritis, Osteoporosis, Prediabetes, Rotator cuff tear, Seizure disorder (CMS/[facility]), Shortness of breath (5/8/2019), Sick sinus syndrome (CMS/[facility]), Spinal stenosis of  cervical region (2/11/2015), Squamous cell skin cancer, face, Traumatic brain injury (CMS/HCC), and Urinary incontinence.      Review of Systems   Constitutional: Negative for fatigue and fever.   Gastrointestinal: Positive for abdominal pain, constipation and diarrhea.   Genitourinary: Negative for dysuria and hematuria.        Objective       Current Outpatient Medications:   •  Calcium Carbonate-Vitamin D3 (CALCIUM 600-D) 600-400 MG-UNIT tablet, Take 2 capsules by mouth Daily., Disp: , Rfl:   •  Dexilant 60 MG capsule, TAKE ONE CAPSULE BY MOUTH ONCE DAILY, Disp: 90 capsule, Rfl: 1  •  escitalopram (LEXAPRO) 10 MG tablet, Take 5 mg by mouth Daily., Disp: , Rfl:   •  fesoterodine fumarate (TOVIAZ ER) 8 MG tablet sustained-release 24 hour tablet, Take 8 mg by mouth Daily., Disp: , Rfl:   •  glucosamine sulfate 500 MG capsule capsule, Take  by mouth 3 (Three) Times a Day With Meals., Disp: , Rfl:   •  levothyroxine (SYNTHROID, LEVOTHROID) 50 MCG tablet, TAKE 1 TABLET BY MOUTH EVERY DAY, Disp: 90 tablet, Rfl: 0  •  losartan (COZAAR) 25 MG tablet, TAKE ONE TABLET BY MOUTH ONCE DAILY FOR BLOOD PRESSURE, Disp: 90 tablet, Rfl: 3  •  metoprolol tartrate (LOPRESSOR) 50 MG tablet, Take 1 tablet by mouth 2 (Two) Times a Day., Disp: 180 tablet, Rfl: 0  •  midodrine (PROAMATINE) 5 MG tablet, TAKE ONE TABLET BY MOUTH ONCE DAILY, Disp: 90 tablet, Rfl: 1  •  Multiple Vitamins-Minerals (MULTI VITAMIN/MINERALS) tablet, MULTI VITAMIN/MINERALS TABS, Disp: , Rfl:   •  zonisamide (ZONEGRAN) 100 MG capsule, TAKE FOUR CAPSULES BY MOUTH ONCE DAILY, Disp: , Rfl:   •  docusate sodium (Colace) 100 MG capsule, Take 2 capsules by mouth Every Night., Disp: 180 capsule, Rfl: 3  •  polyethylene glycol (MIRALAX) 17 g packet, Take 17 g by mouth Daily., Disp: 100 packet, Rfl: 1  •  Probiotic Product (PROBIOTIC ADVANCED PO), Take  by mouth., Disp: , Rfl:     Vital Signs:      /74 (BP Location: Left arm, Patient Position: Sitting, Cuff Size:  "Adult)   Pulse 68   Temp 98.1 °F (36.7 °C) (Infrared)   Resp 16   Ht 160 cm (63\")   Wt 89.8 kg (198 lb)   SpO2 98%   BMI 35.07 kg/m²     Vitals:    04/20/21 1613   BP: 136/74   BP Location: Left arm   Patient Position: Sitting   Cuff Size: Adult   Pulse: 68   Resp: 16   Temp: 98.1 °F (36.7 °C)   TempSrc: Infrared   SpO2: 98%   Weight: 89.8 kg (198 lb)   Height: 160 cm (63\")      Physical Exam  Vitals and nursing note reviewed.   Constitutional:       Appearance: She is well-developed.   Cardiovascular:      Rate and Rhythm: Normal rate and regular rhythm.      Heart sounds: Normal heart sounds. No murmur heard.   No friction rub. No gallop.    Pulmonary:      Effort: Pulmonary effort is normal.      Breath sounds: Normal breath sounds. No wheezing or rales.   Abdominal:      General: Bowel sounds are normal.      Palpations: Abdomen is soft.      Tenderness: There is no abdominal tenderness.   Skin:     General: Skin is warm and dry.   Neurological:      Mental Status: She is alert.        Result Review :                PHQ-9 Depression Screening  Little interest or pleasure in doing things? 0   Feeling down, depressed, or hopeless? 0   Trouble falling or staying asleep, or sleeping too much?     Feeling tired or having little energy?     Poor appetite or overeating?     Feeling bad about yourself - or that you are a failure or have let yourself or your family down?     Trouble concentrating on things, such as reading the newspaper or watching television?     Moving or speaking so slowly that other people could have noticed? Or the opposite - being so fidgety or restless that you have been moving around a lot more than usual?     Thoughts that you would be better off dead, or of hurting yourself in some way?     PHQ-9 Total Score 0   If you checked off any problems, how difficult have these problems made it for you to do your work, take care of things at home, or get along with other people?           "   Assessment and Plan    Diagnoses and all orders for this visit:    1. Constipation, unspecified constipation type (Primary)  Comments:  Discussed with patient restarting Colace.  Also prescribed MiraLAX and flat and upright of abdomen.  Discussed with patient constipation and diarrhea.     2. Diarrhea, unspecified type  Comments:  Calling with flat and upright  Tomorrow.  Discussed reason for diarrhea and constipation together.  Orders:  -     XR Abdomen KUB; Future    3. Lower abdominal pain    Other orders  -     polyethylene glycol (MIRALAX) 17 g packet; Take 17 g by mouth Daily.  Dispense: 100 packet; Refill: 1  -     docusate sodium (Colace) 100 MG capsule; Take 2 capsules by mouth Every Night.  Dispense: 180 capsule; Refill: 3         Problem List Items Addressed This Visit     None      Visit Diagnoses     Constipation, unspecified constipation type    -  Primary    Discussed with patient restarting Colace.  Also prescribed MiraLAX and flat and upright of abdomen.  Discussed with patient constipation and diarrhea.     Diarrhea, unspecified type        Calling with flat and upright  Tomorrow.  Discussed reason for diarrhea and constipation together.    Relevant Orders    XR Abdomen KUB    Lower abdominal pain              Follow Up   Return if symptoms worsen or fail to improve.  Patient was given instructions and counseling regarding her condition or for health maintenance advice. Please see specific information pulled into the AVS if appropriate.

## 2021-04-20 NOTE — SERVICEHPINOTES
I saw this patient in evaluation for gastroesophageal reflux. She's been having increasing epigastric and chest pain with shortness of air. She is had a stress test and a cardiac catheterization without findings for active coronary artery disease. She has been on Dexilant but continues to have symptoms. In 2012 gallbladder ultrasound was normal but HIDA scan was borderline at 38%. I am suspicious of gallbladder trouble but I'm recommending an EGD and we will recheck her gallbladder with an ultrasound followed by a HIDA scan if negative. Complex Repair And Graft Additional Text (Will Appearing After The Standard Complex Repair Text): The complex repair was not sufficient to completely close the primary defect. The remaining additional defect was repaired with the graft mentioned below.

## 2021-04-26 ENCOUNTER — TELEPHONE (OUTPATIENT)
Dept: FAMILY MEDICINE CLINIC | Facility: CLINIC | Age: 81
End: 2021-04-26

## 2021-04-26 NOTE — TELEPHONE ENCOUNTER
Caller: ANASTASIA TAPIA    Relationship to Patient: SELF    Phone Number: [phone]    Reason for Call: PATIENT CALLED STATING SHE HAS BEEN TAKING NEW MEDS FOR FIVE DAYS. PATIENT IS STILL HAVE DIARRHEA AND HAS NOT HAD A SOLID STOOL. ITS EITHER DIARRHEA OR SHE DOESN'T GO AT ALL.  PATIENT STILL HAS STOMACH CRAMPS, HICCUPS, AND SOUR BURPS. PLEASE CALL PATIENT TO ADVISE ON NEXT STEPS.

## 2021-04-26 NOTE — TELEPHONE ENCOUNTER
Spoke with patient and she is scheduled for tomorrow at 11:15am. I also told her she could not take the miralax tonight until she saw you tomorrow.

## 2021-04-27 ENCOUNTER — OFFICE VISIT (OUTPATIENT)
Dept: FAMILY MEDICINE CLINIC | Facility: CLINIC | Age: 81
End: 2021-04-27

## 2021-04-27 VITALS
RESPIRATION RATE: 16 BRPM | HEART RATE: 77 BPM | SYSTOLIC BLOOD PRESSURE: 144 MMHG | DIASTOLIC BLOOD PRESSURE: 78 MMHG | BODY MASS INDEX: 34.91 KG/M2 | WEIGHT: 197 LBS | OXYGEN SATURATION: 97 % | TEMPERATURE: 98.1 F | HEIGHT: 63 IN

## 2021-04-27 DIAGNOSIS — R19.7 DIARRHEA, UNSPECIFIED TYPE: ICD-10-CM

## 2021-04-27 DIAGNOSIS — R10.30 LOWER ABDOMINAL PAIN: Primary | ICD-10-CM

## 2021-04-27 DIAGNOSIS — K59.00 CONSTIPATION, UNSPECIFIED CONSTIPATION TYPE: ICD-10-CM

## 2021-04-27 PROBLEM — H40.002 GLAUCOMA SUSPECT, LEFT: Status: ACTIVE | Noted: 2017-07-05

## 2021-04-27 PROCEDURE — 99214 OFFICE O/P EST MOD 30 MIN: CPT | Performed by: NURSE PRACTITIONER

## 2021-04-27 NOTE — PROGRESS NOTES
Chief Complaint  Diarrhea    Subjective          Anila Spencer presents to [facility] for diarrhea and abdominal pain    History of Present Illness    Patient is here complaining of constipation that started about April 4, 2021.  She is not taking any Colace that was previously prescribed to her.  She said she was going regularly and stopped that medication.  Over the last couple weeks she has gone without a bowel movement for several days and then had diarrhea.  She has bloating and fullness but no fever or tenderness in her abdomen.  She reports during this time at one point she went 5 days without a bowel movement.  This past Sunday, April 18 2021 patient had not gone for several days and had diarrhea before she got up in the morning in her bed.  She is occasionally taken a Dulcolax over-the-counter.  She continues to have mostly liquid stool.  No bleeding.  No history of diverticulitis.  Patient was seen here last week and given MiraLAX in addition to the Colace.  She reports that she is had liquid stools every few days and no formed stool.  She has some generalized abdominal pain across her lower abdomen.  She has no fever she has had some nausea this morning.         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 (3/18/2017), Diplopia, Dysphagia (4/25/2019), Emphysema of lung (CMS/HCC), Fatty liver, Fracture of multiple ribs (07/2018), Gastroesophageal reflux disease (12/4/2012), GERD (gastroesophageal reflux disease), Hematuria (12/29/2014), History of recent fall, Hypertension, Hypothyroidism, Injury of  back, Insomnia (3/7/2016), Kidney stone, Left arm pain, Nasal fracture (07/2018), Osteoarthritis, Osteoporosis, Prediabetes, Rotator cuff tear, Seizure disorder (CMS/[facility]), Shortness of breath (5/8/2019), Sick sinus syndrome (CMS/[facility]), Spinal stenosis of cervical region (2/11/2015), Squamous cell skin cancer, face, Traumatic brain injury (CMS/[facility]), and Urinary incontinence.      Review of Systems   Constitutional: Negative for fatigue and fever.   HENT: Negative for ear discharge, sinus pain and sore throat.    Eyes: Negative for visual disturbance.   Respiratory: Negative for cough and shortness of breath.    Cardiovascular: Negative for chest pain and palpitations.   Gastrointestinal: Positive for abdominal pain, constipation, diarrhea and nausea. Negative for vomiting.   Endocrine: Negative for polyuria.   Genitourinary: Negative for dysuria and vaginal bleeding.   Musculoskeletal: Negative for arthralgias.   Skin: Negative for rash.   Allergic/Immunologic: Positive for environmental allergies. Negative for food allergies.   Neurological: Negative for dizziness and headaches.   Hematological: Does not bruise/bleed easily.   Psychiatric/Behavioral: Negative for agitation. The patient is not nervous/anxious.         Objective       Current Outpatient Medications:   •  Calcium Carbonate-Vitamin D3 (CALCIUM 600-D) 600-400 MG-UNIT tablet, Take 2 capsules by mouth Daily., Disp: , Rfl:   •  Dexilant 60 MG capsule, TAKE ONE CAPSULE BY MOUTH ONCE DAILY, Disp: 90 capsule, Rfl: 1  •  docusate sodium (Colace) 100 MG capsule, Take 2 capsules by mouth Every Night., Disp: 180 capsule, Rfl: 3  •  escitalopram (LEXAPRO) 10 MG tablet, Take 5 mg by mouth Daily., Disp: , Rfl:   •  fesoterodine fumarate (TOVIAZ ER) 8 MG tablet sustained-release 24 hour tablet, Take 8 mg by mouth Daily., Disp: , Rfl:   •  glucosamine sulfate 500 MG capsule capsule, Take  by mouth 3 (Three) Times a Day With Meals., Disp: , Rfl:   •  levothyroxine  "(SYNTHROID, LEVOTHROID) 50 MCG tablet, TAKE 1 TABLET BY MOUTH EVERY DAY, Disp: 90 tablet, Rfl: 0  •  losartan (COZAAR) 25 MG tablet, TAKE ONE TABLET BY MOUTH ONCE DAILY FOR BLOOD PRESSURE, Disp: 90 tablet, Rfl: 3  •  metoprolol tartrate (LOPRESSOR) 50 MG tablet, Take 1 tablet by mouth 2 (Two) Times a Day., Disp: 180 tablet, Rfl: 0  •  midodrine (PROAMATINE) 5 MG tablet, TAKE ONE TABLET BY MOUTH ONCE DAILY, Disp: 90 tablet, Rfl: 1  •  Multiple Vitamins-Minerals (MULTI VITAMIN/MINERALS) tablet, MULTI VITAMIN/MINERALS TABS, Disp: , Rfl:   •  polyethylene glycol (MIRALAX) 17 g packet, Take 17 g by mouth Daily., Disp: 100 packet, Rfl: 1  •  Probiotic Product (PROBIOTIC ADVANCED PO), Take  by mouth., Disp: , Rfl:   •  zonisamide (ZONEGRAN) 100 MG capsule, TAKE FOUR CAPSULES BY MOUTH ONCE DAILY, Disp: , Rfl:     Vital Signs:      /78 (BP Location: Left arm, Patient Position: Sitting, Cuff Size: Large Adult)   Pulse 77   Temp 98.1 °F (36.7 °C) (Infrared)   Resp 16   Ht 160 cm (63\")   Wt 89.4 kg (197 lb)   SpO2 97%   BMI 34.90 kg/m²     Vitals:    04/27/21 1122   BP: 144/78   BP Location: Left arm   Patient Position: Sitting   Cuff Size: Large Adult   Pulse: 77   Resp: 16   Temp: 98.1 °F (36.7 °C)   TempSrc: Infrared   SpO2: 97%   Weight: 89.4 kg (197 lb)   Height: 160 cm (63\")      Physical Exam  Vitals and nursing note reviewed.   Constitutional:       Appearance: She is well-developed.   Cardiovascular:      Rate and Rhythm: Normal rate and regular rhythm.      Heart sounds: Normal heart sounds. No murmur heard.   No friction rub. No gallop.    Pulmonary:      Effort: Pulmonary effort is normal.      Breath sounds: Normal breath sounds. No wheezing or rales.   Abdominal:      General: Bowel sounds are normal.      Palpations: Abdomen is soft.      Tenderness: There is no abdominal tenderness.   Genitourinary:     Rectum: Normal. Guaiac result negative. No mass or tenderness.      Comments: No " impaction  Skin:     General: Skin is warm and dry.   Neurological:      Mental Status: She is alert.   Psychiatric:         Mood and Affect: Mood normal.         Behavior: Behavior normal.         Thought Content: Thought content normal.         Judgment: Judgment normal.        Result Review :                PHQ-9 Depression Screening  Little interest or pleasure in doing things?     Feeling down, depressed, or hopeless?     Trouble falling or staying asleep, or sleeping too much?     Feeling tired or having little energy?     Poor appetite or overeating?     Feeling bad about yourself - or that you are a failure or have let yourself or your family down?     Trouble concentrating on things, such as reading the newspaper or watching television?     Moving or speaking so slowly that other people could have noticed? Or the opposite - being so fidgety or restless that you have been moving around a lot more than usual?     Thoughts that you would be better off dead, or of hurting yourself in some way?     PHQ-9 Total Score     If you checked off any problems, how difficult have these problems made it for you to do your work, take care of things at home, or get along with other people?             Assessment and Plan    Diagnoses and all orders for this visit:    1. Lower abdominal pain (Primary)  Comments:  Discussed scheduling CT scan.  Labs today.  Recall if any increase in symptoms or problem  Orders:  -     Cancel: CBC & Differential; Future  -     Cancel: Comprehensive Metabolic Panel; Future  -     Cancel: TSH; Future  -     Cancel: Clostridium Difficile EIA - Stool, Per Rectum; Future  -     Cancel: Fecal Leukocytes - Stool, Per Rectum; Future  -     Cancel: Ova & Parasite Examination - Stool, Per Rectum; Future  -     Cancel: Stool Culture (Reference Lab) - Stool, Per Rectum; Future  -     Cancel: CT Abdomen Pelvis With & Without Contrast; Future  -     Clostridium Difficile EIA - Stool, Per Rectum; Future  -      CBC & Differential; Future  -     Comprehensive Metabolic Panel; Future  -     Fecal Leukocytes - Stool, Per Rectum; Future  -     Ova & Parasite Examination - Stool, Per Rectum; Future  -     Stool Culture (Reference Lab) - Stool, Per Rectum; Future  -     TSH; Future  -     CT Abdomen Pelvis With & Without Contrast; Future    2. Diarrhea, unspecified type  Comments:  Discussed importance of stool collection for results.  Orders:  -     Cancel: CBC & Differential; Future  -     Cancel: Comprehensive Metabolic Panel; Future  -     Cancel: TSH; Future  -     Cancel: Clostridium Difficile EIA - Stool, Per Rectum; Future  -     Cancel: Fecal Leukocytes - Stool, Per Rectum; Future  -     Cancel: Ova & Parasite Examination - Stool, Per Rectum; Future  -     Cancel: Stool Culture (Reference Lab) - Stool, Per Rectum; Future  -     Cancel: CT Abdomen Pelvis With & Without Contrast; Future  -     Clostridium Difficile EIA - Stool, Per Rectum; Future  -     CBC & Differential; Future  -     Comprehensive Metabolic Panel; Future  -     Fecal Leukocytes - Stool, Per Rectum; Future  -     Ova & Parasite Examination - Stool, Per Rectum; Future  -     Stool Culture (Reference Lab) - Stool, Per Rectum; Future  -     TSH; Future  -     CT Abdomen Pelvis With & Without Contrast; Future         Problem List Items Addressed This Visit     None      Visit Diagnoses     Lower abdominal pain    -  Primary    Discussed scheduling CT scan.  Labs today.  Recall if any increase in symptoms or problem    Relevant Orders    Clostridium Difficile EIA - Stool, Per Rectum    CBC & Differential    Comprehensive Metabolic Panel    Fecal Leukocytes - Stool, Per Rectum    Ova & Parasite Examination - Stool, Per Rectum    Stool Culture (Reference Lab) - Stool, Per Rectum    TSH    CT Abdomen Pelvis With & Without Contrast    Diarrhea, unspecified type        Discussed importance of stool collection for results.    Relevant Orders    Clostridium  Difficile EIA - Stool, Per Rectum    CBC & Differential    Comprehensive Metabolic Panel    Fecal Leukocytes - Stool, Per Rectum    Ova & Parasite Examination - Stool, Per Rectum    Stool Culture (Reference Lab) - Stool, Per Rectum    TSH    CT Abdomen Pelvis With & Without Contrast          Follow Up   Return if symptoms worsen or fail to improve.  Patient was given instructions and counseling regarding her condition or for health maintenance advice. Please see specific information pulled into the AVS if appropriate.

## 2021-04-28 DIAGNOSIS — R19.7 DIARRHEA, UNSPECIFIED TYPE: ICD-10-CM

## 2021-04-28 DIAGNOSIS — R10.30 LOWER ABDOMINAL PAIN: ICD-10-CM

## 2021-05-03 ENCOUNTER — TELEPHONE (OUTPATIENT)
Dept: FAMILY MEDICINE CLINIC | Facility: CLINIC | Age: 81
End: 2021-05-03

## 2021-05-03 NOTE — TELEPHONE ENCOUNTER
[facility] wanted to make you aware that the reason you have not received the O&P stool study on patient is because when she brought in her studies she did not collect enough stool for the O&P. [facility] gave her another kit but she has not brought it back to them.

## 2021-05-05 ENCOUNTER — CLINICAL SUPPORT NO REQUIREMENTS (OUTPATIENT)
Dept: CARDIOLOGY | Facility: CLINIC | Age: 81
End: 2021-05-05

## 2021-05-05 ENCOUNTER — OFFICE VISIT (OUTPATIENT)
Dept: CARDIOLOGY | Facility: CLINIC | Age: 81
End: 2021-05-05

## 2021-05-05 VITALS
SYSTOLIC BLOOD PRESSURE: 140 MMHG | HEIGHT: 63 IN | HEART RATE: 76 BPM | WEIGHT: 195 LBS | DIASTOLIC BLOOD PRESSURE: 80 MMHG | BODY MASS INDEX: 34.55 KG/M2 | OXYGEN SATURATION: 98 %

## 2021-05-05 DIAGNOSIS — Z95.0 PRESENCE OF CARDIAC PACEMAKER: ICD-10-CM

## 2021-05-05 DIAGNOSIS — I10 ESSENTIAL HYPERTENSION: ICD-10-CM

## 2021-05-05 DIAGNOSIS — R07.89 CHEST PAIN, ATYPICAL: ICD-10-CM

## 2021-05-05 DIAGNOSIS — I34.0 MITRAL VALVE INSUFFICIENCY, UNSPECIFIED ETIOLOGY: ICD-10-CM

## 2021-05-05 DIAGNOSIS — I51.7 CARDIOMEGALY: Primary | ICD-10-CM

## 2021-05-05 DIAGNOSIS — I49.5 SICK SINUS SYNDROME (HCC): Primary | ICD-10-CM

## 2021-05-05 DIAGNOSIS — I49.5 SICK SINUS SYNDROME (HCC): ICD-10-CM

## 2021-05-05 PROCEDURE — 99213 OFFICE O/P EST LOW 20 MIN: CPT | Performed by: INTERNAL MEDICINE

## 2021-05-05 PROCEDURE — 93280 PM DEVICE PROGR EVAL DUAL: CPT | Performed by: NURSE PRACTITIONER

## 2021-05-05 NOTE — PROGRESS NOTES
Date of Office Visit: 2021  Encounter Provider: Dr. Adam Agrawal    Place of Service: [facility] CARDIOLOGY [place]  Patient Name: Anila Spencer  :1940  Helena Mas APRN    Chief Complaint   Patient presents with   • Follow-up   • Hypertension   • Sinus Sick Syndrome   • Pacemaker Check     History of Present Illness    Anila Spencer is a 80 y.o. female. Her past medical history significant for hypertension, sick sinus syndrome, tachybradycardia syndrome, history of syncope came today for follow-up.     Previously patient had repeated episodes of syncope. She had extensive workup which showed that she had underlying sick sinus syndrome. She underwent permanent  pacemaker implantation. Since then patient symptoms of syncope had completely resolved. Previously, patient was also evaluated with cardiac catheterization because of her symptoms of angina pectoris and she was noted to have no significant coronary artery disease. Her previous CAT scan of chest was also negative for pulmonary embolism and she had pulmonary function test which was negative for any significant pulmonary airway disease. It was noted that her symptoms of chest pain at that time was probably related to gastroesophageal reflux disease and she was appropriately treated.    In 2019, patient had an episode of syncope at home.  Patient underwent tilt table test and it showed hypotensive response to head up tilt.  Patient was started on midodrine 5 mg twice daily.  Echocardiogram showed normal left ventricular size and function.  LVEF was 60 to 65%.  Mild mitral regurgitation was noted.  Stress test was negative for ischemia.    Since the previous visit, patient is overall doing well.  Patient was seen by Dr. Mora.  He recommended to wait till patient reaches DAHIANA.  She has 1 month left.  I will continue to observe.  I will see the patient in 1 month.  Patient denies any chest pain.  She is short of breath.   Patient walks with a cane.  She is frail and weak.    Pacemaker is interrogated today and it is functioning appropriately.  Patient still have 1 month left to DAHIANA.    Patient would be seen by Sera Bunch in 1 month and once he reaches DAHIANA we will arrange pacemaker generator change.  Patient would be seen by Sera Bunch on June 2 and we will arrange generator change after that            Past Medical History:   Diagnosis Date   • Abnormal cardiovascular stress test 3/21/2017   • Allergic rhinitis    • Anxiety    • Basal cell carcinoma (BCC) of face    • Biliary dyskinesia    • Cardiomegaly    • Cataract    • Cervical spinal stenosis    • Chronic constipation    • Chronic insomnia    • DDD (degenerative disc disease), cervical     C-spine, T-spine, L-Spine   • DDD (degenerative disc disease), lumbar 7/25/2019   • Degeneration of intervertebral disc of thoracic region 3/18/2017   • Diplopia     Dr. Laird   • Dysphagia 4/25/2019   • Emphysema of lung (CMS/HCC)    • Fatty liver    • Fracture of multiple ribs 07/2018    Left 4th-8th   • Gastroesophageal reflux disease 12/4/2012   • GERD (gastroesophageal reflux disease)    • Hematuria 12/29/2014   • History of recent fall    • Hypertension    • Hypothyroidism    • Injury of back    • Insomnia 3/7/2016   • Kidney stone    • Left arm pain    • Nasal fracture 07/2018   • Osteoarthritis    • Osteoporosis     h/o tx with Fosamax but quit in 2012 due to fears of complications   • Prediabetes    • Rotator cuff tear     right   • Seizure disorder (CMS/HCC)     Dr. Shannon Bennett   • Shortness of breath 5/8/2019   • Sick sinus syndrome (CMS/HCC)     Dr. Agrawal   • Spinal stenosis of cervical region 2/11/2015   • Squamous cell skin cancer, face    • Traumatic brain injury (CMS/HCC)     from fall   • Urinary incontinence          Past Surgical History:   Procedure Laterality Date   • BILATERAL SALPINGO OOPHORECTOMY      for benign cysts   • BREAST CYST ASPIRATION Left    •  CARDIAC CATHETERIZATION  08/25/2009    25% LAD. L Cx luminal irregularities. Normal L main   • CARDIAC CATHETERIZATION  03/29/2017    25% LAD. 10-20% LCX. ER 65%. Dr. Agrawal.    • CARDIOVASCULAR STRESS TEST  2019   • CATARACT EXTRACTION  2006   • COSMETIC SURGERY      Lip   • ECHO - CONVERTED  2019   • ENDOSCOPY  05/17/2017    Normal, Dr. Gutierrez   • ENDOSCOPY N/A 8/22/2019    Procedure: ESOPHAGOGASTRODUODENOSCOPY WITH DILATATION (BOUGIE #46,50);  Surgeon: Joaquin Story MD;  Location: [facility] END[facility];  Service: Gastroenterology   • INSERT / REPLACE / REMOVE PACEMAKER     • KNEE ARTHROSCOPY Left 1992   • OOPHORECTOMY     • OTHER SURGICAL HISTORY Right 07/2017    Right eye, YAG Capsuloyomy , Dr. Lemus   • PACEMAKER IMPLANTATION  2009   • ROTATOR CUFF REPAIR Right     Dr. Goldberg   • TONSILLECTOMY     • TOTAL HIP ARTHROPLASTY Bilateral            Current Outpatient Medications:   •  Calcium Carbonate-Vitamin D3 (CALCIUM 600-D) 600-400 MG-UNIT tablet, Take 2 capsules by mouth Daily., Disp: , Rfl:   •  Dexilant 60 MG capsule, TAKE ONE CAPSULE BY MOUTH ONCE DAILY, Disp: 90 capsule, Rfl: 1  •  docusate sodium (Colace) 100 MG capsule, Take 2 capsules by mouth Every Night., Disp: 180 capsule, Rfl: 3  •  escitalopram (LEXAPRO) 10 MG tablet, Take 5 mg by mouth Daily., Disp: , Rfl:   •  fesoterodine fumarate (TOVIAZ ER) 8 MG tablet sustained-release 24 hour tablet, Take 8 mg by mouth Daily., Disp: , Rfl:   •  glucosamine sulfate 500 MG capsule capsule, Take  by mouth 3 (Three) Times a Day With Meals., Disp: , Rfl:   •  levothyroxine (SYNTHROID, LEVOTHROID) 50 MCG tablet, TAKE 1 TABLET BY MOUTH EVERY DAY, Disp: 90 tablet, Rfl: 0  •  losartan (COZAAR) 25 MG tablet, TAKE ONE TABLET BY MOUTH ONCE DAILY FOR BLOOD PRESSURE, Disp: 90 tablet, Rfl: 3  •  metoprolol tartrate (LOPRESSOR) 50 MG tablet, Take 1 tablet by mouth 2 (Two) Times a Day., Disp: 180 tablet, Rfl: 0  •  midodrine (PROAMATINE) 5 MG tablet, TAKE ONE TABLET BY  "MOUTH ONCE DAILY, Disp: 90 tablet, Rfl: 1  •  Multiple Vitamins-Minerals (MULTI VITAMIN/MINERALS) tablet, MULTI VITAMIN/MINERALS TABS, Disp: , Rfl:   •  polyethylene glycol (MIRALAX) 17 g packet, Take 17 g by mouth Daily., Disp: 100 packet, Rfl: 1  •  Probiotic Product (PROBIOTIC ADVANCED PO), Take  by mouth., Disp: , Rfl:   •  zonisamide (ZONEGRAN) 100 MG capsule, TAKE FOUR CAPSULES BY MOUTH ONCE DAILY, Disp: , Rfl:       Social History     Socioeconomic History   • Marital status:      Spouse name: Not on file   • Number of children: Not on file   • Years of education: Not on file   • Highest education level: Not on file   Tobacco Use   • Smoking status: Never Smoker   • Smokeless tobacco: Never Used   • Tobacco comment: Patient is advised not to smoke.   Vaping Use   • Vaping Use: Never used   Substance and Sexual Activity   • Alcohol use: No   • Drug use: No   • Sexual activity: Defer         Review of Systems   Constitutional: Negative for chills and fever.   HENT: Negative for ear discharge and nosebleeds.    Eyes: Negative for discharge and redness.   Cardiovascular: Negative for chest pain, orthopnea, palpitations, paroxysmal nocturnal dyspnea and syncope.   Respiratory: Positive for shortness of breath. Negative for cough and wheezing.    Endocrine: Negative for heat intolerance.   Skin: Negative for rash.   Musculoskeletal: Positive for joint pain. Negative for arthritis and myalgias.   Gastrointestinal: Negative for abdominal pain, melena, nausea and vomiting.   Genitourinary: Negative for dysuria and hematuria.   Neurological: Negative for dizziness, light-headedness, numbness and tremors.   Psychiatric/Behavioral: Negative for depression. The patient is not nervous/anxious.        Procedures    Procedures    No orders to display           Objective:    /80   Pulse 76   Ht 160 cm (62.99\")   Wt 88.5 kg (195 lb)   SpO2 98%   BMI 34.55 kg/m²         Constitutional:       Appearance: " Well-developed.   Eyes:      General: No scleral icterus.        Right eye: No discharge.   HENT:      Head: Normocephalic and atraumatic.   Neck:      Thyroid: No thyromegaly.      Lymphadenopathy: No cervical adenopathy.   Pulmonary:      Effort: Pulmonary effort is normal. No respiratory distress.      Breath sounds: Normal breath sounds. No wheezing. No rales.   Cardiovascular:      Normal rate. Regular rhythm.      No gallop.   Abdominal:      Tenderness: There is no abdominal tenderness.   Skin:     Findings: No erythema or rash.   Neurological:      Mental Status: Alert and oriented to person, place, and time.             Assessment:       Diagnosis Plan   1. Cardiomegaly     2. Essential hypertension     3. Mitral valve insufficiency, unspecified etiology     4. Presence of cardiac pacemaker     5. Chest pain, atypical     6. Sick sinus syndrome (CMS/HCC)              Plan:       MDM:    1.  S/p pacemaker:    She still have 1 month left for DAHIANA.  Patient would be seen in 1 month and would arrange pacemaker change at that time.    2.  Hypertension:    Blood pressure is reasonably well controlled I would not recommend aggressive blood pressure control because of risk of fall    3.  Mitral regurgitation:    Patient has mild mitral regurgitation

## 2021-05-17 ENCOUNTER — DOCUMENTATION (OUTPATIENT)
Dept: PHYSICAL THERAPY | Facility: CLINIC | Age: 81
End: 2021-05-17

## 2021-05-17 DIAGNOSIS — M79.602 ARM PAIN, DIFFUSE, LEFT: Primary | ICD-10-CM

## 2021-05-17 RX ORDER — LOSARTAN POTASSIUM 25 MG/1
TABLET ORAL
Qty: 90 TABLET | Refills: 3 | Status: SHIPPED | OUTPATIENT
Start: 2021-05-17 | End: 2022-01-28 | Stop reason: SDUPTHER

## 2021-05-17 NOTE — PROGRESS NOTES
Discharge Summary  Discharge Summary from Physical Therapy Report          Goals: Partially Met    Discharge Plan: Continue with current home exercise program as instructed    Comments  Patient requests discharge to HEP and self-management due to recent hospitalization and gallbladder issues.  Last seen 4/15/2021  Patient seen for 6 sessions                  Subjective Patient reports her arm is feeling better.  No pain in upper arm, a little at the elbow and up in the AC joint.  She still needs assistance getting her L sleeve on and fastening bra.       Objective   See Exercise, Manual, and Modality Logs for complete treatment. Able to raise arm to 90 deg L without increased pain.  Eccentric flexion strength improving.        Assessment/Plan  Patient independent with HEP in 2 weeks - MET  Tolerate 10 min Nustep in 2 weeks - MET  Decrease L arm pain 25% in 2 weeks - MET  Improve function as evidenced by QuickDASH score of 20% or less by discharge (68% impairment initially) - NOT MET  Able to raise L arm to shoulder height by discharge - MET  Able to dress independently by discharge - NOT MET (still needs assistance with bra fastener and putting L sleeve on)         Date of Discharge 5/17/21        Robin A Sprigler, PT  Physical Therapist

## 2021-05-18 ENCOUNTER — OFFICE (AMBULATORY)
Dept: URBAN - METROPOLITAN AREA CLINIC 64 | Facility: CLINIC | Age: 81
End: 2021-05-18
Payer: COMMERCIAL

## 2021-05-18 VITALS
WEIGHT: 199 LBS | SYSTOLIC BLOOD PRESSURE: 117 MMHG | HEART RATE: 65 BPM | HEIGHT: 64 IN | DIASTOLIC BLOOD PRESSURE: 64 MMHG

## 2021-05-18 DIAGNOSIS — K21.9 GASTRO-ESOPHAGEAL REFLUX DISEASE WITHOUT ESOPHAGITIS: ICD-10-CM

## 2021-05-18 DIAGNOSIS — R19.7 DIARRHEA, UNSPECIFIED: ICD-10-CM

## 2021-05-18 DIAGNOSIS — R10.13 EPIGASTRIC PAIN: ICD-10-CM

## 2021-05-18 PROCEDURE — 99214 OFFICE O/P EST MOD 30 MIN: CPT | Performed by: NURSE PRACTITIONER

## 2021-05-20 NOTE — PROGRESS NOTES
DEVICE INTERROGATION:  IN OFFICE    DEVICE TYPE:   Dual-chamber pacemaker    :   [employer]tronic    BATTERY:  Stable    TIME TO ELECTIVE REPLACEMENT INDICATORS:   1 to 2 months    CHARGE TIME:   Not applicable        LEAD DATA:       DEVICE REPROGRAMMED FOR TESTING      Atrial:   Paced mV, 463 ohms, 0.75 V@0.4 ms    Ventricular:     16 mV, 494 ohms, 0.75 V@0.4 ms    LV:      Atrial pacing percentage: 99.8%    Ventricular pacing percentage: Less than 1%      Arrhythmia Logbook Reviewed: No A. fib        Summary:    Stable Device Function    No significant arhythmia burden.     Battery status is stable.    Reviewed with: Adriana TALBOT reviewed with Dr. Agrawal return to me in office check 1 month      NEXT IN OFFICE DEVICE CHECK DUE: 1 month    REMOTE DEVICE INTERROGATIONS: Not applicable

## 2021-05-24 RX ORDER — MIDODRINE HYDROCHLORIDE 5 MG/1
TABLET ORAL
Qty: 90 TABLET | Refills: 1 | Status: SHIPPED | OUTPATIENT
Start: 2021-05-24 | End: 2021-12-03

## 2021-06-02 ENCOUNTER — OFFICE VISIT (OUTPATIENT)
Dept: CARDIOLOGY | Facility: CLINIC | Age: 81
End: 2021-06-02

## 2021-06-02 ENCOUNTER — HOSPITAL ENCOUNTER (OUTPATIENT)
Facility: HOSPITAL | Age: 81
Setting detail: HOSPITAL OUTPATIENT SURGERY
End: 2021-06-02
Attending: INTERNAL MEDICINE | Admitting: INTERNAL MEDICINE

## 2021-06-02 VITALS
DIASTOLIC BLOOD PRESSURE: 68 MMHG | BODY MASS INDEX: 35.08 KG/M2 | SYSTOLIC BLOOD PRESSURE: 130 MMHG | WEIGHT: 198 LBS | OXYGEN SATURATION: 98 % | HEART RATE: 65 BPM | HEIGHT: 63 IN

## 2021-06-02 DIAGNOSIS — Z45.010 ELECTIVE REPLACEMENT INDICATED FOR CARDIAC PACEMAKER BATTERY AT END OF LIFESPAN: Primary | ICD-10-CM

## 2021-06-02 DIAGNOSIS — Z95.0 PRESENCE OF CARDIAC PACEMAKER: ICD-10-CM

## 2021-06-02 DIAGNOSIS — I49.5 SICK SINUS SYNDROME (HCC): Chronic | ICD-10-CM

## 2021-06-02 DIAGNOSIS — Z45.010 ELECTIVE REPLACEMENT INDICATED FOR CARDIAC PACEMAKER BATTERY AT END OF LIFESPAN: ICD-10-CM

## 2021-06-02 PROCEDURE — 99214 OFFICE O/P EST MOD 30 MIN: CPT | Performed by: NURSE PRACTITIONER

## 2021-06-02 PROCEDURE — 93288 INTERROG EVL PM/LDLS PM IP: CPT | Performed by: NURSE PRACTITIONER

## 2021-06-02 PROCEDURE — 93000 ELECTROCARDIOGRAM COMPLETE: CPT | Performed by: NURSE PRACTITIONER

## 2021-06-03 ENCOUNTER — HOSPITAL ENCOUNTER (OUTPATIENT)
Facility: HOSPITAL | Age: 81
Discharge: HOME | End: 2021-06-04
Attending: INTERNAL MEDICINE | Admitting: INTERNAL MEDICINE

## 2021-06-03 ENCOUNTER — ANESTHESIA EVENT (OUTPATIENT)
Dept: CARDIOLOGY | Facility: HOSPITAL | Age: 81
End: 2021-06-03

## 2021-06-03 ENCOUNTER — APPOINTMENT (OUTPATIENT)
Dept: GENERAL RADIOLOGY | Facility: HOSPITAL | Age: 81
End: 2021-06-03

## 2021-06-03 ENCOUNTER — TELEPHONE (OUTPATIENT)
Dept: CARDIOLOGY | Facility: CLINIC | Age: 81
End: 2021-06-03

## 2021-06-03 ENCOUNTER — ANESTHESIA (OUTPATIENT)
Dept: CARDIOLOGY | Facility: HOSPITAL | Age: 81
End: 2021-06-03

## 2021-06-03 DIAGNOSIS — R06.00 DYSPNEA, UNSPECIFIED TYPE: Primary | ICD-10-CM

## 2021-06-03 DIAGNOSIS — Z45.010 ELECTIVE REPLACEMENT INDICATED FOR CARDIAC PACEMAKER BATTERY AT END OF LIFESPAN: ICD-10-CM

## 2021-06-03 PROBLEM — I10 ESSENTIAL HYPERTENSION: Chronic | Status: ACTIVE | Noted: 2019-07-09

## 2021-06-03 PROBLEM — N32.81 OVERACTIVE BLADDER: Chronic | Status: ACTIVE | Noted: 2021-06-03

## 2021-06-03 PROBLEM — Z95.0 PRESENCE OF CARDIAC PACEMAKER: Chronic | Status: ACTIVE | Noted: 2017-10-27

## 2021-06-03 PROBLEM — I95.1 ORTHOSTATIC HYPOTENSION: Chronic | Status: ACTIVE | Noted: 2021-06-03

## 2021-06-03 PROBLEM — I49.5 SICK SINUS SYNDROME (HCC): Chronic | Status: ACTIVE | Noted: 2019-10-22

## 2021-06-03 PROBLEM — E66.9 OBESITY (BMI 30-39.9): Chronic | Status: ACTIVE | Noted: 2021-06-03

## 2021-06-03 LAB
ANION GAP SERPL CALCULATED.3IONS-SCNC: 12 MMOL/L (ref 5–15)
APTT PPP: 27.1 SECONDS (ref 24–31)
BASOPHILS # BLD AUTO: 0.1 10*3/MM3 (ref 0–0.2)
BASOPHILS NFR BLD AUTO: 1 % (ref 0–1.5)
BUN SERPL-MCNC: 25 MG/DL (ref 8–23)
BUN/CREAT SERPL: 22.1 (ref 7–25)
CALCIUM SPEC-SCNC: 8.7 MG/DL (ref 8.6–10.5)
CHLORIDE SERPL-SCNC: 109 MMOL/L (ref 98–107)
CO2 SERPL-SCNC: 21 MMOL/L (ref 22–29)
CREAT SERPL-MCNC: 1.13 MG/DL (ref 0.57–1)
DEPRECATED RDW RBC AUTO: 48.1 FL (ref 37–54)
EOSINOPHIL # BLD AUTO: 0.1 10*3/MM3 (ref 0–0.4)
EOSINOPHIL NFR BLD AUTO: 1.4 % (ref 0.3–6.2)
ERYTHROCYTE [DISTWIDTH] IN BLOOD BY AUTOMATED COUNT: 14.2 % (ref 12.3–15.4)
GFR SERPL CREATININE-BSD FRML MDRD: 46 ML/MIN/1.73
GLUCOSE SERPL-MCNC: 97 MG/DL (ref 65–99)
HCT VFR BLD AUTO: 36.1 % (ref 34–46.6)
HGB BLD-MCNC: 12.2 G/DL (ref 12–15.9)
HOLD SPECIMEN: NORMAL
INR PPP: 1.01 (ref 0.93–1.1)
LYMPHOCYTES # BLD AUTO: 2.1 10*3/MM3 (ref 0.7–3.1)
LYMPHOCYTES NFR BLD AUTO: 31.1 % (ref 19.6–45.3)
MCH RBC QN AUTO: 32.7 PG (ref 26.6–33)
MCHC RBC AUTO-ENTMCNC: 33.6 G/DL (ref 31.5–35.7)
MCV RBC AUTO: 97.2 FL (ref 79–97)
MONOCYTES # BLD AUTO: 0.4 10*3/MM3 (ref 0.1–0.9)
MONOCYTES NFR BLD AUTO: 6.5 % (ref 5–12)
NEUTROPHILS NFR BLD AUTO: 4 10*3/MM3 (ref 1.7–7)
NEUTROPHILS NFR BLD AUTO: 60 % (ref 42.7–76)
NRBC BLD AUTO-RTO: 0 /100 WBC (ref 0–0.2)
NT-PROBNP SERPL-MCNC: 3822 PG/ML (ref 0–1800)
PLATELET # BLD AUTO: 185 10*3/MM3 (ref 140–450)
PMV BLD AUTO: 8.9 FL (ref 6–12)
POTASSIUM SERPL-SCNC: 4.2 MMOL/L (ref 3.5–5.2)
PROTHROMBIN TIME: 11.1 SECONDS (ref 9.6–11.7)
QT INTERVAL: 407 MS
RBC # BLD AUTO: 3.72 10*6/MM3 (ref 3.77–5.28)
SARS-COV-2 RNA PNL SPEC NAA+PROBE: NOT DETECTED
SODIUM SERPL-SCNC: 142 MMOL/L (ref 136–145)
TROPONIN T SERPL-MCNC: <0.01 NG/ML (ref 0–0.03)
WBC # BLD AUTO: 6.6 10*3/MM3 (ref 3.4–10.8)

## 2021-06-03 PROCEDURE — 63710000001 METOPROLOL TARTRATE 50 MG TABLET: Performed by: INTERNAL MEDICINE

## 2021-06-03 PROCEDURE — 99284 EMERGENCY DEPT VISIT MOD MDM: CPT

## 2021-06-03 PROCEDURE — U0003 INFECTIOUS AGENT DETECTION BY NUCLEIC ACID (DNA OR RNA); SEVERE ACUTE RESPIRATORY SYNDROME CORONAVIRUS 2 (SARS-COV-2) (CORONAVIRUS DISEASE [COVID-19]), AMPLIFIED PROBE TECHNIQUE, MAKING USE OF HIGH THROUGHPUT TECHNOLOGIES AS DESCRIBED BY CMS-2020-01-R: HCPCS | Performed by: NURSE PRACTITIONER

## 2021-06-03 PROCEDURE — 85610 PROTHROMBIN TIME: CPT | Performed by: NURSE PRACTITIONER

## 2021-06-03 PROCEDURE — 25010000002 CEFAZOLIN PER 500 MG: Performed by: INTERNAL MEDICINE

## 2021-06-03 PROCEDURE — U0005 INFEC AGEN DETEC AMPLI PROBE: HCPCS | Performed by: NURSE PRACTITIONER

## 2021-06-03 PROCEDURE — 33228 REMV&REPLC PM GEN DUAL LEAD: CPT | Performed by: INTERNAL MEDICINE

## 2021-06-03 PROCEDURE — 99214 OFFICE O/P EST MOD 30 MIN: CPT | Performed by: INTERNAL MEDICINE

## 2021-06-03 PROCEDURE — 85730 THROMBOPLASTIN TIME PARTIAL: CPT | Performed by: NURSE PRACTITIONER

## 2021-06-03 PROCEDURE — A9270 NON-COVERED ITEM OR SERVICE: HCPCS | Performed by: INTERNAL MEDICINE

## 2021-06-03 PROCEDURE — 85025 COMPLETE CBC W/AUTO DIFF WBC: CPT | Performed by: NURSE PRACTITIONER

## 2021-06-03 PROCEDURE — 63710000001 OXYBUTYNIN XL 10 MG TABLET SUSTAINED-RELEASE 24 HOUR: Performed by: INTERNAL MEDICINE

## 2021-06-03 PROCEDURE — 99219 PR INITIAL OBSERVATION CARE/DAY 50 MINUTES: CPT | Performed by: NURSE PRACTITIONER

## 2021-06-03 PROCEDURE — 93005 ELECTROCARDIOGRAM TRACING: CPT

## 2021-06-03 PROCEDURE — 71045 X-RAY EXAM CHEST 1 VIEW: CPT

## 2021-06-03 PROCEDURE — G0378 HOSPITAL OBSERVATION PER HR: HCPCS

## 2021-06-03 PROCEDURE — 83880 ASSAY OF NATRIURETIC PEPTIDE: CPT | Performed by: NURSE PRACTITIONER

## 2021-06-03 PROCEDURE — 93005 ELECTROCARDIOGRAM TRACING: CPT | Performed by: INTERNAL MEDICINE

## 2021-06-03 PROCEDURE — 84484 ASSAY OF TROPONIN QUANT: CPT | Performed by: NURSE PRACTITIONER

## 2021-06-03 PROCEDURE — 96374 THER/PROPH/DIAG INJ IV PUSH: CPT

## 2021-06-03 PROCEDURE — 80048 BASIC METABOLIC PNL TOTAL CA: CPT | Performed by: NURSE PRACTITIONER

## 2021-06-03 PROCEDURE — C1785 PMKR, DUAL, RATE-RESP: HCPCS | Performed by: INTERNAL MEDICINE

## 2021-06-03 PROCEDURE — 25010000002 PROPOFOL 10 MG/ML EMULSION: Performed by: ANESTHESIOLOGY

## 2021-06-03 PROCEDURE — C9803 HOPD COVID-19 SPEC COLLECT: HCPCS

## 2021-06-03 DEVICE — PACEMAKER
Type: IMPLANTABLE DEVICE | Status: FUNCTIONAL
Brand: ACCOLADE™ MRI DR

## 2021-06-03 RX ORDER — SODIUM CHLORIDE 0.9 % (FLUSH) 0.9 %
10 SYRINGE (ML) INJECTION AS NEEDED
Status: DISCONTINUED | OUTPATIENT
Start: 2021-06-03 | End: 2021-06-04 | Stop reason: HOSPADM

## 2021-06-03 RX ORDER — CEFAZOLIN SODIUM 1 G/3ML
2 INJECTION, POWDER, FOR SOLUTION INTRAMUSCULAR; INTRAVENOUS ONCE
Status: DISCONTINUED | OUTPATIENT
Start: 2021-06-03 | End: 2021-06-03 | Stop reason: SDUPTHER

## 2021-06-03 RX ORDER — ESCITALOPRAM OXALATE 10 MG/1
5 TABLET ORAL DAILY
Status: DISCONTINUED | OUTPATIENT
Start: 2021-06-03 | End: 2021-06-04 | Stop reason: HOSPADM

## 2021-06-03 RX ORDER — MAGNESIUM SULFATE 1 G/100ML
1 INJECTION INTRAVENOUS AS NEEDED
Status: DISCONTINUED | OUTPATIENT
Start: 2021-06-03 | End: 2021-06-04 | Stop reason: HOSPADM

## 2021-06-03 RX ORDER — MAGNESIUM SULFATE HEPTAHYDRATE 40 MG/ML
2 INJECTION, SOLUTION INTRAVENOUS AS NEEDED
Status: DISCONTINUED | OUTPATIENT
Start: 2021-06-03 | End: 2021-06-04 | Stop reason: HOSPADM

## 2021-06-03 RX ORDER — PANTOPRAZOLE SODIUM 40 MG/1
40 TABLET, DELAYED RELEASE ORAL
Status: DISCONTINUED | OUTPATIENT
Start: 2021-06-03 | End: 2021-06-04 | Stop reason: HOSPADM

## 2021-06-03 RX ORDER — MULTIPLE VITAMINS W/ MINERALS TAB 9MG-400MCG
1 TAB ORAL DAILY
COMMUNITY

## 2021-06-03 RX ORDER — LEVOTHYROXINE SODIUM 0.05 MG/1
50 TABLET ORAL DAILY
Status: DISCONTINUED | OUTPATIENT
Start: 2021-06-03 | End: 2021-06-04 | Stop reason: HOSPADM

## 2021-06-03 RX ORDER — MORPHINE SULFATE 4 MG/ML
1 INJECTION, SOLUTION INTRAMUSCULAR; INTRAVENOUS EVERY 4 HOURS PRN
Status: DISCONTINUED | OUTPATIENT
Start: 2021-06-03 | End: 2021-06-04 | Stop reason: HOSPADM

## 2021-06-03 RX ORDER — LIDOCAINE HYDROCHLORIDE AND EPINEPHRINE BITARTRATE 20; .01 MG/ML; MG/ML
INJECTION, SOLUTION SUBCUTANEOUS AS NEEDED
Status: DISCONTINUED | OUTPATIENT
Start: 2021-06-03 | End: 2021-06-03 | Stop reason: HOSPADM

## 2021-06-03 RX ORDER — ACETAMINOPHEN 650 MG/1
650 SUPPOSITORY RECTAL EVERY 4 HOURS PRN
Status: DISCONTINUED | OUTPATIENT
Start: 2021-06-03 | End: 2021-06-04 | Stop reason: HOSPADM

## 2021-06-03 RX ORDER — ESCITALOPRAM OXALATE 5 MG/1
5 TABLET ORAL DAILY
COMMUNITY

## 2021-06-03 RX ORDER — LABETALOL HYDROCHLORIDE 5 MG/ML
10 INJECTION, SOLUTION INTRAVENOUS EVERY 6 HOURS PRN
Status: DISCONTINUED | OUTPATIENT
Start: 2021-06-03 | End: 2021-06-04 | Stop reason: HOSPADM

## 2021-06-03 RX ORDER — LOSARTAN POTASSIUM 25 MG/1
25 TABLET ORAL DAILY
Status: DISCONTINUED | OUTPATIENT
Start: 2021-06-04 | End: 2021-06-04 | Stop reason: HOSPADM

## 2021-06-03 RX ORDER — IPRATROPIUM BROMIDE AND ALBUTEROL SULFATE 2.5; .5 MG/3ML; MG/3ML
3 SOLUTION RESPIRATORY (INHALATION) EVERY 4 HOURS PRN
Status: DISCONTINUED | OUTPATIENT
Start: 2021-06-03 | End: 2021-06-04 | Stop reason: HOSPADM

## 2021-06-03 RX ORDER — HYDRALAZINE HYDROCHLORIDE 20 MG/ML
10 INJECTION INTRAMUSCULAR; INTRAVENOUS EVERY 6 HOURS PRN
Status: DISCONTINUED | OUTPATIENT
Start: 2021-06-03 | End: 2021-06-04 | Stop reason: HOSPADM

## 2021-06-03 RX ORDER — METOPROLOL TARTRATE 50 MG/1
50 TABLET, FILM COATED ORAL 2 TIMES DAILY
Status: DISCONTINUED | OUTPATIENT
Start: 2021-06-03 | End: 2021-06-04 | Stop reason: HOSPADM

## 2021-06-03 RX ORDER — POTASSIUM CHLORIDE 20 MEQ/1
40 TABLET, EXTENDED RELEASE ORAL AS NEEDED
Status: DISCONTINUED | OUTPATIENT
Start: 2021-06-03 | End: 2021-06-04 | Stop reason: HOSPADM

## 2021-06-03 RX ORDER — ONDANSETRON 4 MG/1
4 TABLET, FILM COATED ORAL EVERY 6 HOURS PRN
Status: DISCONTINUED | OUTPATIENT
Start: 2021-06-03 | End: 2021-06-04 | Stop reason: HOSPADM

## 2021-06-03 RX ORDER — ONDANSETRON 2 MG/ML
4 INJECTION INTRAMUSCULAR; INTRAVENOUS EVERY 6 HOURS PRN
Status: DISCONTINUED | OUTPATIENT
Start: 2021-06-03 | End: 2021-06-04 | Stop reason: HOSPADM

## 2021-06-03 RX ORDER — NALOXONE HCL 0.4 MG/ML
0.4 VIAL (ML) INJECTION
Status: DISCONTINUED | OUTPATIENT
Start: 2021-06-03 | End: 2021-06-04 | Stop reason: HOSPADM

## 2021-06-03 RX ORDER — ZONISAMIDE 100 MG/1
300 CAPSULE ORAL NIGHTLY
Status: DISCONTINUED | OUTPATIENT
Start: 2021-06-03 | End: 2021-06-04 | Stop reason: HOSPADM

## 2021-06-03 RX ORDER — MIDODRINE HYDROCHLORIDE 5 MG/1
5 TABLET ORAL DAILY PRN
Status: DISCONTINUED | OUTPATIENT
Start: 2021-06-03 | End: 2021-06-03

## 2021-06-03 RX ORDER — HYDROCODONE BITARTRATE AND ACETAMINOPHEN 5; 325 MG/1; MG/1
1 TABLET ORAL EVERY 4 HOURS PRN
Status: DISCONTINUED | OUTPATIENT
Start: 2021-06-03 | End: 2021-06-04 | Stop reason: HOSPADM

## 2021-06-03 RX ORDER — MULTIPLE VITAMINS W/ MINERALS TAB 9MG-400MCG
1 TAB ORAL DAILY
Status: DISCONTINUED | OUTPATIENT
Start: 2021-06-03 | End: 2021-06-04 | Stop reason: HOSPADM

## 2021-06-03 RX ORDER — MIDODRINE HYDROCHLORIDE 5 MG/1
5 TABLET ORAL
Status: DISCONTINUED | OUTPATIENT
Start: 2021-06-04 | End: 2021-06-04 | Stop reason: HOSPADM

## 2021-06-03 RX ORDER — ACETAMINOPHEN 325 MG/1
650 TABLET ORAL EVERY 4 HOURS PRN
Status: DISCONTINUED | OUTPATIENT
Start: 2021-06-03 | End: 2021-06-04 | Stop reason: HOSPADM

## 2021-06-03 RX ORDER — METOPROLOL TARTRATE 50 MG/1
50 TABLET, FILM COATED ORAL NIGHTLY
Status: DISCONTINUED | OUTPATIENT
Start: 2021-06-03 | End: 2021-06-03

## 2021-06-03 RX ORDER — OXYBUTYNIN CHLORIDE 10 MG/1
10 TABLET, EXTENDED RELEASE ORAL DAILY
Status: DISCONTINUED | OUTPATIENT
Start: 2021-06-03 | End: 2021-06-04 | Stop reason: HOSPADM

## 2021-06-03 RX ORDER — ACETAMINOPHEN 160 MG/5ML
650 SOLUTION ORAL EVERY 4 HOURS PRN
Status: DISCONTINUED | OUTPATIENT
Start: 2021-06-03 | End: 2021-06-04 | Stop reason: HOSPADM

## 2021-06-03 RX ORDER — SODIUM CHLORIDE 0.9 % (FLUSH) 0.9 %
10 SYRINGE (ML) INJECTION EVERY 12 HOURS SCHEDULED
Status: DISCONTINUED | OUTPATIENT
Start: 2021-06-03 | End: 2021-06-04 | Stop reason: HOSPADM

## 2021-06-03 RX ORDER — SODIUM CHLORIDE 9 MG/ML
INJECTION, SOLUTION INTRAVENOUS CONTINUOUS PRN
Status: DISCONTINUED | OUTPATIENT
Start: 2021-06-03 | End: 2021-06-03 | Stop reason: SURG

## 2021-06-03 RX ADMIN — METOPROLOL TARTRATE 50 MG: 50 TABLET, FILM COATED ORAL at 20:26

## 2021-06-03 RX ADMIN — OXYBUTYNIN CHLORIDE 10 MG: 10 TABLET, EXTENDED RELEASE ORAL at 20:26

## 2021-06-03 RX ADMIN — SODIUM CHLORIDE: 0.9 INJECTION, SOLUTION INTRAVENOUS at 17:26

## 2021-06-03 RX ADMIN — LABETALOL 20 MG/4 ML (5 MG/ML) INTRAVENOUS SYRINGE 10 MG: at 23:09

## 2021-06-03 RX ADMIN — PROPOFOL 75 MCG/KG/MIN: 10 INJECTION, EMULSION INTRAVENOUS at 17:36

## 2021-06-03 RX ADMIN — CEFAZOLIN SODIUM 2 G: 10 INJECTION, POWDER, FOR SOLUTION INTRAVENOUS at 17:14

## 2021-06-03 RX ADMIN — Medication 10 ML: at 20:26

## 2021-06-03 NOTE — CONSULTS
CARDIOLOGY CONSULT NOTE      Referring Provider:     Reason for Consultation:    SOA  Chest pain  Hypertension  S/p permanent pacemaker    Attending: Horace Pena DO    Chief complaint    SOA, Chest Heaviness    Subjective .     History of present illness:  Anila Spencer is a 80 y.o. female who presents with plaints of shortness of breath.     The patient has a known history of sick sinus syndrome, previous dual-chamber pacemaker, hypertension and orthostatic hypotension.    Patient has had previous episodes of syncope and has had extensive work-ups and found to have sick sinus syndrome.  She previously underwent a dual-chamber pacemaker implant.  Since then her symptoms of syncope had resolved.    Previously the patient had been evaluated with cardiac catheterization because of symptoms of angina and was noted to have no significant coronary artery disease.    In September 2019 the patient had recurrent syncope.  She underwent tilt table testing and it showed a hypotensive response to head up tilt.  She was started on midodrine twice daily.  Echocardiogram at that time showed her ejection fraction to be normal with EF of 60 to 65% mild MR was noted and she had a stress Myoview that was negative for reversible ischemia.    We have been following the patient closely in the office due to her device nearing DAHIANA.  She is seen Dr. Mora with plans for anticipated generator replacement.  She was seen in our office yesterday and reports progressive shortness of breath.    In office device interrogation showed the device to be at DAHIANA.  This particular [employer] device will switch to VVI pacing at 65 when it reaches DAHIANA.  The patient is reporting symptoms suggestive of pacemaker syndrome.    The patient was scheduled for an outpatient generator replacement this coming Tuesday but apparently had progressive Horace worsening symptoms and came to the ER this morning for evaluation with complaints of shortness of breath and  chest pressure.    The case has been discussed with Dr. Mora and generator replacement has been scheduled for this afternoon.    Review of Systems   Constitutional: Negative for chills and fever.   HENT: Negative for ear discharge and nosebleeds.    Eyes: Negative for discharge and redness.   Cardiovascular: Positive for chest pain and dyspnea on exertion. Negative for orthopnea, palpitations, paroxysmal nocturnal dyspnea and syncope.   Respiratory: Positive for shortness of breath. Negative for cough and wheezing.    Endocrine: Negative for heat intolerance.   Skin: Negative for rash.   Musculoskeletal: Positive for arthritis and joint pain. Negative for myalgias.   Gastrointestinal: Negative for abdominal pain, melena, nausea and vomiting.   Genitourinary: Negative for dysuria and hematuria.   Neurological: Negative for dizziness, light-headedness, numbness and tremors.   Psychiatric/Behavioral: Negative for depression. The patient is not nervous/anxious.        History  Past Medical History:   Diagnosis Date   • Abnormal cardiovascular stress test 3/21/2017   • Allergic rhinitis    • Anxiety    • Basal cell carcinoma (BCC) of face    • Biliary dyskinesia    • Bradycardia    • Cardiomegaly    • Cataract    • Cervical spinal stenosis    • Chronic constipation    • Chronic insomnia    • DDD (degenerative disc disease), cervical     C-spine, T-spine, L-Spine   • DDD (degenerative disc disease), lumbar 7/25/2019   • Degeneration of intervertebral disc of thoracic region 3/18/2017   • Diplopia     Dr. Laird   • Dysphagia 4/25/2019   • Emphysema of lung (CMS/HCC)    • Fatty liver    • Fracture of multiple ribs 07/2018    Left 4th-8th   • Gastroesophageal reflux disease 12/4/2012   • GERD (gastroesophageal reflux disease)    • Hematuria 12/29/2014   • History of recent fall    • Hypertension    • Hypothyroidism    • Injury of back    • Insomnia 3/7/2016   • Kidney stone    • Left arm pain    • Nasal fracture 07/2018    • Osteoarthritis    • Osteoporosis     h/o tx with Fosamax but quit in 2012 due to fears of complications   • Prediabetes    • Rotator cuff tear     right   • Seizure disorder (CMS/HCC)     Dr. Shannon Bennett   • Shortness of breath 5/8/2019   • Sick sinus syndrome (CMS/[facility])     Dr. Agrawal   • Spinal stenosis of cervical region 2/11/2015   • Squamous cell skin cancer, face    • Traumatic brain injury (CMS/HCC)     from fall   • Urinary incontinence        Past Surgical History:   Procedure Laterality Date   • BILATERAL SALPINGO OOPHORECTOMY      for benign cysts   • BREAST CYST ASPIRATION Left    • CARDIAC CATHETERIZATION  08/25/2009    25% LAD. L Cx luminal irregularities. Normal L main   • CARDIAC CATHETERIZATION  03/29/2017    25% LAD. 10-20% LCX. ER 65%. Dr. Agrawal.    • CARDIOVASCULAR STRESS TEST  2019   • CATARACT EXTRACTION  2006   • COSMETIC SURGERY      Lip   • ECHO - CONVERTED  2019   • ENDOSCOPY  05/17/2017    Normal, Dr. Gutierrez   • ENDOSCOPY N/A 8/22/2019    Procedure: ESOPHAGOGASTRODUODENOSCOPY WITH DILATATION (BOUGIE #[id number]);  Surgeon: Joaquin Story MD;  Location: [facility] ENDOSCOPY;  Service: Gastroenterology   • INSERT / REPLACE / REMOVE PACEMAKER     • KNEE ARTHROSCOPY Left 1992   • OOPHORECTOMY     • OTHER SURGICAL HISTORY Right 07/2017    Right eye, YAG Capsuloyomy , Dr. Lemus   • PACEMAKER IMPLANTATION  2009   • ROTATOR CUFF REPAIR Right     Dr. Goldberg   • TONSILLECTOMY     • TOTAL HIP ARTHROPLASTY Bilateral        Family History   Problem Relation Age of Onset   • Heart disease Mother    • Pancreatic cancer Mother    • Prostate cancer Father    • Breast cancer Sister         4 sisters have had   • Pancreatic cancer Brother    • Colon cancer Brother    • Stroke Maternal Grandmother        Social History     Tobacco Use   • Smoking status: Never Smoker   • Smokeless tobacco: Never Used   • Tobacco comment: Patient is advised not to smoke.   Vaping Use   • Vaping Use: Never used   Substance  Use Topics   • Alcohol use: No   • Drug use: No        Medications Prior to Admission   Medication Sig Dispense Refill Last Dose   • Calcium Carbonate-Vitamin D3 (CALCIUM 600-D) 600-400 MG-UNIT tablet Take 1 tablet by mouth 2 (Two) Times a Day.   6/3/2021 at 0800   • Dexilant 60 MG capsule TAKE ONE CAPSULE BY MOUTH ONCE DAILY (Patient taking differently: Take 60 mg by mouth Daily.) 90 capsule 1 6/3/2021 at 0800   • escitalopram (LEXAPRO) 5 MG tablet Take 5 mg by mouth Daily.   6/3/2021 at 0800   • fesoterodine fumarate (TOVIAZ ER) 8 MG tablet sustained-release 24 hour tablet Take 8 mg by mouth Every Evening.   6/2/2021 at Unknown time   • levothyroxine (SYNTHROID, LEVOTHROID) 50 MCG tablet TAKE 1 TABLET BY MOUTH EVERY DAY 90 tablet 0 6/2/2021 at Unknown time   • losartan (COZAAR) 25 MG tablet TAKE 1 TABLET BY MOUTH EVERY DAY FOR BLOOD PRESSURE 90 tablet 3 6/3/2021 at 0800   • metoprolol tartrate (LOPRESSOR) 50 MG tablet Take 1 tablet by mouth 2 (Two) Times a Day. 180 tablet 0 6/3/2021 at 0800   • midodrine (PROAMATINE) 5 MG tablet TAKE 1 TABLET BY MOUTH EVERY DAY 90 tablet 1 6/3/2021 at 0800   • multivitamin with minerals tablet tablet Take 1 tablet by mouth Daily.   6/2/2021 at Unknown time   • psyllium (METAMUCIL) 58.6 % packet Take 1 packet by mouth Daily.   6/2/2021 at Unknown time   • zonisamide (ZONEGRAN) 100 MG capsule Take 300 mg by mouth Every Evening. Take three 100 mg capsules may take four if needed   6/2/2021 at Unknown time         Patient has no known allergies.    Scheduled Meds:escitalopram, 5 mg, Oral, Daily  levothyroxine, 50 mcg, Oral, Daily  losartan, 25 mg, Oral, Daily  metoprolol tartrate, 50 mg, Oral, BID  multivitamin with minerals, 1 tablet, Oral, Daily  oxybutynin XL, 10 mg, Oral, Daily  pantoprazole, 40 mg, Oral, BID AC  psyllium, 1 packet, Oral, Daily  sodium chloride, 10 mL, Intravenous, Q12H      Continuous Infusions:   PRN Meds:.•  acetaminophen **OR** acetaminophen **OR**  "acetaminophen  •  hydrALAZINE  •  [MAR Hold] ipratropium-albuterol  •  lidocaine-EPINEPHrine  •  magnesium sulfate **OR** magnesium sulfate in D5W 1g/100mL (PREMIX)  •  midodrine  •  ondansetron **OR** ondansetron  •  potassium chloride  •  [COMPLETED] Insert peripheral IV **AND** [MAR Hold] sodium chloride  •  sodium chloride    Objective     VITAL SIGNS  Vitals:    06/03/21 1436 06/03/21 1551 06/03/21 1606 06/03/21 1706   BP: 159/72 153/73 155/78 157/69   BP Location:       Patient Position:       Pulse: 72 74 69 86   Resp:       Temp:       TempSrc:       SpO2: 96% 97% 98% 98%   Weight:       Height:           Flowsheet Rows      First Filed Value   Admission Height  160 cm (63\") Documented at 06/03/2021 1038   Admission Weight  89.8 kg (198 lb) Documented at 06/03/2021 1038          Body mass index is 35.07 kg/m².     TELEMETRY: V paced    Physical Exam:  Constitutional:       Appearance: Well-developed.   Eyes:      General: No scleral icterus.        Right eye: No discharge.   HENT:      Head: Normocephalic and atraumatic.   Neck:      Thyroid: No thyromegaly.      Lymphadenopathy: No cervical adenopathy.   Pulmonary:      Effort: Pulmonary effort is normal. No respiratory distress.      Breath sounds: Normal breath sounds. No wheezing. No rales.   Cardiovascular:      Normal rate. Regular rhythm.      No gallop.   Abdominal:      Tenderness: There is no abdominal tenderness.   Skin:     Findings: No erythema or rash.   Neurological:      Mental Status: Alert and oriented to person, place, and time.          Results Review:   I reviewed the patient's new clinical results.    CBC    Results from last 7 days   Lab Units 06/03/21  1116   WBC 10*3/mm3 6.60   HEMOGLOBIN g/dL 12.2   PLATELETS 10*3/mm3 185     BMP   Results from last 7 days   Lab Units 06/03/21  1116   SODIUM mmol/L 142   POTASSIUM mmol/L 4.2   CHLORIDE mmol/L 109*   CO2 mmol/L 21.0*   BUN mg/dL 25*   CREATININE mg/dL 1.13*   GLUCOSE mg/dL 97     Cr " Clearance Estimated Creatinine Clearance: 40.8 mL/min (A) (by C-G formula based on SCr of 1.13 mg/dL (H)).  Coag   Results from last 7 days   Lab Units 06/03/21  1116   INR  1.01   APTT seconds 27.1     HbA1C   Lab Results   Component Value Date    HGBA1C 5.4 11/05/2019     Blood Glucose No results found for: POCGLU  Infection     CMP   Results from last 7 days   Lab Units 06/03/21  1116   SODIUM mmol/L 142   POTASSIUM mmol/L 4.2   CHLORIDE mmol/L 109*   CO2 mmol/L 21.0*   BUN mg/dL 25*   CREATININE mg/dL 1.13*   GLUCOSE mg/dL 97     ABG      UA      PREET  No results found for: POCMETH, POCAMPHET, POCBARBITUR, POCBENZO, POCCOCAINE, POCOPIATES, POCOXYCODO, POCPHENCYC, POCPROPOXY, POCTHC, POCTRICYC  Lysis Labs   Results from last 7 days   Lab Units 06/03/21  1116   INR  1.01   APTT seconds 27.1   HEMOGLOBIN g/dL 12.2   PLATELETS 10*3/mm3 185   CREATININE mg/dL 1.13*     Radiology(recent) XR Chest 1 View    Result Date: 6/3/2021  Stable mild cardiac enlargement. No acute chest findings.  Electronically Signed By-Daiana Muniz MD On:6/3/2021 3:29 PM This report was finalized on [id number] by  Daiana Muniz MD.      Results from last 7 days   Lab Units 06/03/21  1116   TROPONIN T ng/mL <0.010       Imaging Results (Last 24 Hours)     Procedure Component Value Units Date/Time    XR Chest 1 View [[id number]] Collected: 06/03/21 1529     Updated: 06/03/21 1532    Narrative:      DATE OF EXAM:  6/3/2021 2:55 PM     PROCEDURE:  XR CHEST 1 VW-     INDICATIONS:  new admit, preop; R06.00-Dyspnea, unspecified; Z45.010-Encounter for  checking and testing of cardiac pacemaker pulse generator (battery)       COMPARISON:  PA and lateral chest 7/6/2016     TECHNIQUE:   Single radiographic view of the chest was obtained.     FINDINGS:  There is mild cardiomegaly which is thought to be stable. The lungs  appear free of acute airspace disease. Pulmonary vascular distribution  is normal. Pacemaker device appears unchanged. No pleural  effusion or  pneumothorax is identified. 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 No acute  osseous abnormalities.       Impression:      Stable mild cardiac enlargement. No acute chest findings.     Electronically Signed By-Daiana Muniz MD On:6/3/2021 3:29 PM  This report was finalized on [id number] by  Daiana Muniz MD.          EKG          I personally viewed and interpreted the patient's EKG/Telemetry data:    ECHOCARDIOGRAM:  10/2019  Interpretation Summary    · Left ventricular systolic function is normal.  · Left ventricular wall thickness is consistent with mild concentric hypertrophy.  · Left ventricular diastolic dysfunction (grade I) consistent with impaired relaxation.  · Estimated EF appears to be in the range of 61 - 65%.  · Mild mitral valve regurgitation is present  · Mild tricuspid valve regurgitation is present.            STRESS MYOVIEW:    DATE OF EXAM:  05/16/2019     TYPE OF EXAM:  Lexiscan Nuclear Stress Test     Diagnosis:  Chest pain  LEXISCAN STRESS EXAM:  Baseline EKG shows sinus rhythm with a heart rate of sixty-two.  The patient was infused with Lexiscan per protocol.  With Lexiscan  infusion there were no arrhythmias. No ST or T wave segment changes to  suggest ischemia.  Resting blood pressure was 140/90, with Lexiscan blood pressure was  128/70 and 130/80 in recovery.     Lexiscan Treadmill Conclusion:  No ST or T-wave segment abnormalities to suggest ischemia with Sandra  scan infusion.  No arrhythmias detected.  Stable blood pressure throughout the study.  Nuclear images were done in conjunction with this study.  Please correlate with nuclear images.     Stress portion of the test was dictated and supervised by GABRIELA Delacruz.     SPECT IMAGING:  Myocardial perfusion imaging was performed at rest following  intravenous injection of 7.9mCi of Tc99m. At peak exercise, the  patient was intravenously  injected with 21.8mCi of Tc99m. Myocardial  perfusion imaging was performed using the standard techniques of Gated  SPECT and SPECT Imaging.  This is a technically adequate study. The left ventricle size is  normal  and the left ventricular function is normal.  The left  ventricle wall motion appears normal. The 4 DM SPECT imaging showed no  reversible defect but small fixed distal anterior wall defect which  showed normal thickening and contractility consistent with attenuation  artifact.     CONCLUSION:  1. Myocardial ischemia is absent  in the all segments.  2. Myocardial infarction is absent  in the all segments.  3. Ejection fraction on Gated SPECT is 64%.  4. The left ventricle wall motion is normal  ..  5. The chronotropic response is normal  and the hemodynamic response  is normal ..     IMPRESSION:  This is a stress test with radionuclide imaging which is normal  without evidence of ischemia or myocardial infarction%%page    CARDIAC CATHETERIZATION:    OTHER:         Assessment/Plan       Elective replacement indicated for cardiac pacemaker battery at end of lifespan    Essential hypertension    Presence of cardiac pacemaker    Sick sinus syndrome (CMS/HCC)    Hypothyroidism    Seizure disorder (CMS/HCC)    GERD (gastroesophageal reflux disease)    Emphysema of lung (CMS/HCC)    Anxiety    Overactive bladder    Obesity (BMI 30-39.9)    Orthostatic hypotension      Assessment:    ZARCO/Chest Heaviness    -likely related to PM syndrome  SSS  Dual Chamber MDT Pacemaker at DAHIANA and switched to VVI 65 mode  Orthostatic Hypotension  No Significant CAD by previous cardiac catheterization 3/2017    Plan:    D/W EP   Generator replacement scheduled for later today  Keep NPO  Additional recommendations per Dr. Agrawal    Patient is seen and examined and findings are verified.  Patient is presented with shortness of breath and some chest discomfort.  Patient does not seem to be in volume overload.  No leg edema  noted.    Hemodynamics are stable.    Normal S1 and S2.  No pericardial rub or murmur.  Decreased breath sound at the bases.  No leg edema.    Patient had permanent pacemaker because of underlying sick sinus syndrome.  She has reached to the end of life.  Patient has switched to VVI pacing.  Patient is not tolerating it well.  She is short of breath and also complaining of chest pain.  She had previous cardiac catheterization which was totally unremarkable.  I would proceed with generator change.  I have talked with Dr. Mora.  We shall follow    I discussed the patients findings and my recommendations with patient and RN    Adam Agrawal MD  06/03/21  18:10 EDT

## 2021-06-03 NOTE — ANESTHESIA PREPROCEDURE EVALUATION
Anesthesia Evaluation     NPO Solid Status: > 8 hours  NPO Liquid Status: > 8 hours           Airway   Mallampati: II  TM distance: >3 FB  No difficulty expected  Dental      Pulmonary    (+) COPD,   Cardiovascular     (+) hypertension, dysrhythmias,       Neuro/Psych  (+) seizures, syncope, psychiatric history,     GI/Hepatic/Renal/Endo    (+)  GERD,  liver disease, renal disease,     Musculoskeletal     Abdominal    Substance History      OB/GYN          Other   arthritis,    history of cancer                    Anesthesia Plan    ASA 4     MAC     intravenous induction     Anesthetic plan, all risks, benefits, and alternatives have been provided, discussed and informed consent has been obtained with: patient.

## 2021-06-03 NOTE — ED NOTES
Pt resting abed, no complaints at this time.pt given warm blanket for comfort per pt request. VSS. Will continue to monitor.     Dania Prado RN  06/03/21 1629

## 2021-06-03 NOTE — ED TRIAGE NOTES
Pt reports chest tightness with exertion for more than a month that has worsened over past week-now at rest, c/o SOA with any exertion, nausea. Pt scheduled for new pacemaker placement next Tuesday.

## 2021-06-03 NOTE — ED PROVIDER NOTES
Subjective   Chief complaint: Shortness of breath      Context: Patient is an 80-year-old female who comes in with her significant other complaining of increasing shortness of breath. She saw her cardiologist yesterday who interrogated her pacemaker and was told she needs a generator replacement. Has been implanted for 12 years. She denies any increase swelling in her legs or feet recent trauma surgery immobilization prior history of DVT PE or hormone use. She denies any fever cough or congestion. She does note some minor chest tightness when she has shortness of breath. She states her shortness of breath is with exertion.    Duration: Worse since yesterday    Timing: Waxes and wanes      Severity: Mild    Associated symptoms: Shortness of breath with exertion          PCP:  charisse          Review of Systems   Constitutional: Negative for fever.   HENT: Negative.    Eyes: Negative for visual disturbance.   Respiratory: Positive for shortness of breath. Negative for cough.    Cardiovascular: Negative for palpitations and leg swelling.   Gastrointestinal: Negative.    Genitourinary: Negative.    Musculoskeletal: Negative.    Skin: Negative.    Allergic/Immunologic: Negative for immunocompromised state.   Neurological: Negative.    Hematological: Does not bruise/bleed easily.   Psychiatric/Behavioral: Negative for confusion.       Past Medical History:   Diagnosis Date   • Abnormal cardiovascular stress test 3/21/2017   • Allergic rhinitis    • Anxiety    • Basal cell carcinoma (BCC) of face    • Biliary dyskinesia    • Bradycardia    • Cardiomegaly    • Cataract    • Cervical spinal stenosis    • Chronic constipation    • Chronic insomnia    • DDD (degenerative disc disease), cervical     C-spine, T-spine, L-Spine   • DDD (degenerative disc disease), lumbar 7/25/2019   • Degeneration of intervertebral disc of thoracic region 3/18/2017   • Diplopia     Dr. Laird   • Dysphagia 4/25/2019   • Emphysema of lung (CMS/[facility])     • Fatty liver    • Fracture of multiple ribs 07/2018    Left 4th-8th   • Gastroesophageal reflux disease 12/4/2012   • GERD (gastroesophageal reflux disease)    • Hematuria 12/29/2014   • History of recent fall    • Hypertension    • Hypothyroidism    • Injury of back    • Insomnia 3/7/2016   • Kidney stone    • Left arm pain    • Nasal fracture 07/2018   • Osteoarthritis    • Osteoporosis     h/o tx with Fosamax but quit in 2012 due to fears of complications   • Prediabetes    • Rotator cuff tear     right   • Seizure disorder (CMS/HCC)     Dr. Shannon Bennett   • Shortness of breath 5/8/2019   • Sick sinus syndrome (CMS/HCC)     Dr. Agrawal   • Spinal stenosis of cervical region 2/11/2015   • Squamous cell skin cancer, face    • Traumatic brain injury (CMS/HCC)     from fall   • Urinary incontinence        No Known Allergies    Past Surgical History:   Procedure Laterality Date   • BILATERAL SALPINGO OOPHORECTOMY      for benign cysts   • BREAST CYST ASPIRATION Left    • CARDIAC CATHETERIZATION  08/25/2009    25% LAD. L Cx luminal irregularities. Normal L main   • CARDIAC CATHETERIZATION  03/29/2017    25% LAD. 10-20% LCX. ER 65%. Dr. Agrawal.    • CARDIOVASCULAR STRESS TEST  2019   • CATARACT EXTRACTION  2006   • COSMETIC SURGERY      Lip   • ECHO - CONVERTED  2019   • ENDOSCOPY  05/17/2017    Normal, Dr. Gutierrez   • ENDOSCOPY N/A 8/22/2019    Procedure: ESOPHAGOGASTRODUODENOSCOPY WITH DILATATION (BOUGIE #46,50);  Surgeon: Joaquin Story MD;  Location: [facility] ENDOSCOPY;  Service: Gastroenterology   • INSERT / REPLACE / REMOVE PACEMAKER     • KNEE ARTHROSCOPY Left 1992   • OOPHORECTOMY     • OTHER SURGICAL HISTORY Right 07/2017    Right eye, YAG Capsuloyomy , Dr. Lemus   • PACEMAKER IMPLANTATION  2009   • ROTATOR CUFF REPAIR Right     Dr. Goldberg   • TONSILLECTOMY     • TOTAL HIP ARTHROPLASTY Bilateral        Family History   Problem Relation Age of Onset   • Heart disease Mother    • Pancreatic cancer Mother     • Prostate cancer Father    • Breast cancer Sister         4 sisters have had   • Pancreatic cancer Brother    • Colon cancer Brother    • Stroke Maternal Grandmother        Social History     Socioeconomic History   • Marital status:      Spouse name: Not on file   • Number of children: Not on file   • Years of education: Not on file   • Highest education level: Not on file   Tobacco Use   • Smoking status: Never Smoker   • Smokeless tobacco: Never Used   • Tobacco comment: Patient is advised not to smoke.   Vaping Use   • Vaping Use: Never used   Substance and Sexual Activity   • Alcohol use: No   • Drug use: No   • Sexual activity: Defer           Objective   Physical Exam     Vital signs and triage nurse note reviewed.   Constitutional: Awake, alert; well-developed and well-nourished. No acute distress is noted. Significant other at bedside  HEENT: Normocephalic, atraumatic; pupils are PERRL with intact EOM; oropharynx is pink and moist without exudate or erythema.   Neck: Supple, full range of motion without pain; no cervical lymphadenopathy.   Cardiovascular: Regular rate and rhythm, normal S1-S2.   Pulmonary: Respiratory effort regular nonlabored, breath sounds clear to auscultation all fields.   Abdomen: Soft, nontender nondistended with normoactive bowel sounds; no rebound or guarding.   Musculoskeletal: Independent range of motion of all extremities with no palpable tenderness or edema.   Neuro: Alert oriented x3, speech is clear and appropriate, GCS 15   Skin:  Fleshtone warm, dry, intact; no erythematous or petechial rash or lesion       Procedures           ED Course           Labs Reviewed   BASIC METABOLIC PANEL - Abnormal; Notable for the following components:       Result Value    BUN 25 (*)     Creatinine 1.13 (*)     Chloride 109 (*)     CO2 21.0 (*)     eGFR Non  Amer 46 (*)     All other components within normal limits    Narrative:     GFR Normal >60  Chronic Kidney Disease  <60  Kidney Failure <15     BNP (IN-HOUSE) - Abnormal; Notable for the following components:    proBNP 3,822.0 (*)     All other components within normal limits    Narrative:     Among patients with dyspnea, NT-proBNP is highly sensitive for the detection of acute congestive heart failure. In addition NT-proBNP of <300 pg/ml effectively rules out acute congestive heart failure with 99% negative predictive value.    Results may be falsely decreased if patient taking Biotin.     CBC WITH AUTO DIFFERENTIAL - Abnormal; Notable for the following components:    RBC 3.72 (*)     MCV 97.2 (*)     All other components within normal limits   PROTIME-INR - Normal   APTT - Normal   TROPONIN (IN-HOUSE) - Normal    Narrative:     Troponin T Reference Range:  <= 0.03 ng/mL-   Negative for AMI  >0.03 ng/mL-     Abnormal for myocardial necrosis.  Clinicians would have to utilize clinical acumen, EKG, Troponin and serial changes to determine if it is an Acute Myocardial Infarction or myocardial injury due to an underlying chronic condition.       Results may be falsely decreased if patient taking Biotin.     COVID PRE-OP / PRE-PROCEDURE SCREENING ORDER (NO ISOLATION)    Narrative:     The following orders were created for panel order COVID PRE-OP / PRE-PROCEDURE SCREENING ORDER (NO ISOLATION) - Swab, Nasopharynx.  Procedure                               Abnormality         Status                     ---------                               -----------         ------                     COVID-19,CEPHEID,COR/MARRY...[[id number]]                      In process                   Please view results for these tests on the individual orders.   COVID-19,CEPHEID,COR/MARRY/PAD IN-HOUSE(OR EMERGENT/ADD-ON),NP SWAB IN TRANSPORT MEDIA 3-4 HR TAT, RT-PCR   CBC AND DIFFERENTIAL    Narrative:     The following orders were created for panel order CBC & Differential.  Procedure                               Abnormality         Status                      ---------                               -----------         ------                     CBC Auto Differential[[id number]]        Abnormal            Final result                 Please view results for these tests on the individual orders.   EXTRA TUBES    Narrative:     The following orders were created for panel order Extra Tubes.  Procedure                               Abnormality         Status                     ---------                               -----------         ------                     Gold Top - SST[[id number]]                                   Final result                 Please view results for these tests on the individual orders.   GOLD TOP - SST     Medications   sodium chloride 0.9 % flush 10 mL (has no administration in time range)   ceFAZolin (ANCEF) injection 2 g (has no administration in time range)     No radiology results for the last day  Prior to Admission medications    Medication Sig Start Date End Date Taking? Authorizing Provider   Calcium Carbonate-Vitamin D3 (CALCIUM 600-D) 600-400 MG-UNIT tablet Take 1 tablet by mouth 2 (Two) Times a Day. 5/15/13  Yes Nicholas Quiñones MD   Dexilant 60 MG capsule TAKE ONE CAPSULE BY MOUTH ONCE DAILY  Patient taking differently: Take 60 mg by mouth Daily. 10/8/20  Yes Helena Mas APRN   escitalopram (LEXAPRO) 5 MG tablet Take 5 mg by mouth Daily.   Yes Nicholas Quiñones MD   fesoterodine fumarate (TOVIAZ ER) 8 MG tablet sustained-release 24 hour tablet Take 8 mg by mouth Every Evening.   Yes Nicholas Quiñones MD   levothyroxine (SYNTHROID, LEVOTHROID) 50 MCG tablet TAKE 1 TABLET BY MOUTH EVERY DAY 4/6/21  Yes Helena Mas APRN   losartan (COZAAR) 25 MG tablet TAKE 1 TABLET BY MOUTH EVERY DAY FOR BLOOD PRESSURE 5/17/21  Yes Adam Agrawal MD   metoprolol tartrate (LOPRESSOR) 50 MG tablet Take 1 tablet by mouth 2 (Two) Times a Day. 4/13/21  Yes Helena Mas APRN   midodrine (PROAMATINE) 5 MG tablet TAKE 1 TABLET BY MOUTH  EVERY DAY 5/24/21  Yes Adam Agrawal MD   multivitamin with minerals tablet tablet Take 1 tablet by mouth Daily.   Yes Nicholas Quiñones MD   psyllium (METAMUCIL) 58.6 % packet Take 1 packet by mouth Daily.   Yes Nicholas Quiñones MD   zonisamide (ZONEGRAN) 100 MG capsule Take 300 mg by mouth Every Evening. Take three 100 mg capsules may take four if needed 9/30/19  Yes Nicholas Quiñones MD   docusate sodium (Colace) 100 MG capsule Take 2 capsules by mouth Every Night. 4/20/21 6/3/21  Daiana Mcfarlane APRN   escitalopram (LEXAPRO) 10 MG tablet Take 5 mg by mouth Daily. 7/26/18 6/3/21  Nicholas Quiñones MD   Multiple Vitamins-Minerals (MULTI VITAMIN/MINERALS) tablet MULTI VITAMIN/MINERALS TABS 11/20/12 6/3/21  Nicholas Quiñones MD   polyethylene glycol (MIRALAX) 17 g packet Take 17 g by mouth Daily. 4/20/21 6/3/21  Daiana Mcfarlane APRN                                     MDM  Number of Diagnoses or Management Options  Dyspnea, unspecified type  Diagnosis management comments:       Comorbidities:  has a past medical history of Abnormal cardiovascular stress test (3/21/2017), Allergic rhinitis, Anxiety, Basal cell carcinoma (BCC) of face, Biliary dyskinesia, Bradycardia, Cardiomegaly, Cataract, Cervical spinal stenosis, Chronic constipation, Chronic insomnia, DDD (degenerative disc disease), cervical, DDD (degenerative disc disease), lumbar (7/25/2019), Degeneration of intervertebral disc of thoracic region (3/18/2017), Diplopia, Dysphagia (4/25/2019), Emphysema of lung (CMS/[facility]), Fatty liver, Fracture of multiple ribs (07/2018), Gastroesophageal reflux disease (12/4/2012), GERD (gastroesophageal reflux disease), Hematuria (12/29/2014), History of recent fall, Hypertension, Hypothyroidism, Injury of back, Insomnia (3/7/2016), Kidney stone, Left arm pain, Nasal fracture (07/2018), Osteoarthritis, Osteoporosis, Prediabetes, Rotator cuff tear, Seizure disorder (CMS/[facility]), Shortness of breath  (5/8/2019), Sick sinus syndrome (CMS/[facility]), Spinal stenosis of cervical region (2/11/2015), Squamous cell skin cancer, face, Traumatic brain injury (CMS/[facility]), and Urinary incontinence.  Differentials: Ischemia CHF pacemaker syndrome not all inclusive of differentials considered  Discussion with provider: emmanuelle harris  Radiology interpretation: Pending  Lab interpretation: Baseline labs are pending on admission    Appropriate PPE worn during exam.  I spoke with Sera for cardiology who does not recommend aspirin or nitrites at this time and thinks her symptoms are largely due to her pacemaker settings. Dr. Mora will exchange pacemaker generator today. Antibiotics will be deferred to him  Discussed with the hospitalist agreed to admit    i discussed findings with patient who voices understanding of admission        Final diagnoses:   Dyspnea, unspecified type       ED Disposition  ED Disposition     ED Disposition Condition Comment    Decision to Admit  Level of Care: Telemetry [5]   Diagnosis: Elective replacement indicated for cardiac pacemaker battery at end of lifespan [[id number]]   Admitting Physician: PRANAV WADSWORTH [[id number]]   Attending Physician: PRANAV WADSWORTH [[id number]]   Bed Request Comments: enrique- pacer gen exchange            No follow-up provider specified.       Medication List      ASK your doctor about these medications    escitalopram 5 MG tablet  Commonly known as: LEXAPRO  Ask about: Which instructions should I use?     multivitamin with minerals tablet tablet  Ask about: Which instructions should I use?             Luisa Redman, APRN  06/03/21 [id number]

## 2021-06-03 NOTE — TELEPHONE ENCOUNTER
Patient called and stated that when she gets up and walks to the bathroom she is very short of breath and she is having chest pain. She said it has progressively got worse since yesterday and she is going to go to the ER.

## 2021-06-03 NOTE — CASE MANAGEMENT/SOCIAL WORK
Discharge Planning Assessment  [facility]     Patient Name: Anila Spencer  MRN: [id number]  Today's Date: 6/3/2021    Admit Date: 6/3/2021    Discharge Needs Assessment     Row Name 06/03/21 1432       Living Environment    Lives With  spouse    Current Living Arrangements  home/apartment/condo    Primary Care Provided by  self    Provides Primary Care For  no one    Family Caregiver if Needed  spouse    Able to Return to Prior Arrangements  yes       Transition Planning    Patient/Family Anticipates Transition to  home with family       Discharge Needs Assessment    Readmission Within the Last 30 Days  no previous admission in last 30 days    Equipment Currently Used at Home  none    Concerns to be Addressed  no discharge needs identified    Provided Post Acute Provider List?  N/A        Discharge Plan     Row Name 06/03/21 1433       Plan    Plan  DC Plan:Routine home with spouse.    Plan Comments  Spoke with patient and spouse, lived independently in own home. PCP:Elsy Gonzalez, Pharmacy: [facility](August).Had been at [facility] in past after Hip surgery.       Demographic Summary     Row Name 06/03/21 1430       General Information    Admission Type  observation    Arrived From  emergency department    Referral Source  emergency department    Reason for Consult  discharge planning    Preferred Language  English        Functional Status     Row Name 06/03/21 1431       Functional Status    Usual Activity Tolerance  good    Current Activity Tolerance  good       Functional Status, IADL    Medications  independent    Meal Preparation  independent    Housekeeping  independent    Laundry  independent    Shopping  independent       Mental Status    General Appearance WDL  WDL     Willa Daugherty RN   Met with patient in room wearing PPE: mask, face shield/goggles, gloves, gown.      Maintained distance greater than six feet and spent less than 15 minutes in the room.

## 2021-06-03 NOTE — CONSULTS
CC--- pacemaker generator depletion with dyspnea      Consult requested by Dr. Agrawal      Sub--Sub--80-year-old pleasant patient has a [employer]onic dual-chamber pacemaker and recent pacemaker interrogation revealed longevity less than 1 month and was seen for pacemaker battery change.    Patient has  clinical history of hypertension and had prior history of vasovagal syncope currently on midodrine and also has sick sinus syndrome needing a pacemaker implantation in the past.  Prior work-up showed normal EF and negative work-up for pulmonary embolus or any coronary artery stenosis.  She complains of chronic mild fatigue  Her pacemaker is raised battery depletion and pacemaker changed to VVI format by protocol and patient feels poorly with dyspnea and came to the ER and my consultation requested  Denies any febrile illness or leg edema and feels lightheaded without syncope        Past Medical History:   Diagnosis Date   • Abnormal cardiovascular stress test 3/21/2017   • Allergic rhinitis    • Anxiety    • Basal cell carcinoma (BCC) of face    • Biliary dyskinesia    • Bradycardia    • Cardiomegaly    • Cataract    • Cervical spinal stenosis    • Chronic constipation    • Chronic insomnia    • DDD (degenerative disc disease), cervical     C-spine, T-spine, L-Spine   • DDD (degenerative disc disease), lumbar 7/25/2019   • Degeneration of intervertebral disc of thoracic region 3/18/2017   • Diplopia     Dr. Laird   • Dysphagia 4/25/2019   • Emphysema of lung (CMS/HCC)    • Fatty liver    • Fracture of multiple ribs 07/2018    Left 4th-8th   • Gastroesophageal reflux disease 12/4/2012   • GERD (gastroesophageal reflux disease)    • Hematuria 12/29/2014   • History of recent fall    • Hypertension    • Hypothyroidism    • Injury of back    • Insomnia 3/7/2016   • Kidney stone    • Left arm pain    • Nasal fracture 07/2018   • Osteoarthritis    • Osteoporosis     h/o tx with Fosamax but quit in 2012 due to fears of  complications   • Prediabetes    • Rotator cuff tear     right   • Seizure disorder (CMS/HCC)     Dr. Shannon Bennett   • Shortness of breath 5/8/2019   • Sick sinus syndrome (CMS/HCC)     Dr. Agrawal   • Spinal stenosis of cervical region 2/11/2015   • Squamous cell skin cancer, face    • Traumatic brain injury (CMS/HCC)     from fall   • Urinary incontinence      Past Surgical History:   Procedure Laterality Date   • BILATERAL SALPINGO OOPHORECTOMY      for benign cysts   • BREAST CYST ASPIRATION Left    • CARDIAC CATHETERIZATION  08/25/2009    25% LAD. L Cx luminal irregularities. Normal L main   • CARDIAC CATHETERIZATION  03/29/2017    25% LAD. 10-20% LCX. ER 65%. Dr. Agrawal.    • CARDIOVASCULAR STRESS TEST  2019   • CATARACT EXTRACTION  2006   • COSMETIC SURGERY      Lip   • ECHO - CONVERTED  2019   • ENDOSCOPY  05/17/2017    Dr. Matt Owens   • ENDOSCOPY N/A 8/22/2019    Procedure: ESOPHAGOGASTRODUODENOSCOPY WITH DILATATION (BOUGIE #46,50);  Surgeon: Joaquin Story MD;  Location: [facility] ENDOSCOPY;  Service: Gastroenterology   • INSERT / REPLACE / REMOVE PACEMAKER     • KNEE ARTHROSCOPY Left 1992   • OOPHORECTOMY     • OTHER SURGICAL HISTORY Right 07/2017    Right eye, YAG Capsuloyomy , Dr. Lemus   • PACEMAKER IMPLANTATION  2009   • ROTATOR CUFF REPAIR Right     Dr. Goldberg   • TONSILLECTOMY     • TOTAL HIP ARTHROPLASTY Bilateral      Family History   Problem Relation Age of Onset   • Heart disease Mother    • Pancreatic cancer Mother    • Prostate cancer Father    • Breast cancer Sister         4 sisters have had   • Pancreatic cancer Brother    • Colon cancer Brother    • Stroke Maternal Grandmother      Social History     Tobacco Use   • Smoking status: Never Smoker   • Smokeless tobacco: Never Used   • Tobacco comment: Patient is advised not to smoke.   Vaping Use   • Vaping Use: Never used   Substance Use Topics   • Alcohol use: No   • Drug use: No       Allergies:  Patient has no known  allergies.    Review of Systems   General:  positive for fatigue and tiredness  Eyes: No redness  Cardiovascular: No chest pain, no palpitations  Respiratory:   positive for class 3 shortness of breath  Gastrointestinal: No nausea or vomiting, bleeding  Genitourinary: no hematuria or dysuria  Musculoskeletal: No arthralgia or myalgia  Skin: No rash  Neurologic: No numbness, tingling, syncope  Hematologic/Lymphatic: No abnormal bleeding      Physical Exam  VITALS REVIEWED  Pulse rate is 60 patient afebrile respiration 12 times minute blood pressure 110/70    General:      well developed, well nourished, in no acute distress.    Head:      normocephalic and atraumatic.    Eyes:      PERRL/EOM intact, conjunctiva and sclera clear with out nystagmus.    Neck:      no masses, thyromegaly,  trachea central with normal respiratory effort and PMI displaced laterally  Lungs:      clear bilaterally to auscultation.    Heart:       Paced rhythm without gallops  Msk:      no deformity or scoliosis noted of thoracic or lumbar spine.    Pulses:      pulses normal in all 4 extremities.    Extremities:       no cyanosis or clubbing--trace left pedal edema and trace right pedal edema.    Neurologic:      no focal deficits.   alert oriented x3  Skin:      intact without lesions or rashes.    Psych:      alert and cooperative; normal mood and affect; normal attention span and concentration.          CBC    Results from last 7 days   Lab Units 06/03/21  1116   WBC 10*3/mm3 6.60   HEMOGLOBIN g/dL 12.2   PLATELETS 10*3/mm3 185     BMP   Results from last 7 days   Lab Units 06/03/21  1116   SODIUM mmol/L 142   POTASSIUM mmol/L 4.2   CHLORIDE mmol/L 109*   CO2 mmol/L 21.0*   BUN mg/dL 25*   CREATININE mg/dL 1.13*   GLUCOSE mg/dL 97           Assessment plan    Dual-chamber pacemaker in situ with pacemaker battery depletion and patient is in VVI format feeling poorly and pacemaker generator change scheduled today and risks and benefits  educated and orders placed  History of vasovagal syncope on midodrine  Sick sinus syndrome  Bradycardia      Electronically signed by Bharathi Mora MD, 06/03/21, 12:48 PM EDT.

## 2021-06-03 NOTE — H&P
"      [facility] Medicine Services      Patient Name: Anila Spencer  : 1940  MRN: [id number]  Primary Care Physician: Helena Mas APRN  Date of admission: 6/3/2021    Patient Care Team:  Helena Mas APRN as PCP - Shannon Vitale DO as Consulting Physician (Neurology)  Adam Agrawal MD as Consulting Physician (Cardiology)  León Berg MD (Orthopedic Surgery)  Joaquin Story MD as Consulting Physician (Gastroenterology)  Banet, Duane Edward, MD as Consulting Physician (Dermatology)          Subjective   History Present Illness     Chief Complaint: Shortness of breath, chest tightness    Ms. Spencer is a 80 y.o. female with a past medical history of cardiac pacemaker, hypertension, sick sinus syndrome, hypothyroidism, seizure disorder, GERD, anxiety, and overactive bladder who presented to [facility] on 6/3/2021 with complaints of shortness of breath.  Patient explains she had shortness of breath and chest tightness that started 1 month ago.  She explains over the past month it is progressively gotten worse.  She went to cardiology yesterday and was told she needs her pacemaker replaced and was set up for next Tuesday.  She explains overnight she felt like she \"could not make it.\"  Her shortness of breath has been worse with exertion as well as her chest tightness.  She states she feels better resting.  She is also had accompanying nausea.  She reports she has had diarrhea, but has been getting worked up for this and had a HIDA scan.  She denies recent vomiting, fever, chills, or cough.    In the ED, EKG showed Ventricular-paced complexes, Prolonged MN interval, Nonspecific T abnrm, anterolateral leads.  Covid not detected.  All labs unremarkable upon admission except proBNP 3822, creatinine 1.1, BUN 25.  All vital signs stable upon admission except blood pressure ranging from 140s to 160s systolically.  Patient received Ancef in the ED.  " Cardiology consulted.    Review of Systems   Constitutional: Negative.   HENT: Negative.    Cardiovascular: Positive for chest pain and dyspnea on exertion.   Respiratory: Positive for shortness of breath.    Skin: Negative.    Musculoskeletal: Negative.    Gastrointestinal: Positive for nausea.   Genitourinary: Negative.    Neurological: Negative.    Psychiatric/Behavioral: Negative.            Personal History     Past Medical History:   Past Medical History:   Diagnosis Date   • Abnormal cardiovascular stress test 3/21/2017   • Allergic rhinitis    • Anxiety    • Basal cell carcinoma (BCC) of face    • Biliary dyskinesia    • Bradycardia    • Cardiomegaly    • Cataract    • Cervical spinal stenosis    • Chronic constipation    • Chronic insomnia    • DDD (degenerative disc disease), cervical     C-spine, T-spine, L-Spine   • DDD (degenerative disc disease), lumbar 7/25/2019   • Degeneration of intervertebral disc of thoracic region 3/18/2017   • Diplopia     Dr. Laird   • Dysphagia 4/25/2019   • Emphysema of lung (CMS/HCC)    • Fatty liver    • Fracture of multiple ribs 07/2018    Left 4th-8th   • Gastroesophageal reflux disease 12/4/2012   • GERD (gastroesophageal reflux disease)    • Hematuria 12/29/2014   • History of recent fall    • Hypertension    • Hypothyroidism    • Injury of back    • Insomnia 3/7/2016   • Kidney stone    • Left arm pain    • Nasal fracture 07/2018   • Osteoarthritis    • Osteoporosis     h/o tx with Fosamax but quit in 2012 due to fears of complications   • Prediabetes    • Rotator cuff tear     right   • Seizure disorder (CMS/HCC)     Dr. Shannon Bennett   • Shortness of breath 5/8/2019   • Sick sinus syndrome (CMS/[facility])     Dr. Agrawal   • Spinal stenosis of cervical region 2/11/2015   • Squamous cell skin cancer, face    • Traumatic brain injury (CMS/HCC)     from fall   • Urinary incontinence        Surgical History:      Past Surgical History:   Procedure Laterality Date   • BILATERAL  SALPINGO OOPHORECTOMY      for benign cysts   • BREAST CYST ASPIRATION Left    • CARDIAC CATHETERIZATION  08/25/2009    25% LAD. L Cx luminal irregularities. Normal L main   • CARDIAC CATHETERIZATION  03/29/2017    25% LAD. 10-20% LCX. ER 65%. Dr. Agrawal.    • CARDIOVASCULAR STRESS TEST  2019   • CATARACT EXTRACTION  2006   • COSMETIC SURGERY      Lip   • ECHO - CONVERTED  2019   • ENDOSCOPY  05/17/2017    Normal, Dr. Gutierrez   • ENDOSCOPY N/A 8/22/2019    Procedure: ESOPHAGOGASTRODUODENOSCOPY WITH DILATATION (BOUGIE #[id number]);  Surgeon: Joaquin Story MD;  Location: [facility] ENDOSCOPY;  Service: Gastroenterology   • INSERT / REPLACE / REMOVE PACEMAKER     • KNEE ARTHROSCOPY Left 1992   • OOPHORECTOMY     • OTHER SURGICAL HISTORY Right 07/2017    Right eye, YAG Capsuloyomy , Dr. Lemus   • PACEMAKER IMPLANTATION  2009   • ROTATOR CUFF REPAIR Right     Dr. Goldberg   • TONSILLECTOMY     • TOTAL HIP ARTHROPLASTY Bilateral            Family History: family history includes Breast cancer in her sister; Colon cancer in her brother; Heart disease in her mother; Pancreatic cancer in her brother and mother; Prostate cancer in her father; Stroke in her maternal grandmother. Family History was reviewed.     Social History:  reports that she has never smoked. She has never used smokeless tobacco. She reports that she does not drink alcohol and does not use drugs.      Medications:  Prior to Admission medications    Medication Sig Start Date End Date Taking? Authorizing Provider   Calcium Carbonate-Vitamin D3 (CALCIUM 600-D) 600-400 MG-UNIT tablet Take 1 tablet by mouth 2 (Two) Times a Day. 5/15/13  Yes ProviderNicholas MD   Dexilant 60 MG capsule TAKE ONE CAPSULE BY MOUTH ONCE DAILY  Patient taking differently: Take 60 mg by mouth Daily. 10/8/20  Yes Helena Mas APRN   escitalopram (LEXAPRO) 5 MG tablet Take 5 mg by mouth Daily.   Yes ProviderNicholas MD   fesoterodine fumarate (TOVIAZ ER) 8 MG tablet  sustained-release 24 hour tablet Take 8 mg by mouth Every Evening.   Yes Nicholas Quiñones MD   levothyroxine (SYNTHROID, LEVOTHROID) 50 MCG tablet TAKE 1 TABLET BY MOUTH EVERY DAY 4/6/21  Yes Helena Mas APRN   losartan (COZAAR) 25 MG tablet TAKE 1 TABLET BY MOUTH EVERY DAY FOR BLOOD PRESSURE 5/17/21  Yes Adam Agrawal MD   metoprolol tartrate (LOPRESSOR) 50 MG tablet Take 1 tablet by mouth 2 (Two) Times a Day. 4/13/21  Yes Helena Mas APRN   midodrine (PROAMATINE) 5 MG tablet TAKE 1 TABLET BY MOUTH EVERY DAY 5/24/21  Yes Adam Agrawal MD   multivitamin with minerals tablet tablet Take 1 tablet by mouth Daily.   Yes Nicholas Quiñones MD   psyllium (METAMUCIL) 58.6 % packet Take 1 packet by mouth Daily.   Yes Nicholas Quiñones MD   zonisamide (ZONEGRAN) 100 MG capsule Take 300 mg by mouth Every Evening. Take three 100 mg capsules may take four if needed 9/30/19  Yes Nicholas Quiñones MD   docusate sodium (Colace) 100 MG capsule Take 2 capsules by mouth Every Night. 4/20/21 6/3/21  Daiana Mcfarlane APRN   escitalopram (LEXAPRO) 10 MG tablet Take 5 mg by mouth Daily. 7/26/18 6/3/21  Nicholas Quiñones MD   Multiple Vitamins-Minerals (MULTI VITAMIN/MINERALS) tablet MULTI VITAMIN/MINERALS TABS 11/20/12 6/3/21  Nicholas Quiñones MD   polyethylene glycol (MIRALAX) 17 g packet Take 17 g by mouth Daily. 4/20/21 6/3/21  Daiana Mcfarlane APRN       Allergies:  No Known Allergies    Objective   Objective     Vital Signs  Temp:  [97.7 °F (36.5 °C)] 97.7 °F (36.5 °C)  Heart Rate:  [70-97] 72  Resp:  [16-20] 16  BP: (142-164)/(63-83) 159/72  SpO2:  [96 %-99 %] 96 %  on   ;      Body mass index is 35.07 kg/m².    Physical Exam  Vitals reviewed.   Constitutional:       Appearance: Normal appearance. She is obese.   HENT:      Head: Normocephalic.      Nose: Nose normal.      Mouth/Throat:      Mouth: Mucous membranes are moist.      Pharynx: Oropharynx is clear.   Eyes:      Extraocular Movements:  Extraocular movements intact.      Conjunctiva/sclera: Conjunctivae normal.      Pupils: Pupils are equal, round, and reactive to light.   Cardiovascular:      Rate and Rhythm: Normal rate and regular rhythm.      Pulses: Normal pulses.      Heart sounds: Normal heart sounds.      Comments: S1, S2 audible  Pulmonary:      Effort: Pulmonary effort is normal.      Breath sounds: Normal breath sounds.      Comments: On room air   Abdominal:      General: Abdomen is flat. Bowel sounds are normal.      Palpations: Abdomen is soft.   Musculoskeletal:         General: Normal range of motion.      Cervical back: Normal range of motion.   Skin:     General: Skin is warm and dry.   Neurological:      General: No focal deficit present.      Mental Status: She is alert and oriented to person, place, and time. Mental status is at baseline.   Psychiatric:         Mood and Affect: Mood normal.         Behavior: Behavior normal.         Thought Content: Thought content normal.         Judgment: Judgment normal.           Results Review:  I have personally reviewed most recent cardiac tracings, lab results and radiology images and interpretations and agree with findings.    Results from last 7 days   Lab Units 06/03/21  1116   WBC 10*3/mm3 6.60   HEMOGLOBIN g/dL 12.2   HEMATOCRIT % 36.1   PLATELETS 10*3/mm3 185   INR  1.01     Results from last 7 days   Lab Units 06/03/21  1116   SODIUM mmol/L 142   POTASSIUM mmol/L 4.2   CHLORIDE mmol/L 109*   CO2 mmol/L 21.0*   BUN mg/dL 25*   CREATININE mg/dL 1.13*   GLUCOSE mg/dL 97   CALCIUM mg/dL 8.7   TROPONIN T ng/mL <0.010   PROBNP pg/mL 3,822.0*     Estimated Creatinine Clearance: 40.8 mL/min (A) (by C-G formula based on SCr of 1.13 mg/dL (H)).  Brief Urine Lab Results  (Last result in the past 365 days)      Color   Clarity   Blood   Leuk Est   Nitrite   Protein   CREAT   Urine HCG        07/16/20 1430 Yellow Clear Negative Negative Negative Negative               Microbiology Results  (last 10 days)     Procedure Component Value - Date/Time    COVID PRE-OP / PRE-PROCEDURE SCREENING ORDER (NO ISOLATION) - Swab, Nasopharynx [[id number]]  (Normal) Collected: 06/03/21 1153    Lab Status: Final result Specimen: Swab from Nasopharynx Updated: 06/03/21 1301    Narrative:      The following orders were created for panel order COVID PRE-OP / PRE-PROCEDURE SCREENING ORDER (NO ISOLATION) - Swab, Nasopharynx.  Procedure                               Abnormality         Status                     ---------                               -----------         ------                     COVID-19,CEPHEID,COR/MARRY...[[id number]]  Normal              Final result                 Please view results for these tests on the individual orders.    COVID-19,CEPHEID,COR/MARRY/PAD IN-HOUSE(OR EMERGENT/ADD-ON),NP SWAB IN TRANSPORT MEDIA 3-4 HR TAT, RT-PCR - Swab, Nasopharynx [[id number]]  (Normal) Collected: 06/03/21 1153    Lab Status: Final result Specimen: Swab from Nasopharynx Updated: 06/03/21 1301     COVID19 Not Detected    Narrative:      Fact sheet for providers: https://www.fda.gov/media/841463/download     Fact sheet for patients: https://www.fda.gov/media/104850/download  Fact sheet for providers: https://www.fda.gov/media/840171/download     Fact sheet for patients: https://www.fda.gov/media/034085/download          ECG/EMG Results (most recent)     Procedure Component Value Units Date/Time    ECG 12 Lead [[id number]] Collected: 06/03/21 1054     Updated: 06/03/21 1055     QT Interval 407 ms     Narrative:      HEART RATE= 66  bpm  RR Interval= 913  ms  WY Interval= 256  ms  P Horizontal Axis=   deg  P Front Axis= 63  deg  QRSD Interval= 90  ms  QT Interval= 407  ms  QRS Axis= 15  deg  T Wave Axis= 101  deg  - ABNORMAL ECG -  Ventricular-paced complexes  Prolonged WY interval  Nonspecific T abnrm, anterolateral leads  When compared with ECG of 13-Jan-2015 14:31:57,  New conduction abnormality  Significant repolarization  change  Electronically Signed By:   Date and Time of Study: 2021-06-03 10:54:13              Results for orders placed during the hospital encounter of 10/15/19    Adult Transthoracic Echo Complete W/ Cont if Necessary Per Protocol    Interpretation Summary  · Left ventricular systolic function is normal.  · Left ventricular wall thickness is consistent with mild concentric hypertrophy.  · Left ventricular diastolic dysfunction (grade I) consistent with impaired relaxation.  · Estimated EF appears to be in the range of 61 - 65%.  · Mild mitral valve regurgitation is present  · Mild tricuspid valve regurgitation is present.      No radiology results for the last 7 days      Estimated Creatinine Clearance: 40.8 mL/min (A) (by C-G formula based on SCr of 1.13 mg/dL (H)).    Assessment/Plan   Assessment/Plan       Active Hospital Problems    Diagnosis  POA   • **Elective replacement indicated for cardiac pacemaker battery at end of lifespan [Z45.010]  Not Applicable     Priority: High   • Overactive bladder [N32.81]  Yes   • Obesity (BMI 30-39.9) [E66.9]  Yes   • Orthostatic hypotension [I95.1]  Yes   • Hypothyroidism [E03.9]  Yes   • Seizure disorder (CMS/HCC) [G40.909]  Yes   • GERD (gastroesophageal reflux disease) [K21.9]  Yes   • Anxiety [F41.9]  Yes   • Emphysema of lung (CMS/HCC) [J43.9]  Yes   • Sick sinus syndrome (CMS/HCC) [I49.5]  Yes   • Essential hypertension [I10]  Yes   • Presence of cardiac pacemaker [Z95.0]  Yes      Resolved Hospital Problems   No resolved problems to display.       Elective replacement indicated for cardiac pacemaker battery  -EKG reviewed  -proBNP 3822, monitor   -Continuous cardiac monitoring  - NPO, plan for pacemaker generator change   -Cardiology and EP consulted    Essential hypertension  -Uncontrolled  -Monitor blood pressure  -Continue metoprolol and losartan  - IV hydralazine ordered PRN     Chronic orthostatic hypotension  - Monitor blood pressure   -Continue  midodrine    GERD  -On Dexilant at home    Hypothyroidism  -Continue levothyroxine    Seizure  -Seizure precautions  - No reported home medications    Anxiety  - Stable   -Continue Lexapro    Overactive bladder   - Continue toviaz     Empheysema of the lung  - Check CXR   - Albuterol ordered PRN    Obesity  - BMI 35.0  - Encourage lifestyle modifications        VTE Prophylaxis - SCD's    CODE STATUS:    Code Status and Medical Interventions:   Ordered at: 06/03/21 7977     Level Of Support Discussed With:    Patient     Code Status:    CPR     Medical Interventions (Level of Support Prior to Arrest):    Full       This patient has been examined wearing appropriate Personal Protective Equipment. 06/03/21      I discussed the patient's findings and my recommendations with patient and family.      Signature: Electronically signed by VALENTINO Cramer, 06/03/21, 2:48 PM EDT.    [facility] Hospitalist Team

## 2021-06-04 ENCOUNTER — READMISSION MANAGEMENT (OUTPATIENT)
Dept: CALL CENTER | Facility: HOSPITAL | Age: 81
End: 2021-06-04

## 2021-06-04 VITALS
HEIGHT: 63 IN | OXYGEN SATURATION: 96 % | BODY MASS INDEX: 35.66 KG/M2 | DIASTOLIC BLOOD PRESSURE: 46 MMHG | WEIGHT: 201.28 LBS | RESPIRATION RATE: 11 BRPM | TEMPERATURE: 97.6 F | HEART RATE: 60 BPM | SYSTOLIC BLOOD PRESSURE: 127 MMHG

## 2021-06-04 PROBLEM — Z45.010 PACEMAKER BATTERY DEPLETION: Status: RESOLVED | Noted: 2017-10-27 | Resolved: 2021-06-04

## 2021-06-04 LAB
ANION GAP SERPL CALCULATED.3IONS-SCNC: 12 MMOL/L (ref 5–15)
BUN SERPL-MCNC: 22 MG/DL (ref 8–23)
BUN/CREAT SERPL: 20.2 (ref 7–25)
CALCIUM SPEC-SCNC: 8.2 MG/DL (ref 8.6–10.5)
CHLORIDE SERPL-SCNC: 109 MMOL/L (ref 98–107)
CO2 SERPL-SCNC: 21 MMOL/L (ref 22–29)
CREAT SERPL-MCNC: 1.09 MG/DL (ref 0.57–1)
DEPRECATED RDW RBC AUTO: 49 FL (ref 37–54)
ERYTHROCYTE [DISTWIDTH] IN BLOOD BY AUTOMATED COUNT: 14.4 % (ref 12.3–15.4)
GFR SERPL CREATININE-BSD FRML MDRD: 48 ML/MIN/1.73
GLUCOSE SERPL-MCNC: 89 MG/DL (ref 65–99)
HCT VFR BLD AUTO: 34.2 % (ref 34–46.6)
HGB BLD-MCNC: 11.5 G/DL (ref 12–15.9)
MAGNESIUM SERPL-MCNC: 2 MG/DL (ref 1.6–2.4)
MCH RBC QN AUTO: 32.5 PG (ref 26.6–33)
MCHC RBC AUTO-ENTMCNC: 33.7 G/DL (ref 31.5–35.7)
MCV RBC AUTO: 96.7 FL (ref 79–97)
NT-PROBNP SERPL-MCNC: 3947 PG/ML (ref 0–1800)
PLATELET # BLD AUTO: 170 10*3/MM3 (ref 140–450)
PMV BLD AUTO: 9.2 FL (ref 6–12)
POTASSIUM SERPL-SCNC: 4.1 MMOL/L (ref 3.5–5.2)
RBC # BLD AUTO: 3.54 10*6/MM3 (ref 3.77–5.28)
SODIUM SERPL-SCNC: 142 MMOL/L (ref 136–145)
WBC # BLD AUTO: 8.1 10*3/MM3 (ref 3.4–10.8)

## 2021-06-04 PROCEDURE — 85027 COMPLETE CBC AUTOMATED: CPT | Performed by: INTERNAL MEDICINE

## 2021-06-04 PROCEDURE — 63710000001 METOPROLOL TARTRATE 50 MG TABLET: Performed by: INTERNAL MEDICINE

## 2021-06-04 PROCEDURE — 36415 COLL VENOUS BLD VENIPUNCTURE: CPT | Performed by: INTERNAL MEDICINE

## 2021-06-04 PROCEDURE — 63710000001 PSYLLIUM 58.12 % PACK: Performed by: INTERNAL MEDICINE

## 2021-06-04 PROCEDURE — 99217 PR OBSERVATION CARE DISCHARGE MANAGEMENT: CPT | Performed by: INTERNAL MEDICINE

## 2021-06-04 PROCEDURE — A9270 NON-COVERED ITEM OR SERVICE: HCPCS | Performed by: INTERNAL MEDICINE

## 2021-06-04 PROCEDURE — 63710000001 ESCITALOPRAM 10 MG TABLET: Performed by: INTERNAL MEDICINE

## 2021-06-04 PROCEDURE — 63710000001 PANTOPRAZOLE 40 MG TABLET DELAYED-RELEASE: Performed by: INTERNAL MEDICINE

## 2021-06-04 PROCEDURE — 83735 ASSAY OF MAGNESIUM: CPT | Performed by: INTERNAL MEDICINE

## 2021-06-04 PROCEDURE — 25010000002 CEFAZOLIN PER 500 MG: Performed by: INTERNAL MEDICINE

## 2021-06-04 PROCEDURE — 63710000001 HYDROCODONE-ACETAMINOPHEN 5-325 MG TABLET: Performed by: INTERNAL MEDICINE

## 2021-06-04 PROCEDURE — 63710000001 LOSARTAN 25 MG TABLET: Performed by: INTERNAL MEDICINE

## 2021-06-04 PROCEDURE — A9270 NON-COVERED ITEM OR SERVICE: HCPCS | Performed by: NURSE PRACTITIONER

## 2021-06-04 PROCEDURE — G0378 HOSPITAL OBSERVATION PER HR: HCPCS

## 2021-06-04 PROCEDURE — 83880 ASSAY OF NATRIURETIC PEPTIDE: CPT | Performed by: INTERNAL MEDICINE

## 2021-06-04 PROCEDURE — 80048 BASIC METABOLIC PNL TOTAL CA: CPT | Performed by: INTERNAL MEDICINE

## 2021-06-04 PROCEDURE — 63710000001 MIDODRINE 5 MG TABLET: Performed by: NURSE PRACTITIONER

## 2021-06-04 PROCEDURE — 63710000001 LEVOTHYROXINE 50 MCG TABLET: Performed by: INTERNAL MEDICINE

## 2021-06-04 PROCEDURE — 99024 POSTOP FOLLOW-UP VISIT: CPT | Performed by: NURSE PRACTITIONER

## 2021-06-04 PROCEDURE — 63710000001 OXYBUTYNIN XL 10 MG TABLET SUSTAINED-RELEASE 24 HOUR: Performed by: INTERNAL MEDICINE

## 2021-06-04 PROCEDURE — 63710000001 MULTIVITAMIN WITH MINERALS TABLET: Performed by: INTERNAL MEDICINE

## 2021-06-04 RX ADMIN — LOSARTAN POTASSIUM 25 MG: 25 TABLET, FILM COATED ORAL at 08:23

## 2021-06-04 RX ADMIN — LEVOTHYROXINE SODIUM 50 MCG: 0.05 TABLET ORAL at 08:23

## 2021-06-04 RX ADMIN — MULTIPLE VITAMINS W/ MINERALS TAB 1 TABLET: TAB at 08:23

## 2021-06-04 RX ADMIN — METOPROLOL TARTRATE 50 MG: 50 TABLET, FILM COATED ORAL at 08:24

## 2021-06-04 RX ADMIN — OXYBUTYNIN CHLORIDE 10 MG: 10 TABLET, EXTENDED RELEASE ORAL at 08:23

## 2021-06-04 RX ADMIN — HYDROCODONE BITARTRATE AND ACETAMINOPHEN 1 TABLET: 5; 325 TABLET ORAL at 01:05

## 2021-06-04 RX ADMIN — CEFAZOLIN SODIUM 2 G: 10 INJECTION, POWDER, FOR SOLUTION INTRAVENOUS at 10:42

## 2021-06-04 RX ADMIN — HYDROCODONE BITARTRATE AND ACETAMINOPHEN 1 TABLET: 5; 325 TABLET ORAL at 13:25

## 2021-06-04 RX ADMIN — PSYLLIUM HUSK 1 PACKET: 3.4 POWDER ORAL at 08:23

## 2021-06-04 RX ADMIN — CEFAZOLIN SODIUM 2 G: 10 INJECTION, POWDER, FOR SOLUTION INTRAVENOUS at 01:05

## 2021-06-04 RX ADMIN — HYDROCODONE BITARTRATE AND ACETAMINOPHEN 1 TABLET: 5; 325 TABLET ORAL at 08:23

## 2021-06-04 RX ADMIN — PANTOPRAZOLE SODIUM 40 MG: 40 TABLET, DELAYED RELEASE ORAL at 08:23

## 2021-06-04 RX ADMIN — Medication 10 ML: at 08:24

## 2021-06-04 RX ADMIN — ESCITALOPRAM OXALATE 5 MG: 10 TABLET ORAL at 08:23

## 2021-06-04 RX ADMIN — MIDODRINE HYDROCHLORIDE 5 MG: 5 TABLET ORAL at 08:24

## 2021-06-04 NOTE — PLAN OF CARE
No complaints per patient.  Vitals stable.  All needs met.  Problem: Adult Inpatient Plan of Care  Goal: Absence of Hospital-Acquired Illness or Injury  Intervention: Identify and Manage Fall Risk  Recent Flowsheet Documentation  Taken 6/4/2021 0000 by Veena Manjarrez RN  Safety Promotion/Fall Prevention:   safety round/check completed   room organization consistent   nonskid shoes/slippers when out of bed  Taken 6/3/2021 2200 by Veena Manjarrez RN  Safety Promotion/Fall Prevention:   safety round/check completed   room organization consistent   nonskid shoes/slippers when out of bed  Taken 6/3/2021 2100 by Veena Manjarrez RN  Safety Promotion/Fall Prevention:   safety round/check completed   room organization consistent   nonskid shoes/slippers when out of bed  Intervention: Prevent and Manage VTE (venous thromboembolism) Risk  Recent Flowsheet Documentation  Taken 6/3/2021 2100 by Veena Manjarrez RN  VTE Prevention/Management:   bilateral   sequential compression devices on  Goal: Optimal Comfort and Wellbeing  Intervention: Provide Person-Centered Care  Recent Flowsheet Documentation  Taken 6/4/2021 0000 by Veena Manjarrez RN  Trust Relationship/Rapport:   care explained   questions answered   questions encouraged   thoughts/feelings acknowledged  Taken 6/3/2021 2100 by Veena Manjarrez RN  Trust Relationship/Rapport:   care explained   questions answered   questions encouraged   thoughts/feelings acknowledged     Problem: Hypertension Comorbidity  Goal: Blood Pressure in Desired Range  Intervention: Maintain Hypertension-Management Strategies  Recent Flowsheet Documentation  Taken 6/3/2021 2100 by Veena Manjarrez RN  Medication Review/Management: medications reviewed     Problem: Seizure Disorder Comorbidity  Goal: Maintenance of Seizure Control  Intervention: Maintain Seizure-Symptom Control  Recent Flowsheet Documentation  Taken 6/4/2021 0000 by Veena Manjarrez RN  Seizure Precautions: activity  supervised  Taken 6/3/2021 2100 by Veena Manjarrez, RN  Seizure Precautions: activity supervised   Goal Outcome Evaluation:

## 2021-06-04 NOTE — OUTREACH NOTE
Prep Survey      Responses   [facility] facility patient discharged from?  [facility]   Is LACE score < 7 ?  Yes   Emergency Room discharge w/ pulse ox?  No   Eligibility  TCM   Hospital  [facility]   Date of Admission  06/03/21   Date of Discharge  06/04/21   Discharge Disposition  Home or Self Care   Discharge diagnosis  Elective replacement for cardiac pacemaker   Does the patient have one of the following disease processes/diagnoses(primary or secondary)?  Other   Does the patient have Home health ordered?  No   Is there a DME ordered?  No   Prep survey completed?  Yes          Chichi Yao RN

## 2021-06-04 NOTE — DISCHARGE SUMMARY
Date of Admission: 6/3/2021    Date of Discharge:  6/4/2021    Length of stay:  LOS: 0 days     Presenting Problem:   Elective replacement indicated for cardiac pacemaker battery at end of lifespan [Z45.010]  Dyspnea, unspecified type [R06.00]      Active Diagnosis During Hospital Stay/Discharge Diagnoses/Course by Diagnoses:    ZARCO/Chest Heaviness  -likely related to PM syndrome  -s/p pacermaker battery changed  -Sx resolved  -follow up cardiology 10 days for wound check     Essential hypertension  -Uncontrolled  -Continue metoprolol and losartan     Chronic orthostatic hypotension  - Monitor blood pressure   -Continue midodrine     GERD  -On Dexilant at home     Hypothyroidism  -Continue levothyroxine     H/o Seizures  - No reported home medications     Anxiety  - Stable   -Continue Lexapro     Overactive bladder   - Continue toviaz      Empheysema of the lung  - Check CXR   - Albuterol ordered PRN     Obesity  - BMI 35.0  - Encourage lifestyle modifications       Active Hospital Problems    Diagnosis  POA   • **Elective replacement indicated for cardiac pacemaker battery at end of lifespan [Z45.010]  Not Applicable   • Overactive bladder [N32.81]  Yes   • Obesity (BMI 30-39.9) [E66.9]  Yes   • Orthostatic hypotension [I95.1]  Yes   • Hypothyroidism [E03.9]  Yes   • Seizure disorder (CMS/HCC) [G40.909]  Yes   • GERD (gastroesophageal reflux disease) [K21.9]  Yes   • Anxiety [F41.9]  Yes   • Emphysema of lung (CMS/HCC) [J43.9]  Yes   • Sick sinus syndrome (CMS/HCC) [I49.5]  Yes   • Essential hypertension [I10]  Yes      Resolved Hospital Problems    Diagnosis Date Resolved POA   • Pacemaker battery depletion [Z45.010] 06/04/2021 Not Applicable         Hospital Course  Patient is a 80 y.o. female presented with ZARCO and chest heaviness was due for pacemaker battery changed. Was admitted this was done and she was felt stable to d/c home.        Procedures Performed:    Procedure(s):  PPM generator change - dual     MDT  -------------------       Consults:   Consults     Date and Time Order Name Status Description    6/3/2021 11:11 AM Cardiology (on-call MD unless specified) Completed           Pertinent Test Results:     Results from last 7 days   Lab Units 06/04/21  0458 06/03/21  1116   WBC 10*3/mm3 8.10 6.60   HEMOGLOBIN g/dL 11.5* 12.2   HEMATOCRIT % 34.2 36.1   PLATELETS 10*3/mm3 170 185     Results from last 7 days   Lab Units 06/04/21  0458 06/03/21  1116   SODIUM mmol/L 142 142   POTASSIUM mmol/L 4.1 4.2   CHLORIDE mmol/L 109* 109*   CO2 mmol/L 21.0* 21.0*   BUN mg/dL 22 25*   CREATININE mg/dL 1.09* 1.13*   CALCIUM mg/dL 8.2* 8.7   GLUCOSE mg/dL 89 97       Microbiology Results (last 10 days)     Procedure Component Value - Date/Time    COVID PRE-OP / PRE-PROCEDURE SCREENING ORDER (NO ISOLATION) - Swab, Nasopharynx [[id number]]  (Normal) Collected: 06/03/21 1153    Lab Status: Final result Specimen: Swab from Nasopharynx Updated: 06/03/21 1301    Narrative:      The following orders were created for panel order COVID PRE-OP / PRE-PROCEDURE SCREENING ORDER (NO ISOLATION) - Swab, Nasopharynx.  Procedure                               Abnormality         Status                     ---------                               -----------         ------                     COVID-19,CEPHEID,COR/MARRY...[[id number]]  Normal              Final result                 Please view results for these tests on the individual orders.    COVID-19,CEPHEID,COR/MARRY/PAD IN-HOUSE(OR EMERGENT/ADD-ON),NP SWAB IN TRANSPORT MEDIA 3-4 HR TAT, RT-PCR - Swab, Nasopharynx [[id number]]  (Normal) Collected: 06/03/21 1153    Lab Status: Final result Specimen: Swab from Nasopharynx Updated: 06/03/21 1301     COVID19 Not Detected    Narrative:      Fact sheet for providers: https://www.fda.gov/media/681383/download     Fact sheet for patients: https://www.fda.gov/media/319940/download  Fact sheet for providers: https://www.fda.gov/media/321126/download     Fact  sheet for patients: https://www.fda.gov/media/391474/download          Results for orders placed during the hospital encounter of 10/15/19    Adult Transthoracic Echo Complete W/ Cont if Necessary Per Protocol    Interpretation Summary  · Left ventricular systolic function is normal.  · Left ventricular wall thickness is consistent with mild concentric hypertrophy.  · Left ventricular diastolic dysfunction (grade I) consistent with impaired relaxation.  · Estimated EF appears to be in the range of 61 - 65%.  · Mild mitral valve regurgitation is present  · Mild tricuspid valve regurgitation is present.      Imaging Results (All)     Procedure Component Value Units Date/Time    XR Chest 1 View [[id number]] Collected: 06/03/21 1529     Updated: 06/03/21 1532    Narrative:      DATE OF EXAM:  6/3/2021 2:55 PM     PROCEDURE:  XR CHEST 1 VW-     INDICATIONS:  new admit, preop; R06.00-Dyspnea, unspecified; Z45.010-Encounter for  checking and testing of cardiac pacemaker pulse generator (battery)       COMPARISON:  PA and lateral chest 7/6/2016     TECHNIQUE:   Single radiographic view of the chest was obtained.     FINDINGS:  There is mild cardiomegaly which is thought to be stable. The lungs  appear free of acute airspace disease. Pulmonary vascular distribution  is normal. Pacemaker device appears unchanged. No pleural effusion or  pneumothorax is identified. 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 No acute  osseous abnormalities.       Impression:      Stable mild cardiac enlargement. No acute chest findings.     Electronically Signed By-Daiana Muniz MD On:6/3/2021 3:29 PM  This report was finalized on [id number] by  Daiana Muniz MD.            Condition on Discharge:  Stable     Vital Signs  Temp:  [97.6 °F (36.4 °C)-98.1 °F (36.7 °C)] 97.6 °F (36.4 °C)  Heart Rate:  [60-97] 60  Resp:  [11-23] 11  BP: (127-182)/(46-78) 127/46    Physical  Exam:  Physical Exam  Vitals reviewed.   Eyes:      Pupils: Pupils are equal, round, and reactive to light.   Cardiovascular:      Rate and Rhythm: Normal rate.   Pulmonary:      Effort: No respiratory distress.   Abdominal:      General: Abdomen is flat.   Skin:     General: Skin is warm.   Neurological:      Mental Status: She is alert and oriented to person, place, and time.         Discharge Disposition  Home or Self Care    Discharge Medications     Discharge Medications      Changes to Medications      Instructions Start Date   Dexilant 60 MG capsule  Generic drug: dexlansoprazole  What changed: how much to take   TAKE ONE CAPSULE BY MOUTH ONCE DAILY         Continue These Medications      Instructions Start Date   Calcium 600-D 600-400 MG-UNIT tablet  Generic drug: Calcium Carbonate-Vitamin D3   1 tablet, Oral, 2 Times Daily      escitalopram 5 MG tablet  Commonly known as: LEXAPRO   5 mg, Oral, Daily      fesoterodine fumarate 8 MG tablet sustained-release 24 hour tablet  Commonly known as: TOVIAZ ER   8 mg, Oral, Every Evening      levothyroxine 50 MCG tablet  Commonly known as: SYNTHROID, LEVOTHROID   TAKE 1 TABLET BY MOUTH EVERY DAY      losartan 25 MG tablet  Commonly known as: COZAAR   TAKE 1 TABLET BY MOUTH EVERY DAY FOR BLOOD PRESSURE      metoprolol tartrate 50 MG tablet  Commonly known as: LOPRESSOR   50 mg, Oral, 2 Times Daily      midodrine 5 MG tablet  Commonly known as: PROAMATINE   TAKE 1 TABLET BY MOUTH EVERY DAY      multivitamin with minerals tablet tablet   1 tablet, Oral, Daily      psyllium 58.6 % packet  Commonly known as: METAMUCIL   1 packet, Oral, Daily      zonisamide 100 MG capsule  Commonly known as: ZONEGRAN   300 mg, Oral, Every Evening, Take three 100 mg capsules may take four if needed             Discharge Diet:   Diet Instructions     Diet: Cardiac      Discharge Diet: Cardiac          Activity at Discharge:   Activity Instructions     Gradually Increase Activity Until at  Pre-Hospitalization Level            Follow-up Appointments  Future Appointments   Date Time Provider Department Center   6/15/2021  3:30 PM Zee Bunch APRN [facility]   7/20/2021 10:00 AM Helena Mas APRN [facility]     Additional Instructions for the Follow-ups that You Need to Schedule     Discharge Follow-up with Specified Provider: cardiology   As directed      To: cardiology    Follow Up Details: 10 days               Test Results Pending at Discharge       Risk for Readmission (LACE) Score: 5 (6/4/2021  6:01 AM)      This patient has been examined wearing appropriate Personal Protective Equipment . 06/04/21      Electronically signed by Horace Pena DO, 06/04/21, 12:09 EDT.

## 2021-06-04 NOTE — PROGRESS NOTES
CARDIOLOGY FOLLOW-UP PROGRESS NOTE      Reason for follow-up:    Pacemaker generator at [facility]  Status post dual-chamber pacemaker generator replacement     Attending: Horace Pena, DO      Subjective .     Sore but denies chest pain or dyspnea     Review of Systems   Constitutional: Negative for decreased appetite and diaphoresis.   HENT: Negative for congestion, hearing loss and nosebleeds.    Cardiovascular: Negative for chest pain, claudication, dyspnea on exertion, irregular heartbeat, leg swelling, near-syncope, orthopnea, palpitations, paroxysmal nocturnal dyspnea and syncope.   Respiratory: Negative for cough, shortness of breath and sleep disturbances due to breathing.    Endocrine: Negative for polyuria.   Hematologic/Lymphatic: Does not bruise/bleed easily.   Skin: Negative for itching and rash.   Musculoskeletal: Negative for back pain, muscle weakness and myalgias.   Gastrointestinal: Negative for abdominal pain, change in bowel habit and nausea.   Genitourinary: Negative for dysuria, flank pain, frequency and hesitancy.   Neurological: Negative for dizziness, tremors and weakness.   Psychiatric/Behavioral: Negative for altered mental status. The patient does not have insomnia.        Allergies: Patient has no known allergies.    Scheduled Meds:escitalopram, 5 mg, Oral, Daily  levothyroxine, 50 mcg, Oral, Daily  losartan, 25 mg, Oral, Daily  metoprolol tartrate, 50 mg, Oral, BID  midodrine, 5 mg, Oral, Daily  multivitamin with minerals, 1 tablet, Oral, Daily  oxybutynin XL, 10 mg, Oral, Daily  pantoprazole, 40 mg, Oral, BID AC  pharmacy, 1 each, Does not apply, Once  psyllium, 1 packet, Oral, Daily  sodium chloride, 10 mL, Intravenous, Q12H  zonisamide, 300 mg, Oral, Nightly        Continuous Infusions:     PRN Meds:.•  acetaminophen **OR** acetaminophen **OR** acetaminophen  •  hydrALAZINE  •  HYDROcodone-acetaminophen  •  ipratropium-albuterol  •  labetalol  •  magnesium sulfate **OR** magnesium sulfate  "in D5W 1g/100mL (PREMIX)  •  Morphine **AND** naloxone  •  ondansetron **OR** ondansetron  •  potassium chloride  •  [COMPLETED] Insert peripheral IV **AND** sodium chloride  •  sodium chloride    Objective     VITAL SIGNS  Patient Vitals for the past 24 hrs:   BP Temp Temp src Pulse Resp SpO2 Height Weight   06/04/21 1000 127/46 97.6 °F (36.4 °C) Oral -- 11 96 % -- --   06/04/21 0556 174/69 98.1 °F (36.7 °C) Oral 60 16 97 % -- --   06/04/21 0232 146/60 97.8 °F (36.6 °C) Oral 60 16 93 % -- --   06/04/21 0003 131/59 -- -- 60 -- 95 % -- --   06/03/21 2330 142/56 -- -- 60 -- 95 % -- --   06/03/21 2252 (!) 182/76 -- -- 60 -- 97 % -- --   06/03/21 2203 170/65 98.1 °F (36.7 °C) Oral 65 17 95 % -- --   06/03/21 1942 175/73 98.1 °F (36.7 °C) Oral 60 11 97 % 160 cm (63\") 91.3 kg (201 lb 4.5 oz)   06/03/21 1830 172/57 -- -- 60 15 96 % -- --   06/03/21 1815 161/56 -- -- 60 23 98 % -- --   06/03/21 1706 157/69 -- -- 86 -- 98 % -- --   06/03/21 1606 155/78 -- -- 69 -- 98 % -- --   06/03/21 1551 153/73 -- -- 74 -- 97 % -- --   06/03/21 1436 159/72 -- -- 72 -- 96 % -- --   06/03/21 1321 149/70 -- -- 72 -- 99 % -- --   06/03/21 1221 163/72 -- -- 93 -- 98 % -- --   06/03/21 1219 163/72 -- -- 97 16 98 % -- --   06/03/21 1206 154/83 -- -- 96 -- 99 % -- --   06/03/21 1122 156/63 -- -- 70 -- 98 % -- --        Flowsheet Rows      First Filed Value   Admission Height  160 cm (63\") Documented at 06/03/2021 1038   Admission Weight  89.8 kg (198 lb) Documented at 06/03/2021 1038          Body mass index is 35.66 kg/m².      Intake/Output Summary (Last 24 hours) at 6/4/2021 1115  Last data filed at 6/4/2021 1000  Gross per 24 hour   Intake 440 ml   Output --   Net 440 ml        TELEMETRY:     apaced    Physical Exam:  Constitutional:       General: Not in acute distress.     Appearance: Normal appearance. Well-developed.   Eyes:      Pupils: Pupils are equal, round, and reactive to light.   HENT:      Head: Normocephalic and atraumatic. "   Neck:      Vascular: No JVD.   Pulmonary:      Effort: Pulmonary effort is normal.      Breath sounds: Normal breath sounds.   Cardiovascular:      Normal rate. Regular rhythm.      Comments: Dressing in left upper chest with no significant drainage there is no swelling or hematoma noted  Pulses:     Intact distal pulses.   Edema:     Peripheral edema absent.   Abdominal:      General: There is no distension.      Palpations: Abdomen is soft.      Tenderness: There is no abdominal tenderness.   Musculoskeletal: Normal range of motion.      Cervical back: Normal range of motion and neck supple. Skin:     General: Skin is warm and dry.   Neurological:      Mental Status: Alert and oriented to person, place, and time.            Results Review:   I reviewed the patient's new clinical results.    CBC    Results from last 7 days   Lab Units 06/04/21  0458 06/03/21  1116   WBC 10*3/mm3 8.10 6.60   HEMOGLOBIN g/dL 11.5* 12.2   PLATELETS 10*3/mm3 170 185     BMP   Results from last 7 days   Lab Units 06/04/21  0458 06/03/21  1116   SODIUM mmol/L 142 142   POTASSIUM mmol/L 4.1 4.2   CHLORIDE mmol/L 109* 109*   CO2 mmol/L 21.0* 21.0*   BUN mg/dL 22 25*   CREATININE mg/dL 1.09* 1.13*   GLUCOSE mg/dL 89 97   MAGNESIUM mg/dL 2.0  --      Cr Clearance Estimated Creatinine Clearance: 42.7 mL/min (A) (by C-G formula based on SCr of 1.09 mg/dL (H)).  Coag   Results from last 7 days   Lab Units 06/03/21  1116   INR  1.01   APTT seconds 27.1     HbA1C   Lab Results   Component Value Date    HGBA1C 5.4 11/05/2019     Blood Glucose No results found for: POCGLU  Infection     CMP   Results from last 7 days   Lab Units 06/04/21  0458 06/03/21  1116   SODIUM mmol/L 142 142   POTASSIUM mmol/L 4.1 4.2   CHLORIDE mmol/L 109* 109*   CO2 mmol/L 21.0* 21.0*   BUN mg/dL 22 25*   CREATININE mg/dL 1.09* 1.13*   GLUCOSE mg/dL 89 97     ABG      UA      PREET  No results found for: POCMETH, POCAMPHET, POCBARBITUR, POCBENZO, POCCOCAINE, POCOPIATES,  POCOXYCODO, POCPHENCYC, POCPROPOXY, POCTHC, POCTRICYC  Lysis Labs   Results from last 7 days   Lab Units 06/04/21  0458 06/03/21  1116   INR   --  1.01   APTT seconds  --  27.1   HEMOGLOBIN g/dL 11.5* 12.2   PLATELETS 10*3/mm3 170 185   CREATININE mg/dL 1.09* 1.13*     Radiology(recent) XR Chest 1 View    Result Date: 6/3/2021  Stable mild cardiac enlargement. No acute chest findings.  Electronically Signed By-Daiana Muniz MD On:6/3/2021 3:29 PM This report was finalized on [id number] by  Daiana Muniz MD.      Imaging Results (Last 24 Hours)     Procedure Component Value Units Date/Time    XR Chest 1 View [[id number]] Collected: 06/03/21 1529     Updated: 06/03/21 1532    Narrative:      DATE OF EXAM:  6/3/2021 2:55 PM     PROCEDURE:  XR CHEST 1 VW-     INDICATIONS:  new admit, preop; R06.00-Dyspnea, unspecified; Z45.010-Encounter for  checking and testing of cardiac pacemaker pulse generator (battery)       COMPARISON:  PA and lateral chest 7/6/2016     TECHNIQUE:   Single radiographic view of the chest was obtained.     FINDINGS:  There is mild cardiomegaly which is thought to be stable. The lungs  appear free of acute airspace disease. Pulmonary vascular distribution  is normal. Pacemaker device appears unchanged. No pleural effusion or  pneumothorax is identified. 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 No acute  osseous abnormalities.       Impression:      Stable mild cardiac enlargement. No acute chest findings.     Electronically Signed By-Daiana Muniz MD On:6/3/2021 3:29 PM  This report was finalized on [id number] by  Daiana Muinz MD.          Results from last 7 days   Lab Units 06/03/21  1116   TROPONIN T ng/mL <0.010                     I personally viewed and interpreted the patient's EKG/Telemetry data:        ECHOCARDIOGRAM:     Results for orders placed during the hospital encounter of 10/15/19    Adult Transthoracic  Echo Complete W/ Cont if Necessary Per Protocol    Interpretation Summary  · Left ventricular systolic function is normal.  · Left ventricular wall thickness is consistent with mild concentric hypertrophy.  · Left ventricular diastolic dysfunction (grade I) consistent with impaired relaxation.  · Estimated EF appears to be in the range of 61 - 65%.  · Mild mitral valve regurgitation is present  · Mild tricuspid valve regurgitation is present.        STRESS MYOVIEW:      CARDIAC CATHETERIZATION:      OTHER:         Assessment/Plan            Elective replacement indicated for cardiac pacemaker battery at end of lifespan    Essential hypertension    Pacemaker battery depletion    Sick sinus syndrome (CMS/HCC)    Hypothyroidism    Seizure disorder (CMS/HCC)    GERD (gastroesophageal reflux disease)    Emphysema of lung (CMS/HCC)    Anxiety    Overactive bladder    Obesity (BMI 30-39.9)    Orthostatic hypotension        ASSESSMENT:  Assessment:     ZARCO/Chest Heaviness    -likely related to PM syndrome/has resoled since generator replacement.   SSS  Dual Chamber MDT Pacemaker at [facility] and switched to VVI 65 mode    -s/p generator replacement with BST device  Orthostatic Hypotension  No Significant CAD by previous cardiac catheterization 3/2017          PLAN:    Symptoms of chest pain and dyspnea resolved post generator replacement.   Currently atrial paced  Hemodynamics stable  Stable for d/c home  OP f/u with our office scheduled for wound check            I discussed the patients findings and my recommendations with patient and Dr. Jean Bunch, VALENTINO  06/04/21  11:15 EDT

## 2021-06-04 NOTE — PLAN OF CARE
Problem: Adult Inpatient Plan of Care  Goal: Plan of Care Review  Outcome: Ongoing, Progressing  Goal: Patient-Specific Goal (Individualized)  Outcome: Ongoing, Progressing  Goal: Absence of Hospital-Acquired Illness or Injury  Outcome: Ongoing, Progressing  Intervention: Identify and Manage Fall Risk  Recent Flowsheet Documentation  Taken 6/4/2021 1200 by Amara Lebron RN  Safety Promotion/Fall Prevention:   assistive device/personal items within reach   clutter free environment maintained   nonskid shoes/slippers when out of bed   room organization consistent   safety round/check completed  Taken 6/4/2021 1000 by Amara Lebron RN  Safety Promotion/Fall Prevention:   clutter free environment maintained   nonskid shoes/slippers when out of bed   room organization consistent   safety round/check completed  Taken 6/4/2021 0823 by Amara Lebron RN  Safety Promotion/Fall Prevention:   assistive device/personal items within reach   clutter free environment maintained   fall prevention program maintained   nonskid shoes/slippers when out of bed   room organization consistent   safety round/check completed  Taken 6/4/2021 0800 by Amara Lebron RN  Safety Promotion/Fall Prevention:   assistive device/personal items within reach   clutter free environment maintained   nonskid shoes/slippers when out of bed   room organization consistent   safety round/check completed  Intervention: Prevent Skin Injury  Recent Flowsheet Documentation  Taken 6/4/2021 1200 by Amara Lebron RN  Body Position: position changed independently  Taken 6/4/2021 1000 by Amara Lebron RN  Body Position: position changed independently  Taken 6/4/2021 0823 by Amara Lebron RN  Body Position: position changed independently  Taken 6/4/2021 0800 by Amara Lebron RN  Body Position: position changed independently  Goal: Optimal Comfort and Wellbeing  Outcome: Ongoing, Progressing  Goal: Readiness for Transition of  Care  Outcome: Ongoing, Progressing     Problem: Diabetes Comorbidity  Goal: Blood Glucose Level Within Desired Range  Outcome: Ongoing, Progressing     Problem: Hypertension Comorbidity  Goal: Blood Pressure in Desired Range  Outcome: Ongoing, Progressing  Intervention: Maintain Hypertension-Management Strategies  Recent Flowsheet Documentation  Taken 6/4/2021 1200 by Amara Lebron RN  Medication Review/Management: medications reviewed     Problem: Seizure Disorder Comorbidity  Goal: Maintenance of Seizure Control  Outcome: Ongoing, Progressing  Intervention: Maintain Seizure-Symptom Control  Recent Flowsheet Documentation  Taken 6/4/2021 1200 by Amara Lebron RN  Seizure Precautions:   soft boundaries provided   clutter-free environment maintained  Taken 6/4/2021 1000 by Amara Lebron RN  Seizure Precautions:   clutter-free environment maintained   soft boundaries provided  Taken 6/4/2021 0823 by Amara Lebron RN  Seizure Precautions:   clutter-free environment maintained   side rails padded  Taken 6/4/2021 0800 by Amara Lebron RN  Seizure Precautions:   clutter-free environment maintained   soft boundaries provided   Goal Outcome Evaluation:      Patient did not have any new tests or procedures today. The patient complained of back pain and was given PRN pain medication which helped. See MAR. The patient remains on room air and is discharging home. Will continue to monitor.

## 2021-06-07 ENCOUNTER — TRANSITIONAL CARE MANAGEMENT TELEPHONE ENCOUNTER (OUTPATIENT)
Dept: CALL CENTER | Facility: HOSPITAL | Age: 81
End: 2021-06-07

## 2021-06-07 NOTE — OUTREACH NOTE
Call Center TCM Note      Responses   [facility] patient discharged from?  [facility]   Does the patient have one of the following disease processes/diagnoses(primary or secondary)?  Other   TCM attempt successful?  No   Unsuccessful attempts  Attempt 1          Marguerite Morocho LPN    6/7/2021, 09:26 EDT

## 2021-06-07 NOTE — CASE MANAGEMENT/SOCIAL WORK
Case Management Discharge Note           Provided Post Acute Provider List?: N/A    Selected Continued Care - Discharged on 6/4/2021 Admission date: 6/3/2021 - Discharge disposition: Home or Self Care                 Final Discharge Disposition Code: 01 - home or self-care

## 2021-06-07 NOTE — OUTREACH NOTE
Call Center TCM Note      Responses   [facility] patient discharged from?  [facility]   Does the patient have one of the following disease processes/diagnoses(primary or secondary)?  Other   TCM attempt successful?  No   Unsuccessful attempts  Attempt 2          Marguerite Morocho LPN    6/7/2021, 17:59 EDT

## 2021-06-08 ENCOUNTER — TRANSITIONAL CARE MANAGEMENT TELEPHONE ENCOUNTER (OUTPATIENT)
Dept: CALL CENTER | Facility: HOSPITAL | Age: 81
End: 2021-06-08

## 2021-06-08 ENCOUNTER — OFFICE (AMBULATORY)
Dept: URBAN - METROPOLITAN AREA CLINIC 64 | Facility: CLINIC | Age: 81
End: 2021-06-08
Payer: COMMERCIAL

## 2021-06-08 VITALS
HEART RATE: 63 BPM | WEIGHT: 197 LBS | HEIGHT: 64 IN | DIASTOLIC BLOOD PRESSURE: 55 MMHG | SYSTOLIC BLOOD PRESSURE: 120 MMHG

## 2021-06-08 DIAGNOSIS — K21.9 GASTRO-ESOPHAGEAL REFLUX DISEASE WITHOUT ESOPHAGITIS: ICD-10-CM

## 2021-06-08 DIAGNOSIS — R11.0 NAUSEA: ICD-10-CM

## 2021-06-08 DIAGNOSIS — R19.7 DIARRHEA, UNSPECIFIED: ICD-10-CM

## 2021-06-08 PROCEDURE — 99213 OFFICE O/P EST LOW 20 MIN: CPT | Performed by: NURSE PRACTITIONER

## 2021-06-08 NOTE — OUTREACH NOTE
Call Center TCM Note      Responses   [facility] patient discharged from?  [facility]   Does the patient have one of the following disease processes/diagnoses(primary or secondary)?  Other   TCM attempt successful?  No   Unsuccessful attempts  Attempt 3   Wrap up additional comments  UNABLE TO REACH PT X 3 ATTEMPTS FOR TCM CALL. PT IS NOT YET SCHED FOR TCM [facility] WITH PCP JESUS Johnston MA    6/8/2021, 16:16 EDT

## 2021-06-15 ENCOUNTER — OFFICE VISIT (OUTPATIENT)
Dept: CARDIOLOGY | Facility: CLINIC | Age: 81
End: 2021-06-15

## 2021-06-15 ENCOUNTER — CLINICAL SUPPORT NO REQUIREMENTS (OUTPATIENT)
Dept: CARDIOLOGY | Facility: CLINIC | Age: 81
End: 2021-06-15

## 2021-06-15 VITALS — BODY MASS INDEX: 35.66 KG/M2 | HEIGHT: 63 IN

## 2021-06-15 DIAGNOSIS — I49.5 SICK SINUS SYNDROME (HCC): Primary | Chronic | ICD-10-CM

## 2021-06-15 DIAGNOSIS — Z95.0 PRESENCE OF CARDIAC PACEMAKER: ICD-10-CM

## 2021-06-15 PROCEDURE — 99024 POSTOP FOLLOW-UP VISIT: CPT | Performed by: NURSE PRACTITIONER

## 2021-06-15 PROCEDURE — 93280 PM DEVICE PROGR EVAL DUAL: CPT | Performed by: NURSE PRACTITIONER

## 2021-06-15 NOTE — PROGRESS NOTES
Cardiology Office Follow Up Visit      Primary Care Provider:  Helena Mas APRN    Reason for f/u:     Pacemaker interrogation  Dyspnea with exertion      Subjective     CC:    Dyspnea with exertion    History of Present Illness       Anila Spencer is a 80 y.o. female.  Patient has a history of sick sinus syndrome, and previous dual-chamber Medtronic pacemaker implant back in 2009.    The patient is not known to have significant coronary artery disease by previous cardiac catheterization.    She had a previous tilt table test following a syncopal episode and was found to have hypotensive response to head up tilt.  She was started on midodrine 5 mg twice daily.    Last echocardiogram showed preserved LV function mild mitral regurgitation was noted her EF was 60 to 65%.  At the same time in September 2019 she had a stress Myoview that was negative for reversible ischemia.    We will following her closely in the office due to her battery nearing DAHIANA.  The patient reports in the last few weeks she has had increasing shortness of breath and activity intolerance.            Past Medical History:   Diagnosis Date   • Abnormal cardiovascular stress test 3/21/2017   • Allergic rhinitis    • Anxiety    • Basal cell carcinoma (BCC) of face    • Biliary dyskinesia    • Bradycardia    • Cardiomegaly    • Cataract    • Cervical spinal stenosis    • Chronic constipation    • Chronic insomnia    • DDD (degenerative disc disease), cervical     C-spine, T-spine, L-Spine   • DDD (degenerative disc disease), lumbar 7/25/2019   • Degeneration of intervertebral disc of thoracic region 3/18/2017   • Diplopia     Dr. Laird   • Dysphagia 4/25/2019   • Emphysema of lung (CMS/HCC)    • Fatty liver    • Fracture of multiple ribs 07/2018    Left 4th-8th   • Gastroesophageal reflux disease 12/4/2012   • GERD (gastroesophageal reflux disease)    • Hematuria 12/29/2014   • History of recent fall    • Hypertension    • Hypothyroidism    •  Injury of back    • Insomnia 3/7/2016   • Kidney stone    • Left arm pain    • Nasal fracture 07/2018   • Osteoarthritis    • Osteoporosis     h/o tx with Fosamax but quit in 2012 due to fears of complications   • Prediabetes    • Rotator cuff tear     right   • Seizure disorder (CMS/[facility])     Dr. Shannon Bennett   • Shortness of breath 5/8/2019   • Sick sinus syndrome (CMS/[facility])     Dr. Agrawal   • Spinal stenosis of cervical region 2/11/2015   • Squamous cell skin cancer, face    • Traumatic brain injury (CMS/[facility])     from fall   • Urinary incontinence        Past Surgical History:   Procedure Laterality Date   • BILATERAL SALPINGO OOPHORECTOMY      for benign cysts   • BREAST CYST ASPIRATION Left    • CARDIAC CATHETERIZATION  08/25/2009    25% LAD. L Cx luminal irregularities. Normal L main   • CARDIAC CATHETERIZATION  03/29/2017    25% LAD. 10-20% LCX. ER 65%. Dr. Agrawal.    • CARDIAC ELECTROPHYSIOLOGY PROCEDURE N/A 6/3/2021    Procedure: PPM generator change - dual    MDT;  Surgeon: Bharathi Mora MD;  Location: [facility] CATH INVASIVE LOCATION;  Service: Cardiovascular;  Laterality: N/A;   • CARDIOVASCULAR STRESS TEST  2019   • CATARACT EXTRACTION  2006   • COSMETIC SURGERY      Lip   • ECHO - CONVERTED  2019   • ENDOSCOPY  05/17/2017    Normal, Dr. Gutierrez   • ENDOSCOPY N/A 8/22/2019    Procedure: ESOPHAGOGASTRODUODENOSCOPY WITH DILATATION (BOUGIE #[id number]);  Surgeon: Joaquin Story MD;  Location: [facility] ENDOSCOPY;  Service: Gastroenterology   • INSERT / REPLACE / REMOVE PACEMAKER     • KNEE ARTHROSCOPY Left 1992   • OOPHORECTOMY     • OTHER SURGICAL HISTORY Right 07/2017    Right eye, YAG Capsuloyomy , Dr. Lemus   • PACEMAKER IMPLANTATION  2009   • ROTATOR CUFF REPAIR Right     Dr. Goldberg   • TONSILLECTOMY     • TOTAL HIP ARTHROPLASTY Bilateral          Current Outpatient Medications:   •  Calcium Carbonate-Vitamin D3 (CALCIUM 600-D) 600-400 MG-UNIT tablet, Take 1 tablet by mouth 2 (Two) Times a Day., Disp:  , Rfl:   •  Dexilant 60 MG capsule, TAKE ONE CAPSULE BY MOUTH ONCE DAILY (Patient taking differently: Take 60 mg by mouth Daily.), Disp: 90 capsule, Rfl: 1  •  fesoterodine fumarate (TOVIAZ ER) 8 MG tablet sustained-release 24 hour tablet, Take 8 mg by mouth Every Evening., Disp: , Rfl:   •  levothyroxine (SYNTHROID, LEVOTHROID) 50 MCG tablet, TAKE 1 TABLET BY MOUTH EVERY DAY, Disp: 90 tablet, Rfl: 0  •  losartan (COZAAR) 25 MG tablet, TAKE 1 TABLET BY MOUTH EVERY DAY FOR BLOOD PRESSURE, Disp: 90 tablet, Rfl: 3  •  metoprolol tartrate (LOPRESSOR) 50 MG tablet, Take 1 tablet by mouth 2 (Two) Times a Day., Disp: 180 tablet, Rfl: 0  •  midodrine (PROAMATINE) 5 MG tablet, TAKE 1 TABLET BY MOUTH EVERY DAY, Disp: 90 tablet, Rfl: 1  •  zonisamide (ZONEGRAN) 100 MG capsule, Take 300 mg by mouth Every Evening. Take three 100 mg capsules may take four if needed, Disp: , Rfl:   •  escitalopram (LEXAPRO) 5 MG tablet, Take 5 mg by mouth Daily., Disp: , Rfl:   •  multivitamin with minerals tablet tablet, Take 1 tablet by mouth Daily., Disp: , Rfl:   •  psyllium (METAMUCIL) 58.6 % packet, Take 1 packet by mouth Daily., Disp: , Rfl:     Social History     Socioeconomic History   • Marital status:      Spouse name: Not on file   • Number of children: Not on file   • Years of education: Not on file   • Highest education level: Not on file   Tobacco Use   • Smoking status: Never Smoker   • Smokeless tobacco: Never Used   • Tobacco comment: Patient is advised not to smoke.   Vaping Use   • Vaping Use: Never used   Substance and Sexual Activity   • Alcohol use: No   • Drug use: No   • Sexual activity: Defer       Family History   Problem Relation Age of Onset   • Heart disease Mother    • Pancreatic cancer Mother    • Prostate cancer Father    • Breast cancer Sister         4 sisters have had   • Pancreatic cancer Brother    • Colon cancer Brother    • Stroke Maternal Grandmother        The following portions of the patient's  "history were reviewed and updated as appropriate: allergies, current medications, past family history, past medical history, past social history, past surgical history and problem list.    Review of Systems   Constitutional: Negative for decreased appetite and diaphoresis.   HENT: Negative for congestion, hearing loss and nosebleeds.    Cardiovascular: Positive for dyspnea on exertion. Negative for chest pain, claudication, irregular heartbeat, leg swelling, near-syncope, orthopnea, palpitations, paroxysmal nocturnal dyspnea and syncope.   Respiratory: Positive for shortness of breath. Negative for cough and sleep disturbances due to breathing.    Endocrine: Negative for polyuria.   Hematologic/Lymphatic: Does not bruise/bleed easily.   Skin: Negative for itching and rash.   Musculoskeletal: Negative for back pain, muscle weakness and myalgias.   Gastrointestinal: Negative for abdominal pain, change in bowel habit and nausea.   Genitourinary: Negative for dysuria, flank pain, frequency and hesitancy.   Neurological: Negative for dizziness, tremors and weakness.   Psychiatric/Behavioral: Negative for altered mental status. The patient does not have insomnia.        /68   Pulse 65   Ht 160 cm (62.99\")   Wt 89.8 kg (198 lb)   SpO2 98%   BMI 35.08 kg/m² .    Objective     Vitals reviewed.   Constitutional:       General: Not in acute distress.     Appearance: Normal and healthy appearance. Well-developed and not in distress.   Eyes:      Pupils: Pupils are equal, round, and reactive to light.   HENT:      Head: Normocephalic and atraumatic.   Neck:      Vascular: No JVD.   Pulmonary:      Effort: Pulmonary effort is normal.      Breath sounds: Normal breath sounds.   Cardiovascular:      Normal rate. Regular rhythm.   Pulses:     Intact distal pulses.   Edema:     Peripheral edema absent.   Abdominal:      General: There is no distension.      Palpations: Abdomen is soft.      Tenderness: There is no " abdominal tenderness.   Musculoskeletal: Normal range of motion.      Cervical back: Normal range of motion and neck supple. Skin:     General: Skin is warm and dry.   Neurological:      Mental Status: Alert, oriented to person, place, and time and oriented to person, place and time.           EKG ordered and reviewed by me in office    ECG 12 Lead    Date/Time: 6/2/2021 7:57 AM  Performed by: Zee Bunch APRN  Authorized by: Zee Bunch APRN   Rhythm: paced  BPM: 65  Pacing: ventricular pacing  Clinical impression: abnormal EKG                In Office Device Interrogation: Reviewed    DEVICE INTERROGATION:  IN OFFICE    DEVICE TYPE:   Dual-chamber pacemaker    :   Medtronic    BATTERY: At DAHIANA    TIME TO ELECTIVE REPLACEMENT INDICATORS:   At DAHIANA    CHARGE TIME:   Not applicable        LEAD DATA:    Atrial: Unable to test due to DAHIANA    Ventricular:     Unable to test due to DAHIANA  LV:      Atrial pacing percentage:  0 %    Ventricular pacing percentage: 100%      Arrhythmia Logbook Reviewed: No information obtained due to device being at DAHIANA            Diagnoses and all orders for this visit:    1. Elective replacement indicated for cardiac pacemaker battery at end of lifespan (Primary)  Comments:  Patient's pacemaker is triggered DAHIANA and switch to VVI 65 pacing  Orders:  -     ECG 12 Lead  -     Case Request EP Lab: PPM generator change - dual    MDT    2. Sick sinus syndrome (CMS/HCC)  Comments:  Has been stable since pacemaker implant in  2009    3. Presence of cardiac pacemaker  Comments:  Dual-chamber Medtronic pacemaker implanted 8/25/2009           MEDICAL DECISION MAKING:        Patient's Medtronic pacemaker has triggered DAHIANA.  She has switched to VVI pacing mode and currently is experiencing symptoms suggestive of pacemaker syndrome due to VVI VVI pacing at 65 and loss of AV synchrony.    We will schedule the patient for pacemaker generator replacement as soon as possible.    The  patient has been advised if she has any worsening symptoms to come to the emergency room and she verbalizes understanding.    Patient will be seen for postop check after her pacemaker generator replacement.  The case has been discussed with Dr. Nghia Mora the procedure has been scheduled.

## 2021-06-21 NOTE — PROGRESS NOTES
DEVICE INTERROGATION:  IN OFFICE    DEVICE TYPE:   Dual-chamber pacemaker    :   [place]    BATTERY:  Stable    TIME TO ELECTIVE REPLACEMENT INDICATORS:   9.5 years    CHARGE TIME:   Not applicable        LEAD DATA:       DEVICE REPROGRAMMED FOR TESTING      Atrial:   Paced mV, 448 ohms, 0.7 V@0.4 ms    Ventricular:     3.9 mV, 430 ohms, 1.3 V@0.4 ms    LV:      Atrial pacing percentage: 99%    Ventricular pacing percentage: Less than 1%      Arrhythmia Logbook Reviewed: No A. fib        Summary:    Stable Device Function    No significant arhythmia burden.     Battery status is stable.          NEXT IN OFFICE DEVICE CHECK DUE: 1 year    REMOTE DEVICE INTERROGATIONS:     3 months  Device pocket left upper chest is without significant redness swelling or hematoma.  Wound edges are well approximated there is no drainage.  Steri-Strips remain intact.    Patient reports she is feeling much better since her pacemaker generator change and having no recurrent chest pain or dyspnea.    She will keep her next scheduled follow-up with Dr. Agrawal.  She has been advised to contact her office sooner if she has any new or worsening problems.  We will continue to monitor her device via [facility] home monitoring

## 2021-06-22 NOTE — ANESTHESIA POSTPROCEDURE EVALUATION
Patient: Anila Spencer    Procedure Summary     Date: 06/03/21 Room / Location: [facility] CATH [facility] 3 /  [facility] CATH INVASIVE LOCATION    Anesthesia Start: 1726 Anesthesia Stop: 1826    Procedure: PPM generator change - dual    MDT (N/A ) Diagnosis:       Elective replacement indicated for cardiac pacemaker battery at end of lifespan      Sick sinus syndrome (CMS/HCC)      Pacemaker battery depletion      (Post pacemaker generator change without complications)    Providers: Bharathi Mora MD Provider: Jose Moses MD    Anesthesia Type: MAC ASA Status: 4          Anesthesia Type: MAC    Vitals  Vitals Value Taken Time   /57 06/03/21 1830   Temp     Pulse 60 06/03/21 1830   Resp 15 06/03/21 1830   SpO2 96 % 06/03/21 1830           Post Anesthesia Care and Evaluation    Patient location during evaluation: PACU  Patient participation: complete - patient participated  Level of consciousness: awake  Pain scale: See nurse's notes for pain score.  Pain management: adequate  Airway patency: patent  Anesthetic complications: No anesthetic complications  PONV Status: none  Cardiovascular status: acceptable  Respiratory status: acceptable  Hydration status: acceptable    Comments: Patient seen and examined postoperatively; vital signs stable; SpO2 greater than or equal to 90%; cardiopulmonary status stable; nausea/vomiting adequately controlled; pain adequately controlled; no apparent anesthesia complications; patient discharged from anesthesia care when discharge criteria were met

## 2021-07-05 RX ORDER — DEXLANSOPRAZOLE 60 MG/1
CAPSULE, DELAYED RELEASE ORAL
Qty: 90 CAPSULE | Refills: 0 | Status: SHIPPED | OUTPATIENT
Start: 2021-07-05 | End: 2021-10-05

## 2021-07-06 RX ORDER — METOPROLOL TARTRATE 50 MG/1
TABLET, FILM COATED ORAL
Qty: 180 TABLET | Refills: 0 | Status: SHIPPED | OUTPATIENT
Start: 2021-07-06 | End: 2021-10-05

## 2021-07-20 ENCOUNTER — OFFICE VISIT (OUTPATIENT)
Dept: FAMILY MEDICINE CLINIC | Facility: CLINIC | Age: 81
End: 2021-07-20

## 2021-07-20 VITALS
WEIGHT: 185.5 LBS | HEART RATE: 60 BPM | BODY MASS INDEX: 32.87 KG/M2 | DIASTOLIC BLOOD PRESSURE: 62 MMHG | SYSTOLIC BLOOD PRESSURE: 118 MMHG | HEIGHT: 63 IN | TEMPERATURE: 97.4 F | RESPIRATION RATE: 16 BRPM | OXYGEN SATURATION: 97 %

## 2021-07-20 DIAGNOSIS — D64.9 ANEMIA, UNSPECIFIED TYPE: ICD-10-CM

## 2021-07-20 DIAGNOSIS — G40.909 SEIZURE DISORDER (HCC): Chronic | ICD-10-CM

## 2021-07-20 DIAGNOSIS — N32.81 OVERACTIVE BLADDER: Chronic | ICD-10-CM

## 2021-07-20 DIAGNOSIS — I95.1 ORTHOSTATIC HYPOTENSION: Chronic | ICD-10-CM

## 2021-07-20 DIAGNOSIS — M15.9 PRIMARY OSTEOARTHRITIS INVOLVING MULTIPLE JOINTS: ICD-10-CM

## 2021-07-20 DIAGNOSIS — M81.0 AGE-RELATED OSTEOPOROSIS WITHOUT CURRENT PATHOLOGICAL FRACTURE: ICD-10-CM

## 2021-07-20 DIAGNOSIS — K76.0 FATTY LIVER: ICD-10-CM

## 2021-07-20 DIAGNOSIS — E03.9 ACQUIRED HYPOTHYROIDISM: Chronic | ICD-10-CM

## 2021-07-20 DIAGNOSIS — J43.9 PULMONARY EMPHYSEMA, UNSPECIFIED EMPHYSEMA TYPE (HCC): Chronic | ICD-10-CM

## 2021-07-20 DIAGNOSIS — I49.5 SICK SINUS SYNDROME (HCC): Chronic | ICD-10-CM

## 2021-07-20 DIAGNOSIS — R73.03 PREDIABETES: ICD-10-CM

## 2021-07-20 DIAGNOSIS — Z12.31 BREAST CANCER SCREENING BY MAMMOGRAM: ICD-10-CM

## 2021-07-20 DIAGNOSIS — K21.9 GASTROESOPHAGEAL REFLUX DISEASE, UNSPECIFIED WHETHER ESOPHAGITIS PRESENT: Chronic | ICD-10-CM

## 2021-07-20 DIAGNOSIS — F41.9 ANXIETY: Chronic | ICD-10-CM

## 2021-07-20 DIAGNOSIS — Z00.01 ENCOUNTER FOR GENERAL ADULT MEDICAL EXAMINATION WITH ABNORMAL FINDINGS: Primary | ICD-10-CM

## 2021-07-20 DIAGNOSIS — K59.09 CHRONIC CONSTIPATION: ICD-10-CM

## 2021-07-20 DIAGNOSIS — I10 ESSENTIAL HYPERTENSION: Chronic | ICD-10-CM

## 2021-07-20 DIAGNOSIS — J30.9 ALLERGIC RHINITIS, UNSPECIFIED SEASONALITY, UNSPECIFIED TRIGGER: ICD-10-CM

## 2021-07-20 PROBLEM — E66.09 CLASS 1 OBESITY DUE TO EXCESS CALORIES WITH SERIOUS COMORBIDITY AND BODY MASS INDEX (BMI) OF 32.0 TO 32.9 IN ADULT: Status: ACTIVE | Noted: 2020-07-16

## 2021-07-20 PROBLEM — R55 SYNCOPE AND COLLAPSE: Status: RESOLVED | Noted: 2019-10-22 | Resolved: 2021-07-20

## 2021-07-20 PROBLEM — E66.811 CLASS 1 OBESITY DUE TO EXCESS CALORIES WITH SERIOUS COMORBIDITY AND BODY MASS INDEX (BMI) OF 32.0 TO 32.9 IN ADULT: Status: ACTIVE | Noted: 2020-07-16

## 2021-07-20 PROBLEM — R07.89 CHEST PAIN, ATYPICAL: Status: RESOLVED | Noted: 2019-09-25 | Resolved: 2021-07-20

## 2021-07-20 PROBLEM — E66.9 OBESITY (BMI 30-39.9): Chronic | Status: RESOLVED | Noted: 2021-06-03 | Resolved: 2021-07-20

## 2021-07-20 PROCEDURE — 99214 OFFICE O/P EST MOD 30 MIN: CPT | Performed by: NURSE PRACTITIONER

## 2021-07-20 PROCEDURE — G0439 PPPS, SUBSEQ VISIT: HCPCS | Performed by: NURSE PRACTITIONER

## 2021-07-20 NOTE — ASSESSMENT & PLAN NOTE
The mild dyspnea with exertion that she was having has improved since she had her pacemaker battery replaced.

## 2021-07-20 NOTE — ASSESSMENT & PLAN NOTE
She has discussed possibility of bladder stimulator placement with urology, however she does not plan to do this due to her age.  Discussed possibility of Toviaz causing dry mouth.  She will discuss this with her urologist at her next appointment.  She might be a candidate for Myrbetriq.

## 2021-07-20 NOTE — ASSESSMENT & PLAN NOTE
Now alternating with loose stool.  She brings in paper with possible side effects of Dexilant.  She wonders if this could be contributing to her GI symptoms.  Advised her to continue Dexilant at this time and to discuss this further with GI at her next appointment..

## 2021-07-20 NOTE — ASSESSMENT & PLAN NOTE
History of Fosamax treatment, but she quit this in 2012 due to fears of side effects.  Encouraged weightbearing exercise and calcium intake.  Will call with DEXA results and further recommendations.

## 2021-07-28 DIAGNOSIS — E03.9 ACQUIRED HYPOTHYROIDISM: ICD-10-CM

## 2021-07-28 RX ORDER — LEVOTHYROXINE SODIUM 0.05 MG/1
TABLET ORAL
Qty: 90 TABLET | Refills: 2 | Status: SHIPPED | OUTPATIENT
Start: 2021-07-28 | End: 2022-01-28 | Stop reason: SDUPTHER

## 2021-08-11 ENCOUNTER — HOSPITAL ENCOUNTER (OUTPATIENT)
Dept: BONE DENSITY | Facility: HOSPITAL | Age: 81
Discharge: HOME | End: 2021-08-11

## 2021-08-11 ENCOUNTER — HOSPITAL ENCOUNTER (OUTPATIENT)
Dept: MAMMOGRAPHY | Facility: HOSPITAL | Age: 81
Discharge: HOME | End: 2021-08-11

## 2021-08-11 DIAGNOSIS — M81.0 AGE-RELATED OSTEOPOROSIS WITHOUT CURRENT PATHOLOGICAL FRACTURE: ICD-10-CM

## 2021-08-11 DIAGNOSIS — Z12.31 BREAST CANCER SCREENING BY MAMMOGRAM: ICD-10-CM

## 2021-08-11 PROCEDURE — 77067 SCR MAMMO BI INCL CAD: CPT

## 2021-08-11 PROCEDURE — 77080 DXA BONE DENSITY AXIAL: CPT

## 2021-08-11 PROCEDURE — 77063 BREAST TOMOSYNTHESIS BI: CPT

## 2021-08-12 ENCOUNTER — OFFICE (AMBULATORY)
Dept: URBAN - METROPOLITAN AREA CLINIC 64 | Facility: CLINIC | Age: 81
End: 2021-08-12
Payer: COMMERCIAL

## 2021-08-12 VITALS
HEART RATE: 60 BPM | HEIGHT: 64 IN | DIASTOLIC BLOOD PRESSURE: 85 MMHG | WEIGHT: 184 LBS | SYSTOLIC BLOOD PRESSURE: 169 MMHG

## 2021-08-12 DIAGNOSIS — R19.4 CHANGE IN BOWEL HABIT: ICD-10-CM

## 2021-08-12 DIAGNOSIS — R11.2 NAUSEA WITH VOMITING, UNSPECIFIED: ICD-10-CM

## 2021-08-12 DIAGNOSIS — R10.84 GENERALIZED ABDOMINAL PAIN: ICD-10-CM

## 2021-08-12 DIAGNOSIS — K21.9 GASTRO-ESOPHAGEAL REFLUX DISEASE WITHOUT ESOPHAGITIS: ICD-10-CM

## 2021-08-12 PROCEDURE — 99214 OFFICE O/P EST MOD 30 MIN: CPT | Performed by: NURSE PRACTITIONER

## 2021-09-08 PROCEDURE — 93296 REM INTERROG EVL PM/IDS: CPT | Performed by: NURSE PRACTITIONER

## 2021-09-08 PROCEDURE — 93294 REM INTERROG EVL PM/LDLS PM: CPT | Performed by: NURSE PRACTITIONER

## 2021-09-09 ENCOUNTER — HOME HEALTH ADMISSION (OUTPATIENT)
Dept: HOME HEALTH SERVICES | Facility: HOME HEALTHCARE | Age: 81
End: 2021-09-09

## 2021-09-09 ENCOUNTER — OFFICE VISIT (OUTPATIENT)
Dept: FAMILY MEDICINE CLINIC | Facility: CLINIC | Age: 81
End: 2021-09-09

## 2021-09-09 VITALS
SYSTOLIC BLOOD PRESSURE: 118 MMHG | HEIGHT: 63 IN | HEART RATE: 61 BPM | OXYGEN SATURATION: 95 % | BODY MASS INDEX: 33.66 KG/M2 | TEMPERATURE: 97.7 F | WEIGHT: 190 LBS | DIASTOLIC BLOOD PRESSURE: 64 MMHG | RESPIRATION RATE: 16 BRPM

## 2021-09-09 DIAGNOSIS — M81.0 AGE-RELATED OSTEOPOROSIS WITHOUT CURRENT PATHOLOGICAL FRACTURE: Primary | ICD-10-CM

## 2021-09-09 DIAGNOSIS — R53.1 GENERALIZED WEAKNESS: ICD-10-CM

## 2021-09-09 DIAGNOSIS — R26.89 BALANCE PROBLEM: ICD-10-CM

## 2021-09-09 DIAGNOSIS — Z74.09 MOBILITY IMPAIRED: ICD-10-CM

## 2021-09-09 PROCEDURE — 99213 OFFICE O/P EST LOW 20 MIN: CPT | Performed by: NURSE PRACTITIONER

## 2021-09-09 NOTE — ASSESSMENT & PLAN NOTE
No significant change since 2019.   Continue Prolia. Continue calcium supplementation.   Will have her start PT again to work on weight bearing exercise.   Repeat DEXA in 2 years.

## 2021-09-09 NOTE — PROGRESS NOTES
Chief Complaint  Osteoporosis    Subjective          History of Present Illness  Patient presents for follow-up on osteoporosis.  She had a bone density test on 8/11/2021 that showed a T score of -3.7 in the right forearm and -0.2 in her lumbar spine. She was on Fosamax in the past but has been off of it since 2012. She is now on Prolia. She is also taking a calcium supplement. She is not able to get much weight bearing exercise due to balance problems and generalized weakness. She was doing physical therapy earlier this year, but discontinued it due to acute illness this summer. She would like to start PT again to work on strength and balance.         Current Outpatient Medications:   •  Calcium Carbonate-Vitamin D3 (CALCIUM 600-D) 600-400 MG-UNIT tablet, Take 1 tablet by mouth 2 (Two) Times a Day., Disp: , Rfl:   •  Dexilant 60 MG capsule, TAKE 1 CAPSULE BY MOUTH EVERY DAY, Disp: 90 capsule, Rfl: 0  •  escitalopram (LEXAPRO) 5 MG tablet, Take 5 mg by mouth Daily., Disp: , Rfl:   •  fesoterodine fumarate (TOVIAZ ER) 8 MG tablet sustained-release 24 hour tablet, Take 8 mg by mouth Every Evening., Disp: , Rfl:   •  levothyroxine (SYNTHROID, LEVOTHROID) 50 MCG tablet, TAKE 1 TABLET BY MOUTH EVERY DAY, Disp: 90 tablet, Rfl: 2  •  losartan (COZAAR) 25 MG tablet, TAKE 1 TABLET BY MOUTH EVERY DAY FOR BLOOD PRESSURE, Disp: 90 tablet, Rfl: 3  •  metoprolol tartrate (LOPRESSOR) 50 MG tablet, TAKE 1 TABLET BY MOUTH TWICE A DAY, Disp: 180 tablet, Rfl: 0  •  midodrine (PROAMATINE) 5 MG tablet, TAKE 1 TABLET BY MOUTH EVERY DAY, Disp: 90 tablet, Rfl: 1  •  multivitamin with minerals tablet tablet, Take 1 tablet by mouth Daily., Disp: , Rfl:   •  zonisamide (ZONEGRAN) 100 MG capsule, Take 300 mg by mouth Every Evening. Take three 100 mg capsules may take four if needed, Disp: , Rfl:   •  psyllium (METAMUCIL) 58.6 % packet, Take 1 packet by mouth Daily., Disp: , Rfl:      Review of Systems   Constitutional: Positive for fatigue.  "  Respiratory: Negative for cough and shortness of breath.    Cardiovascular: Negative for chest pain.   Musculoskeletal: Positive for arthralgias, back pain and gait problem.   Neurological:        Poor balance        Objective   Vital Signs:   Vitals:    09/09/21 1435 09/09/21 1457   BP: 145/79 118/64   BP Location: Left arm    Patient Position: Sitting    Cuff Size: Large Adult    Pulse: 61    Resp: 16    Temp: 97.7 °F (36.5 °C)    TempSrc: Infrared    SpO2: 95%    Weight: 86.2 kg (190 lb)    Height: 160 cm (63\")      Body mass index is 33.66 kg/m².      Physical Exam  Constitutional:       Appearance: She is well-developed.   Neck:      Thyroid: No thyromegaly.      Vascular: No carotid bruit.      Trachea: Trachea normal.   Cardiovascular:      Rate and Rhythm: Normal rate and regular rhythm.      Heart sounds: Normal heart sounds. No murmur heard.   No friction rub. No gallop.    Pulmonary:      Effort: Pulmonary effort is normal.      Breath sounds: Normal breath sounds. No wheezing or rales.   Musculoskeletal:      Cervical back: Normal range of motion and neck supple.   Lymphadenopathy:      Cervical: No cervical adenopathy.   Skin:     General: Skin is warm and dry.   Neurological:      Mental Status: She is alert and oriented to person, place, and time.      Motor: Weakness present.      Gait: Gait abnormal.      Comments: Using cane   Psychiatric:         Behavior: Behavior normal.         Thought Content: Thought content normal.         Judgment: Judgment normal.          Result Review :   The following data was reviewed by: VALENTINO Power on 09/09/2021:    No visits with results within 1 Day(s) from this visit.   Latest known visit with results is:   Admission on 06/03/2021, Discharged on 06/04/2021   Component Date Value Ref Range Status   • QT Interval 06/03/2021 407  ms Preliminary   • Glucose 06/03/2021 97  65 - 99 mg/dL Final   • BUN 06/03/2021 25* 8 - 23 mg/dL Final   • Creatinine 06/03/2021 " 1.13* 0.57 - 1.00 mg/dL Final   • Sodium 06/03/2021 142  136 - 145 mmol/L Final   • Potassium 06/03/2021 4.2  3.5 - 5.2 mmol/L Final   • Chloride 06/03/2021 109* 98 - 107 mmol/L Final   • CO2 06/03/2021 21.0* 22.0 - 29.0 mmol/L Final   • Calcium 06/03/2021 8.7  8.6 - 10.5 mg/dL Final   • eGFR Non African Amer 06/03/2021 46* >60 mL/min/1.73 Final   • BUN/Creatinine Ratio 06/03/2021 22.1  7.0 - 25.0 Final   • Anion Gap 06/03/2021 12.0  5.0 - 15.0 mmol/L Final   • Protime 06/03/2021 11.1  9.6 - 11.7 Seconds Final   • INR 06/03/2021 1.01  0.93 - 1.10 Final   • PTT 06/03/2021 27.1  24.0 - 31.0 seconds Final   • Troponin T 06/03/2021 <0.010  0.000 - 0.030 ng/mL Final   • proBNP 06/03/2021 3,822.0* 0.0-1,800.0 pg/mL Final   • WBC 06/03/2021 6.60  3.40 - 10.80 10*3/mm3 Final   • RBC 06/03/2021 3.72* 3.77 - 5.28 10*6/mm3 Final   • Hemoglobin 06/03/2021 12.2  12.0 - 15.9 g/dL Final   • Hematocrit 06/03/2021 36.1  34.0 - 46.6 % Final   • MCV 06/03/2021 97.2* 79.0 - 97.0 fL Final   • MCH 06/03/2021 32.7  26.6 - 33.0 pg Final   • MCHC 06/03/2021 33.6  31.5 - 35.7 g/dL Final   • RDW 06/03/2021 14.2  12.3 - 15.4 % Final   • RDW-SD 06/03/2021 48.1  37.0 - 54.0 fl Final   • MPV 06/03/2021 8.9  6.0 - 12.0 fL Final   • Platelets 06/03/2021 185  140 - 450 10*3/mm3 Final   • Neutrophil % 06/03/2021 60.0  42.7 - 76.0 % Final   • Lymphocyte % 06/03/2021 31.1  19.6 - 45.3 % Final   • Monocyte % 06/03/2021 6.5  5.0 - 12.0 % Final   • Eosinophil % 06/03/2021 1.4  0.3 - 6.2 % Final   • Basophil % 06/03/2021 1.0  0.0 - 1.5 % Final   • Neutrophils, Absolute 06/03/2021 4.00  1.70 - 7.00 10*3/mm3 Final   • Lymphocytes, Absolute 06/03/2021 2.10  0.70 - 3.10 10*3/mm3 Final   • Monocytes, Absolute 06/03/2021 0.40  0.10 - 0.90 10*3/mm3 Final   • Eosinophils, Absolute 06/03/2021 0.10  0.00 - 0.40 10*3/mm3 Final   • Basophils, Absolute 06/03/2021 0.10  0.00 - 0.20 10*3/mm3 Final   • nRBC 06/03/2021 0.0  0.0 - 0.2 /100 WBC Final   • COVID19 06/03/2021  Not Detected  Not Detected - Ref. Range Final   • Extra Tube 06/03/2021 Hold for add-ons.   Final    Auto resulted.   • Magnesium 06/04/2021 2.0  1.6 - 2.4 mg/dL Final   • Glucose 06/04/2021 89  65 - 99 mg/dL Final   • BUN 06/04/2021 22  8 - 23 mg/dL Final   • Creatinine 06/04/2021 1.09* 0.57 - 1.00 mg/dL Final   • Sodium 06/04/2021 142  136 - 145 mmol/L Final   • Potassium 06/04/2021 4.1  3.5 - 5.2 mmol/L Final   • Chloride 06/04/2021 109* 98 - 107 mmol/L Final   • CO2 06/04/2021 21.0* 22.0 - 29.0 mmol/L Final   • Calcium 06/04/2021 8.2* 8.6 - 10.5 mg/dL Final   • eGFR Non African Amer 06/04/2021 48* >60 mL/min/1.73 Final   • BUN/Creatinine Ratio 06/04/2021 20.2  7.0 - 25.0 Final   • Anion Gap 06/04/2021 12.0  5.0 - 15.0 mmol/L Final   • proBNP 06/04/2021 3,947.0* 0.0-1,800.0 pg/mL Final   • WBC 06/04/2021 8.10  3.40 - 10.80 10*3/mm3 Final   • RBC 06/04/2021 3.54* 3.77 - 5.28 10*6/mm3 Final   • Hemoglobin 06/04/2021 11.5* 12.0 - 15.9 g/dL Final   • Hematocrit 06/04/2021 34.2  34.0 - 46.6 % Final   • MCV 06/04/2021 96.7  79.0 - 97.0 fL Final   • MCH 06/04/2021 32.5  26.6 - 33.0 pg Final   • MCHC 06/04/2021 33.7  31.5 - 35.7 g/dL Final   • RDW 06/04/2021 14.4  12.3 - 15.4 % Final   • RDW-SD 06/04/2021 49.0  37.0 - 54.0 fl Final   • MPV 06/04/2021 9.2  6.0 - 12.0 fL Final   • Platelets 06/04/2021 170  140 - 450 10*3/mm3 Final     Data reviewed: Radiologic studies 8/11/21 DEXA Scan            Assessment and Plan    Diagnoses and all orders for this visit:    1. Age-related osteoporosis without current pathological fracture (Primary)  Assessment & Plan:  No significant change since 2019.   Continue Prolia. Continue calcium supplementation.   Will have her start PT again to work on weight bearing exercise.   Repeat DEXA in 2 years.       2. Balance problem  -     Ambulatory Referral to Physical Therapy Evaluate and treat    3. Generalized weakness  -     Ambulatory Referral to Physical Therapy Evaluate and treat    4.  Mobility impaired  -     Ambulatory Referral to Physical Therapy Evaluate and treat          Follow Up   No follow-ups on file.    Patient was given instructions and counseling regarding her condition and health maintenance advice. Please see specific information in the After Visit Summary.     Helena Mas, APRN 9/9/2021 15:06 EDT  This note was electronically signed.

## 2021-09-14 ENCOUNTER — HOME CARE VISIT (OUTPATIENT)
Dept: HOME HEALTH SERVICES | Facility: HOME HEALTHCARE | Age: 81
End: 2021-09-14

## 2021-09-24 ENCOUNTER — HOME CARE VISIT (OUTPATIENT)
Dept: HOME HEALTH SERVICES | Facility: HOME HEALTHCARE | Age: 81
End: 2021-09-24

## 2021-09-24 VITALS
HEART RATE: 78 BPM | TEMPERATURE: 98.5 F | DIASTOLIC BLOOD PRESSURE: 78 MMHG | SYSTOLIC BLOOD PRESSURE: 121 MMHG | OXYGEN SATURATION: 97 %

## 2021-09-24 PROCEDURE — G0151 HHCP-SERV OF PT,EA 15 MIN: HCPCS

## 2021-09-24 NOTE — HOME HEALTH
2w3 m/f  PT EVAL  Patient is a 80  year old  female,referred for skilled PT interventions after recent decline in functional mobility due to multiple medical issues. Patient at risk for falls, pacemaker placement back in June and complaints of pain on B knees  PLOF. Px reports being independent with a cane prior to recent decline  Vital Signs. Please see [facility]asis/eval on this visit  Homebound status. Due to dec act tolerance, weakness, decline in transfers and ambulation. Patient requires a taxing effort to leave home, putting patient at risk for falls or injury  Skilled PT needed to improve patient's functional mobility, improve safety for transfers and ambulation and return patient to OF.  At time of eval, patient required Mod A for transfers with weakness noted during sit to stand, Mod A for gait with the use of her cane.

## 2021-09-27 ENCOUNTER — HOME CARE VISIT (OUTPATIENT)
Dept: HOME HEALTH SERVICES | Facility: HOME HEALTHCARE | Age: 81
End: 2021-09-27

## 2021-09-27 VITALS
DIASTOLIC BLOOD PRESSURE: 64 MMHG | TEMPERATURE: 97.8 F | HEART RATE: 77 BPM | SYSTOLIC BLOOD PRESSURE: 130 MMHG | RESPIRATION RATE: 17 BRPM | OXYGEN SATURATION: 96 %

## 2021-09-27 PROCEDURE — G0157 HHC PT ASSISTANT EA 15: HCPCS

## 2021-09-28 PROCEDURE — G0180 MD CERTIFICATION HHA PATIENT: HCPCS | Performed by: NURSE PRACTITIONER

## 2021-09-28 NOTE — HOME HEALTH
pt up and ready for PT session,   reporting no changes but admits weakness and low endurance,    pt denies any new falls,   no med changes.       pt reports she wants to improve so she can improve her ability to perform adl's and to allow for improved safety within the home     pt was compliant throughout PT session but needed cues to remain on task and to perform the activities properly.      pt was challenged today to ambulate long distance and needed cues for proper cane placement with cues given to improve step length/step through and to heel strike for foot clearance

## 2021-10-01 ENCOUNTER — HOME CARE VISIT (OUTPATIENT)
Dept: HOME HEALTH SERVICES | Facility: HOME HEALTHCARE | Age: 81
End: 2021-10-01

## 2021-10-04 ENCOUNTER — HOME CARE VISIT (OUTPATIENT)
Dept: HOME HEALTH SERVICES | Facility: HOME HEALTHCARE | Age: 81
End: 2021-10-04

## 2021-10-04 VITALS
RESPIRATION RATE: 17 BRPM | DIASTOLIC BLOOD PRESSURE: 74 MMHG | HEART RATE: 82 BPM | SYSTOLIC BLOOD PRESSURE: 138 MMHG | TEMPERATURE: 97.7 F | OXYGEN SATURATION: 98 %

## 2021-10-04 PROCEDURE — G0157 HHC PT ASSISTANT EA 15: HCPCS

## 2021-10-04 NOTE — HOME HEALTH
pt up and feeling well with no new c/o's but does report she continues to have soreness in the knees.   pt is motivated to improve and wants to return to pl[facility]   pt continues to use her walker at all times.     pt was given education throughout review of hep to attempt to fully contract/hold at end rom,  while avoiding substitution.    pt was cued on proper LE placement prior to standing to improve initial standing balance and stability.   pt was educated on correct step length, step through and to heel strike during gait trg and struggled with this at times.    pt was educated on proper use of the walker during gait trg with focus on proper posture,   maintaining proper distance to the walker and to remain upright as able

## 2021-10-05 RX ORDER — DEXLANSOPRAZOLE 60 MG/1
CAPSULE, DELAYED RELEASE ORAL
Qty: 90 CAPSULE | Refills: 1 | Status: SHIPPED | OUTPATIENT
Start: 2021-10-05 | End: 2022-01-20

## 2021-10-05 RX ORDER — METOPROLOL TARTRATE 50 MG/1
TABLET, FILM COATED ORAL
Qty: 180 TABLET | Refills: 1 | Status: SHIPPED | OUTPATIENT
Start: 2021-10-05 | End: 2022-01-28 | Stop reason: SDUPTHER

## 2021-10-08 ENCOUNTER — HOME CARE VISIT (OUTPATIENT)
Dept: HOME HEALTH SERVICES | Facility: HOME HEALTHCARE | Age: 81
End: 2021-10-08

## 2021-10-08 PROCEDURE — G0157 HHC PT ASSISTANT EA 15: HCPCS

## 2021-10-09 VITALS
SYSTOLIC BLOOD PRESSURE: 134 MMHG | OXYGEN SATURATION: 96 % | HEART RATE: 84 BPM | RESPIRATION RATE: 17 BRPM | DIASTOLIC BLOOD PRESSURE: 72 MMHG | TEMPERATURE: 97.7 F

## 2021-10-09 NOTE — HOME HEALTH
pt up and prepared for PT session,  pt states she is improving with PT intervention and is motivated to continue to improve to return to plf.  pt has been performing hep as able but admits continued limitations.      pt was compliant throughout PT session but struggled at times with weakness and fatigue and needed occasional rest periods.   dynamic balance is improving slowly and pt continues to show improvement in the ability to advance the LEs with improving step length and step through.   pt did began to fatigue toward the end of PT session.

## 2021-10-11 ENCOUNTER — HOME CARE VISIT (OUTPATIENT)
Dept: HOME HEALTH SERVICES | Facility: HOME HEALTHCARE | Age: 81
End: 2021-10-11

## 2021-10-12 ENCOUNTER — OFFICE (AMBULATORY)
Dept: URBAN - METROPOLITAN AREA PATHOLOGY 4 | Facility: PATHOLOGY | Age: 81
End: 2021-10-12
Payer: COMMERCIAL

## 2021-10-12 ENCOUNTER — ON CAMPUS - OUTPATIENT (AMBULATORY)
Dept: URBAN - METROPOLITAN AREA HOSPITAL 2 | Facility: HOSPITAL | Age: 81
End: 2021-10-12
Payer: COMMERCIAL

## 2021-10-12 VITALS
TEMPERATURE: 97.4 F | SYSTOLIC BLOOD PRESSURE: 180 MMHG | RESPIRATION RATE: 13 BRPM | DIASTOLIC BLOOD PRESSURE: 83 MMHG | WEIGHT: 185 LBS | RESPIRATION RATE: 16 BRPM | DIASTOLIC BLOOD PRESSURE: 75 MMHG | RESPIRATION RATE: 17 BRPM | SYSTOLIC BLOOD PRESSURE: 175 MMHG | SYSTOLIC BLOOD PRESSURE: 171 MMHG | HEIGHT: 64 IN | SYSTOLIC BLOOD PRESSURE: 169 MMHG | DIASTOLIC BLOOD PRESSURE: 76 MMHG | SYSTOLIC BLOOD PRESSURE: 186 MMHG | SYSTOLIC BLOOD PRESSURE: 173 MMHG | DIASTOLIC BLOOD PRESSURE: 85 MMHG | SYSTOLIC BLOOD PRESSURE: 174 MMHG | RESPIRATION RATE: 18 BRPM | SYSTOLIC BLOOD PRESSURE: 188 MMHG | DIASTOLIC BLOOD PRESSURE: 72 MMHG | DIASTOLIC BLOOD PRESSURE: 73 MMHG | SYSTOLIC BLOOD PRESSURE: 165 MMHG | DIASTOLIC BLOOD PRESSURE: 78 MMHG | SYSTOLIC BLOOD PRESSURE: 170 MMHG | OXYGEN SATURATION: 97 % | OXYGEN SATURATION: 99 % | SYSTOLIC BLOOD PRESSURE: 177 MMHG | DIASTOLIC BLOOD PRESSURE: 54 MMHG | SYSTOLIC BLOOD PRESSURE: 147 MMHG | HEART RATE: 60 BPM | OXYGEN SATURATION: 100 % | OXYGEN SATURATION: 98 % | SYSTOLIC BLOOD PRESSURE: 182 MMHG | DIASTOLIC BLOOD PRESSURE: 94 MMHG | DIASTOLIC BLOOD PRESSURE: 77 MMHG

## 2021-10-12 DIAGNOSIS — B96.81 HELICOBACTER PYLORI [H. PYLORI] AS THE CAUSE OF DISEASES CLA: ICD-10-CM

## 2021-10-12 DIAGNOSIS — D12.3 BENIGN NEOPLASM OF TRANSVERSE COLON: ICD-10-CM

## 2021-10-12 DIAGNOSIS — K29.80 DUODENITIS WITHOUT BLEEDING: ICD-10-CM

## 2021-10-12 DIAGNOSIS — D12.4 BENIGN NEOPLASM OF DESCENDING COLON: ICD-10-CM

## 2021-10-12 DIAGNOSIS — R19.7 DIARRHEA, UNSPECIFIED: ICD-10-CM

## 2021-10-12 DIAGNOSIS — K31.89 OTHER DISEASES OF STOMACH AND DUODENUM: ICD-10-CM

## 2021-10-12 DIAGNOSIS — K29.70 GASTRITIS, UNSPECIFIED, WITHOUT BLEEDING: ICD-10-CM

## 2021-10-12 DIAGNOSIS — R11.2 NAUSEA WITH VOMITING, UNSPECIFIED: ICD-10-CM

## 2021-10-12 DIAGNOSIS — R19.4 CHANGE IN BOWEL HABIT: ICD-10-CM

## 2021-10-12 DIAGNOSIS — R13.10 DYSPHAGIA, UNSPECIFIED: ICD-10-CM

## 2021-10-12 PROBLEM — K63.5 POLYP OF COLON: Status: ACTIVE | Noted: 2021-10-12

## 2021-10-12 LAB
GI HISTOLOGY: A. SELECT: (no result)
GI HISTOLOGY: B. SELECT: (no result)
GI HISTOLOGY: C. SELECT: (no result)
GI HISTOLOGY: D. UNSPECIFIED: (no result)
GI HISTOLOGY: E. UNSPECIFIED: (no result)
GI HISTOLOGY: PDF REPORT: (no result)

## 2021-10-12 PROCEDURE — 43239 EGD BIOPSY SINGLE/MULTIPLE: CPT | Performed by: INTERNAL MEDICINE

## 2021-10-12 PROCEDURE — 45380 COLONOSCOPY AND BIOPSY: CPT | Mod: 59 | Performed by: INTERNAL MEDICINE

## 2021-10-12 PROCEDURE — 88305 TISSUE EXAM BY PATHOLOGIST: CPT | Mod: 26 | Performed by: INTERNAL MEDICINE

## 2021-10-12 PROCEDURE — 45385 COLONOSCOPY W/LESION REMOVAL: CPT | Performed by: INTERNAL MEDICINE

## 2021-10-12 PROCEDURE — 43450 DILATE ESOPHAGUS 1/MULT PASS: CPT | Performed by: INTERNAL MEDICINE

## 2021-10-14 ENCOUNTER — HOME CARE VISIT (OUTPATIENT)
Dept: HOME HEALTH SERVICES | Facility: HOME HEALTHCARE | Age: 81
End: 2021-10-14

## 2021-10-14 PROBLEM — D12.6 TUBULAR ADENOMA OF COLON: Status: ACTIVE | Noted: 2021-10-14

## 2021-10-15 ENCOUNTER — HOME CARE VISIT (OUTPATIENT)
Dept: HOME HEALTH SERVICES | Facility: HOME HEALTHCARE | Age: 81
End: 2021-10-15

## 2021-10-15 PROCEDURE — G0157 HHC PT ASSISTANT EA 15: HCPCS

## 2021-10-17 VITALS
SYSTOLIC BLOOD PRESSURE: 128 MMHG | HEART RATE: 82 BPM | RESPIRATION RATE: 17 BRPM | TEMPERATURE: 97.7 F | DIASTOLIC BLOOD PRESSURE: 70 MMHG | OXYGEN SATURATION: 96 %

## 2021-10-18 ENCOUNTER — HOME CARE VISIT (OUTPATIENT)
Dept: HOME HEALTH SERVICES | Facility: HOME HEALTHCARE | Age: 81
End: 2021-10-18

## 2021-10-18 VITALS
HEART RATE: 68 BPM | DIASTOLIC BLOOD PRESSURE: 74 MMHG | SYSTOLIC BLOOD PRESSURE: 130 MMHG | OXYGEN SATURATION: 99 % | TEMPERATURE: 98.2 F

## 2021-10-18 PROCEDURE — G0151 HHCP-SERV OF PT,EA 15 MIN: HCPCS

## 2021-10-18 NOTE — HOME HEALTH
Denies medication changes or adverse reactions. No insurance change. No falls reported. Pt compliant with use of cane for support.     Must be completed and at least every 30 days.    Clinical condition of patient at initial or last assessment:  patient required Mod A for transfers with weakness noted during sit to stand, Mod A for gait with the use of her cane.    Current clinical condition of patient:  Pt is I with transfers with no unsteadiness. Pt able to ambulate at least 150 ft with use of cane on level surface with supervision.    Overall progress towards measurable treatment goals:  Pt making progress    Effectiveness of current plan:  Pt with improved mobility.    Plan for continuing or discontinuing service:  Pt has 3 more PT visits.     Changes to goals or care plan update to be completed in guideline section and communicated to MD:  NA    Statement of expectation of continued progress toward goals:  Pt with good prognosis of meeting goals.     Why is it necessary for therapy to continue?  Skilled PT required to improve mobility.

## 2021-10-18 NOTE — HOME HEALTH
pt prepared for PT session, presents up with her cane,  denies any falls and no med changes.   pt states she has been performing hep as able but with ongoing limitations.     pt denies any dizziness but admits balance deficits at times    pt was compliant throughout PT session but struggled at times with low endurance.   pt was unsteady upon standing but was able to self correct with assistance of UEs.   pt continues to have a shuffling gait pattern, improved when cued to properly flex the hips.    pt fatigued to end PT session but was pleased to have participated.    encouraged hourly ambulation and review of hep x 3 daily

## 2021-10-21 ENCOUNTER — OFFICE (AMBULATORY)
Dept: URBAN - METROPOLITAN AREA CLINIC 64 | Facility: CLINIC | Age: 81
End: 2021-10-21
Payer: COMMERCIAL

## 2021-10-21 VITALS
HEART RATE: 60 BPM | HEIGHT: 64 IN | WEIGHT: 191 LBS | DIASTOLIC BLOOD PRESSURE: 76 MMHG | SYSTOLIC BLOOD PRESSURE: 155 MMHG

## 2021-10-21 DIAGNOSIS — R19.4 CHANGE IN BOWEL HABIT: ICD-10-CM

## 2021-10-21 DIAGNOSIS — K21.9 GASTRO-ESOPHAGEAL REFLUX DISEASE WITHOUT ESOPHAGITIS: ICD-10-CM

## 2021-10-21 DIAGNOSIS — R10.13 EPIGASTRIC PAIN: ICD-10-CM

## 2021-10-21 DIAGNOSIS — R13.10 DYSPHAGIA, UNSPECIFIED: ICD-10-CM

## 2021-10-21 PROCEDURE — 99213 OFFICE O/P EST LOW 20 MIN: CPT | Performed by: NURSE PRACTITIONER

## 2021-10-21 RX ORDER — DICYCLOMINE HYDROCHLORIDE 10 MG/1
10 CAPSULE ORAL 2 TIMES DAILY PRN
Start: 2021-10-21 | End: 2022-08-09

## 2021-10-21 RX ORDER — DICYCLOMINE HYDROCHLORIDE 10 MG/1
CAPSULE ORAL
Qty: 180 | Refills: 4 | Status: ACTIVE

## 2021-10-21 RX ORDER — POLYETHYLENE GLYCOL 3350 17 G/17G
17 POWDER, FOR SOLUTION ORAL DAILY
Start: 2021-10-21

## 2021-10-25 ENCOUNTER — HOME CARE VISIT (OUTPATIENT)
Dept: HOME HEALTH SERVICES | Facility: HOME HEALTHCARE | Age: 81
End: 2021-10-25

## 2021-10-25 VITALS
RESPIRATION RATE: 16 BRPM | TEMPERATURE: 97.5 F | SYSTOLIC BLOOD PRESSURE: 138 MMHG | OXYGEN SATURATION: 96 % | HEART RATE: 83 BPM | DIASTOLIC BLOOD PRESSURE: 68 MMHG

## 2021-10-25 PROCEDURE — G0157 HHC PT ASSISTANT EA 15: HCPCS

## 2021-10-26 NOTE — HOME HEALTH
pt up with no new c/o's.  pt states she has been performing hep as able but with limitations.  pt reporting she has no pain but has lower level energy from over activity yesterday.  pt denies any pain or falls.      pt was cued to deep plb for energy conservation and to properly pace all activities.    pt was educated on proper lumbar stab with all activities, with cues to avoid forward flexed posture.  pt was minimally unsteady upon standing but was mostly able to self correct and was ind with transfers.  pt was given several cues today to fully contract/hold at end rom with hep review and to slow spenser to improve efficiency

## 2021-10-26 NOTE — PROGRESS NOTES
Date of Office Visit: 10/27/2021  Encounter Provider: Dr. Adam Agrawal  Place of Service: [facility] CARDIOLOGY [place]  Patient Name: Anila Spencer  :1940  Helena Mas APRN    Chief Complaint   Patient presents with   • Hypertension     4 month follow up/device check     History of Present Illness:    Anila Spencer is a 80 y.o. female. Her past medical history significant for hypertension, sick sinus syndrome, tachybradycardia syndrome, history of syncope came today for follow-up.     Previously patient had repeated episodes of syncope. She had extensive workup which showed that she had underlying sick sinus syndrome. She underwent permanent  pacemaker implantation. Since then patient symptoms of syncope had completely resolved. Previously, patient was also evaluated with cardiac catheterization because of her symptoms of angina pectoris and she was noted to have no significant coronary artery disease. Her previous CAT scan of chest was also negative for pulmonary embolism and she had pulmonary function test which was negative for any significant pulmonary airway disease. It was noted that her symptoms of chest pain at that time was probably related to gastroesophageal reflux disease and she was appropriately treated.    In 2019, patient had an episode of syncope at home.  Patient underwent tilt table test and it showed hypotensive response to head up tilt.  Patient was started on midodrine 5 mg twice daily.  Echocardiogram showed normal left ventricular size and function.  LVEF was 60 to 65%.  Mild mitral regurgitation was noted.  Stress test was negative for ischemia.    Since the previous visit, patient underwent pacemaker implantation and did well.  However patient continues to report shortness of breath.  She reports shortness of breath on short distances.  She denies any chest pain.  No orthopnea PND.  No significant leg edema.  Patient does have palpitation but it is with  exertion.  No syncope or presyncope.    Patient continues to complain of shortness of breath.  I would recommend to repeat echocardiogram.  She had mild mitral regurgitation previously.  I would like to make sure that there is no worsening of mitral regurgitation.  I would also recommend to proceed with PFT.  Her previous stress test was negative in 2019.  Patient may need high-resolution CT.        Past Medical History:   Diagnosis Date   • Abnormal cardiovascular stress test 3/21/2017   • Allergic rhinitis    • Anxiety    • Basal cell carcinoma (BCC) of face    • Biliary dyskinesia    • Bradycardia    • Cardiomegaly    • Cataract    • Cervical spinal stenosis    • Chronic constipation    • Chronic insomnia    • DDD (degenerative disc disease), cervical     C-spine, T-spine, L-Spine   • DDD (degenerative disc disease), lumbar 7/25/2019   • Degeneration of intervertebral disc of thoracic region 3/18/2017   • Diplopia     Dr. Laird   • Dysphagia 4/25/2019   • Emphysema of lung (HCC)    • Fatty liver    • Fracture of multiple ribs 07/2018    Left 4th-8th   • Gastroesophageal reflux disease 12/4/2012   • GERD (gastroesophageal reflux disease)    • Hematuria 12/29/2014   • History of recent fall    • Hypertension    • Hypothyroidism    • Injury of back    • Insomnia 3/7/2016   • Kidney stone    • Left arm pain    • Nasal fracture 07/2018   • Osteoarthritis    • Osteoporosis     h/o tx with Fosamax but quit in 2012 due to fears of complications   • Prediabetes    • Rotator cuff tear     right   • Seizure disorder (HCC)     Dr. Shannon Bennett   • Shortness of breath 5/8/2019   • Sick sinus syndrome (HCC)     Dr. Agrawal   • Spinal stenosis of cervical region 2/11/2015   • Squamous cell skin cancer, face    • Traumatic brain injury (HCC)     from fall   • Tubular adenoma of colon 10/14/2021    No further recall due to age   • Urinary incontinence          Past Surgical History:   Procedure Laterality Date   • BILATERAL  SALPINGO OOPHORECTOMY      for benign cysts   • BREAST CYST ASPIRATION Left    • CARDIAC CATHETERIZATION  08/25/2009    25% LAD. L Cx luminal irregularities. Normal L main   • CARDIAC CATHETERIZATION  03/29/2017    25% LAD. 10-20% LCX. ER 65%. Dr. Agrawal.    • CARDIAC ELECTROPHYSIOLOGY PROCEDURE N/A 6/3/2021    Procedure: PPM generator change - dual    MDT;  Surgeon: Bharathi Mora MD;  Location: [facility] CATH INVASIVE LOCATION;  Service: Cardiovascular;  Laterality: N/A;   • CARDIOVASCULAR STRESS TEST  2019   • CATARACT EXTRACTION  2006   • COSMETIC SURGERY      Lip   • ECHO - CONVERTED  2019   • ENDOSCOPY  05/17/2017    Normal, Dr. Gutierrez   • ENDOSCOPY N/A 8/22/2019    Procedure: ESOPHAGOGASTRODUODENOSCOPY WITH DILATATION (BOUGIE #46,50);  Surgeon: Joaquin Story MD;  Location: [facility] ENDOSCOPY;  Service: Gastroenterology   • INSERT / REPLACE / REMOVE PACEMAKER     • KNEE ARTHROSCOPY Left 1992   • OOPHORECTOMY     • OTHER SURGICAL HISTORY Right 07/2017    Right eye, YAG Capsuloyomy , Dr. Lemus   • PACEMAKER IMPLANTATION  2009   • ROTATOR CUFF REPAIR Right     Dr. Goldberg   • TONSILLECTOMY     • TOTAL HIP ARTHROPLASTY Bilateral            Current Outpatient Medications:   •  Calcium Carbonate-Vitamin D3 (CALCIUM 600-D) 600-400 MG-UNIT tablet, Take 1 tablet by mouth 2 (Two) Times a Day., Disp: , Rfl:   •  Dexilant 60 MG capsule, TAKE 1 CAPSULE BY MOUTH EVERY DAY, Disp: 90 capsule, Rfl: 1  •  dicyclomine (Bentyl) 10 MG capsule, Take 1 capsule by mouth 2 (Two) Times a Day As Needed (abdominal pain)., Disp: , Rfl:   •  escitalopram (LEXAPRO) 5 MG tablet, Take 5 mg by mouth Daily., Disp: , Rfl:   •  fesoterodine fumarate (TOVIAZ ER) 8 MG tablet sustained-release 24 hour tablet, Take 8 mg by mouth Every Evening., Disp: , Rfl:   •  levothyroxine (SYNTHROID, LEVOTHROID) 50 MCG tablet, TAKE 1 TABLET BY MOUTH EVERY DAY, Disp: 90 tablet, Rfl: 2  •  losartan (COZAAR) 25 MG tablet, TAKE 1 TABLET BY MOUTH EVERY DAY FOR  "BLOOD PRESSURE, Disp: 90 tablet, Rfl: 3  •  metoprolol tartrate (LOPRESSOR) 50 MG tablet, TAKE 1 TABLET BY MOUTH TWICE A DAY, Disp: 180 tablet, Rfl: 1  •  midodrine (PROAMATINE) 5 MG tablet, TAKE 1 TABLET BY MOUTH EVERY DAY, Disp: 90 tablet, Rfl: 1  •  multivitamin with minerals tablet tablet, Take 1 tablet by mouth Daily., Disp: , Rfl:   •  polyethylene glycol (MiraLax) 17 GM/SCOOP powder, Take 17 g by mouth Daily., Disp: , Rfl:   •  psyllium (METAMUCIL) 58.6 % packet, Take 1 packet by mouth Daily., Disp: , Rfl:   •  zonisamide (ZONEGRAN) 100 MG capsule, Take 300 mg by mouth Every Evening. Take three 100 mg capsules may take four if needed, Disp: , Rfl:       Social History     Socioeconomic History   • Marital status:    Tobacco Use   • Smoking status: Never Smoker   • Smokeless tobacco: Never Used   • Tobacco comment: Patient is advised not to smoke.   Vaping Use   • Vaping Use: Never used   Substance and Sexual Activity   • Alcohol use: No   • Drug use: No   • Sexual activity: Defer         Review of Systems   Constitutional: Negative for chills and fever.   HENT: Negative for ear discharge and nosebleeds.    Eyes: Negative for discharge and redness.   Cardiovascular: Negative for chest pain, orthopnea, palpitations, paroxysmal nocturnal dyspnea and syncope.   Respiratory: Positive for shortness of breath. Negative for cough and wheezing.    Endocrine: Negative for heat intolerance.   Skin: Negative for rash.   Musculoskeletal: Positive for arthritis and joint pain. Negative for myalgias.   Gastrointestinal: Negative for abdominal pain, melena, nausea and vomiting.   Genitourinary: Negative for dysuria and hematuria.   Neurological: Negative for dizziness, light-headedness, numbness and tremors.   Psychiatric/Behavioral: Negative for depression. The patient is not nervous/anxious.        Procedures    Procedures    No orders to display           Objective:    /64   Pulse 60   Ht 160 cm (62.99\")   " Wt 86.2 kg (190 lb)   BMI 33.67 kg/m²         Constitutional:       Appearance: Well-developed.   Eyes:      General: No scleral icterus.        Right eye: No discharge.   HENT:      Head: Normocephalic and atraumatic.   Neck:      Thyroid: No thyromegaly.      Lymphadenopathy: No cervical adenopathy.   Pulmonary:      Effort: Pulmonary effort is normal. No respiratory distress.      Breath sounds: Normal breath sounds. No wheezing. No rales.   Cardiovascular:      Normal rate. Regular rhythm.      No gallop.   Abdominal:      Tenderness: There is no abdominal tenderness.   Skin:     Findings: No erythema or rash.   Neurological:      Mental Status: Alert and oriented to person, place, and time.             Assessment:       Diagnosis Plan   1. Sick sinus syndrome (HCC)  Pulmonary Function Test    Adult Transthoracic Echo Complete W/ Cont if Necessary Per Protocol   2. Essential hypertension  Pulmonary Function Test    Adult Transthoracic Echo Complete W/ Cont if Necessary Per Protocol   3. Mitral valve insufficiency, unspecified etiology  Pulmonary Function Test    Adult Transthoracic Echo Complete W/ Cont if Necessary Per Protocol   4. Shortness of breath  Pulmonary Function Test    Adult Transthoracic Echo Complete W/ Cont if Necessary Per Protocol            Plan:       MDM:    1.  Shortness of breath:    Patient is complaining of shortness of breath.  Etiology is unclear to me.  I would recommend to proceed with PFT and echocardiogram.    2.  Hypertension:    Blood pressure is very well controlled.    3.  S/p permanent pacemaker:    Patient underwent pacemaker interrogation and it is functioning appropriately.  Patient has healed well at the pacemaker site.    4.  Mitral regurgitation:    I would recommend to proceed with echocardiogram    5.  Syncope:    Patient has not had any further episode of syncope patient is on midodrine

## 2021-10-27 ENCOUNTER — OFFICE VISIT (OUTPATIENT)
Dept: CARDIOLOGY | Facility: CLINIC | Age: 81
End: 2021-10-27

## 2021-10-27 ENCOUNTER — CLINICAL SUPPORT NO REQUIREMENTS (OUTPATIENT)
Dept: CARDIOLOGY | Facility: CLINIC | Age: 81
End: 2021-10-27

## 2021-10-27 VITALS
BODY MASS INDEX: 33.66 KG/M2 | HEIGHT: 63 IN | WEIGHT: 190 LBS | SYSTOLIC BLOOD PRESSURE: 116 MMHG | HEART RATE: 60 BPM | DIASTOLIC BLOOD PRESSURE: 64 MMHG

## 2021-10-27 DIAGNOSIS — I10 ESSENTIAL HYPERTENSION: Chronic | ICD-10-CM

## 2021-10-27 DIAGNOSIS — R06.02 SHORTNESS OF BREATH: ICD-10-CM

## 2021-10-27 DIAGNOSIS — I49.5 SICK SINUS SYNDROME (HCC): Primary | Chronic | ICD-10-CM

## 2021-10-27 DIAGNOSIS — Z95.0 PRESENCE OF CARDIAC PACEMAKER: ICD-10-CM

## 2021-10-27 DIAGNOSIS — I34.0 MITRAL VALVE INSUFFICIENCY, UNSPECIFIED ETIOLOGY: ICD-10-CM

## 2021-10-27 PROCEDURE — 99214 OFFICE O/P EST MOD 30 MIN: CPT | Performed by: INTERNAL MEDICINE

## 2021-10-27 PROCEDURE — 93280 PM DEVICE PROGR EVAL DUAL: CPT | Performed by: NURSE PRACTITIONER

## 2021-11-01 ENCOUNTER — HOME CARE VISIT (OUTPATIENT)
Dept: HOME HEALTH SERVICES | Facility: HOME HEALTHCARE | Age: 81
End: 2021-11-01

## 2021-11-01 VITALS
TEMPERATURE: 97.8 F | HEART RATE: 83 BPM | SYSTOLIC BLOOD PRESSURE: 130 MMHG | RESPIRATION RATE: 17 BRPM | DIASTOLIC BLOOD PRESSURE: 64 MMHG | OXYGEN SATURATION: 96 %

## 2021-11-01 PROCEDURE — G0157 HHC PT ASSISTANT EA 15: HCPCS

## 2021-11-02 ENCOUNTER — TRANSCRIBE ORDERS (OUTPATIENT)
Dept: ADMINISTRATIVE | Facility: HOSPITAL | Age: 81
End: 2021-11-02

## 2021-11-02 DIAGNOSIS — Z01.818 OTHER SPECIFIED PRE-OPERATIVE EXAMINATION: Primary | ICD-10-CM

## 2021-11-02 NOTE — HOME HEALTH
pt sitting up and feeling ok today.     pt is concerned regarding ongoing weakness and fatigue.   pt denies any new falls but reports her balance has been unsteady with weakness in her LEs.  no med changes     pts strength and functional abilities are better and pt is tolerating PT better with no c/o's  pt was unsteady upon standing and needed cues to widen sofia for turning during gait trg with cues given for proper cane placement at times.    pts work/rest ratio was 70/30 with cues given to deep plb.  pt was educated today on proper form with hep review,  not yet ind but improved,  needing cues mostly to sustain contractions.     pt was fatigued to end PT session but was pleased to have participated.

## 2021-11-04 NOTE — PROGRESS NOTES
DEVICE INTERROGATION:  IN OFFICE    DEVICE TYPE:   Dual-chamber pacemaker    :   Spotster    BATTERY:  Stable    TIME TO ELECTIVE REPLACEMENT INDICATORS:   8.5 years    CHARGE TIME:   Not applicable        LEAD DATA:       DEVICE REPROGRAMMED FOR TESTING      Atrial:   Paced mV, 459 ohms, 0.6 V@0.4 ms    Ventricular:     4.9 mV, 445 ohms, 0.9 V@0.4 ms    LV:      Atrial pacing percentage: 99%    Ventricular pacing percentage: Less than 1%      Arrhythmia Logbook Reviewed: No A. fib        Summary:    Stable Device Function    No significant arhythmia burden.     Battery status is stable.    Reviewed with: Dr. Agrawal      NEXT IN OFFICE DEVICE CHECK DUE: 1 year    REMOTE DEVICE INTERROGATIONS: 3 months

## 2021-11-08 ENCOUNTER — HOME CARE VISIT (OUTPATIENT)
Dept: HOME HEALTH SERVICES | Facility: HOME HEALTHCARE | Age: 81
End: 2021-11-08

## 2021-11-08 VITALS
HEART RATE: 67 BPM | DIASTOLIC BLOOD PRESSURE: 80 MMHG | TEMPERATURE: 98.1 F | OXYGEN SATURATION: 98 % | SYSTOLIC BLOOD PRESSURE: 130 MMHG

## 2021-11-08 PROCEDURE — G0151 HHCP-SERV OF PT,EA 15 MIN: HCPCS

## 2021-12-02 ENCOUNTER — OFFICE (AMBULATORY)
Dept: URBAN - METROPOLITAN AREA CLINIC 64 | Facility: CLINIC | Age: 81
End: 2021-12-02
Payer: COMMERCIAL

## 2021-12-02 VITALS
SYSTOLIC BLOOD PRESSURE: 158 MMHG | HEIGHT: 64 IN | HEART RATE: 65 BPM | WEIGHT: 188 LBS | DIASTOLIC BLOOD PRESSURE: 79 MMHG

## 2021-12-02 DIAGNOSIS — K21.9 GASTRO-ESOPHAGEAL REFLUX DISEASE WITHOUT ESOPHAGITIS: ICD-10-CM

## 2021-12-02 DIAGNOSIS — R10.13 EPIGASTRIC PAIN: ICD-10-CM

## 2021-12-02 DIAGNOSIS — Z01.818 PRE-OP EXAMINATION: Primary | ICD-10-CM

## 2021-12-02 PROCEDURE — 99213 OFFICE O/P EST LOW 20 MIN: CPT | Performed by: NURSE PRACTITIONER

## 2021-12-03 RX ORDER — MIDODRINE HYDROCHLORIDE 5 MG/1
TABLET ORAL
Qty: 90 TABLET | Refills: 1 | Status: SHIPPED | OUTPATIENT
Start: 2021-12-03 | End: 2022-06-01

## 2021-12-07 ENCOUNTER — LAB (OUTPATIENT)
Dept: LAB | Facility: HOSPITAL | Age: 81
End: 2021-12-07

## 2021-12-07 DIAGNOSIS — Z01.818 OTHER SPECIFIED PRE-OPERATIVE EXAMINATION: ICD-10-CM

## 2021-12-07 PROCEDURE — U0004 COV-19 TEST NON-CDC HGH THRU: HCPCS

## 2021-12-07 PROCEDURE — U0005 INFEC AGEN DETEC AMPLI PROBE: HCPCS

## 2021-12-07 PROCEDURE — C9803 HOPD COVID-19 SPEC COLLECT: HCPCS

## 2021-12-08 LAB — SARS-COV-2 ORF1AB RESP QL NAA+PROBE: NOT DETECTED

## 2021-12-09 ENCOUNTER — HOSPITAL ENCOUNTER (OUTPATIENT)
Dept: CARDIOLOGY | Facility: HOSPITAL | Age: 81
Discharge: HOME | End: 2021-12-09

## 2021-12-09 ENCOUNTER — HOSPITAL ENCOUNTER (OUTPATIENT)
Dept: RESPIRATORY THERAPY | Facility: HOSPITAL | Age: 81
Discharge: HOME | End: 2021-12-09

## 2021-12-09 DIAGNOSIS — I49.5 SICK SINUS SYNDROME (HCC): ICD-10-CM

## 2021-12-09 DIAGNOSIS — I34.0 MITRAL VALVE INSUFFICIENCY, UNSPECIFIED ETIOLOGY: ICD-10-CM

## 2021-12-09 DIAGNOSIS — I10 ESSENTIAL HYPERTENSION: ICD-10-CM

## 2021-12-09 DIAGNOSIS — R06.02 SHORTNESS OF BREATH: ICD-10-CM

## 2021-12-09 PROCEDURE — 94726 PLETHYSMOGRAPHY LUNG VOLUMES: CPT

## 2021-12-09 PROCEDURE — 94729 DIFFUSING CAPACITY: CPT

## 2021-12-09 PROCEDURE — 94010 BREATHING CAPACITY TEST: CPT

## 2021-12-10 LAB
BH CV ECHO MEAS - ACS: 1.8 CM
BH CV ECHO MEAS - AO MAX PG (FULL): 1.6 MMHG
BH CV ECHO MEAS - AO MAX PG: 5 MMHG
BH CV ECHO MEAS - AO MEAN PG (FULL): 0.55 MMHG
BH CV ECHO MEAS - AO MEAN PG: 2.3 MMHG
BH CV ECHO MEAS - AO ROOT AREA (BSA CORRECTED): 1.8
BH CV ECHO MEAS - AO ROOT AREA: 8.7 CM^2
BH CV ECHO MEAS - AO ROOT DIAM: 3.3 CM
BH CV ECHO MEAS - AO V2 MAX: 112.1 CM/SEC
BH CV ECHO MEAS - AO V2 MEAN: 69.6 CM/SEC
BH CV ECHO MEAS - AO V2 VTI: 26.7 CM
BH CV ECHO MEAS - ASC AORTA: 3.7 CM
BH CV ECHO MEAS - AVA(I,A): 3 CM^2
BH CV ECHO MEAS - AVA(I,D): 3 CM^2
BH CV ECHO MEAS - AVA(V,A): 2.9 CM^2
BH CV ECHO MEAS - AVA(V,D): 2.9 CM^2
BH CV ECHO MEAS - BSA(HAYCOCK): 2 M^2
BH CV ECHO MEAS - BSA: 1.9 M^2
BH CV ECHO MEAS - BZI_BMI: 33.7 KILOGRAMS/M^2
BH CV ECHO MEAS - BZI_METRIC_HEIGHT: 160 CM
BH CV ECHO MEAS - BZI_METRIC_WEIGHT: 86.2 KG
BH CV ECHO MEAS - EDV(CUBED): 49 ML
BH CV ECHO MEAS - EDV(MOD-SP4): 70.6 ML
BH CV ECHO MEAS - EDV(TEICH): 56.6 ML
BH CV ECHO MEAS - EF(CUBED): 60.5 %
BH CV ECHO MEAS - EF(MOD-BP): 56 %
BH CV ECHO MEAS - EF(MOD-SP4): 56.3 %
BH CV ECHO MEAS - EF(TEICH): 53 %
BH CV ECHO MEAS - ESV(CUBED): 19.3 ML
BH CV ECHO MEAS - ESV(MOD-SP4): 30.9 ML
BH CV ECHO MEAS - ESV(TEICH): 26.6 ML
BH CV ECHO MEAS - FS: 26.7 %
BH CV ECHO MEAS - IVS/LVPW: 1.4
BH CV ECHO MEAS - IVSD: 1.9 CM
BH CV ECHO MEAS - LA DIMENSION(2D): 3.7 CM
BH CV ECHO MEAS - LV DIASTOLIC VOL/BSA (35-75): 37.3 ML/M^2
BH CV ECHO MEAS - LV MASS(C)D: 233.1 GRAMS
BH CV ECHO MEAS - LV MASS(C)DI: 123.2 GRAMS/M^2
BH CV ECHO MEAS - LV MAX PG: 3.4 MMHG
BH CV ECHO MEAS - LV MEAN PG: 1.8 MMHG
BH CV ECHO MEAS - LV SYSTOLIC VOL/BSA (12-30): 16.3 ML/M^2
BH CV ECHO MEAS - LV V1 MAX: 92.6 CM/SEC
BH CV ECHO MEAS - LV V1 MEAN: 61.6 CM/SEC
BH CV ECHO MEAS - LV V1 VTI: 23.4 CM
BH CV ECHO MEAS - LVIDD: 3.7 CM
BH CV ECHO MEAS - LVIDS: 2.7 CM
BH CV ECHO MEAS - LVOT AREA: 3.5 CM^2
BH CV ECHO MEAS - LVOT DIAM: 2.1 CM
BH CV ECHO MEAS - LVPWD: 1.3 CM
BH CV ECHO MEAS - MV A MAX VEL: 119.8 CM/SEC
BH CV ECHO MEAS - MV DEC SLOPE: 521.5 CM/SEC^2
BH CV ECHO MEAS - MV DEC TIME: 0.17 SEC
BH CV ECHO MEAS - MV E MAX VEL: 88.5 CM/SEC
BH CV ECHO MEAS - MV E/A: 0.74
BH CV ECHO MEAS - MV MAX PG: 5 MMHG
BH CV ECHO MEAS - MV MEAN PG: 1.7 MMHG
BH CV ECHO MEAS - MV V2 MAX: 111.9 CM/SEC
BH CV ECHO MEAS - MV V2 MEAN: 60.8 CM/SEC
BH CV ECHO MEAS - MV V2 VTI: 29.5 CM
BH CV ECHO MEAS - MVA(VTI): 2.7 CM^2
BH CV ECHO MEAS - PA MAX PG (FULL): 1.4 MMHG
BH CV ECHO MEAS - PA MAX PG: 2.4 MMHG
BH CV ECHO MEAS - PA MEAN PG (FULL): 0.65 MMHG
BH CV ECHO MEAS - PA MEAN PG: 1.2 MMHG
BH CV ECHO MEAS - PA V2 MAX: 77.5 CM/SEC
BH CV ECHO MEAS - PA V2 MEAN: 49.3 CM/SEC
BH CV ECHO MEAS - PA V2 VTI: 17.4 CM
BH CV ECHO MEAS - PULM A REVS DUR: 0.11 SEC
BH CV ECHO MEAS - PULM A REVS VEL: 28.6 CM/SEC
BH CV ECHO MEAS - PULM DIAS VEL: 51.5 CM/SEC
BH CV ECHO MEAS - PULM S/D: 1.7
BH CV ECHO MEAS - PULM SYS VEL: 85.7 CM/SEC
BH CV ECHO MEAS - RAP SYSTOLE: 3 MMHG
BH CV ECHO MEAS - RV MAX PG: 1 MMHG
BH CV ECHO MEAS - RV MEAN PG: 0.52 MMHG
BH CV ECHO MEAS - RV V1 MAX: 51.1 CM/SEC
BH CV ECHO MEAS - RV V1 MEAN: 32.8 CM/SEC
BH CV ECHO MEAS - RV V1 VTI: 13.8 CM
BH CV ECHO MEAS - RVDD: 2.5 CM
BH CV ECHO MEAS - RVSP: 33 MMHG
BH CV ECHO MEAS - SI(AO): 122.8 ML/M^2
BH CV ECHO MEAS - SI(CUBED): 15.7 ML/M^2
BH CV ECHO MEAS - SI(LVOT): 42.8 ML/M^2
BH CV ECHO MEAS - SI(MOD-SP4): 21 ML/M^2
BH CV ECHO MEAS - SI(TEICH): 15.8 ML/M^2
BH CV ECHO MEAS - SV(AO): 232.4 ML
BH CV ECHO MEAS - SV(CUBED): 29.6 ML
BH CV ECHO MEAS - SV(LVOT): 81 ML
BH CV ECHO MEAS - SV(MOD-SP4): 39.8 ML
BH CV ECHO MEAS - SV(TEICH): 30 ML
BH CV ECHO MEAS - TR MAX VEL: 274 CM/SEC

## 2021-12-15 ENCOUNTER — TELEPHONE (OUTPATIENT)
Dept: CARDIOLOGY | Facility: CLINIC | Age: 81
End: 2021-12-15

## 2021-12-15 NOTE — TELEPHONE ENCOUNTER
Echo was ok patient needs to keep follow up appt to discuss in detail l/m for patient to call back

## 2021-12-17 NOTE — TELEPHONE ENCOUNTER
Left message to call back Mild abnormalities would keep follow up to discuss with Dr. Agrawal     Interpretation Summary of recent ECHO    Mild dilation of the aortic root is present.  Estimated right ventricular systolic pressure from tricuspid regurgitation is normal (<35 mmHg).  Estimated left ventricular EF was in agreement with the calculated left ventricular EF. Left ventricular ejection fraction appears to be 56 - 60%. Left ventricular systolic function is normal.  Left ventricular diastolic function is consistent with (grade I) impaired relaxation.

## 2021-12-17 NOTE — TELEPHONE ENCOUNTER
Patient called back and was notified about echo. She is curious of her results of her PFT but doesn't look like the study is read yet.

## 2021-12-23 ENCOUNTER — TELEPHONE (OUTPATIENT)
Dept: CARDIOLOGY | Facility: CLINIC | Age: 81
End: 2021-12-23

## 2021-12-23 DIAGNOSIS — R94.2 ABNORMAL PFTS (PULMONARY FUNCTION TESTS): ICD-10-CM

## 2021-12-23 DIAGNOSIS — R06.02 SHORTNESS OF BREATH: ICD-10-CM

## 2021-12-23 DIAGNOSIS — J43.9 PULMONARY EMPHYSEMA, UNSPECIFIED EMPHYSEMA TYPE (HCC): Primary | ICD-10-CM

## 2021-12-23 NOTE — TELEPHONE ENCOUNTER
Per Dr. Agrawal refer to Pulmonary for ABN PFT.       Tried to call patient # was busy. Order placed

## 2022-01-20 RX ORDER — DEXLANSOPRAZOLE 60 MG/1
CAPSULE, DELAYED RELEASE ORAL
Qty: 90 CAPSULE | Refills: 1 | Status: SHIPPED | OUTPATIENT
Start: 2022-01-20 | End: 2022-01-28 | Stop reason: SDUPTHER

## 2022-01-27 ENCOUNTER — OFFICE VISIT (OUTPATIENT)
Dept: FAMILY MEDICINE CLINIC | Facility: CLINIC | Age: 82
End: 2022-01-27

## 2022-01-27 VITALS
TEMPERATURE: 97.8 F | WEIGHT: 189 LBS | HEART RATE: 60 BPM | BODY MASS INDEX: 33.49 KG/M2 | SYSTOLIC BLOOD PRESSURE: 132 MMHG | OXYGEN SATURATION: 95 % | HEIGHT: 63 IN | DIASTOLIC BLOOD PRESSURE: 71 MMHG | RESPIRATION RATE: 18 BRPM

## 2022-01-27 DIAGNOSIS — E03.9 ACQUIRED HYPOTHYROIDISM: ICD-10-CM

## 2022-01-27 DIAGNOSIS — R73.03 PREDIABETES: ICD-10-CM

## 2022-01-27 DIAGNOSIS — M15.9 PRIMARY OSTEOARTHRITIS INVOLVING MULTIPLE JOINTS: ICD-10-CM

## 2022-01-27 DIAGNOSIS — Z91.81 AT HIGH RISK FOR FALLS: ICD-10-CM

## 2022-01-27 DIAGNOSIS — I10 ESSENTIAL HYPERTENSION: Primary | ICD-10-CM

## 2022-01-27 DIAGNOSIS — I10 ESSENTIAL HYPERTENSION: ICD-10-CM

## 2022-01-27 DIAGNOSIS — K21.9 GASTROESOPHAGEAL REFLUX DISEASE, UNSPECIFIED WHETHER ESOPHAGITIS PRESENT: Chronic | ICD-10-CM

## 2022-01-27 DIAGNOSIS — L60.2 HYPERTROPHIC TOENAIL: ICD-10-CM

## 2022-01-27 PROCEDURE — 99214 OFFICE O/P EST MOD 30 MIN: CPT | Performed by: NURSE PRACTITIONER

## 2022-01-27 NOTE — PROGRESS NOTES
Chief Complaint  Hypertension, Hypothyroidism, Heartburn, and Prediabetes    Subjective          History of Present Illness  Patient presents for follow-up on multiple chronic medical conditions, including hypertension, prediabetes, hypothyroidism, and GERD. She feels well other than having arthritis type pains and generalized weakness. She continues to walk with a cane. She did have a trip and fall on December 26th. She tripped over her cane and landed on her buttocks. She had to have assistance to get back up. She did not seek medical care. She does not believe she injured anything, maybe bruised & [place] herself. She is feeling back to normal now. She has done physical therapy in the past, but doesn't think it would be helpful now. She believes the main issue is her arthritic knees. She has seen Ortho and was reportedly told that she needs bilateral TKAs but that she is not a good surgical candidate.     She would like a referral to a Podiatrist. She has thickened toenails and she and her  have difficulty trying to trim them.       Current Outpatient Medications:   •  Calcium Carbonate-Vitamin D3 (CALCIUM 600-D) 600-400 MG-UNIT tablet, Take 1 tablet by mouth 2 (Two) Times a Day., Disp: , Rfl:   •  dexlansoprazole (Dexilant) 60 MG capsule, Take 1 capsule by mouth Daily., Disp: 90 capsule, Rfl: 1  •  dicyclomine (Bentyl) 10 MG capsule, Take 1 capsule by mouth 2 (Two) Times a Day As Needed (abdominal pain)., Disp: , Rfl:   •  escitalopram (LEXAPRO) 5 MG tablet, Take 5 mg by mouth Daily., Disp: , Rfl:   •  fesoterodine fumarate (TOVIAZ ER) 8 MG tablet sustained-release 24 hour tablet, Take 8 mg by mouth Every Evening., Disp: , Rfl:   •  levothyroxine (SYNTHROID, LEVOTHROID) 50 MCG tablet, Take 1 tablet by mouth Daily., Disp: 90 tablet, Rfl: 1  •  losartan (COZAAR) 25 MG tablet, Take 1 tablet by mouth Daily. for blood pressure., Disp: 90 tablet, Rfl: 1  •  metoprolol tartrate (LOPRESSOR) 50 MG tablet, Take 1  "tablet by mouth 2 (Two) Times a Day., Disp: 180 tablet, Rfl: 1  •  midodrine (PROAMATINE) 5 MG tablet, TAKE 1 TABLET BY MOUTH EVERY DAY, Disp: 90 tablet, Rfl: 1  •  multivitamin with minerals tablet tablet, Take 1 tablet by mouth Daily., Disp: , Rfl:   •  polyethylene glycol (MiraLax) 17 GM/SCOOP powder, Take 17 g by mouth Daily., Disp: , Rfl:   •  psyllium (METAMUCIL) 58.6 % packet, Take 1 packet by mouth Daily., Disp: , Rfl:   •  zonisamide (ZONEGRAN) 100 MG capsule, Take 300 mg by mouth Every Evening. Take three 100 mg capsules may take four if needed, Disp: , Rfl:      Review of Systems   Constitutional: Positive for fatigue. Negative for fever, unexpected weight gain and unexpected weight loss.   HENT: Negative for trouble swallowing.    Respiratory: Negative for cough and shortness of breath.    Cardiovascular: Negative for chest pain.   Gastrointestinal: Negative for abdominal pain, nausea, vomiting and indigestion.   Endocrine: Negative for polydipsia, polyphagia and polyuria.   Musculoskeletal: Positive for arthralgias, back pain and gait problem.   Neurological: Positive for weakness (generalized). Negative for dizziness, seizures, syncope and headache.        Poor balance        Objective   Vital Signs:   Vitals:    01/27/22 1124   BP: 132/71   BP Location: Left arm   Patient Position: Sitting   Cuff Size: Large Adult   Pulse: 60   Resp: 18   Temp: 97.8 °F (36.6 °C)   TempSrc: Infrared   SpO2: 95%   Weight: 85.7 kg (189 lb)   Height: 160 cm (62.99\")     Body mass index is 33.49 kg/m².    Physical Exam  Constitutional:       Appearance: She is well-developed.   Neck:      Thyroid: No thyromegaly.      Vascular: No carotid bruit.      Trachea: Trachea normal.   Cardiovascular:      Rate and Rhythm: Normal rate and regular rhythm.      Heart sounds: Normal heart sounds. No murmur heard.  No friction rub. No gallop.    Pulmonary:      Effort: Pulmonary effort is normal.      Breath sounds: Normal breath " sounds. No wheezing or rales.   Musculoskeletal:      Cervical back: Normal range of motion and neck supple.   Lymphadenopathy:      Cervical: No cervical adenopathy.   Skin:     General: Skin is warm and dry.   Neurological:      Mental Status: She is alert and oriented to person, place, and time.      Motor: Weakness present.      Gait: Gait abnormal.      Comments: Using cane   Psychiatric:         Behavior: Behavior normal.         Thought Content: Thought content normal.         Judgment: Judgment normal.          Result Review :   The following data was reviewed by: VALENTINO Power on 01/27/2022:  Data reviewed: 7/20/21 CBC, CMP, A1c, Vit B12; 4/28/21 TSH       Assessment and Plan    Diagnoses and all orders for this visit:    1. Essential hypertension (Primary)  Assessment & Plan:  Stable on losartan and metoprolol.   Follow-up in 6 months.     Orders:  -     CBC & Differential; Future  -     Comprehensive Metabolic Panel; Future    2. Acquired hypothyroidism  -     TSH; Future  -     levothyroxine (SYNTHROID, LEVOTHROID) 50 MCG tablet; Take 1 tablet by mouth Daily.  Dispense: 90 tablet; Refill: 1    3. Prediabetes  Assessment & Plan:  Last A1c was in normal range.   Continue healthy diet.     Orders:  -     Hemoglobin A1c; Future    4. Gastroesophageal reflux disease, unspecified whether esophagitis present  Assessment & Plan:  Last EGD was in October 2021.   Doing much better.   Continue Dexilant.       5. Primary osteoarthritis involving multiple joints  Assessment & Plan:  She declines physical therapy or referral back to Ortho at this time.       6. Hypertrophic toenail  -     Ambulatory Referral to Podiatry    7. At high risk for falls  Assessment & Plan:  We reviewed home safety measures.   Offered PT to work on strength and balance - she declines.       Other orders  -     metoprolol tartrate (LOPRESSOR) 50 MG tablet; Take 1 tablet by mouth 2 (Two) Times a Day.  Dispense: 180 tablet; Refill: 1  -      dexlansoprazole (Dexilant) 60 MG capsule; Take 1 capsule by mouth Daily.  Dispense: 90 capsule; Refill: 1  -     losartan (COZAAR) 25 MG tablet; Take 1 tablet by mouth Daily. for blood pressure.  Dispense: 90 tablet; Refill: 1          Follow Up   No follow-ups on file.    Patient was given instructions and counseling regarding her condition and health maintenance advice. Please see specific information in the After Visit Summary.     Helena Mas, VALENTINO 1/28/2022 06:11 EST  This note was electronically signed.

## 2022-01-28 PROBLEM — Z91.81 AT HIGH RISK FOR FALLS: Status: ACTIVE | Noted: 2022-01-28

## 2022-01-28 PROBLEM — Z96.1 PSEUDOPHAKIA OF BOTH EYES: Status: RESOLVED | Noted: 2020-02-25 | Resolved: 2022-01-28

## 2022-01-28 PROBLEM — M79.602 LEFT ARM PAIN: Status: RESOLVED | Noted: 2021-01-21 | Resolved: 2022-01-28

## 2022-01-28 PROBLEM — H40.002 GLAUCOMA SUSPECT, LEFT: Status: RESOLVED | Noted: 2017-07-05 | Resolved: 2022-01-28

## 2022-01-28 RX ORDER — DEXLANSOPRAZOLE 60 MG/1
1 CAPSULE, DELAYED RELEASE ORAL DAILY
Qty: 90 CAPSULE | Refills: 1 | Status: SHIPPED | OUTPATIENT
Start: 2022-01-28 | End: 2022-08-09 | Stop reason: SDUPTHER

## 2022-01-28 RX ORDER — LEVOTHYROXINE SODIUM 0.05 MG/1
50 TABLET ORAL DAILY
Qty: 90 TABLET | Refills: 1 | Status: SHIPPED | OUTPATIENT
Start: 2022-01-28 | End: 2022-08-09 | Stop reason: SDUPTHER

## 2022-01-28 RX ORDER — METOPROLOL TARTRATE 50 MG/1
50 TABLET, FILM COATED ORAL 2 TIMES DAILY
Qty: 180 TABLET | Refills: 1 | Status: SHIPPED | OUTPATIENT
Start: 2022-01-28 | End: 2023-01-06 | Stop reason: SDUPTHER

## 2022-01-28 RX ORDER — LOSARTAN POTASSIUM 25 MG/1
25 TABLET ORAL DAILY
Qty: 90 TABLET | Refills: 1 | Status: SHIPPED | OUTPATIENT
Start: 2022-01-28 | End: 2022-08-12 | Stop reason: SDUPTHER

## 2022-03-01 ENCOUNTER — CLINICAL SUPPORT (OUTPATIENT)
Dept: FAMILY MEDICINE CLINIC | Facility: CLINIC | Age: 82
End: 2022-03-01

## 2022-03-01 ENCOUNTER — TELEPHONE (OUTPATIENT)
Dept: FAMILY MEDICINE CLINIC | Facility: CLINIC | Age: 82
End: 2022-03-01

## 2022-03-01 DIAGNOSIS — M81.0 AGE-RELATED OSTEOPOROSIS WITHOUT CURRENT PATHOLOGICAL FRACTURE: Primary | ICD-10-CM

## 2022-03-01 PROCEDURE — 96372 THER/PROPH/DIAG INJ SC/IM: CPT | Performed by: NURSE PRACTITIONER

## 2022-05-03 ENCOUNTER — OFFICE VISIT (OUTPATIENT)
Dept: CARDIOLOGY | Facility: CLINIC | Age: 82
End: 2022-05-03

## 2022-05-03 ENCOUNTER — CLINICAL SUPPORT NO REQUIREMENTS (OUTPATIENT)
Dept: CARDIOLOGY | Facility: CLINIC | Age: 82
End: 2022-05-03

## 2022-05-03 VITALS
OXYGEN SATURATION: 97 % | HEIGHT: 63 IN | BODY MASS INDEX: 34.02 KG/M2 | HEART RATE: 62 BPM | WEIGHT: 192 LBS | SYSTOLIC BLOOD PRESSURE: 141 MMHG | DIASTOLIC BLOOD PRESSURE: 71 MMHG

## 2022-05-03 DIAGNOSIS — R06.09 DYSPNEA ON EXERTION: ICD-10-CM

## 2022-05-03 DIAGNOSIS — I10 ESSENTIAL HYPERTENSION: Chronic | ICD-10-CM

## 2022-05-03 DIAGNOSIS — Z95.0 PRESENCE OF CARDIAC PACEMAKER: ICD-10-CM

## 2022-05-03 DIAGNOSIS — I49.5 SICK SINUS SYNDROME: Chronic | ICD-10-CM

## 2022-05-03 DIAGNOSIS — R07.9 CHEST PAIN, UNSPECIFIED TYPE: Primary | ICD-10-CM

## 2022-05-03 PROCEDURE — 99214 OFFICE O/P EST MOD 30 MIN: CPT | Performed by: NURSE PRACTITIONER

## 2022-05-03 PROCEDURE — 93280 PM DEVICE PROGR EVAL DUAL: CPT | Performed by: NURSE PRACTITIONER

## 2022-05-03 PROCEDURE — 93000 ELECTROCARDIOGRAM COMPLETE: CPT | Performed by: NURSE PRACTITIONER

## 2022-05-06 PROBLEM — R06.09 DYSPNEA ON EXERTION: Status: ACTIVE | Noted: 2019-05-08

## 2022-05-06 NOTE — PROGRESS NOTES
Cardiology Office Follow Up Visit      Primary Care Provider:  Helena Mas APRN    Reason for f/u:     Shortness of air  Chest discomfort  Hypertension  Sick sinus syndrome  Dual-chamber [employer] pacemaker      Subjective     CC:    Complaints of dyspnea with exertion and intermittent chest discomfort    History of Present Illness       Anila Spencer is a 81 y.o. female.  Patient is known to have hypertension, sick sinus syndrome, previous dual-chamber pacemaker and syncopal episodes.    Patient had a dual-chamber [employer] pacemaker implanted in June 2021.  She has had no recurrent syncope since that time.    In 2019 the patient underwent a tilt table test that showed hypertensive response to head up tilt.  She was started on midodrine at that time.  Echocardiogram showed preserved LV function with EF of 60 to 65% and mild mitral regurgitation.  Stress Myoview at that time showed no reversible ischemia.    Patient has had persistent complaints of shortness of breath.  She is undergone PFT by Dr. Agrawal and was referred to pulmonary back in December.  Unfortunately this appointment did not get made and the patient did not follow-up with us about this until now.          ASSESSMENT/PLAN:        Diagnoses and all orders for this visit:    1. Chest pain, unspecified type (Primary)  Comments:  Patient complaining of some chest pain associated with tightness  Orders:  -     Stress Test With Myocardial Perfusion (1 Day); Future  -     XR Chest 2 View; Future    2. Dyspnea on exertion  Comments:  Persistent complaints of dyspnea with exertion.  Previously referred to pulmonary.    3. Sick sinus syndrome (HCC)  Comments:  Stable since pacemaker implant    4. Presence of cardiac pacemaker  Comments:  Dual-chamber [employer] pacemaker with stable device function    5. Essential hypertension           MEDICAL DECISION MAKING:    Patient is reporting some symptoms of chest pain associated with  shortness of breath.  On clinical exam she does not appear to have overt heart failure.  EKG does not show acute ST or T wave segment abnormalities.    The patient is not known to have coronary artery disease.    I would like to repeat a stress Myoview to rule out an ischemic burden.    Echocardiogram was reviewed from October 2021 which showed her ejection fraction to be 55 to 60%.  There was no significant valvular flow abnormalities.  RVSP was less than 35 mmHg.  There was mild diastolic dysfunction.    While the patient was in our office today we did go ahead and make her an appointment with Dr. Fitch for pulmonary evaluation due to ongoing complaints of shortness of breath in the absence of any acute cardiac findings.    I will proceed with a stress Myoview to rule out any ischemic burden.    I made no other changes in her medication.    The patient's pacemaker function is stable.  She has had no atrial arrhythmias.    She will have scheduled follow-up with Dr. Agrawal in 6 months.          Past Medical History:   Diagnosis Date   • Abnormal cardiovascular stress test 3/21/2017   • Allergic rhinitis    • Anxiety    • At high risk for falls 1/28/2022   • Basal cell carcinoma (BCC) of face    • Biliary dyskinesia    • Bradycardia    • Cardiomegaly    • Cataract    • Cervical spinal stenosis    • Chronic constipation    • Chronic insomnia    • DDD (degenerative disc disease), cervical     C-spine, T-spine, L-Spine   • DDD (degenerative disc disease), lumbar 7/25/2019   • Degeneration of intervertebral disc of thoracic region 3/18/2017   • Diplopia     Dr. Laird   • Dysphagia 4/25/2019   • Emphysema of lung (HCC)    • Fatty liver    • Fracture of multiple ribs 07/2018    Left 4th-8th   • Gastroesophageal reflux disease 12/4/2012   • GERD (gastroesophageal reflux disease)    • Hematuria 12/29/2014   • History of recent fall    • Hypertension    • Hypothyroidism    • Injury of back    • Insomnia 3/7/2016   • Kidney stone     • Left arm pain    • Nasal fracture 07/2018   • Osteoarthritis    • Osteoporosis     h/o tx with Fosamax but quit in 2012 due to fears of complications   • Prediabetes    • Rotator cuff tear     right   • Seizure disorder ([facility])     Dr. Shannon Bennett   • Shortness of breath 5/8/2019   • Sick sinus syndrome ([facility])     Dr. Agrawal   • Spinal stenosis of cervical region 2/11/2015   • Squamous cell skin cancer, face    • Traumatic brain injury (HCC)     from fall   • Tubular adenoma of colon 10/14/2021    No further recall due to age   • Urinary incontinence        Past Surgical History:   Procedure Laterality Date   • BILATERAL SALPINGO OOPHORECTOMY      for benign cysts   • BREAST CYST ASPIRATION Left    • CARDIAC CATHETERIZATION  08/25/2009    25% LAD. L Cx luminal irregularities. Normal L main   • CARDIAC CATHETERIZATION  03/29/2017    25% LAD. 10-20% LCX. ER 65%. Dr. Agrawal.    • CARDIAC ELECTROPHYSIOLOGY PROCEDURE N/A 6/3/2021    Procedure: PPM generator change - dual    MDT;  Surgeon: Bharathi Mora MD;  Location: [facility] CATH INVASIVE LOCATION;  Service: Cardiovascular;  Laterality: N/A;   • CARDIOVASCULAR STRESS TEST  2019   • CATARACT EXTRACTION  2006   • COSMETIC SURGERY      Lip   • ECHO - CONVERTED  2019   • ENDOSCOPY  05/17/2017    Normal, Dr. Gutierrez   • ENDOSCOPY N/A 8/22/2019    Procedure: ESOPHAGOGASTRODUODENOSCOPY WITH DILATATION (BOUGIE #46,50);  Surgeon: Joaquin Story MD;  Location: [facility] ENDOSCOPY;  Service: Gastroenterology   • INSERT / REPLACE / REMOVE PACEMAKER     • KNEE ARTHROSCOPY Left 1992   • OOPHORECTOMY     • OTHER SURGICAL HISTORY Right 07/2017    Right eye, YAG Capsuloyomy , Dr. Lemus   • PACEMAKER IMPLANTATION  2009   • ROTATOR CUFF REPAIR Right     Dr. Goldberg   • TONSILLECTOMY     • TOTAL HIP ARTHROPLASTY Bilateral          Current Outpatient Medications:   •  Calcium Carbonate-Vitamin D3 600-400 MG-UNIT tablet, Take 1 tablet by mouth 2 (Two) Times a Day., Disp: , Rfl:   •   dexlansoprazole (Dexilant) 60 MG capsule, Take 1 capsule by mouth Daily., Disp: 90 capsule, Rfl: 1  •  dicyclomine (Bentyl) 10 MG capsule, Take 1 capsule by mouth 2 (Two) Times a Day As Needed (abdominal pain)., Disp: , Rfl:   •  escitalopram (LEXAPRO) 5 MG tablet, Take 5 mg by mouth Daily., Disp: , Rfl:   •  fesoterodine fumarate (TOVIAZ ER) 8 MG tablet sustained-release 24 hour tablet, Take 8 mg by mouth Every Evening., Disp: , Rfl:   •  levothyroxine (SYNTHROID, LEVOTHROID) 50 MCG tablet, Take 1 tablet by mouth Daily., Disp: 90 tablet, Rfl: 1  •  losartan (COZAAR) 25 MG tablet, Take 1 tablet by mouth Daily. for blood pressure., Disp: 90 tablet, Rfl: 1  •  metoprolol tartrate (LOPRESSOR) 50 MG tablet, Take 1 tablet by mouth 2 (Two) Times a Day., Disp: 180 tablet, Rfl: 1  •  midodrine (PROAMATINE) 5 MG tablet, TAKE 1 TABLET BY MOUTH EVERY DAY, Disp: 90 tablet, Rfl: 1  •  multivitamin with minerals tablet tablet, Take 1 tablet by mouth Daily., Disp: , Rfl:   •  polyethylene glycol (MiraLax) 17 GM/SCOOP powder, Take 17 g by mouth Daily., Disp: , Rfl:   •  psyllium (METAMUCIL) 58.6 % packet, Take 1 packet by mouth Daily., Disp: , Rfl:   •  zonisamide (ZONEGRAN) 100 MG capsule, Take 300 mg by mouth Every Evening. Take three 100 mg capsules may take four if needed, Disp: , Rfl:     Social History     Socioeconomic History   • Marital status:    Tobacco Use   • Smoking status: Never Smoker   • Smokeless tobacco: Never Used   • Tobacco comment: Patient is advised not to smoke.   Vaping Use   • Vaping Use: Never used   Substance and Sexual Activity   • Alcohol use: No   • Drug use: No   • Sexual activity: Defer       Family History   Problem Relation Age of Onset   • Heart disease Mother    • Pancreatic cancer Mother    • Prostate cancer Father    • Breast cancer Sister         4 sisters have had   • Pancreatic cancer Brother    • Colon cancer Brother    • Stroke Maternal Grandmother    • Breast cancer Sister    •  "Breast cancer Sister    • Breast cancer Sister        The following portions of the patient's history were reviewed and updated as appropriate: allergies, current medications, past family history, past medical history, past social history, past surgical history and problem list.    Review of Systems   Constitutional: Negative for decreased appetite and diaphoresis.   HENT: Negative for congestion, hearing loss and nosebleeds.    Cardiovascular: Positive for chest pain and dyspnea on exertion. Negative for claudication, irregular heartbeat, leg swelling, near-syncope, orthopnea, palpitations, paroxysmal nocturnal dyspnea and syncope.   Respiratory: Positive for shortness of breath. Negative for cough and sleep disturbances due to breathing.    Endocrine: Negative for polyuria.   Hematologic/Lymphatic: Does not bruise/bleed easily.   Skin: Negative for itching and rash.   Musculoskeletal: Negative for back pain, muscle weakness and myalgias.   Gastrointestinal: Negative for abdominal pain, change in bowel habit and nausea.   Genitourinary: Negative for dysuria, flank pain, frequency and hesitancy.   Neurological: Negative for dizziness, tremors and weakness.   Psychiatric/Behavioral: Negative for altered mental status. The patient does not have insomnia.        /71   Pulse 62   Ht 160 cm (62.99\")   Wt 87.1 kg (192 lb)   SpO2 97%   BMI 34.02 kg/m² .    Objective     Vitals reviewed.   Constitutional:       General: Not in acute distress.     Appearance: Normal appearance. Well-developed.   Eyes:      Pupils: Pupils are equal, round, and reactive to light.   HENT:      Head: Normocephalic and atraumatic.   Neck:      Vascular: No JVD.   Pulmonary:      Effort: Pulmonary effort is normal.      Breath sounds: Normal breath sounds.   Cardiovascular:      Normal rate. Regular rhythm.   Pulses:     Intact distal pulses.   Edema:     Peripheral edema absent.   Abdominal:      General: There is no distension.      " Palpations: Abdomen is soft.      Tenderness: There is no abdominal tenderness.   Musculoskeletal: Normal range of motion.      Cervical back: Normal range of motion and neck supple. Skin:     General: Skin is warm and dry.   Neurological:      Mental Status: Alert and oriented to person, place, and time.           EKG ordered and reviewed by me in office    ECG 12 Lead    Date/Time: 5/3/2022 1:21 PM  Performed by: Zee Bunch APRN  Authorized by: Zee Bunch APRN   Rhythm: paced  BPM: 62    Clinical impression: non-specific ECG  Comments: Atrial paced rhythm with intact ventricular conduction.  No ST or T wave segment abnormalities                In Office Device Interrogation: Reviewed    DEVICE INTERROGATION:  IN OFFICE    DEVICE TYPE:   Dual-chamber pacemaker    :   Ctrip    BATTERY:  Stable    TIME TO ELECTIVE REPLACEMENT INDICATORS:   8 years    CHARGE TIME:   Not applicable        LEAD DATA:   LEADS Reprogrammed for testing purposes    Atrial:   Paced mV, 429 ohms, 0.8 V@0.4 ms    Ventricular:     4.0 mV, 463 ohms, 1.2 V@0.4 ms    LV:      Pacemaker Dependent: No      Atrial pacing percentage: 88%    Ventricular pacing percentage: 1%      Arrhythmia Logbook Reviewed: No atrial fibrillation detected        Summary:    Stable Device Function    No significant arhythmia burden.     Battery status is stable.      NEXT IN OFFICE DEVICE CHECK DUE: 1 year    REMOTE DEVICE INTERROGATIONS: 3 months

## 2022-05-17 ENCOUNTER — HOSPITAL ENCOUNTER (OUTPATIENT)
Dept: CARDIOLOGY | Facility: HOSPITAL | Age: 82
Discharge: HOME | End: 2022-05-17

## 2022-05-17 DIAGNOSIS — R07.9 CHEST PAIN, UNSPECIFIED TYPE: ICD-10-CM

## 2022-05-17 LAB
BH CV REST NUCLEAR ISOTOPE DOSE: 11 MCI
BH CV STRESS BP STAGE 1: NORMAL
BH CV STRESS COMMENTS STAGE 1: NORMAL
BH CV STRESS DOSE REGADENOSON STAGE 1: 0.4
BH CV STRESS DURATION MIN STAGE 1: 0
BH CV STRESS DURATION SEC STAGE 1: 10
BH CV STRESS HR STAGE 1: 78
BH CV STRESS NUCLEAR ISOTOPE DOSE: 32 MCI
BH CV STRESS PROTOCOL 1: NORMAL
BH CV STRESS RECOVERY BP: NORMAL MMHG
BH CV STRESS RECOVERY HR: 61 BPM
BH CV STRESS STAGE 1: 1
MAXIMAL PREDICTED HEART RATE: 137 BPM
STRESS BASELINE BP: NORMAL MMHG
STRESS BASELINE HR: 78 BPM
STRESS TARGET HR: 116 BPM

## 2022-05-17 PROCEDURE — 25010000002 REGADENOSON 0.4 MG/5ML SOLUTION: Performed by: NURSE PRACTITIONER

## 2022-05-17 PROCEDURE — A9500 TC99M SESTAMIBI: HCPCS | Performed by: NURSE PRACTITIONER

## 2022-05-17 PROCEDURE — 0 TECHNETIUM SESTAMIBI: Performed by: NURSE PRACTITIONER

## 2022-05-17 PROCEDURE — 78452 HT MUSCLE IMAGE SPECT MULT: CPT | Performed by: INTERNAL MEDICINE

## 2022-05-17 PROCEDURE — 93017 CV STRESS TEST TRACING ONLY: CPT

## 2022-05-17 PROCEDURE — 93018 CV STRESS TEST I&R ONLY: CPT | Performed by: INTERNAL MEDICINE

## 2022-05-17 PROCEDURE — 78452 HT MUSCLE IMAGE SPECT MULT: CPT

## 2022-05-17 RX ADMIN — REGADENOSON 0.4 MG: 0.08 INJECTION, SOLUTION INTRAVENOUS at 13:59

## 2022-05-17 RX ADMIN — TECHNETIUM TC 99M SESTAMIBI 1 DOSE: 1 INJECTION INTRAVENOUS at 13:58

## 2022-05-17 RX ADMIN — TECHNETIUM TC 99M SESTAMIBI 1 DOSE: 1 INJECTION INTRAVENOUS at 12:16

## 2022-05-18 NOTE — PROGRESS NOTES
Please call pt and let her know her stress test looks normal  No ischemia  Keep scheduled f/u with Dr. Agrawal   If symptoms worsen we can move up if needed.

## 2022-06-01 RX ORDER — MIDODRINE HYDROCHLORIDE 5 MG/1
TABLET ORAL
Qty: 90 TABLET | Refills: 1 | Status: SHIPPED | OUTPATIENT
Start: 2022-06-01 | End: 2022-11-22

## 2022-08-09 ENCOUNTER — OFFICE VISIT (OUTPATIENT)
Dept: FAMILY MEDICINE CLINIC | Facility: CLINIC | Age: 82
End: 2022-08-09

## 2022-08-09 VITALS
HEART RATE: 67 BPM | RESPIRATION RATE: 16 BRPM | DIASTOLIC BLOOD PRESSURE: 63 MMHG | WEIGHT: 198 LBS | BODY MASS INDEX: 35.08 KG/M2 | HEIGHT: 63 IN | SYSTOLIC BLOOD PRESSURE: 99 MMHG | OXYGEN SATURATION: 96 % | TEMPERATURE: 97.8 F

## 2022-08-09 DIAGNOSIS — K21.9 GASTROESOPHAGEAL REFLUX DISEASE, UNSPECIFIED WHETHER ESOPHAGITIS PRESENT: Chronic | ICD-10-CM

## 2022-08-09 DIAGNOSIS — E66.01 CLASS 2 SEVERE OBESITY DUE TO EXCESS CALORIES WITH SERIOUS COMORBIDITY AND BODY MASS INDEX (BMI) OF 35.0 TO 35.9 IN ADULT: ICD-10-CM

## 2022-08-09 DIAGNOSIS — R94.2 ABNORMAL LUNG FUNCTION TEST: ICD-10-CM

## 2022-08-09 DIAGNOSIS — I49.9 CARDIAC ARRHYTHMIA, UNSPECIFIED CARDIAC ARRHYTHMIA TYPE: ICD-10-CM

## 2022-08-09 DIAGNOSIS — M81.0 AGE-RELATED OSTEOPOROSIS WITHOUT CURRENT PATHOLOGICAL FRACTURE: ICD-10-CM

## 2022-08-09 DIAGNOSIS — N39.0 URINARY TRACT INFECTION WITHOUT HEMATURIA, SITE UNSPECIFIED: ICD-10-CM

## 2022-08-09 DIAGNOSIS — I10 ESSENTIAL HYPERTENSION: Chronic | ICD-10-CM

## 2022-08-09 DIAGNOSIS — F51.04 CHRONIC INSOMNIA: ICD-10-CM

## 2022-08-09 DIAGNOSIS — R53.83 FATIGUE, UNSPECIFIED TYPE: ICD-10-CM

## 2022-08-09 DIAGNOSIS — F41.9 ANXIETY: Chronic | ICD-10-CM

## 2022-08-09 DIAGNOSIS — N32.81 OVERACTIVE BLADDER: Chronic | ICD-10-CM

## 2022-08-09 DIAGNOSIS — M15.9 PRIMARY OSTEOARTHRITIS INVOLVING MULTIPLE JOINTS: ICD-10-CM

## 2022-08-09 DIAGNOSIS — R94.2 ABNORMAL PFTS (PULMONARY FUNCTION TESTS): ICD-10-CM

## 2022-08-09 DIAGNOSIS — Z12.31 SCREENING MAMMOGRAM FOR BREAST CANCER: ICD-10-CM

## 2022-08-09 DIAGNOSIS — I95.1 ORTHOSTATIC HYPOTENSION: Chronic | ICD-10-CM

## 2022-08-09 DIAGNOSIS — N39.46 MIXED STRESS AND URGE URINARY INCONTINENCE: ICD-10-CM

## 2022-08-09 DIAGNOSIS — U07.1 COVID-19: ICD-10-CM

## 2022-08-09 DIAGNOSIS — K59.09 CHRONIC CONSTIPATION: ICD-10-CM

## 2022-08-09 DIAGNOSIS — E03.9 ACQUIRED HYPOTHYROIDISM: ICD-10-CM

## 2022-08-09 DIAGNOSIS — R82.90 ABNORMAL URINALYSIS: ICD-10-CM

## 2022-08-09 DIAGNOSIS — Z95.0 PRESENCE OF CARDIAC PACEMAKER: ICD-10-CM

## 2022-08-09 DIAGNOSIS — R73.03 PREDIABETES: ICD-10-CM

## 2022-08-09 DIAGNOSIS — J30.9 ALLERGIC RHINITIS, UNSPECIFIED SEASONALITY, UNSPECIFIED TRIGGER: ICD-10-CM

## 2022-08-09 DIAGNOSIS — H61.21 EXCESSIVE CERUMEN IN EAR CANAL, RIGHT: ICD-10-CM

## 2022-08-09 DIAGNOSIS — R60.0 MILD PERIPHERAL EDEMA: ICD-10-CM

## 2022-08-09 DIAGNOSIS — R06.09 DYSPNEA ON EXERTION: ICD-10-CM

## 2022-08-09 DIAGNOSIS — Z91.81 AT HIGH RISK FOR FALLS: Primary | ICD-10-CM

## 2022-08-09 DIAGNOSIS — G40.909 SEIZURE DISORDER: Chronic | ICD-10-CM

## 2022-08-09 LAB
BILIRUB BLD-MCNC: NEGATIVE MG/DL
CLARITY, POC: ABNORMAL
COLOR UR: ABNORMAL
EXPIRATION DATE: ABNORMAL
GLUCOSE UR STRIP-MCNC: NEGATIVE MG/DL
KETONES UR QL: NEGATIVE
LEUKOCYTE EST, POC: ABNORMAL
Lab: ABNORMAL
NITRITE UR-MCNC: POSITIVE MG/ML
PH UR: 5.5 [PH] (ref 5–8)
PROT UR STRIP-MCNC: ABNORMAL MG/DL
RBC # UR STRIP: NEGATIVE /UL
SP GR UR: 1.02 (ref 1–1.03)
UROBILINOGEN UR QL: NORMAL

## 2022-08-09 PROCEDURE — 99214 OFFICE O/P EST MOD 30 MIN: CPT

## 2022-08-09 PROCEDURE — 81003 URINALYSIS AUTO W/O SCOPE: CPT

## 2022-08-09 RX ORDER — NITROFURANTOIN 25; 75 MG/1; MG/1
100 CAPSULE ORAL 2 TIMES DAILY
Qty: 14 CAPSULE | Refills: 0 | Status: SHIPPED | OUTPATIENT
Start: 2022-08-09 | End: 2022-08-16

## 2022-08-09 RX ORDER — LEVOTHYROXINE SODIUM 0.05 MG/1
50 TABLET ORAL DAILY
Qty: 90 TABLET | Refills: 1 | Status: SHIPPED | OUTPATIENT
Start: 2022-08-09 | End: 2023-01-06 | Stop reason: SDUPTHER

## 2022-08-09 RX ORDER — DEXLANSOPRAZOLE 60 MG/1
1 CAPSULE, DELAYED RELEASE ORAL DAILY
Qty: 90 CAPSULE | Refills: 1 | Status: SHIPPED | OUTPATIENT
Start: 2022-08-09

## 2022-08-09 RX ORDER — MELATONIN 10 MG
1 CAPSULE ORAL
Qty: 30 CAPSULE | Refills: 3 | Status: SHIPPED | OUTPATIENT
Start: 2022-08-09 | End: 2022-08-17 | Stop reason: SDUPTHER

## 2022-08-09 NOTE — PROGRESS NOTES
Subjective   Anila Spencer is a 81 y.o. female.     Chief Complaint   Patient presents with   • Hypertension       ECG 12 Lead    Date/Time: 8/18/2022 10:26 PM  Performed by: Chantell Larios APRN  Authorized by: Chantell Larios APRN   Comparison: compared with previous ECG from 5/6/2022  Similar to previous ECG  Rhythm: sinus rhythm  Rate: normal  Conduction: conduction normal  Conduction: 1st degree AV block  ST Segments: ST segments normal  ST Elevation: III  ST Flattening: I and II  T Waves: T waves normal  T elevation: all, II, aVF, V2, V3, V4, V5 and V6  T inversion: aVR and V1  T flattening: aVL  QRS axis: normal  Other: no other findings  Pacing: atrial sensed rhythm  Clinical impression: abnormal EKG          Patient is a 82 yo female well established in this office. Here for medication refill and new onset of fatigue and recent Covid illness.    Medication and diagnosis history of chart reviewed.      Current complaints include:  Palpitations with walkng a short distance and has to sit down.  History of cardiology workup with no final result of etiology.  EKG in office abnormal.  Reports dependent edema. No edema today.    Abnormal PFT in 12/2021 with no pulmonology workup post.  Covid diagnosis 7/7/2022.  Reports lingering fatigue since then.  Reports frequent productive cough. Will refer to  for further eval/treat.    Chronic constipation.  Reports egd/colonoscopy recent two years ago    Reports occasional coldness  Fatigues easily. TSH recheck.    Does have seasonal allergies.    Has seen dermatology in past. No skin change in past.    Wants covid booster -can receive in three months.           The following portions of the patient's history were reviewed and updated as appropriate: allergies, current medications, past family history, past medical history, past social history, past surgical history and problem list.  chart review and prior consults    Allergies:  No Known  Allergies    Social History:  Social History     Socioeconomic History   • Marital status:    Tobacco Use   • Smoking status: Never Smoker   • Smokeless tobacco: Never Used   • Tobacco comment: Patient is advised not to smoke.   Vaping Use   • Vaping Use: Never used   Substance and Sexual Activity   • Alcohol use: No   • Drug use: No   • Sexual activity: Defer       Family History:  Family History   Problem Relation Age of Onset   • Heart disease Mother    • Pancreatic cancer Mother    • Prostate cancer Father    • Breast cancer Sister         4 sisters have had   • Pancreatic cancer Brother    • Colon cancer Brother    • Stroke Maternal Grandmother    • Breast cancer Sister    • Breast cancer Sister    • Breast cancer Sister        Past Medical History :  Patient Active Problem List   Diagnosis   • Cardiomegaly   • Essential hypertension   • Mitral valve insufficiency   • Presence of cardiac pacemaker   • Dyspnea on exertion   • DDD (degenerative disc disease), lumbar   • Sick sinus syndrome (HCC)   • PCO (posterior capsular opacification), bilateral   • Diplopia   • Glaucoma suspect   • Prediabetes   • Hypothyroidism   • Urinary incontinence   • Squamous cell skin cancer, face   • Spinal stenosis of cervical region   • Seizure disorder (HCC)   • Rotator cuff tear   • Osteoporosis   • Osteoarthritis   • Kidney stone   • GERD (gastroesophageal reflux disease)   • Fracture of multiple ribs   • Fatty liver   • Degeneration of intervertebral disc of thoracic region   • DDD (degenerative disc disease), cervical   • Chronic constipation   • Cervical spinal stenosis   • Biliary dyskinesia   • Basal cell carcinoma (BCC) of face   • Anxiety   • Traumatic brain injury (HCC)   • Nasal fracture   • Chronic insomnia   • Allergic rhinitis   • Class 1 obesity due to excess calories with serious comorbidity and body mass index (BMI) of 32.0 to 32.9 in adult   • Elective replacement indicated for cardiac pacemaker battery at  end of lifespan   • Overactive bladder   • Orthostatic hypotension   • Tubular adenoma of colon   • At high risk for falls   • Screening mammogram for breast cancer   • Abnormal lung function test   • Abnormal PFTs (pulmonary function tests)   • Cardiac arrhythmia   • Mild peripheral edema   • Fatigue   • COVID-19       Medication List:  Outpatient Encounter Medications as of 8/9/2022   Medication Sig Dispense Refill   • Calcium Carbonate-Vitamin D3 600-400 MG-UNIT tablet Take 1 tablet by mouth 2 (Two) Times a Day.     • dexlansoprazole (Dexilant) 60 MG capsule Take 1 capsule by mouth Daily. 90 capsule 1   • escitalopram (LEXAPRO) 5 MG tablet Take 5 mg by mouth Daily.     • levothyroxine (SYNTHROID, LEVOTHROID) 50 MCG tablet Take 1 tablet by mouth Daily. 90 tablet 1   • losartan (COZAAR) 25 MG tablet Take 1 tablet by mouth Daily. for blood pressure. 90 tablet 1   • metoprolol tartrate (LOPRESSOR) 50 MG tablet Take 1 tablet by mouth 2 (Two) Times a Day. 180 tablet 1   • midodrine (PROAMATINE) 5 MG tablet TAKE 1 TABLET BY MOUTH EVERY DAY 90 tablet 1   • multivitamin with minerals tablet tablet Take 1 tablet by mouth Daily.     • polyethylene glycol (MiraLax) 17 GM/SCOOP powder Take 17 g by mouth Daily.     • psyllium (METAMUCIL) 58.6 % packet Take 1 packet by mouth Daily.     • zonisamide (ZONEGRAN) 100 MG capsule Take 300 mg by mouth Every Evening. Take three 100 mg capsules may take four if needed     • [DISCONTINUED] dexlansoprazole (Dexilant) 60 MG capsule Take 1 capsule by mouth Daily. 90 capsule 1   • [DISCONTINUED] dicyclomine (Bentyl) 10 MG capsule Take 1 capsule by mouth 2 (Two) Times a Day As Needed (abdominal pain).     • [DISCONTINUED] fesoterodine fumarate (TOVIAZ ER) 8 MG tablet sustained-release 24 hour tablet Take 8 mg by mouth Every Evening.     • [DISCONTINUED] levothyroxine (SYNTHROID, LEVOTHROID) 50 MCG tablet Take 1 tablet by mouth Daily. 90 tablet 1   • [START ON 9/1/2022] denosumab (PROLIA) 60  MG/ML solution prefilled syringe syringe Inject 1 mL under the skin into the appropriate area as directed 1 (One) Time for 1 dose. Every six months. 1 each 2   • Melatonin 10 MG capsule Take 1 tablet by mouth every night at bedtime. 30 capsule 3     No facility-administered encounter medications on file as of 8/9/2022.       Past Surgical History:  Past Surgical History:   Procedure Laterality Date   • BILATERAL SALPINGO OOPHORECTOMY      for benign cysts   • BREAST CYST ASPIRATION Left    • CARDIAC CATHETERIZATION  08/25/2009    25% LAD. L Cx luminal irregularities. Normal L main   • CARDIAC CATHETERIZATION  03/29/2017    25% LAD. 10-20% LCX. ER 65%. Dr. Agrawal.    • CARDIAC ELECTROPHYSIOLOGY PROCEDURE N/A 6/3/2021    Procedure: PPM generator change - dual    MDT;  Surgeon: Bharathi Mora MD;  Location: [facility] CATH INVASIVE LOCATION;  Service: Cardiovascular;  Laterality: N/A;   • CARDIOVASCULAR STRESS TEST  2019   • CATARACT EXTRACTION  2006   • COSMETIC SURGERY      Lip   • ECHO - CONVERTED  2019   • ENDOSCOPY  05/17/2017    Normal, Dr. Gutierrez   • ENDOSCOPY N/A 8/22/2019    Procedure: ESOPHAGOGASTRODUODENOSCOPY WITH DILATATION (BOUGIE #46,[id number]);  Surgeon: Joaquin Story MD;  Location: [facility] ENDOSCOPY;  Service: Gastroenterology   • INSERT / REPLACE / REMOVE PACEMAKER     • KNEE ARTHROSCOPY Left 1992   • OOPHORECTOMY     • OTHER SURGICAL HISTORY Right 07/2017    Right eye, YAG Capsuloyomy , Dr. Lemus   • PACEMAKER IMPLANTATION  2009   • ROTATOR CUFF REPAIR Right     Dr. Goldberg   • TONSILLECTOMY     • TOTAL HIP ARTHROPLASTY Bilateral        Review of Systems:  Review of Systems   Constitutional: Positive for activity change and fatigue. Negative for appetite change, chills, fever, unexpected weight gain and unexpected weight loss.   HENT: Negative.    Eyes: Negative.    Respiratory: Positive for cough and shortness of breath. Negative for chest tightness and wheezing.    Cardiovascular: Positive for leg  "swelling. Negative for chest pain and palpitations.   Gastrointestinal: Positive for constipation and GERD. Negative for abdominal pain, anal bleeding, blood in stool, diarrhea, nausea, rectal pain, vomiting and indigestion.   Endocrine: Positive for cold intolerance. Negative for polydipsia, polyphagia and polyuria.   Genitourinary: Positive for frequency, urgency and urinary incontinence. Negative for breast discharge, breast lump, breast pain, decreased urine volume, difficulty urinating, flank pain, hematuria, pelvic pain, vaginal bleeding, vaginal discharge and vaginal pain.   Musculoskeletal: Positive for arthralgias, back pain and gait problem (uses cane for stability).   Skin: Negative for color change, dry skin, pallor, rash, skin lesions, wound and bruise.   Allergic/Immunologic: Positive for environmental allergies.   Neurological: Positive for seizures (stable on current medications. No recent seizures.). Negative for dizziness, tremors, syncope, facial asymmetry, speech difficulty, weakness, light-headedness, numbness, headache, memory problem and confusion.   Hematological: Negative.  Negative for adenopathy. Does not bruise/bleed easily.   Psychiatric/Behavioral: Positive for sleep disturbance. Negative for behavioral problems, suicidal ideas, depressed mood and stress.       I have reviewed and confirmed the accuracy of the HPI and ROS as documented by the MA/LPN/RN Chantell Larios, VALENTINO    Vital Signs:  Visit Vitals  BP 99/63 (BP Location: Right arm, Patient Position: Sitting, Cuff Size: Large Adult)   Pulse 67   Temp 97.8 °F (36.6 °C) (Infrared)   Resp 16   Ht 160 cm (63\")   Wt 89.8 kg (198 lb)   SpO2 96%   BMI 35.07 kg/m²       Physical Exam  Vitals and nursing note reviewed.   Constitutional:       General: She is not in acute distress.     Appearance: Normal appearance. She is well-developed, well-groomed and overweight. She is not ill-appearing.      Interventions: She is not " intubated.  HENT:      Head: Normocephalic.      Right Ear: There is impacted cerumen.      Left Ear: Tympanic membrane normal.      Nose: Nose normal.      Mouth/Throat:      Mouth: Mucous membranes are moist.   Eyes:      Extraocular Movements: Extraocular movements intact.      Conjunctiva/sclera: Conjunctivae normal.      Pupils: Pupils are equal, round, and reactive to light.   Neck:      Thyroid: No thyromegaly.      Vascular: No carotid bruit.      Trachea: Trachea normal.   Cardiovascular:      Rate and Rhythm: Normal rate. Rhythm irregularly irregular.      Pulses: Normal pulses.      Heart sounds: Normal heart sounds. No murmur heard.    No friction rub. No gallop.   Pulmonary:      Effort: Pulmonary effort is normal. No tachypnea, bradypnea, accessory muscle usage, prolonged expiration, respiratory distress or retractions. She is not intubated.      Breath sounds: Normal breath sounds. No wheezing or rales.   Abdominal:      General: Bowel sounds are normal. There is no distension.      Palpations: Abdomen is soft. There is no mass.      Tenderness: There is no abdominal tenderness.      Hernia: No hernia is present.   Musculoskeletal:      Cervical back: Normal range of motion and neck supple.      Right lower leg: No edema.      Left lower leg: No edema.      Comments: Uses cane for stability   Lymphadenopathy:      Cervical: No cervical adenopathy.   Skin:     General: Skin is warm and dry.   Neurological:      General: No focal deficit present.      Mental Status: She is alert and oriented to person, place, and time.      Motor: Weakness present.      Gait: Gait abnormal.      Comments: Using cane   Psychiatric:         Mood and Affect: Mood normal.         Behavior: Behavior normal. Behavior is cooperative.         Thought Content: Thought content normal.         Judgment: Judgment normal.         Assessment and Plan:  Problem List Items Addressed This Visit        Advance Directives and General  Issues    At high risk for falls - Primary       Allergies and Adverse Reactions    Allergic rhinitis       Cardiac and Vasculature    Essential hypertension (Chronic)    Relevant Orders    POC Urinalysis Dipstick, Automated (Completed)    CBC & Differential    Comprehensive Metabolic Panel    TSH Rfx On Abnormal To Free T4    Lipid Panel    Orthostatic hypotension (Chronic)    Presence of cardiac pacemaker    Overview     Dr. Agrawal         Dyspnea on exertion    Relevant Orders    Ambulatory Referral to Pulmonology (Completed)    Cardiac arrhythmia    Current Assessment & Plan     EKG and rec. Follow up with cardiology         Relevant Orders    ECG 12 Lead       Endocrine and Metabolic    Hypothyroidism (Chronic)    Relevant Medications    levothyroxine (SYNTHROID, LEVOTHROID) 50 MCG tablet    Other Relevant Orders    TSH Rfx On Abnormal To Free T4    Prediabetes       Gastrointestinal Abdominal     GERD (gastroesophageal reflux disease) (Chronic)    Relevant Medications    dexlansoprazole (Dexilant) 60 MG capsule    Chronic constipation    Current Assessment & Plan     Continue eating high fiber foods, stay hydrated with water, exercise, take medication as directed and report any change in bowel movements or new abdominal pain or worsening constipation.            Genitourinary and Reproductive     Overactive bladder (Chronic)    Urinary incontinence    Current Assessment & Plan     Patient reports discontinuing previous medication for problem due to side effects. Has seen urology in the past and notes surgery is the next step. She wants to avoid this.  Reports wearing briefs at night and pads during the day for incontinence episodes.               Mental Health    Anxiety (Chronic)       Musculoskeletal and Injuries    Osteoporosis    Overview     h/o tx with Fosamax but quit in 2012 due to fears of complications.  Now on Prolia injections         Current Assessment & Plan     Prolia due 9/1/2022         Relevant  Medications    denosumab (PROLIA) 60 MG/ML solution prefilled syringe syringe (Start on 9/1/2022)    Osteoarthritis    Overview     Dr. Berg         Relevant Medications    denosumab (PROLIA) 60 MG/ML solution prefilled syringe syringe (Start on 9/1/2022)       Neuro    Seizure disorder (HCC) (Chronic)    Overview     Dr. Shannon Bennett            Pulmonary and Pneumonias    Abnormal lung function test    Current Assessment & Plan       1. The spirometry showing the patient has   normal FEV1 FVC ratio a normal   value.     2. Lung volumes within normal limits.     3. Diffusion capacity  decreased to 54 of predicted corrected to 66   For ventilated alveoli   4. Volume loops the expiratory part is fluttering might reflect upper   airway abnormality     Impression: This pulmonary function test showing that  the patient has   abnormal lobe     MD. ROZ Hernandez, ABSM.   12/22/2021   19:42 EST          Relevant Orders    Ambulatory Referral to Pulmonology (Completed)    Abnormal PFTs (pulmonary function tests)    Current Assessment & Plan       1. The spirometry showing the patient has   normal FEV1 FVC ratio a normal   value.     2. Lung volumes within normal limits.     3. Diffusion capacity  decreased to 54 of predicted corrected to 66   For ventilated alveoli   4. Volume loops the expiratory part is fluttering might reflect upper   airway abnormality     Impression: This pulmonary function test showing that  the patient has   abnormal lobe     MD. ROZ Hernandez, ABSM.   12/22/2021   19:42 EST          Relevant Orders    Ambulatory Referral to Pulmonology (Completed)       Sleep    Chronic insomnia    Current Assessment & Plan     Reports taking 3mg melatonin.  Advised 10mg melatonin at night.          Relevant Medications    Melatonin 10 MG capsule       Symptoms and Signs    Mild peripheral edema    Overview     Patient reports occasional dependent edema when standing for extended period of time or when she  is seated with legs not elevated.          Current Assessment & Plan     Discussed use of knee hi macrina hose 20-35mmHg to decrease edema when up.  Monitor salt intake, keep legs elevated when seated.           Fatigue    Overview     Reports new onset fatigue, worse after Covid illness in 7/2022.             Current Assessment & Plan     Continue plan of care with cardiology, referral placed for pulmonology. Hx of abnormal PFT.  Will recheck thyroid levels and adjust if necessary.            Other    Screening mammogram for breast cancer    Relevant Orders    Mammo Screening Bilateral With CAD    COVID-19    Overview     Diagnosed with Covid on 7/7/2022 with lingering side effect of fatigue.         Current Assessment & Plan     Referrals placed for  Cardiology, pulmonology, thyroid lab recheck.             Other Visit Diagnoses     Excessive cerumen in ear canal, right        Relevant Orders    Ear Cerumen Removal    Abnormal urinalysis        Relevant Orders    Urine Culture - Urine, Urine, Clean Catch    Class 2 severe obesity due to excess calories with serious comorbidity and body mass index (BMI) of 35.0 to 35.9 in adult (HCC)              An After Visit Summary and PPPS were given to the patient.       I wore protective equipment throughout this patient encounter to include mask. Hand hygiene was performed before donning protective equipment and after removal when leaving the room.    Findings discussed.  All questions answered.

## 2022-08-09 NOTE — ASSESSMENT & PLAN NOTE
Continue eating high fiber foods, stay hydrated with water, exercise, take medication as directed and report any change in bowel movements or new abdominal pain or worsening constipation.

## 2022-08-09 NOTE — PATIENT INSTRUCTIONS
Continue current plan of care as discussed.   Take medication as ordered (if applicable).    Practice good sleep hygiene.  Eat a well balanced diet with fresh fruit and vegetables.    Stay hydrated, drink water daily.      Limit sweetened beverages, sodas, juices.    Bake, boil, broil or grill your food, avoid eating fried foods.   Regular exercise is important.  Incorporate weight or resistance into your routine.  Keep appointments with cardiology/pulmonology.

## 2022-08-09 NOTE — ASSESSMENT & PLAN NOTE
Continue plan of care with cardiology, referral placed for pulmonology. Hx of abnormal PFT.  Will recheck thyroid levels and adjust if necessary.

## 2022-08-09 NOTE — ASSESSMENT & PLAN NOTE
Patient reports discontinuing previous medication for problem due to side effects. Has seen urology in the past and notes surgery is the next step. She wants to avoid this.  Reports wearing briefs at night and pads during the day for incontinence episodes.

## 2022-08-09 NOTE — ASSESSMENT & PLAN NOTE
Discussed use of knee hi macrina hose 20-35mmHg to decrease edema when up.  Monitor salt intake, keep legs elevated when seated.

## 2022-08-09 NOTE — ASSESSMENT & PLAN NOTE
1. The spirometry showing the patient has   normal FEV1 FVC ratio a normal   value.     2. Lung volumes within normal limits.     3. Diffusion capacity  decreased to 54 of predicted corrected to 66   For ventilated alveoli   4. Volume loops the expiratory part is fluttering might reflect upper   airway abnormality     Impression: This pulmonary function test showing that  the patient has   abnormal lobe     Mary Alice Cartagena MD. D, ABSM.   12/22/2021   19:42 EST

## 2022-08-10 ENCOUNTER — TRANSCRIBE ORDERS (OUTPATIENT)
Dept: ADMINISTRATIVE | Facility: HOSPITAL | Age: 82
End: 2022-08-10

## 2022-08-10 DIAGNOSIS — Z12.31 SCREENING MAMMOGRAM FOR BREAST CANCER: Primary | ICD-10-CM

## 2022-08-10 NOTE — PROGRESS NOTES
Please call the patient regarding her abnormal result - UTI.  Antibiotic has been sent into pharmacy.

## 2022-08-10 NOTE — PROGRESS NOTES
Latest Reference Range & Units 08/09/22 11:33   Color, UA Yellow, Straw, Dark Yellow, Fariha  Dark Yellow   Appearance, UA Clear  Slightly Cloudy !   Specific Gravity, UA 1.005 - 1.030  1.025   pH, UA 5.0 - 8.0  5.5   Glucose Negative mg/dL Negative   Ketones, UA Negative  Negative   Blood, UA Negative  Negative   Nitrite, UA Negative  Positive !   Leukocytes, UA Negative  Small (1+) !   Protein, UA Negative mg/dL 30 mg/dL !   Bilirubin, UA Negative  Negative   Urobilinogen, UA Normal  Normal   !: Data is abnormal

## 2022-08-12 RX ORDER — LOSARTAN POTASSIUM 25 MG/1
25 TABLET ORAL DAILY
Qty: 90 TABLET | Refills: 1 | Status: SHIPPED | OUTPATIENT
Start: 2022-08-12 | End: 2023-01-22

## 2022-08-15 ENCOUNTER — APPOINTMENT (OUTPATIENT)
Dept: MAMMOGRAPHY | Facility: HOSPITAL | Age: 82
End: 2022-08-15

## 2022-08-16 ENCOUNTER — HOSPITAL ENCOUNTER (OUTPATIENT)
Dept: MAMMOGRAPHY | Facility: HOSPITAL | Age: 82
Discharge: HOME | End: 2022-08-16

## 2022-08-16 DIAGNOSIS — Z12.31 SCREENING MAMMOGRAM FOR BREAST CANCER: ICD-10-CM

## 2022-08-16 PROCEDURE — 77067 SCR MAMMO BI INCL CAD: CPT

## 2022-08-16 PROCEDURE — 77063 BREAST TOMOSYNTHESIS BI: CPT

## 2022-08-17 ENCOUNTER — TELEPHONE (OUTPATIENT)
Dept: FAMILY MEDICINE CLINIC | Facility: CLINIC | Age: 82
End: 2022-08-17

## 2022-08-17 DIAGNOSIS — F51.04 CHRONIC INSOMNIA: ICD-10-CM

## 2022-08-17 RX ORDER — MELATONIN 10 MG
1 CAPSULE ORAL
Qty: 90 CAPSULE | Refills: 0 | Status: SHIPPED | OUTPATIENT
Start: 2022-08-17

## 2022-08-18 PROCEDURE — 93000 ELECTROCARDIOGRAM COMPLETE: CPT

## 2022-09-01 ENCOUNTER — TELEPHONE (OUTPATIENT)
Dept: FAMILY MEDICINE CLINIC | Facility: CLINIC | Age: 82
End: 2022-09-01

## 2022-09-01 NOTE — TELEPHONE ENCOUNTER
PATIENT IS HAVING PAIN IN HER LEFT KNEE FOR ABOUT A WEEK NOW. IT DOESN'T WANT TO MOVE AFTER SHE TAKES 6 OR 7 STEPS, SHE DOESN'T WANT A REPLACEMENT WHICH IS WHAT SHE WAS TOLD IT NEEDED A FEW YEARS AGO BUT SHE'D LIKE TO DISCUSS OPTIONS THAT COULD GIVE SOME RELIEF THAT ARE NON SURGICAL. PLEASE REFER HER TO THE BEST THAT HER PCP RECOMMENDS.     PATIENT> [phone]

## 2022-09-15 PROCEDURE — 93294 REM INTERROG EVL PM/LDLS PM: CPT | Performed by: NURSE PRACTITIONER

## 2022-09-15 PROCEDURE — 93296 REM INTERROG EVL PM/IDS: CPT | Performed by: NURSE PRACTITIONER

## 2022-09-19 ENCOUNTER — OFFICE VISIT (OUTPATIENT)
Dept: FAMILY MEDICINE CLINIC | Facility: CLINIC | Age: 82
End: 2022-09-19

## 2022-09-19 VITALS
TEMPERATURE: 97.7 F | OXYGEN SATURATION: 96 % | SYSTOLIC BLOOD PRESSURE: 147 MMHG | WEIGHT: 191.2 LBS | HEART RATE: 60 BPM | BODY MASS INDEX: 33.88 KG/M2 | DIASTOLIC BLOOD PRESSURE: 80 MMHG | HEIGHT: 63 IN

## 2022-09-19 DIAGNOSIS — K21.9 GASTROESOPHAGEAL REFLUX DISEASE WITHOUT ESOPHAGITIS: Chronic | ICD-10-CM

## 2022-09-19 DIAGNOSIS — M25.562 CHRONIC PAIN OF LEFT KNEE: Primary | ICD-10-CM

## 2022-09-19 DIAGNOSIS — M81.0 AGE-RELATED OSTEOPOROSIS WITHOUT CURRENT PATHOLOGICAL FRACTURE: ICD-10-CM

## 2022-09-19 DIAGNOSIS — R60.9 DEPENDENT EDEMA: ICD-10-CM

## 2022-09-19 DIAGNOSIS — G89.29 CHRONIC PAIN OF LEFT KNEE: Primary | ICD-10-CM

## 2022-09-19 PROCEDURE — 99214 OFFICE O/P EST MOD 30 MIN: CPT

## 2022-09-19 PROCEDURE — 1159F MED LIST DOCD IN RCRD: CPT

## 2022-09-19 PROCEDURE — G0439 PPPS, SUBSEQ VISIT: HCPCS

## 2022-09-19 RX ORDER — MULTIVITAMIN/IRON/FOLIC ACID 18MG-0.4MG
2 TABLET ORAL DAILY
Qty: 4 EACH | Refills: 2 | Status: SHIPPED | OUTPATIENT
Start: 2022-09-19

## 2022-09-19 NOTE — ASSESSMENT & PLAN NOTE
At the last visit Prolia injection reordered.  Insurance is refusing to cover.  Reordered again with osteoporosis diagnosis and last DEXA scan results copied to today's encounter.

## 2022-09-19 NOTE — ASSESSMENT & PLAN NOTE
Previously advised by Dr Berg (physician that performed hip surgery) that patient was not a candidate for knee replacement surgery.      X ray ordered  Will refer to physical therapy.

## 2022-09-19 NOTE — PROGRESS NOTES
The ABCs of the Annual Wellness Visit  Subsequent Medicare Wellness Visit    Chief Complaint   Patient presents with   • Medicare Wellness-subsequent   • Hypertension        Patient here fo rannual medicare wellness visit.  Left terrence maldonado  Reports Prolia injections are not wanting to be covered by insurance. Medication was ordred at last visit.      Dexa scan as noted below:   DATE OF EXAM:   8/11/2021 4:08 PM     PROCEDURE:   DEXA BONE DENSITY AXIAL-     INDICATIONS:   osteoporosis; M81.0-Age-related osteoporosis without current  pathological fracture     TECHNIQUE:   Lumbar vertebral and forearm Dual-Energy X-ray Absorptiometry (DEXA) was  performed on the [employer] [facility].      FINDINGS:   A full detailed summary report from the dexa scan performed is located  in the patients chart in [facility].      According to the World Health Organization criteria, this is classified  as osteoporosis.     FRAX was not calculated.     IMPRESSION:  Osteoporosis     Copies of the computerized summary reports can be obtained from [facility] via  the health information department of [facility].      Electronically Signed By-Oleksandr Brownlee MD On:8/12/2021 8:21 AM  This report was finalized on [id number] by  Oleksandr Brownlee MD.      Reordered Prolia injectins.       Subjective    History of Present Illness:  Anila Spencer is a 81 y.o. female who presents for a Subsequent Medicare Wellness Visit.    The following portions of the patient's history were reviewed and   updated as appropriate: allergies, current medications, past family history, past medical history, past social history, past surgical history and problem list.    Compared to one year ago, the patient feels her physical   health is worse.    Compared to one year ago, the patient feels her mental   health is the same.    Recent Hospitalizations:  She was not admitted to the hospital during the last year.       Current Medical Providers:  Patient Care  Team:  Chantell Larios APRN as PCP - General (Nurse Practitioner)  Shannon Bennett DO as Consulting Physician (Neurology)  Adam Agrawal MD as Consulting Physician (Cardiology)  León Berg MD (Orthopedic Surgery)  Joaquin Story MD as Consulting Physician (Gastroenterology)  Banet, Duane Edward, MD as Consulting Physician (Dermatology)  Bart Longo MD as Consulting Physician (Urology)    Outpatient Medications Prior to Visit   Medication Sig Dispense Refill   • Calcium Carbonate-Vitamin D3 600-400 MG-UNIT tablet Take 1 tablet by mouth 2 (Two) Times a Day.     • dexlansoprazole (Dexilant) 60 MG capsule Take 1 capsule by mouth Daily. 90 capsule 1   • escitalopram (LEXAPRO) 5 MG tablet Take 5 mg by mouth Daily.     • levothyroxine (SYNTHROID, LEVOTHROID) 50 MCG tablet Take 1 tablet by mouth Daily. 90 tablet 1   • losartan (COZAAR) 25 MG tablet Take 1 tablet by mouth Daily. for blood pressure. 90 tablet 1   • Melatonin 10 MG capsule Take 1 tablet by mouth every night at bedtime. 90 capsule 0   • metoprolol tartrate (LOPRESSOR) 50 MG tablet Take 1 tablet by mouth 2 (Two) Times a Day. 180 tablet 1   • midodrine (PROAMATINE) 5 MG tablet TAKE 1 TABLET BY MOUTH EVERY DAY 90 tablet 1   • multivitamin with minerals tablet tablet Take 1 tablet by mouth Daily.     • polyethylene glycol (MiraLax) 17 GM/SCOOP powder Take 17 g by mouth Daily.     • zonisamide (ZONEGRAN) 100 MG capsule Take 300 mg by mouth Every Evening. Take three 100 mg capsules may take four if needed     • denosumab (PROLIA) 60 MG/ML solution prefilled syringe syringe Inject 1 mL under the skin into the appropriate area as directed 1 (One) Time for 1 dose. Every six months. 1 each 2   • psyllium (METAMUCIL) 58.6 % packet Take 1 packet by mouth Daily.       No facility-administered medications prior to visit.     This separate E/M service is medically necessary, significant, and separately identifiable.    No opioid  medication identified on active medication list. I have reviewed chart for other potential  high risk medication/s and harmful drug interactions in the elderly.          Aspirin is not on active medication list.  Aspirin use is not indicated based on review of current medical condition/s. Risk of harm outweighs potential benefits.  .    Patient Active Problem List   Diagnosis   • Cardiomegaly   • Essential hypertension   • Mitral valve insufficiency   • Presence of cardiac pacemaker   • Dyspnea on exertion   • DDD (degenerative disc disease), lumbar   • Sick sinus syndrome (HCC)   • PCO (posterior capsular opacification), bilateral   • Diplopia   • Glaucoma suspect   • Prediabetes   • Hypothyroidism   • Urinary incontinence   • Squamous cell skin cancer, face   • Spinal stenosis of cervical region   • Seizure disorder (HCC)   • Rotator cuff tear   • Age-related osteoporosis without current pathological fracture   • Osteoarthritis   • Kidney stone   • GERD (gastroesophageal reflux disease)   • Fracture of multiple ribs   • Fatty liver   • Degeneration of intervertebral disc of thoracic region   • DDD (degenerative disc disease), cervical   • Chronic constipation   • Cervical spinal stenosis   • Biliary dyskinesia   • Basal cell carcinoma (BCC) of face   • Anxiety   • Traumatic brain injury (HCC)   • Nasal fracture   • Chronic insomnia   • Allergic rhinitis   • Class 1 obesity due to excess calories with serious comorbidity and body mass index (BMI) of 32.0 to 32.9 in adult   • Elective replacement indicated for cardiac pacemaker battery at end of lifespan   • Overactive bladder   • Orthostatic hypotension   • Tubular adenoma of colon   • At high risk for falls   • Screening mammogram for breast cancer   • Abnormal lung function test   • Abnormal PFTs (pulmonary function tests)   • Cardiac arrhythmia   • Mild peripheral edema   • Fatigue   • COVID-19   • Chronic pain of left knee   • Dependent edema     Advance Care  "Planning  Advance Directive is on file.  ACP discussion was held with the patient during this visit. Patient has an advance directive in EMR which is still valid.     Review of Systems   Constitutional: Positive for fatigue. Negative for chills and fever.   HENT: Negative for congestion and sore throat.    Eyes: Positive for visual disturbance (glaucoma suspect. Sees Dr. Sahu.).   Respiratory: Positive for shortness of breath. Negative for cough and wheezing.    Cardiovascular: Positive for chest pain (occasionally.  Sees cardiology.). Negative for palpitations and leg swelling.   Gastrointestinal: Negative for anal bleeding, blood in stool, constipation, diarrhea, nausea and vomiting.   Genitourinary: Negative for difficulty urinating.   Musculoskeletal: Positive for back pain and myalgias. Negative for arthralgias. Gait problem: uses cane for stability.   Skin: Negative.    Allergic/Immunologic: Positive for environmental allergies.   Neurological: Positive for weakness. Negative for dizziness. Numbness: post covid.   Hematological: Does not bruise/bleed easily.   Psychiatric/Behavioral: Negative for decreased concentration, sleep disturbance and suicidal ideas. The patient is not nervous/anxious.         Objective    Vitals:    09/19/22 1312   BP: 147/80   BP Location: Right arm   Patient Position: Sitting   Cuff Size: Adult   Pulse: 60   Temp: 97.7 °F (36.5 °C)   TempSrc: Infrared   SpO2: 96%   Weight: 86.7 kg (191 lb 3.2 oz)   Height: 160 cm (63\")     Estimated body mass index is 33.87 kg/m² as calculated from the following:    Height as of this encounter: 160 cm (63\").    Weight as of this encounter: 86.7 kg (191 lb 3.2 oz).    BMI is >= 30 and <35. (Class 1 Obesity). The following options were offered after discussion;: exercise counseling/recommendations      Does the patient have evidence of cognitive impairment? No    Physical Exam  Vitals and nursing note reviewed.   Constitutional:       General: She " is not in acute distress.     Appearance: Normal appearance. She is well-groomed and normal weight.   Cardiovascular:      Rate and Rhythm: Normal rate and regular rhythm.      Pulses: Normal pulses.      Heart sounds: Normal heart sounds.   Pulmonary:      Effort: Pulmonary effort is normal.      Breath sounds: Normal breath sounds.   Abdominal:      General: Abdomen is flat.      Palpations: Abdomen is soft. Abdomen is not rigid.   Musculoskeletal:      Left knee: Crepitus present. No swelling or deformity. Normal range of motion.      Instability Tests: Anterior drawer test negative. Posterior drawer test negative.        Legs:       Comments: H/O left arthroscopic surgery when she was in her fifties.  Tissue repaired.   No laxity noted.   Good ROM.   Skin:     General: Skin is warm and dry.      Capillary Refill: Capillary refill takes less than 2 seconds.   Neurological:      Mental Status: She is oriented to person, place, and time.   Psychiatric:         Mood and Affect: Mood normal.         Behavior: Behavior normal. Behavior is cooperative.                 HEALTH RISK ASSESSMENT    Smoking Status:  Social History     Tobacco Use   Smoking Status Never Smoker   Smokeless Tobacco Never Used   Tobacco Comment    Patient is advised not to smoke.     Alcohol Consumption:  Social History     Substance and Sexual Activity   Alcohol Use No     Fall Risk Screen:    STEADI Fall Risk Assessment was completed, and patient is at MODERATE risk for falls. Assessment completed on:9/19/2022    Depression Screening:  PHQ-2/PHQ-9 Depression Screening 9/19/2022   Retired PHQ-9 Total Score -   Retired Total Score -   Little Interest or Pleasure in Doing Things 0-->not at all   Feeling Down, Depressed or Hopeless 0-->not at all   PHQ-9: Brief Depression Severity Measure Score 0       Health Habits and Functional and Cognitive Screening:  Functional & Cognitive Status 9/19/2022   Do you have difficulty preparing food and eating?  Yes   Do you have difficulty bathing yourself, getting dressed or grooming yourself? Yes   Do you have difficulty using the toilet? No   Do you have difficulty moving around from place to place? Yes   Do you have trouble with steps or getting out of a bed or a chair? No   Current Diet Well Balanced Diet   Dental Exam Up to date   Eye Exam Up to date   Exercise (times per week) 2 times per week   Current Exercises Include Other        Exercise Comment Exercises from PT   Current Exercise Activities Include -   Do you need help using the phone?  No   Are you deaf or do you have serious difficulty hearing?  No   Do you need help with transportation? No   Do you need help shopping? No   Do you need help preparing meals?  No   Do you need help with housework?  No   Do you need help with laundry? No   Do you need help taking your medications? No   Do you need help managing money? No   Do you ever drive or ride in a car without wearing a seat belt? No   Have you felt unusual stress, anger or loneliness in the last month? No   Who do you live with? Spouse   If you need help, do you have trouble finding someone available to you? No   Have you been bothered in the last four weeks by sexual problems? No   Do you have difficulty concentrating, remembering or making decisions? No       Age-appropriate Screening Schedule:  Refer to the list below for future screening recommendations based on patient's age, sex and/or medical conditions. Orders for these recommended tests are listed in the plan section. The patient has been provided with a written plan.    Health Maintenance   Topic Date Due   • INFLUENZA VACCINE  10/01/2022   • DXA SCAN  08/11/2023   • TDAP/TD VACCINES (3 - Td or Tdap) 04/10/2028   • ZOSTER VACCINE  Completed              Assessment & Plan   CMS Preventative Services Quick Reference  Risk Factors Identified During Encounter  Cardiovascular Disease  Chronic Pain   Fall Risk-High or  Moderate  Inactivity/Sedentary  Obesity/Overweight   Urinary Incontinence  The above risks/problems have been discussed with the patient.  Follow up actions/plans if indicated are seen below in the Assessment/Plan Section.  Pertinent information has been shared with the patient in the After Visit Summary.    Diagnoses and all orders for this visit:    1. Chronic pain of left knee (Primary)  Assessment & Plan:  Previously advised by Dr Berg (physician that performed hip surgery) that patient was not a candidate for knee replacement surgery.      X ray ordered  Will refer to physical therapy.     Orders:  -     Cancel: XR Knee 3 View Left; Future  -     Ambulatory Referral to Physical Therapy  -     XR Knee 3 View Left; Future    2. Dependent edema  Assessment & Plan:  Patient reports dependent edema when she was up ambulating.  Advised to put on knee-high compression stockings before getting out of bed.  She denies chest pain, palpitations, or shortness of breath.  Keep appointment with cardiology.    Orders:  -     Elastic Bandages & Supports ([employer] Firm Compression Socks) misc; 2 each Daily. Please size appropriately for knee hi compression socks. 20-35mmHg. 2 pair each.  Dispense: 4 each; Refill: 2    3. Gastroesophageal reflux disease without esophagitis  Assessment & Plan:  Controlled with current medication      4. Age-related osteoporosis without current pathological fracture  Assessment & Plan:  At the last visit Prolia injection reordered.  Insurance is refusing to cover.  Reordered again with osteoporosis diagnosis and last DEXA scan results copied to today's encounter.        Follow Up:   Return in about 3 months (around 12/19/2022) for Recheck.     An After Visit Summary and PPPS were made available to the patient.                 This separate E/M service is medically necessary, significant, and separately identifiable.

## 2022-09-19 NOTE — ASSESSMENT & PLAN NOTE
Patient reports dependent edema when she was up ambulating.  Advised to put on knee-high compression stockings before getting out of bed.  She denies chest pain, palpitations, or shortness of breath.  Keep appointment with cardiology.

## 2022-10-06 ENCOUNTER — CLINICAL SUPPORT (OUTPATIENT)
Dept: FAMILY MEDICINE CLINIC | Facility: CLINIC | Age: 82
End: 2022-10-06

## 2022-10-06 DIAGNOSIS — M81.0 AGE-RELATED OSTEOPOROSIS WITHOUT CURRENT PATHOLOGICAL FRACTURE: Primary | ICD-10-CM

## 2022-10-06 PROCEDURE — 96372 THER/PROPH/DIAG INJ SC/IM: CPT

## 2022-10-13 DIAGNOSIS — L60.2 LONG TOENAIL: Primary | ICD-10-CM

## 2022-11-22 RX ORDER — MIDODRINE HYDROCHLORIDE 5 MG/1
TABLET ORAL
Qty: 90 TABLET | Refills: 1 | Status: SHIPPED | OUTPATIENT
Start: 2022-11-22

## 2022-12-01 ENCOUNTER — OFFICE VISIT (OUTPATIENT)
Dept: CARDIOLOGY | Facility: CLINIC | Age: 82
End: 2022-12-01

## 2022-12-01 VITALS
HEART RATE: 97 BPM | SYSTOLIC BLOOD PRESSURE: 131 MMHG | HEIGHT: 63 IN | OXYGEN SATURATION: 99 % | BODY MASS INDEX: 33.84 KG/M2 | DIASTOLIC BLOOD PRESSURE: 68 MMHG | WEIGHT: 191 LBS

## 2022-12-01 DIAGNOSIS — I49.5 SICK SINUS SYNDROME: Primary | Chronic | ICD-10-CM

## 2022-12-01 DIAGNOSIS — Z95.0 PRESENCE OF CARDIAC PACEMAKER: ICD-10-CM

## 2022-12-01 DIAGNOSIS — I95.1 ORTHOSTATIC HYPOTENSION: Chronic | ICD-10-CM

## 2022-12-01 PROCEDURE — 93000 ELECTROCARDIOGRAM COMPLETE: CPT | Performed by: NURSE PRACTITIONER

## 2022-12-01 PROCEDURE — 99213 OFFICE O/P EST LOW 20 MIN: CPT | Performed by: NURSE PRACTITIONER

## 2022-12-01 PROCEDURE — 93280 PM DEVICE PROGR EVAL DUAL: CPT | Performed by: NURSE PRACTITIONER

## 2022-12-01 NOTE — PROGRESS NOTES
Cardiology Office Follow Up Visit      Primary Care Provider:  Chantell Larios APRN    Reason for f/u:     Sick sinus syndrome  Dual-chamber pacemaker  Orthostatic hypotension      Subjective     CC:    Complains of occasional palpitations    History of Present Illness       Anila Spencer is a 82 y.o. female.  Patient is a pleasant 82-year-old female who is known to have sick sinus syndrome, previous dual-chamber pacemaker, hypertension and periods of orthostatic hypotension.  She has had previous syncope.    She had a dual-chamber [employer] pacemaker implanted in June 2021.  She has had no recurrent syncope since that time.    In 2019 the patient underwent a tilt table test that showed a hypotensive response to head up tilt.  She was started on midodrine at that time.  Ejection fraction by echocardiogram at that time was 60 to 65% with mild MR.  She also had stress Myoview at that time that showed no reversible ischemia.    She has been evaluated due to ongoing complaints of shortness of breath and had a PFT with referral to pulmonology.    Patient reports feelings of palpitations and feeling like her heart is beating fast typically related to periods of activity.  She denies chest pain or worsening dyspnea.  She complains of chronic dyspnea with levels of exertion but unchanged.  She has had no edema, near syncope or orthopnea      ASSESSMENT/PLAN:    Patient presents today for routine follow-up regarding her sick sinus syndrome and dual-chamber pacemaker as well as history of orthostatic hypotension.  She reports some recent feelings of palpitations generally related to periods of activity or when she is ambulating.    Her device was interrogated today.  She has had no documented arrhythmias.  She does have rate responsiveness programmed down and I suspect some of her symptoms may be related to this.  She is also having isolated PACs I changed her pacing parameters from DDDR to DDD at a rate of 70.   With this pacing rate the PACs seem to decrease.    I made no other changes in her medical therapy.  We will continue the current treatment if she develops any new or worsening problems or if you to see her sooner.  She will have scheduled follow-up in 6 months with Dr. Agrawal in [facility].  At that time she will not need a pacemaker interrogation.  I will see her back here in our [place] office in 1 year for device interrogation and follow-up    Diagnoses and all orders for this visit:    1. Sick sinus syndrome (HCC) (Primary)  Comments:  Stable since pacemaker implant    2. Presence of cardiac pacemaker  Comments:  Dual-chamber [employer] pacemaker with stable device function    3. Orthostatic hypotension  Comments:  Blood pressure currently stable with no reported dizziness or lightheadedness           MEDICAL DECISION MAKING:          Past Medical History:   Diagnosis Date   • Abnormal cardiovascular stress test 3/21/2017   • Allergic rhinitis    • Anxiety    • At high risk for falls 1/28/2022   • Basal cell carcinoma (BCC) of face    • Biliary dyskinesia    • Bradycardia    • Cardiomegaly    • Cataract    • Cervical spinal stenosis    • Chronic constipation    • Chronic insomnia    • DDD (degenerative disc disease), cervical     C-spine, T-spine, L-Spine   • DDD (degenerative disc disease), lumbar 7/25/2019   • Degeneration of intervertebral disc of thoracic region 3/18/2017   • Diplopia     Dr. Laird   • Dysphagia 4/25/2019   • Emphysema of lung (HCC)    • Fatty liver    • Fracture of multiple ribs 07/2018    Left 4th-8th   • Gastroesophageal reflux disease 12/4/2012   • GERD (gastroesophageal reflux disease)    • Hematuria 12/29/2014   • History of recent fall    • Hypertension    • Hypothyroidism    • Injury of back    • Insomnia 3/7/2016   • Kidney stone    • Left arm pain    • Nasal fracture 07/2018   • Osteoarthritis    • Osteoporosis     h/o tx with Fosamax but quit in 2012 due to fears of  complications   • Prediabetes    • Rotator cuff tear     right   • Seizure disorder (HCC)     Dr. Shannon Bennett   • Shortness of breath 5/8/2019   • Sick sinus syndrome (HCC)     Dr. Agrawal   • Spinal stenosis of cervical region 2/11/2015   • Squamous cell skin cancer, face    • Traumatic brain injury     from fall   • Tubular adenoma of colon 10/14/2021    No further recall due to age   • Urinary incontinence        Past Surgical History:   Procedure Laterality Date   • BILATERAL SALPINGO OOPHORECTOMY      for benign cysts   • BREAST CYST ASPIRATION Left    • CARDIAC CATHETERIZATION  08/25/2009    25% LAD. L Cx luminal irregularities. Normal L main   • CARDIAC CATHETERIZATION  03/29/2017    25% LAD. 10-20% LCX. ER 65%. Dr. Agrawal.    • CARDIAC ELECTROPHYSIOLOGY PROCEDURE N/A 06/03/2021    Procedure: PPM generator change - dual    MDT;  Surgeon: Bharathi Mora MD;  Location: [facility] CATH INVASIVE LOCATION;  Service: Cardiovascular;  Laterality: N/A;   • CARDIOVASCULAR STRESS TEST  2019   • CATARACT EXTRACTION  2006   • COSMETIC SURGERY      Lip   • ECHO - CONVERTED  2019   • ENDOSCOPY  05/17/2017    Normal, Dr. Gutierrez   • ENDOSCOPY N/A 08/22/2019    Procedure: ESOPHAGOGASTRODUODENOSCOPY WITH DILATATION (BOUGIE #46,50);  Surgeon: Joaquin Story MD;  Location: [facility] ENDOSCOPY;  Service: Gastroenterology   • INSERT / REPLACE / REMOVE PACEMAKER     • KNEE ARTHROSCOPY Left 1992   • OOPHORECTOMY     • OTHER SURGICAL HISTORY Right 07/2017    Right eye, YAG Capsuloyomy , Dr. Lemus   • PACEMAKER IMPLANTATION  2009   • ROTATOR CUFF REPAIR Right     Dr. Goldberg   • TONSILLECTOMY     • TOTAL HIP ARTHROPLASTY Bilateral          Current Outpatient Medications:   •  Calcium Carbonate-Vitamin D3 600-400 MG-UNIT tablet, Take 1 tablet by mouth 2 (Two) Times a Day., Disp: , Rfl:   •  dexlansoprazole (Dexilant) 60 MG capsule, Take 1 capsule by mouth Daily., Disp: 90 capsule, Rfl: 1  •  Elastic Bandages & Supports ([employer] Firm  Compression Socks) misc, 2 each Daily. Please size appropriately for knee hi compression socks. 20-35mmHg. 2 pair each., Disp: 4 each, Rfl: 2  •  escitalopram (LEXAPRO) 5 MG tablet, Take 5 mg by mouth Daily., Disp: , Rfl:   •  levothyroxine (SYNTHROID, LEVOTHROID) 50 MCG tablet, Take 1 tablet by mouth Daily., Disp: 90 tablet, Rfl: 1  •  losartan (COZAAR) 25 MG tablet, Take 1 tablet by mouth Daily. for blood pressure., Disp: 90 tablet, Rfl: 1  •  Melatonin 10 MG capsule, Take 1 tablet by mouth every night at bedtime., Disp: 90 capsule, Rfl: 0  •  metoprolol tartrate (LOPRESSOR) 50 MG tablet, Take 1 tablet by mouth 2 (Two) Times a Day., Disp: 180 tablet, Rfl: 1  •  midodrine (PROAMATINE) 5 MG tablet, TAKE 1 TABLET BY MOUTH EVERY DAY, Disp: 90 tablet, Rfl: 1  •  multivitamin with minerals tablet tablet, Take 1 tablet by mouth Daily., Disp: , Rfl:   •  polyethylene glycol (MiraLax) 17 GM/SCOOP powder, Take 17 g by mouth Daily., Disp: , Rfl:   •  zonisamide (ZONEGRAN) 100 MG capsule, Take 300 mg by mouth Every Evening. Take three 100 mg capsules may take four if needed, Disp: , Rfl:   •  denosumab (PROLIA) 60 MG/ML solution prefilled syringe syringe, Inject 1 mL under the skin into the appropriate area as directed 1 (One) Time for 1 dose. Every six months., Disp: 1 each, Rfl: 2    Social History     Socioeconomic History   • Marital status:    Tobacco Use   • Smoking status: Never   • Smokeless tobacco: Never   • Tobacco comments:     Patient is advised not to smoke.   Vaping Use   • Vaping Use: Never used   Substance and Sexual Activity   • Alcohol use: No   • Drug use: No   • Sexual activity: Defer       Family History   Problem Relation Age of Onset   • Heart disease Mother    • Pancreatic cancer Mother    • Prostate cancer Father    • Breast cancer Sister         4 sisters have had   • Pancreatic cancer Brother    • Colon cancer Brother    • Stroke Maternal Grandmother    • Breast cancer Sister    • Breast  "cancer Sister    • Breast cancer Sister        The following portions of the patient's history were reviewed and updated as appropriate: allergies, current medications, past family history, past medical history, past social history, past surgical history and problem list.    Review of Systems   Constitutional: Positive for malaise/fatigue.   Cardiovascular: Positive for dyspnea on exertion, irregular heartbeat and palpitations.   Musculoskeletal: Positive for arthritis and joint pain.   Neurological: Positive for weakness.       /68   Pulse 97   Ht 160 cm (63\")   Wt 86.6 kg (191 lb)   SpO2 99%   BMI 33.83 kg/m² .    Objective     Vitals reviewed.   Constitutional:       General: Not in acute distress.     Appearance: Normal appearance. Well-developed.   Eyes:      Pupils: Pupils are equal, round, and reactive to light.   HENT:      Head: Normocephalic and atraumatic.   Neck:      Vascular: No JVD.   Pulmonary:      Effort: Pulmonary effort is normal.      Breath sounds: Normal breath sounds.   Cardiovascular:      Normal rate. Regular rhythm.      Murmurs: There is no murmur.   Pulses:     Intact distal pulses.   Edema:     Peripheral edema absent.   Abdominal:      General: There is no distension.      Palpations: Abdomen is soft.      Tenderness: There is no abdominal tenderness.   Musculoskeletal: Normal range of motion.      Cervical back: Normal range of motion and neck supple. Skin:     General: Skin is warm and dry.   Neurological:      Mental Status: Alert and oriented to person, place, and time.           EKG ordered and reviewed by me in office    ECG 12 Lead    Date/Time: 12/1/2022 1:02 PM  Performed by: Zee Bunch APRN  Authorized by: Zee Bunch APRN   Comparison: compared with previous ECG   Similar to previous ECG  Rhythm: paced  Rate: normal  BPM: 67  QRS axis: normal  Other findings: left ventricular hypertrophy  Pacing: dual chamber pacingComments: Occasional " PACs                In Office Device Interrogation: Reviewed    DEVICE INTERROGATION:  IN OFFICE    DEVICE TYPE:   Dual-chamber pacemaker    :   "[employer]    BATTERY:  Stable    TIME TO ELECTIVE REPLACEMENT INDICATORS:   7.5 years    CHARGE TIME:   Not applicable        LEAD DATA:   LEADS Reprogrammed for testing purposes    Atrial:   Paced mV, 460 ohms, 0.7 V@0.4 ms    Ventricular:     4.4 mV, 449 ohms, 1.1 V@0.4 ms    LV:      Pacemaker Dependent: No      Atrial pacing percentage: 80%    Ventricular pacing percentage: 2%      Arrhythmia Logbook Reviewed: No A. fib        Summary:    I did turn off rate responsiveness for this patient as she was complaining of feelings of her heart racing associated with activity and there were no associated arrhythmias.    In addition I increased her base pacing rate from 60-70 due to occasional PACs that seem to decrease at a faster pacing rate.    Reprogramming at the time of end of visit was DDD 70    No significant arhythmia burden.     Battery status is stable.      NEXT IN OFFICE DEVICE CHECK DUE: 1 year    REMOTE DEVICE INTERROGATIONS: Ongoing

## 2022-12-15 PROCEDURE — 93294 REM INTERROG EVL PM/LDLS PM: CPT | Performed by: NURSE PRACTITIONER

## 2022-12-15 PROCEDURE — 93296 REM INTERROG EVL PM/IDS: CPT | Performed by: NURSE PRACTITIONER

## 2022-12-19 ENCOUNTER — TELEPHONE (OUTPATIENT)
Dept: FAMILY MEDICINE CLINIC | Facility: CLINIC | Age: 82
End: 2022-12-19

## 2022-12-27 ENCOUNTER — OFFICE (AMBULATORY)
Dept: URBAN - METROPOLITAN AREA CLINIC 64 | Facility: CLINIC | Age: 82
End: 2022-12-27

## 2022-12-27 VITALS
DIASTOLIC BLOOD PRESSURE: 76 MMHG | HEIGHT: 64 IN | SYSTOLIC BLOOD PRESSURE: 139 MMHG | HEART RATE: 70 BPM | WEIGHT: 193 LBS

## 2022-12-27 DIAGNOSIS — K21.9 GASTRO-ESOPHAGEAL REFLUX DISEASE WITHOUT ESOPHAGITIS: ICD-10-CM

## 2022-12-27 DIAGNOSIS — K59.00 CONSTIPATION, UNSPECIFIED: ICD-10-CM

## 2022-12-27 PROCEDURE — 99213 OFFICE O/P EST LOW 20 MIN: CPT | Performed by: INTERNAL MEDICINE

## 2022-12-27 RX ORDER — OMEPRAZOLE 40 MG/1
80 CAPSULE, DELAYED RELEASE ORAL
Qty: 180 | Refills: 3 | Status: ACTIVE
Start: 2022-12-27

## 2023-01-06 ENCOUNTER — HOME HEALTH ADMISSION (OUTPATIENT)
Dept: HOME HEALTH SERVICES | Facility: HOME HEALTHCARE | Age: 83
End: 2023-01-06
Payer: MEDICARE

## 2023-01-06 ENCOUNTER — OFFICE VISIT (OUTPATIENT)
Dept: FAMILY MEDICINE CLINIC | Facility: CLINIC | Age: 83
End: 2023-01-06
Payer: MEDICARE

## 2023-01-06 VITALS
BODY MASS INDEX: 34.38 KG/M2 | SYSTOLIC BLOOD PRESSURE: 131 MMHG | HEIGHT: 63 IN | OXYGEN SATURATION: 96 % | DIASTOLIC BLOOD PRESSURE: 79 MMHG | HEART RATE: 67 BPM | TEMPERATURE: 98.6 F | WEIGHT: 194 LBS | RESPIRATION RATE: 16 BRPM

## 2023-01-06 DIAGNOSIS — G89.29 CHRONIC PAIN OF LEFT KNEE: ICD-10-CM

## 2023-01-06 DIAGNOSIS — Z95.0 PRESENCE OF CARDIAC PACEMAKER: ICD-10-CM

## 2023-01-06 DIAGNOSIS — E03.9 ACQUIRED HYPOTHYROIDISM: ICD-10-CM

## 2023-01-06 DIAGNOSIS — M15.9 PRIMARY OSTEOARTHRITIS INVOLVING MULTIPLE JOINTS: ICD-10-CM

## 2023-01-06 DIAGNOSIS — I49.5 SICK SINUS SYNDROME: Chronic | ICD-10-CM

## 2023-01-06 DIAGNOSIS — M81.0 AGE-RELATED OSTEOPOROSIS WITHOUT CURRENT PATHOLOGICAL FRACTURE: ICD-10-CM

## 2023-01-06 DIAGNOSIS — I10 ESSENTIAL HYPERTENSION: Chronic | ICD-10-CM

## 2023-01-06 DIAGNOSIS — M25.562 CHRONIC PAIN OF LEFT KNEE: ICD-10-CM

## 2023-01-06 DIAGNOSIS — Z74.09 LIMITED MOBILITY: Primary | ICD-10-CM

## 2023-01-06 PROBLEM — Z96.1 PSEUDOPHAKIA OF BOTH EYES: Status: ACTIVE | Noted: 2017-02-28

## 2023-01-06 PROCEDURE — 99214 OFFICE O/P EST MOD 30 MIN: CPT

## 2023-01-06 RX ORDER — LEVOTHYROXINE SODIUM 0.05 MG/1
50 TABLET ORAL DAILY
Qty: 90 TABLET | Refills: 1 | Status: SHIPPED | OUTPATIENT
Start: 2023-01-06

## 2023-01-06 RX ORDER — METOPROLOL TARTRATE 50 MG/1
50 TABLET, FILM COATED ORAL 2 TIMES DAILY
Qty: 180 TABLET | Refills: 1 | Status: SHIPPED | OUTPATIENT
Start: 2023-01-06

## 2023-01-06 NOTE — PROGRESS NOTES
Subjective   Anila Spencer is a 82 y.o. female. Presents to [facility]        Chief Complaint   Patient presents with   • Hypertension       History of Present Illness  Patient is here for concerns of decreased mobility.  She is escorted with her  and uses a four-point cane for mobility assistance.  She does have a history of chronic knee pain and is not a candidate for knee replacement.  She reports continued decreased mobility and is requesting assistance in the home.    Her medical history is significant for sick sinus syndrome and has a pacemaker placed.  She is reporting increasing palpitations and fatigue.  She recently saw cardiology with management of pacemaker rhythm.  Given the increased palpitations and activity change I recommend she return to cardiology for rhythm evaluation if symptoms persisit or worsen.        Review of Systems   Constitutional: Positive for activity change and fatigue. Negative for appetite change, chills, diaphoresis and fever.   HENT: Negative.  Negative for ear discharge, sinus pain and sore throat.    Eyes: Positive for visual disturbance (blurry vision after covid).   Respiratory: Negative for cough and shortness of breath.    Cardiovascular: Positive for chest pain, palpitations and leg swelling.   Gastrointestinal: Positive for constipation. Negative for abdominal pain, diarrhea, nausea and vomiting.   Endocrine: Negative for polyuria.   Genitourinary: Positive for urgency. Negative for dysuria and vaginal bleeding.        Urinary incontinence   Musculoskeletal: Positive for arthralgias, back pain, gait problem, joint swelling and myalgias.   Skin: Negative for rash.   Allergic/Immunologic: Positive for environmental allergies. Negative for food allergies.   Neurological: Positive for weakness. Negative for dizziness, light-headedness and headaches.   Hematological: Does not bruise/bleed easily.   Psychiatric/Behavioral: Positive for sleep  disturbance (melatonin for sleep help). Negative for agitation. The patient is not nervous/anxious.         I personally reviewed and updated the patient's allergies, medications, problem list, and past medical, surgical, social, and family history. I have reviewed and confirmed the accuracy of the History of Present Illness and Review of Symptoms as documented by the MA/LPN/RN. VALENTINO Henriquez     Allergies:  No Known Allergies    Social History:  Social History     Socioeconomic History   • Marital status:    Tobacco Use   • Smoking status: Never   • Smokeless tobacco: Never   • Tobacco comments:     Patient is advised not to smoke.   Vaping Use   • Vaping Use: Never used   Substance and Sexual Activity   • Alcohol use: No   • Drug use: No   • Sexual activity: Defer       Family History:  Family History   Problem Relation Age of Onset   • Heart disease Mother    • Pancreatic cancer Mother    • Prostate cancer Father    • Breast cancer Sister         4 sisters have had   • Pancreatic cancer Brother    • Colon cancer Brother    • Stroke Maternal Grandmother    • Breast cancer Sister    • Breast cancer Sister    • Breast cancer Sister        Past Medical History :  Patient Active Problem List   Diagnosis   • Cardiomegaly   • Essential hypertension   • Mitral valve insufficiency   • Presence of cardiac pacemaker   • Dyspnea on exertion   • DDD (degenerative disc disease), lumbar   • Sick sinus syndrome (HCC)   • PCO (posterior capsular opacification), bilateral   • Diplopia   • Glaucoma suspect   • Pseudophakia of both eyes   • Prediabetes   • Hypothyroidism   • Urinary incontinence   • Squamous cell skin cancer, face   • Spinal stenosis of cervical region   • Seizure disorder (HCC)   • Rotator cuff tear   • Age-related osteoporosis without current pathological fracture   • Osteoarthritis   • Kidney stone   • GERD (gastroesophageal reflux disease)   • Fracture of multiple ribs   • Fatty liver   •  Degeneration of intervertebral disc of thoracic region   • DDD (degenerative disc disease), cervical   • Chronic constipation   • Cervical spinal stenosis   • Biliary dyskinesia   • Basal cell carcinoma (BCC) of face   • Anxiety   • Traumatic brain injury   • Nasal fracture   • Chronic insomnia   • Allergic rhinitis   • Class 1 obesity due to excess calories with serious comorbidity and body mass index (BMI) of 32.0 to 32.9 in adult   • Elective replacement indicated for cardiac pacemaker battery at end of lifespan   • Overactive bladder   • Orthostatic hypotension   • Tubular adenoma of colon   • At high risk for falls   • Screening mammogram for breast cancer   • Abnormal lung function test   • Abnormal PFTs (pulmonary function tests)   • Cardiac arrhythmia   • Mild peripheral edema   • Fatigue   • COVID-19   • Chronic pain of left knee   • Dependent edema       Medication List:    Current Outpatient Medications:   •  Calcium Carbonate-Vitamin D3 600-400 MG-UNIT tablet, Take 1 tablet by mouth 2 (Two) Times a Day., Disp: , Rfl:   •  denosumab (PROLIA) 60 MG/ML solution prefilled syringe syringe, Inject 1 mL under the skin into the appropriate area as directed 1 (One) Time for 1 dose. Every six months., Disp: 1 each, Rfl: 2  •  dexlansoprazole (Dexilant) 60 MG capsule, Take 1 capsule by mouth Daily., Disp: 90 capsule, Rfl: 1  •  Elastic Bandages & Supports ([employer] Firm Compression Socks) misc, 2 each Daily. Please size appropriately for knee hi compression socks. 20-35mmHg. 2 pair each., Disp: 4 each, Rfl: 2  •  escitalopram (LEXAPRO) 5 MG tablet, Take 5 mg by mouth Daily., Disp: , Rfl:   •  levothyroxine (SYNTHROID, LEVOTHROID) 50 MCG tablet, Take 1 tablet by mouth Daily., Disp: 90 tablet, Rfl: 1  •  losartan (COZAAR) 25 MG tablet, Take 1 tablet by mouth Daily. for blood pressure., Disp: 90 tablet, Rfl: 1  •  Melatonin 10 MG capsule, Take 1 tablet by mouth every night at bedtime., Disp: 90 capsule, Rfl: 0  •   metoprolol tartrate (LOPRESSOR) 50 MG tablet, Take 1 tablet by mouth 2 (Two) Times a Day., Disp: 180 tablet, Rfl: 1  •  midodrine (PROAMATINE) 5 MG tablet, TAKE 1 TABLET BY MOUTH EVERY DAY, Disp: 90 tablet, Rfl: 1  •  multivitamin with minerals tablet tablet, Take 1 tablet by mouth Daily., Disp: , Rfl:   •  polyethylene glycol (MiraLax) 17 GM/SCOOP powder, Take 17 g by mouth Daily., Disp: , Rfl:   •  zonisamide (ZONEGRAN) 100 MG capsule, Take 300 mg by mouth Every Evening. Take three 100 mg capsules may take four if needed, Disp: , Rfl:     Past Surgical History:  Past Surgical History:   Procedure Laterality Date   • BILATERAL SALPINGO OOPHORECTOMY      for benign cysts   • BREAST CYST ASPIRATION Left    • CARDIAC CATHETERIZATION  08/25/2009    25% LAD. L Cx luminal irregularities. Normal L main   • CARDIAC CATHETERIZATION  03/29/2017    25% LAD. 10-20% LCX. ER 65%. Dr. Agrawal.    • CARDIAC ELECTROPHYSIOLOGY PROCEDURE N/A 06/03/2021    Procedure: PPM generator change - dual    MDT;  Surgeon: Bharathi Mora MD;  Location: [facility] CATH INVASIVE LOCATION;  Service: Cardiovascular;  Laterality: N/A;   • CARDIOVASCULAR STRESS TEST  2019   • CATARACT EXTRACTION  2006   • COSMETIC SURGERY      Lip   • ECHO - CONVERTED  2019   • ENDOSCOPY  05/17/2017    Normal, Dr. Gutierrez   • ENDOSCOPY N/A 08/22/2019    Procedure: ESOPHAGOGASTRODUODENOSCOPY WITH DILATATION (BOUGIE #[id number]);  Surgeon: Joaquin Story MD;  Location: [facility] ENDOSCOPY;  Service: Gastroenterology   • INSERT / REPLACE / REMOVE PACEMAKER     • KNEE ARTHROSCOPY Left 1992   • OOPHORECTOMY     • OTHER SURGICAL HISTORY Right 07/2017    Right eye, YAG Capsuloyomy , Dr. Lemus   • PACEMAKER IMPLANTATION  2009   • ROTATOR CUFF REPAIR Right     Dr. Goldberg   • TONSILLECTOMY     • TOTAL HIP ARTHROPLASTY Bilateral          Physical Exam:  Physical Exam  Vitals and nursing note reviewed.   Constitutional:       General: She is not in acute distress.     Appearance:  Normal appearance. She is well-groomed. She is obese. She is not ill-appearing, toxic-appearing or diaphoretic.   Cardiovascular:      Rate and Rhythm: Normal rate. Rhythm irregular.      Pulses: Normal pulses.      Heart sounds: No murmur heard.     Comments: Ankle edema    Pulmonary:      Effort: Pulmonary effort is normal.      Breath sounds: Normal breath sounds.   Abdominal:      General: Abdomen is flat.      Palpations: Abdomen is soft. Abdomen is not rigid.   Skin:     General: Skin is warm and dry.      Capillary Refill: Capillary refill takes less than 2 seconds.   Neurological:      Mental Status: She is alert and oriented to person, place, and time.   Psychiatric:         Mood and Affect: Mood normal.         Behavior: Behavior normal. Behavior is cooperative.          Vital Signs:  Vital Signs:  /79 (BP Location: Right arm, Patient Position: Sitting, Cuff Size: Large Adult)   Pulse 67   Temp 98.6 °F (37 °C) (Infrared)   Resp 16   Ht 160 cm (63\")   Wt 88 kg (194 lb)   SpO2 96%   BMI 34.37 kg/m²   Vitals:    01/06/23 1620   BP: 131/79   BP Location: Right arm   Patient Position: Sitting   Cuff Size: Large Adult   Pulse: 67   Resp: 16   Temp: 98.6 °F (37 °C)   TempSrc: Infrared   SpO2: 96%   Weight: 88 kg (194 lb)   Height: 160 cm (63\")        Estimated body mass index is 34.37 kg/m² as calculated from the following:    Height as of this encounter: 160 cm (63\").    Weight as of this encounter: 88 kg (194 lb).    Result Review :               PHQ-9 Total Score:                   Assessment and Plan   Problems Addressed this Visit        Cardiac and Vasculature    Essential hypertension (Chronic)     Hypertension is unchanged.  Continue current treatment regimen.  Blood pressure will be reassessed at the next regular appointment.         Relevant Medications    metoprolol tartrate (LOPRESSOR) 50 MG tablet    Sick sinus syndrome (HCC) (Chronic)     Patient recently seen by cardiology with  pacemaker adjustment.  Patient here today and reports increased palpitations and fatigue. Return to cardiology for rhythm evaluation if symptoms persisit or worsen.          Relevant Medications    metoprolol tartrate (LOPRESSOR) 50 MG tablet    Presence of cardiac pacemaker       Endocrine and Metabolic    Hypothyroidism (Chronic)    Relevant Medications    metoprolol tartrate (LOPRESSOR) 50 MG tablet    levothyroxine (SYNTHROID, LEVOTHROID) 50 MCG tablet       Musculoskeletal and Injuries    Age-related osteoporosis without current pathological fracture    Relevant Orders    Ambulatory Referral to Home Health    Osteoarthritis    Chronic pain of left knee    Relevant Orders    Ambulatory Referral to Home Health   Other Visit Diagnoses     Limited mobility    -  Primary    Relevant Orders    Ambulatory Referral to Home Health      Diagnoses       Codes Comments    Limited mobility    -  Primary ICD-10-CM: Z74.09  ICD-9-CM: V49.89     Sick sinus syndrome (HCC)     ICD-10-CM: I49.5  ICD-9-CM: 427.81     Essential hypertension     ICD-10-CM: I10  ICD-9-CM: 401.9     Presence of cardiac pacemaker     ICD-10-CM: Z95.0  ICD-9-CM: V45.01     Chronic pain of left knee     ICD-10-CM: M25.562, G89.29  ICD-9-CM: 719.46, 338.29     Acquired hypothyroidism     ICD-10-CM: E03.9  ICD-9-CM: 244.9     Age-related osteoporosis without current pathological fracture     ICD-10-CM: M81.0  ICD-9-CM: 733.01     Primary osteoarthritis involving multiple joints     ICD-10-CM: M15.9  ICD-9-CM: 715.98             Diagnoses and all orders for this visit:    1. Limited mobility (Primary)  -     Ambulatory Referral to Home Health    2. Sick sinus syndrome (HCC)  Assessment & Plan:  Patient recently seen by cardiology with pacemaker adjustment.  Patient here today and reports increased palpitations and fatigue. Return to cardiology for rhythm evaluation if symptoms persisit or worsen.       3. Essential hypertension  Assessment &  Plan:  Hypertension is unchanged.  Continue current treatment regimen.  Blood pressure will be reassessed at the next regular appointment.      4. Presence of cardiac pacemaker  Overview:  Dr. Agrawal      5. Chronic pain of left knee  -     Ambulatory Referral to Home Health    6. Acquired hypothyroidism  -     levothyroxine (SYNTHROID, LEVOTHROID) 50 MCG tablet; Take 1 tablet by mouth Daily.  Dispense: 90 tablet; Refill: 1    7. Age-related osteoporosis without current pathological fracture  Overview:  h/o tx with Fosamax but quit in 2012 due to fears of complications.  Now on Prolia injections    Orders:  -     Ambulatory Referral to Home Health  -     denosumab (PROLIA) 60 MG/ML solution prefilled syringe syringe; Inject 1 mL under the skin into the appropriate area as directed 1 (One) Time for 1 dose. Every six months.  Dispense: 1 each; Refill: 2    8. Primary osteoarthritis involving multiple joints  Overview:  Dr. Berg    Orders:  -     denosumab (PROLIA) 60 MG/ML solution prefilled syringe syringe; Inject 1 mL under the skin into the appropriate area as directed 1 (One) Time for 1 dose. Every six months.  Dispense: 1 each; Refill: 2    Other orders  -     metoprolol tartrate (LOPRESSOR) 50 MG tablet; Take 1 tablet by mouth 2 (Two) Times a Day.  Dispense: 180 tablet; Refill: 1       Follow Up   Return in about 6 months (around 7/6/2023), or if symptoms worsen or fail to improve, for Return to cardiology for rhythm evaluation if symptoms persisit or worsen. .  Patient was given instructions and counseling regarding her condition or for health maintenance advice. Please see specific information pulled into the AVS if appropriate.    There are no Patient Instructions on file for this visit.     I wore protective equipment throughout this patient encounter to include mask and eyewear. Hand hygiene was performed before donning protective equipment and after removal when leaving the room.    This document is  intended for medical expert use only. Reading of this document by patients and/or patient's family without participating medical staff guidance may result in misinterpretation and unintended morbidity. Any interpretation of such data is the responsibility of the patient and/or family member responsible for the patient in concert with their primary or specialist providers, not to be left for sources of online searches such as [facility] [place], [employer] or similar queries. Relying on these approaches to knowledge may result in misinterpretation, misguided goals of care and even death should patients or family members try recommendations outside of the realm of professional medical care.

## 2023-01-07 NOTE — ASSESSMENT & PLAN NOTE
Patient recently seen by cardiology with pacemaker adjustment.  Patient here today and reports increased palpitations and fatigue. Return to cardiology for rhythm evaluation if symptoms persisit or worsen.

## 2023-01-16 ENCOUNTER — TELEPHONE (OUTPATIENT)
Dept: FAMILY MEDICINE CLINIC | Facility: CLINIC | Age: 83
End: 2023-01-16
Payer: MEDICARE

## 2023-01-16 DIAGNOSIS — E03.9 ACQUIRED HYPOTHYROIDISM: ICD-10-CM

## 2023-01-16 DIAGNOSIS — I10 ESSENTIAL HYPERTENSION: Primary | ICD-10-CM

## 2023-01-16 NOTE — TELEPHONE ENCOUNTER
Patient last labs in chart from 08/22 no recent labs found through [facility]

## 2023-01-22 RX ORDER — LOSARTAN POTASSIUM 25 MG/1
25 TABLET ORAL DAILY
Qty: 90 TABLET | Refills: 1 | Status: SHIPPED | OUTPATIENT
Start: 2023-01-22

## 2023-03-13 ENCOUNTER — TELEPHONE (OUTPATIENT)
Dept: FAMILY MEDICINE CLINIC | Facility: CLINIC | Age: 83
End: 2023-03-13
Payer: MEDICARE

## 2023-03-16 PROCEDURE — 93294 REM INTERROG EVL PM/LDLS PM: CPT | Performed by: NURSE PRACTITIONER

## 2023-03-16 PROCEDURE — 93296 REM INTERROG EVL PM/IDS: CPT | Performed by: NURSE PRACTITIONER

## 2023-03-17 ENCOUNTER — PATIENT OUTREACH (OUTPATIENT)
Dept: CASE MANAGEMENT | Facility: OTHER | Age: 83
End: 2023-03-17
Payer: MEDICARE

## 2023-03-17 NOTE — OUTREACH NOTE
Social Work Assessment  Questions/Answers    Flowsheet Row Most Recent Value   Referral Source physician   Reason for Consult community resources, other (see comments)  [in home services]   Preferred Language English   Advance Care Planning Reviewed no concerns identified   People in Home spouse   Current Living Arrangements home   Primary Care Provided by self, spouse/significant other   Provides Primary Care For no one, unable/limited ability to care for self   Quality of Family Relationships helpful, involved, supportive   Employment Status retired   Source of Income social security   Application for Public Assistance pending public assistance pending number   Usual Activity Tolerance fair   Current Activity Tolerance fair   Housekeeping assistive person   Transportation Anticipated family or friend will provide   Anticipated Changes Related to Illness inability to care for self        SDOH updated and reviewed with the patient during this program:  Financial Resource Strain: Low Risk    • Difficulty of Paying Living Expenses: Not very hard      Food Insecurity: No Food Insecurity   • Worried About Running Out of Food in the Last Year: Never true   • Ran Out of Food in the Last Year: Never true      Transportation Needs: No Transportation Needs   • Lack of Transportation (Medical): No   • Lack of Transportation (Non-Medical): No      Housing Stability: Low Risk    • Unable to Pay for Housing in the Last Year: No   • Number of Places Lived in the Last Year: 1   • Unstable Housing in the Last Year: No      Continuing Care   Community & DME   [facility]    [address] [place] [address] [place] [address]    Phone: [phone]    Resource for: Financial Resource Strain     Patient Outreach    MSW outreach to patient regarding community resources available. Patient states that she would like someone to come into the home to assist with housekeeping. Patient states she utilizes a walker and it is difficult to  complete most tasks including meal preparation. Patient's wife states they also need assistance at home with bathing and dressing. MSW and patient's wife discussed referral to [facility] services for assistance with housekeeping, meal preparation, and in home caregiver. Patient's wife agreeable to referral and discussion with  at [facility] Patient's wife states that her  currently drives at this time so no transportation assistance is needed at this time. MSW follow-up in 2-3 weeks.     Care Coordination    MSW placed referral to [facility] for assistance with connection to community resources.     Janet CAMPUZANO -   Ambulatory Case Management    3/17/2023, 15:51 EDT

## 2023-04-03 ENCOUNTER — PATIENT OUTREACH (OUTPATIENT)
Dept: CASE MANAGEMENT | Facility: OTHER | Age: 83
End: 2023-04-03
Payer: MEDICARE

## 2023-04-03 NOTE — OUTREACH NOTE
Patient Outreach    MSW follow-up with patient regarding referral to [employer] resources for assistance. Patient states that due to income, her and her  do not qualify for [facility] services. Patient states they have located a private pay caregiver to come into the home to assist with needs. Patient states there are no other needs at this time and will call back to MSW as needed for additional help or resources.    Janet CAMPUZANO -   Ambulatory Case Management    4/3/2023, 11:35 EDT

## 2023-05-16 DIAGNOSIS — E03.9 ACQUIRED HYPOTHYROIDISM: ICD-10-CM

## 2023-05-17 RX ORDER — LEVOTHYROXINE SODIUM 0.05 MG/1
TABLET ORAL
Qty: 90 TABLET | Refills: 1 | Status: SHIPPED | OUTPATIENT
Start: 2023-05-17

## 2023-05-23 RX ORDER — MIDODRINE HYDROCHLORIDE 5 MG/1
TABLET ORAL
Qty: 90 TABLET | Refills: 1 | Status: SHIPPED | OUTPATIENT
Start: 2023-05-23

## 2023-06-30 PROBLEM — M15.0 PRIMARY OSTEOARTHRITIS INVOLVING MULTIPLE JOINTS: Status: ACTIVE | Noted: 2023-06-30

## 2023-06-30 PROBLEM — L60.2 HYPERTROPHIC TOENAIL: Status: ACTIVE | Noted: 2023-06-30

## 2023-06-30 PROBLEM — N32.9 BLADDER DISORDER: Status: ACTIVE | Noted: 2023-06-30

## 2023-06-30 PROBLEM — M15.9 PRIMARY OSTEOARTHRITIS INVOLVING MULTIPLE JOINTS: Status: ACTIVE | Noted: 2023-06-30

## 2023-07-09 PROBLEM — R60.0 MILD PERIPHERAL EDEMA: Status: RESOLVED | Noted: 2022-08-09 | Resolved: 2023-07-09

## 2023-07-09 PROBLEM — G25.81 RESTLESS LEGS: Status: ACTIVE | Noted: 2023-07-09

## 2023-07-09 PROBLEM — R53.83 FATIGUE: Status: RESOLVED | Noted: 2022-08-09 | Resolved: 2023-07-09

## 2023-07-09 PROBLEM — S02.2XXA NASAL FRACTURE: Status: RESOLVED | Noted: 2018-07-01 | Resolved: 2023-07-09

## 2023-07-09 PROBLEM — M62.838 MUSCLE SPASM OF BOTH LOWER LEGS: Status: ACTIVE | Noted: 2023-07-09

## 2023-07-09 PROBLEM — S22.49XA FRACTURE OF MULTIPLE RIBS: Status: RESOLVED | Noted: 2018-07-01 | Resolved: 2023-07-09

## 2023-07-09 PROBLEM — I95.1 ORTHOSTATIC HYPOTENSION: Chronic | Status: RESOLVED | Noted: 2021-06-03 | Resolved: 2023-07-09

## 2023-07-09 PROBLEM — Z12.31 SCREENING MAMMOGRAM FOR BREAST CANCER: Status: RESOLVED | Noted: 2022-08-09 | Resolved: 2023-07-09

## 2023-07-09 PROBLEM — Z74.09 LIMITED MOBILITY: Status: ACTIVE | Noted: 2023-07-09

## 2023-07-09 PROBLEM — U07.1 COVID-19: Status: RESOLVED | Noted: 2022-08-09 | Resolved: 2023-07-09

## 2023-08-07 RX ORDER — LOSARTAN POTASSIUM 25 MG/1
TABLET ORAL
Qty: 90 TABLET | Refills: 1 | Status: SHIPPED | OUTPATIENT
Start: 2023-08-07

## 2023-08-13 DIAGNOSIS — G40.909 SEIZURE DISORDER: Chronic | ICD-10-CM

## 2023-08-13 DIAGNOSIS — N18.31 CHRONIC KIDNEY DISEASE (CKD) STAGE G3A/A1, MODERATELY DECREASED GLOMERULAR FILTRATION RATE (GFR) BETWEEN 45-59 ML/MIN/1.73 SQUARE METER AND ALBUMINURIA CREATININE RATIO LESS THAN 30 MG/G (CMS/H*: Primary | ICD-10-CM

## 2023-08-13 PROBLEM — E78.00 ELEVATED LDL CHOLESTEROL LEVEL: Status: ACTIVE | Noted: 2023-07-07

## 2023-08-18 RX ORDER — LEVOTHYROXINE SODIUM 0.07 MG/1
TABLET ORAL
Qty: 30 TABLET | Refills: 12 | Status: SHIPPED | OUTPATIENT
Start: 2023-08-18

## 2023-08-21 RX ORDER — LEVOTHYROXINE SODIUM 0.07 MG/1
TABLET ORAL
Qty: 30 TABLET | Refills: 12 | OUTPATIENT
Start: 2023-08-21

## 2023-09-13 ENCOUNTER — TELEPHONE (OUTPATIENT)
Dept: CARDIOLOGY | Facility: CLINIC | Age: 83
End: 2023-09-13
Payer: MEDICARE

## 2023-09-13 NOTE — TELEPHONE ENCOUNTER
Called and spoke to patient she verbalized understanding. She states she wasn't able to locate the box and finally found it over the weekend. She states she will get it set up in the next week.    ---------------------------------    Called to inform the patient that We are currently not receiving transmissions from your home monitor. Please check all connections and push the button to transmit.

## 2023-09-14 RX ORDER — METOPROLOL TARTRATE 50 MG/1
TABLET, FILM COATED ORAL
Qty: 180 TABLET | Refills: 1 | Status: SHIPPED | OUTPATIENT
Start: 2023-09-14

## 2023-11-21 RX ORDER — MIDODRINE HYDROCHLORIDE 5 MG/1
TABLET ORAL
Qty: 90 TABLET | Refills: 1 | Status: SHIPPED | OUTPATIENT
Start: 2023-11-21

## 2023-12-04 ENCOUNTER — TELEPHONE (OUTPATIENT)
Dept: FAMILY MEDICINE CLINIC | Facility: CLINIC | Age: 83
End: 2023-12-04

## 2023-12-04 NOTE — TELEPHONE ENCOUNTER
Caller: Anila Spencer    Relationship to patient: Self    Best call back number: [phone]    Patient is needing:     IS TRYING TO SCHEDULE APPOINTMENT FOR HIP AND LEG PAIN, BUT CAN'T WAIT UNTIL 12.14.23.    PLEASE ADVISE

## 2023-12-05 ENCOUNTER — CLINICAL SUPPORT NO REQUIREMENTS (OUTPATIENT)
Dept: CARDIOLOGY | Facility: CLINIC | Age: 83
End: 2023-12-05
Payer: MEDICARE

## 2023-12-05 ENCOUNTER — OFFICE VISIT (OUTPATIENT)
Dept: CARDIOLOGY | Facility: CLINIC | Age: 83
End: 2023-12-05
Payer: MEDICARE

## 2023-12-05 VITALS
HEART RATE: 83 BPM | DIASTOLIC BLOOD PRESSURE: 77 MMHG | WEIGHT: 184 LBS | BODY MASS INDEX: 32.6 KG/M2 | RESPIRATION RATE: 18 BRPM | OXYGEN SATURATION: 98 % | HEIGHT: 63 IN | SYSTOLIC BLOOD PRESSURE: 152 MMHG

## 2023-12-05 DIAGNOSIS — I10 ESSENTIAL HYPERTENSION: Chronic | ICD-10-CM

## 2023-12-05 DIAGNOSIS — Z95.0 PRESENCE OF CARDIAC PACEMAKER: ICD-10-CM

## 2023-12-05 DIAGNOSIS — I49.5 SICK SINUS SYNDROME: Primary | Chronic | ICD-10-CM

## 2023-12-06 ENCOUNTER — OFFICE VISIT (OUTPATIENT)
Dept: FAMILY MEDICINE CLINIC | Facility: CLINIC | Age: 83
End: 2023-12-06
Payer: MEDICARE

## 2023-12-06 VITALS
RESPIRATION RATE: 18 BRPM | TEMPERATURE: 98.1 F | DIASTOLIC BLOOD PRESSURE: 84 MMHG | HEIGHT: 63 IN | WEIGHT: 184 LBS | OXYGEN SATURATION: 97 % | HEART RATE: 70 BPM | SYSTOLIC BLOOD PRESSURE: 143 MMHG | BODY MASS INDEX: 32.6 KG/M2

## 2023-12-06 DIAGNOSIS — Z23 NEED FOR INFLUENZA VACCINATION: ICD-10-CM

## 2023-12-06 DIAGNOSIS — M51.36 DDD (DEGENERATIVE DISC DISEASE), LUMBAR: ICD-10-CM

## 2023-12-06 DIAGNOSIS — M81.0 AGE-RELATED OSTEOPOROSIS WITHOUT CURRENT PATHOLOGICAL FRACTURE: ICD-10-CM

## 2023-12-06 DIAGNOSIS — M54.41 CHRONIC LOW BACK PAIN WITH RIGHT-SIDED SCIATICA, UNSPECIFIED BACK PAIN LATERALITY: Primary | ICD-10-CM

## 2023-12-06 DIAGNOSIS — M25.551 PAIN OF RIGHT HIP: ICD-10-CM

## 2023-12-06 DIAGNOSIS — G89.29 CHRONIC LOW BACK PAIN WITH RIGHT-SIDED SCIATICA, UNSPECIFIED BACK PAIN LATERALITY: Primary | ICD-10-CM

## 2023-12-06 PROCEDURE — 1159F MED LIST DOCD IN RCRD: CPT | Performed by: NURSE PRACTITIONER

## 2023-12-06 PROCEDURE — 90662 IIV NO PRSV INCREASED AG IM: CPT | Performed by: NURSE PRACTITIONER

## 2023-12-06 PROCEDURE — G0008 ADMIN INFLUENZA VIRUS VAC: HCPCS | Performed by: NURSE PRACTITIONER

## 2023-12-06 PROCEDURE — 99214 OFFICE O/P EST MOD 30 MIN: CPT | Performed by: NURSE PRACTITIONER

## 2023-12-06 PROCEDURE — 1160F RVW MEDS BY RX/DR IN RCRD: CPT | Performed by: NURSE PRACTITIONER

## 2023-12-06 PROCEDURE — 3079F DIAST BP 80-89 MM HG: CPT | Performed by: NURSE PRACTITIONER

## 2023-12-06 PROCEDURE — 3077F SYST BP >= 140 MM HG: CPT | Performed by: NURSE PRACTITIONER

## 2023-12-06 RX ORDER — MELOXICAM 15 MG/1
15 TABLET ORAL DAILY
Qty: 30 TABLET | Refills: 0 | Status: SHIPPED | OUTPATIENT
Start: 2023-12-06

## 2023-12-06 RX ORDER — METHYLPREDNISOLONE 4 MG/1
TABLET ORAL
Qty: 21 TABLET | Refills: 0 | Status: SHIPPED | OUTPATIENT
Start: 2023-12-06

## 2023-12-06 NOTE — ASSESSMENT & PLAN NOTE
Stopped Prolia due to cost (states not covered by insurance)  Last DEXA on 8/11/2021  Continue Vitamin D and calcium supplements.  Discuss options for treatment with PCP at follow up appointment

## 2023-12-06 NOTE — PROGRESS NOTES
Chief Complaint  Leg Pain (Right side.  States from the knee to hip. Describes as a squeezing and pulsating pain)    Subjective          nAila is a 83 y.o. female  who presents to [facility]     Patient Care Team:  Chantell Larios APRN as PCP - General (Nurse Practitioner)  Shannon Bennett DO as Consulting Physician (Neurology)  Adam Agrawal MD as Consulting Physician (Cardiology)  León Berg MD (Orthopedic Surgery)  Joaquin Story MD as Consulting Physician (Gastroenterology)  Banet, Duane Edward, MD as Consulting Physician (Dermatology)  Bart Longo MD as Consulting Physician (Urology)     History of Present Illness  Anila is here today for right leg and right hip pain    Location: right leg and right hip pain.  She sometimes has low back pain.  Quality: aching, throbbing, squeezing, pulsating  Severity: severe  Duration: for 8 days  Timing: constant pain, not getting better  Context: No recent fall or injury  She has osteoporosis  She has DDD lumbar spine and spondylolisthesis.    CT lumbar spine on 7/31/2019.   She saw ortho spine, Dr. Rowell, in 2019.  He referred her to Pain Management and physical therapy.  She had injections by Dr. Christiano Langford in 2019.  Alleviating factors: She is taking Tylenol but does not feel it helps very much.  Aggravating factors: movement  Associated Symptoms: + numbness right leg, + weakness right leg      She started having pain from right ankle to right hip last week.    Today she has pain from right knee to right hip  She states she is having spasms in right leg.  Feels like a squeezing sensation in her leg.  She has weakness in right leg.  Her  had to pick her leg up to get it in the car.  She thinks she is having a flare up of sciatic pain.      Anila Spencer  has a past medical history of Abnormal cardiovascular stress test (03/21/2017), Allergic rhinitis, Anxiety, At high risk for falls  (01/28/2022), Basal cell carcinoma (BCC) of face, Biliary dyskinesia, Bradycardia, Cardiomegaly, Cataract, Cervical spinal stenosis, Chronic constipation, Chronic insomnia, COVID-19 (08/09/2022), DDD (degenerative disc disease), cervical, DDD (degenerative disc disease), lumbar (07/25/2019), Degeneration of intervertebral disc of thoracic region (03/18/2017), Diplopia, Dysphagia (04/25/2019), Emphysema of lung, Fatigue (08/09/2022), Fatty liver, Fracture of multiple ribs (07/2018), Gastroesophageal reflux disease (12/04/2012), GERD (gastroesophageal reflux disease), Hematuria (12/29/2014), History of recent fall, Hypertension, Hypothyroidism, Injury of back, Insomnia (03/07/2016), Kidney stone, Kidney stone, Left arm pain, Mild peripheral edema (08/09/2022), Nasal fracture (07/2018), Nasal fracture (07/01/2018), Osteoarthritis, Osteoporosis, Prediabetes, Rotator cuff tear, Rotator cuff tear, Seizure disorder, Shortness of breath (05/08/2019), Sick sinus syndrome, Spinal stenosis of cervical region (02/11/2015), Squamous cell skin cancer, face, Traumatic brain injury, Traumatic brain injury, Tubular adenoma of colon (10/14/2021), and Urinary incontinence.      Review of Systems   Constitutional:  Negative for fever.   Musculoskeletal:  Positive for arthralgias (right hip, right knee) and back pain.   Neurological:  Positive for weakness, numbness and headaches.        Family History   Problem Relation Age of Onset    Heart disease Mother     Pancreatic cancer Mother     Prostate cancer Father     Breast cancer Sister         4 sisters have had    Pancreatic cancer Brother     Colon cancer Brother     Stroke Maternal Grandmother     Breast cancer Sister     Breast cancer Sister     Breast cancer Sister         Past Surgical History:   Procedure Laterality Date    BILATERAL SALPINGO OOPHORECTOMY      for benign cysts    BREAST CYST ASPIRATION Left     CARDIAC CATHETERIZATION  08/25/2009    25% LAD. L Cx luminal  irregularities. Normal L main    CARDIAC CATHETERIZATION  03/29/2017    25% LAD. 10-20% LCX. ER 65%. Dr. Agrawal.     CARDIAC ELECTROPHYSIOLOGY PROCEDURE N/A 06/03/2021    Procedure: PPM generator change - dual    MDT;  Surgeon: Bharathi Mora MD;  Location: [facility] CATH INVASIVE LOCATION;  Service: Cardiovascular;  Laterality: N/A;    CARDIOVASCULAR STRESS TEST  2019    CATARACT EXTRACTION  2006    COSMETIC SURGERY      Lip    ECHO - CONVERTED  2019    ENDOSCOPY  05/17/2017    Normal, Dr. Gutierrez    ENDOSCOPY N/A 08/22/2019    Procedure: ESOPHAGOGASTRODUODENOSCOPY WITH DILATATION (BOUGIE #46,50);  Surgeon: Joaquin Story MD;  Location: [facility] ENDOSCOPY;  Service: Gastroenterology    INSERT / REPLACE / REMOVE PACEMAKER      KNEE ARTHROSCOPY Left 1992    OOPHORECTOMY      OTHER SURGICAL HISTORY Right 07/2017    Right eye, YAG Capsuloyomy , Dr. Lemus    PACEMAKER IMPLANTATION  2009    ROTATOR CUFF REPAIR Right     Dr. Goldberg    TONSILLECTOMY      TOTAL HIP ARTHROPLASTY Bilateral         Social History     Socioeconomic History    Marital status:    Tobacco Use    Smoking status: Never    Smokeless tobacco: Never    Tobacco comments:     Patient is advised not to smoke.   Vaping Use    Vaping Use: Never used   Substance and Sexual Activity    Alcohol use: No    Drug use: No    Sexual activity: Defer        Immunization History   Administered Date(s) Administered    COVID-19 (MODERNA) 1st,2nd,3rd Dose Monovalent 01/14/2021, 02/20/2021, 09/13/2021    COVID-19 (MODERNA) BIVALENT 12+YRS 11/11/2022    Fluad Quad 65+ 10/07/2021    Fluzone High Dose =>65 Years ([employer] ONLY) 10/02/2013, 09/26/2017, 10/05/2018, 10/25/2019, 09/10/2020    Fluzone High-Dose 65+yrs 10/24/2022, 12/06/2023    Fluzone Quad >6mos (Multi-dose) 10/25/2019    Hepatitis A 07/16/2019, 01/20/2020    PEDS-Pneumococcal Conjugate (PCV7) 10/31/2017    Pneumococcal Conjugate 13-Valent (PCV13) 10/31/2017    Pneumococcal Polysaccharide (PPSV23)  "10/06/2016    Shingrix 05/09/2019, 07/08/2019    TD Preservative Free (Tenivac) 01/23/2006    Td (TDVAX) 01/23/2006    Tdap 04/10/2018    Zostavax 09/29/2010       Objective       Current Outpatient Medications:     Calcium Carbonate-Vitamin D3 600-400 MG-UNIT tablet, Take 1 tablet by mouth 2 (Two) Times a Day., Disp: , Rfl:     Elastic Bandages & Supports ([employer] Firm Compression Socks) misc, 2 each Daily. Please size appropriately for knee hi compression socks. 20-35mmHg. 2 pair each., Disp: 4 each, Rfl: 2    escitalopram (LEXAPRO) 5 MG tablet, Take 1 tablet by mouth Daily., Disp: , Rfl:     levothyroxine (SYNTHROID, LEVOTHROID) 75 MCG tablet, TAKE 1 TABLET BY MOUTH EVERY DAY, Disp: 30 tablet, Rfl: 12    losartan (COZAAR) 25 MG tablet, TAKE 1 TABLET BY MOUTH EVERY DAY FOR BLOOD PRESSURE, Disp: 90 tablet, Rfl: 1    Melatonin 10 MG capsule, Take 1 tablet by mouth every night at bedtime., Disp: 90 capsule, Rfl: 0    metoprolol tartrate (LOPRESSOR) 50 MG tablet, TAKE 1 TABLET BY MOUTH TWICE A DAY, Disp: 180 tablet, Rfl: 1    midodrine (PROAMATINE) 5 MG tablet, TAKE 1 TABLET BY MOUTH EVERY DAY, Disp: 90 tablet, Rfl: 1    multivitamin with minerals tablet tablet, Take 1 tablet by mouth Daily., Disp: , Rfl:     omeprazole (priLOSEC) 40 MG capsule, Take 1 capsule by mouth Daily., Disp: , Rfl:     polyethylene glycol (MiraLax) 17 GM/SCOOP powder, Take 17 g by mouth Daily., Disp: , Rfl:     zonisamide (ZONEGRAN) 100 MG capsule, Take 3 capsules by mouth Every Evening. Take three 100 mg capsules may take four if needed, Disp: , Rfl:     meloxicam (MOBIC) 15 MG tablet, Take 1 tablet by mouth Daily. Take with food, Disp: 30 tablet, Rfl: 0    methylPREDNISolone (MEDROL) 4 MG dose pack, Take as directed on package instructions., Disp: 21 tablet, Rfl: 0    Vital Signs:      /84   Pulse 70   Temp 98.1 °F (36.7 °C) (Temporal)   Resp 18   Ht 160 cm (62.99\")   Wt 83.5 kg (184 lb)   SpO2 97%   BMI 32.60 kg/m²     Vitals:    " "12/06/23 1541   BP: 143/84   Pulse: 70   Resp: 18   Temp: 98.1 °F (36.7 °C)   TempSrc: Temporal   SpO2: 97%   Weight: 83.5 kg (184 lb)   Height: 160 cm (62.99\")   PainSc: 10-Worst pain ever   PainLoc: Leg      Physical Exam  Vitals reviewed.   Constitutional:       General: She is not in acute distress.     Appearance: Normal appearance.      Comments: Using walker   HENT:      Head: Normocephalic and atraumatic.   Eyes:      General: No scleral icterus.     Conjunctiva/sclera: Conjunctivae normal.   Cardiovascular:      Rate and Rhythm: Normal rate and regular rhythm.      Heart sounds: Normal heart sounds.   Pulmonary:      Effort: Pulmonary effort is normal. No respiratory distress.      Breath sounds: Normal breath sounds.   Musculoskeletal:      Lumbar back: No spasms or tenderness.      Right lower leg: No edema.      Left lower leg: No edema.      Comments: Physical exam limited by patient unable to get on exam table.  No tenderness over trochanteric bursa bilaterally   Skin:     General: Skin is warm and dry.   Neurological:      Mental Status: She is alert and oriented to person, place, and time.      Sensory: Sensation is intact.      Motor: Weakness (right leg) present.   Psychiatric:         Mood and Affect: Mood normal.        Result Review :                   Assessment and Plan    Diagnoses and all orders for this visit:    1. 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 Take with food  Dispense: 30 tablet; Refill: 0  -     methylPREDNISolone (MEDROL) 4 MG dose pack; Take as directed on package instructions.  Dispense: 21 tablet; Refill: 0  -     XR Spine Lumbar Complete 4+VW; Future    2. Pain of right hip  -     XR Hip With or Without Pelvis 2 - 3 View Right; Future    3. DDD (degenerative disc disease), lumbar  Overview:  CT lumbar spine on 7/31/2019    Assessment & Plan:  Established " problem.  Worsening with exacerbation of pain.      4. Age-related osteoporosis without current pathological fracture  Overview:  h/o tx with Fosamax but quit in 2012 due to fears of complications.  Not taking Prolia (states insurance will not cover)    Assessment & Plan:  Stopped Prolia due to cost (states not covered by insurance)  Last DEXA on 8/11/2021  Continue Vitamin D and calcium supplements.  Discuss options for treatment with PCP at follow up appointment      5. Need for influenza vaccination  -     Fluzone High-Dose 65+yrs       Begin meloxicam 15 mg daily with food and medrol dose pack.  Ordered xrays of lumbar spine and right hip.  Discussed she needs to follow up with PCP concerning osteoporosis.  Orders printed for xrays -  she wants to go to [facility].  Follow up with PCP Chantell Larios NP as scheduled.  Discussed she may need a CT lumbar spine if no improvement with above treatment.  (She states she can not have an MRI due to pacemaker)  Follow-up if not better in 2-3 days or sooner if worse.       Follow Up   Return for Follow up as scheduled with PCP on 1/8/2024.  Patient was given instructions and counseling regarding her condition or for health maintenance advice. Please see specific information pulled into the AVS if appropriate.    There are no Patient Instructions on file for this visit.

## 2023-12-07 NOTE — PROGRESS NOTES
Cardiology Office Follow Up Visit      Primary Care Provider:  Chantell Larios APRN    Reason for f/u:     Sick sinus syndrome  Dual-chamber pacemaker  Hypertension      Subjective     CC:    Denies chest pain or dyspnea    History of Present Illness       Anila Spencer is a 83 y.o. female.  Patient is a pleasant 83-year-old female who is known to have sick sinus syndrome, previous dual-chamber pacemaker, hypertension and periods of orthostatic hypotension.  She has had previous syncope.     She had a dual-chamber [employer] pacemaker implanted in June 2021.  She has had no recurrent syncope since that time.     In 2019 the patient underwent a tilt table test that showed a hypotensive response to head up tilt.  She was started on midodrine at that time.  Ejection fraction by echocardiogram at that time was 60 to 65% with mild MR.  She also had stress Myoview at that time that showed no reversible ischemia.     She has been evaluated due to ongoing complaints of shortness of breath and had a PFT with referral to pulmonology.     Patient continues to report feelings of palpitations and feeling like her heart is beating fast typically related to periods of activity.  She denies chest pain or worsening dyspnea.  She complains of chronic dyspnea with levels of exertion but unchanged.  She has had no edema, near syncope or orthopnea      ASSESSMENT/PLAN:        Diagnoses and all orders for this visit:    1. Sick sinus syndrome (Primary)    2. Presence of cardiac pacemaker    3. Essential hypertension    Other orders  -     ECG 12 Lead           MEDICAL DECISION MAKING:    Patient has had no episodes of atrial fibrillation or arrhythmias on device interrogation.  There are stable function.  Her blood pressure is mildly elevated though all other blood pressures reviewed been in the 120s to 130s systolic.    She is having no signs or symptoms suggestive of unstable angina.  I made no changes in her medical  therapy.  Will continue the current treatment if she develops any new or worsening problems of asked her to return sooner.  She will have scheduled follow-up with Dr. Agrawal in 6 months we will continue to monitor her pacemaker via [employer] and see her back in the pacemaker clinic in 1 year for device interrogation      Past Medical History:   Diagnosis Date    Abnormal cardiovascular stress test 03/21/2017    Allergic rhinitis     Anxiety     At high risk for falls 01/28/2022    Basal cell carcinoma (BCC) of face     Biliary dyskinesia     Bradycardia     Cardiomegaly     Cataract     Cervical spinal stenosis     Chronic constipation     Chronic insomnia     COVID-19 08/09/2022    Diagnosed with Covid on 7/7/2022 with lingering side effect of fatigue.    DDD (degenerative disc disease), cervical     C-spine, T-spine, L-Spine    DDD (degenerative disc disease), lumbar 07/25/2019    Degeneration of intervertebral disc of thoracic region 03/18/2017    Diplopia     Dr. Laird    Dysphagia 04/25/2019    Emphysema of lung     Fatigue 08/09/2022    Reports new onset fatigue, worse after Covid illness in 7/2022.         Fatty liver     Fracture of multiple ribs 07/2018    Left 4th-8th    Gastroesophageal reflux disease 12/04/2012    GERD (gastroesophageal reflux disease)     Hematuria 12/29/2014    History of recent fall     Hypertension     Hypothyroidism     Injury of back     Insomnia 03/07/2016    Kidney stone     Kidney stone     Left arm pain     Mild peripheral edema 08/09/2022    Patient reports occasional dependent edema when standing for extended period of time or when she is seated with legs not elevated.     Nasal fracture 07/2018    Nasal fracture 07/01/2018    Osteoarthritis     Osteoporosis     h/o tx with Fosamax but quit in 2012 due to fears of complications    Prediabetes     Rotator cuff tear     right    Rotator cuff tear     right    Seizure disorder     Dr. Shannon Bennett    Shortness of breath 05/08/2019     Sick sinus syndrome     Dr. Agrawal    Spinal stenosis of cervical region 02/11/2015    Squamous cell skin cancer, face     Traumatic brain injury     from fall    Traumatic brain injury     from fall    Tubular adenoma of colon 10/14/2021    No further recall due to age    Urinary incontinence        Past Surgical History:   Procedure Laterality Date    BILATERAL SALPINGO OOPHORECTOMY      for benign cysts    BREAST CYST ASPIRATION Left     CARDIAC CATHETERIZATION  08/25/2009    25% LAD. L Cx luminal irregularities. Normal L main    CARDIAC CATHETERIZATION  03/29/2017    25% LAD. 10-20% LCX. ER 65%. Dr. Agrawal.     CARDIAC ELECTROPHYSIOLOGY PROCEDURE N/A 06/03/2021    Procedure: PPM generator change - dual    MDT;  Surgeon: Bharathi Mora MD;  Location: [facility] CATH INVASIVE LOCATION;  Service: Cardiovascular;  Laterality: N/A;    CARDIOVASCULAR STRESS TEST  2019    CATARACT EXTRACTION  2006    COSMETIC SURGERY      Lip    ECHO - CONVERTED  2019    ENDOSCOPY  05/17/2017    Normal, Dr. Gutierrez    ENDOSCOPY N/A 08/22/2019    Procedure: ESOPHAGOGASTRODUODENOSCOPY WITH DILATATION (BOUGIE #46,50);  Surgeon: Joaquin Story MD;  Location: [facility] ENDOSCOPY;  Service: Gastroenterology    INSERT / REPLACE / REMOVE PACEMAKER      KNEE ARTHROSCOPY Left 1992    OOPHORECTOMY      OTHER SURGICAL HISTORY Right 07/2017    Right eye, YAG Capsuloyomy , Dr. Lemus    PACEMAKER IMPLANTATION  2009    ROTATOR CUFF REPAIR Right     Dr. Goldberg    TONSILLECTOMY      TOTAL HIP ARTHROPLASTY Bilateral          Current Outpatient Medications:     Calcium Carbonate-Vitamin D3 600-400 MG-UNIT tablet, Take 1 tablet by mouth 2 (Two) Times a Day., Disp: , Rfl:     Elastic Bandages & Supports ([employer] Firm Compression Socks) misc, 2 each Daily. Please size appropriately for knee hi compression socks. 20-35mmHg. 2 pair each., Disp: 4 each, Rfl: 2    escitalopram (LEXAPRO) 5 MG tablet, Take 1 tablet by mouth Daily., Disp: , Rfl:     levothyroxine  (SYNTHROID, LEVOTHROID) 75 MCG tablet, TAKE 1 TABLET BY MOUTH EVERY DAY, Disp: 30 tablet, Rfl: 12    losartan (COZAAR) 25 MG tablet, TAKE 1 TABLET BY MOUTH EVERY DAY FOR BLOOD PRESSURE, Disp: 90 tablet, Rfl: 1    Melatonin 10 MG capsule, Take 1 tablet by mouth every night at bedtime., Disp: 90 capsule, Rfl: 0    metoprolol tartrate (LOPRESSOR) 50 MG tablet, TAKE 1 TABLET BY MOUTH TWICE A DAY, Disp: 180 tablet, Rfl: 1    midodrine (PROAMATINE) 5 MG tablet, TAKE 1 TABLET BY MOUTH EVERY DAY, Disp: 90 tablet, Rfl: 1    multivitamin with minerals tablet tablet, Take 1 tablet by mouth Daily., Disp: , Rfl:     omeprazole (priLOSEC) 40 MG capsule, Take 1 capsule by mouth Daily., Disp: , Rfl:     polyethylene glycol (MiraLax) 17 GM/SCOOP powder, Take 17 g by mouth Daily., Disp: , Rfl:     zonisamide (ZONEGRAN) 100 MG capsule, Take 3 capsules by mouth Every Evening. Take three 100 mg capsules may take four if needed, Disp: , Rfl:     meloxicam (MOBIC) 15 MG tablet, Take 1 tablet by mouth Daily. Take with food, Disp: 30 tablet, Rfl: 0    methylPREDNISolone (MEDROL) 4 MG dose pack, Take as directed on package instructions., Disp: 21 tablet, Rfl: 0    Social History     Socioeconomic History    Marital status:    Tobacco Use    Smoking status: Never    Smokeless tobacco: Never    Tobacco comments:     Patient is advised not to smoke.   Vaping Use    Vaping Use: Never used   Substance and Sexual Activity    Alcohol use: No    Drug use: No    Sexual activity: Defer       Family History   Problem Relation Age of Onset    Heart disease Mother     Pancreatic cancer Mother     Prostate cancer Father     Breast cancer Sister         4 sisters have had    Pancreatic cancer Brother     Colon cancer Brother     Stroke Maternal Grandmother     Breast cancer Sister     Breast cancer Sister     Breast cancer Sister        The following portions of the patient's history were reviewed and updated as appropriate: allergies, current  "medications, past family history, past medical history, past social history, past surgical history and problem list.    ROS  Pertinent items are noted in HPI, all other systems reviewed and negative      /77 (BP Location: Left arm, Patient Position: Sitting, Cuff Size: Large Adult)   Pulse 83   Resp 18   Ht 160 cm (63\")   Wt 83.5 kg (184 lb)   SpO2 98%   BMI 32.59 kg/m² .    Objective     Vitals reviewed.   Constitutional:       General: Not in acute distress.     Appearance: Normal appearance. Well-developed.   Eyes:      Pupils: Pupils are equal, round, and reactive to light.   HENT:      Head: Normocephalic and atraumatic.   Neck:      Vascular: No JVD.   Pulmonary:      Effort: Pulmonary effort is normal.      Breath sounds: Normal breath sounds.   Cardiovascular:      Normal rate. Regular rhythm.   Edema:     Peripheral edema absent.   Abdominal:      General: There is no distension.      Palpations: Abdomen is soft.      Tenderness: There is no abdominal tenderness.   Musculoskeletal: Normal range of motion.      Cervical back: Normal range of motion and neck supple. Skin:     General: Skin is warm and dry.   Neurological:      Mental Status: Alert and oriented to person, place, and time.           EKG ordered and reviewed by me in office    ECG 12 Lead    Date/Time: 12/5/2023 1:37 PM  Performed by: Zee Bunch APRN    Authorized by: Zee Bunch APRN  Rhythm: paced  Rate: normal  BPM: 83  QRS axis: normal  Comments: Paced rhythm with              In Office Device Interrogation: Reviewed    DEVICE INTERROGATION:  IN OFFICE    DEVICE TYPE:   Dual-chamber pacemaker    :   [employer]    BATTERY:  Stable    TIME TO ELECTIVE REPLACEMENT INDICATORS:   6.5 years    CHARGE TIME:   Not applicable        LEAD DATA:   LEADS Reprogrammed for testing purposes    Atrial:   PAC ED mV, 463 ohms, 0.7 V@0.4 ms    Ventricular:     7.7 mV, 465 ohms, 1.2 V@0.4 ms    LV:      Pacemaker " Dependent: No      Atrial pacing percentage: 99%    Ventricular pacing percentage: 2%      Arrhythmia Logbook Reviewed: No A-fib        Summary:    Stable Device Function    No significant arhythmia burden.     Battery status is stable.      NEXT IN OFFICE DEVICE CHECK DUE: 1 year    REMOTE DEVICE INTERROGATIONS: Ongoing

## 2023-12-08 DIAGNOSIS — M25.551 PAIN OF RIGHT HIP: ICD-10-CM

## 2023-12-09 NOTE — PROGRESS NOTES
Let her know her right hip xray shows degenerative (arthritis) changes in her right SI joint.   No fractures.

## 2024-02-14 RX ORDER — LOSARTAN POTASSIUM 25 MG/1
TABLET ORAL
Qty: 90 TABLET | Refills: 1 | Status: SHIPPED | OUTPATIENT
Start: 2024-02-14 | End: 2024-02-15 | Stop reason: SDUPTHER

## 2024-02-15 ENCOUNTER — OFFICE VISIT (OUTPATIENT)
Dept: FAMILY MEDICINE CLINIC | Facility: CLINIC | Age: 84
End: 2024-02-15
Payer: MEDICARE

## 2024-02-15 VITALS
TEMPERATURE: 98.2 F | SYSTOLIC BLOOD PRESSURE: 138 MMHG | RESPIRATION RATE: 18 BRPM | DIASTOLIC BLOOD PRESSURE: 84 MMHG | BODY MASS INDEX: 33.84 KG/M2 | OXYGEN SATURATION: 96 % | HEART RATE: 75 BPM | HEIGHT: 63 IN | WEIGHT: 191 LBS

## 2024-02-15 DIAGNOSIS — E03.9 HYPOTHYROIDISM, UNSPECIFIED TYPE: Chronic | ICD-10-CM

## 2024-02-15 DIAGNOSIS — Z74.2 NEED FOR HOME HEALTH CARE: ICD-10-CM

## 2024-02-15 DIAGNOSIS — H61.23 EXCESSIVE CERUMEN IN BOTH EAR CANALS: ICD-10-CM

## 2024-02-15 DIAGNOSIS — Z74.09 LIMITED MOBILITY: ICD-10-CM

## 2024-02-15 DIAGNOSIS — R29.898 WEAKNESS OF BOTH LOWER EXTREMITIES: ICD-10-CM

## 2024-02-15 DIAGNOSIS — M15.9 PRIMARY OSTEOARTHRITIS INVOLVING MULTIPLE JOINTS: ICD-10-CM

## 2024-02-15 DIAGNOSIS — Z00.00 MEDICARE ANNUAL WELLNESS VISIT, SUBSEQUENT: Primary | ICD-10-CM

## 2024-02-15 DIAGNOSIS — I10 ESSENTIAL HYPERTENSION: Chronic | ICD-10-CM

## 2024-02-15 DIAGNOSIS — M81.0 AGE-RELATED OSTEOPOROSIS WITHOUT CURRENT PATHOLOGICAL FRACTURE: ICD-10-CM

## 2024-02-15 RX ORDER — METOPROLOL TARTRATE 50 MG/1
50 TABLET, FILM COATED ORAL 2 TIMES DAILY
Qty: 180 TABLET | Refills: 3 | Status: SHIPPED | OUTPATIENT
Start: 2024-02-15

## 2024-02-15 RX ORDER — LEVOTHYROXINE SODIUM 0.07 MG/1
75 TABLET ORAL DAILY
Qty: 90 TABLET | Refills: 3 | Status: SHIPPED | OUTPATIENT
Start: 2024-02-15

## 2024-02-15 RX ORDER — LOSARTAN POTASSIUM 25 MG/1
25 TABLET ORAL DAILY
Qty: 90 TABLET | Refills: 3 | Status: SHIPPED | OUTPATIENT
Start: 2024-02-15

## 2024-02-20 ENCOUNTER — HOME HEALTH ADMISSION (OUTPATIENT)
Dept: HOME HEALTH SERVICES | Facility: HOME HEALTHCARE | Age: 84
End: 2024-02-20
Payer: MEDICARE

## 2024-02-21 ENCOUNTER — TRANSCRIBE ORDERS (OUTPATIENT)
Dept: ADMINISTRATIVE | Facility: HOSPITAL | Age: 84
End: 2024-02-21
Payer: MEDICARE

## 2024-02-21 DIAGNOSIS — Z12.31 ENCOUNTER FOR SCREENING MAMMOGRAM FOR BREAST CANCER: Primary | ICD-10-CM

## 2024-02-22 ENCOUNTER — TELEPHONE (OUTPATIENT)
Dept: FAMILY MEDICINE CLINIC | Facility: CLINIC | Age: 84
End: 2024-02-22
Payer: MEDICARE

## 2024-02-22 NOTE — TELEPHONE ENCOUNTER
Caller: Anila Spencer    Relationship: Self    Best call back number: [phone]     What was the call regarding: PATIENT STATED SHE WAS SUPPOSED TO HAVE LABS ORDERS SENT TO [facility] LAST FRIDAY. PATIENT WENT TO GET HER LABS DONE TODAY AND THE ORDERS WERE SENT TO [facility] INSTEAD OF THE HOSPITAL. PATIENT STATED SHE WAITED AN HOUR AND 45 MINUTES WHILE THE WORKER AT [facility] TRIED TO GET [employer] OF THE OFFICE AND GET THE LABS FAXED OVER. PATIENT WAS UNABLE TO GET HER LABS DONE.     PATIENT ALSO WANTED TO GET AN UPDATE ON HOME HEALTH AND PHYSICAL THERAPY. NOBODY HAS REACHED OUT TO PATIENT REGARDING THIS YET. PATIENT WOULD LIKE A CALL FROM AXEL AVELAR    PLEASE ADVISE

## 2024-03-07 NOTE — PROGRESS NOTES
Date of Office Visit: 2024  Encounter Provider: Dr. Adam Agrawal  Place of Service: [facility] CARDIOLOGY [place]  Patient Name: Anila Spencer  :1940  Chantell Larios APRN    Chief Complaint   Patient presents with    Hypertension    Follow-up     History of Present Illness:    Anila Spencer is a 82y.o. female. Her past medical history significant for hypertension, sick sinus syndrome, tachybradycardia syndrome, history of syncope came today for follow-up.     Previously patient had repeated episodes of syncope. She had extensive workup which showed that she had underlying sick sinus syndrome. She underwent permanent  pacemaker implantation. Since then patient symptoms of syncope had completely resolved. Previously, patient was also evaluated with cardiac catheterization because of her symptoms of angina pectoris and she was noted to have no significant coronary artery disease. Her previous CAT scan of chest was also negative for pulmonary embolism and she had pulmonary function test which was negative for any significant pulmonary airway disease. It was noted that her symptoms of chest pain at that time was probably related to gastroesophageal reflux disease and she was appropriately treated.    In 2019, patient had an episode of syncope at home.  Patient underwent tilt table test and it showed hypotensive response to head up tilt.  Patient was started on midodrine 5 mg twice daily.  Echocardiogram showed normal left ventricular size and function.  LVEF was 60 to 65%.  Mild mitral regurgitation was noted.  Stress test was negative for ischemia.    Patient came today for follow-up.  Patient walks with a walker.  She has not had any fall.  She denies any symptom of chest pain or tightness or heaviness no orthopnea PND no syncope or presyncope.  Patient has not had any seizure.    Pacemaker was interrogated in 2023 and it was functioning appropriately.  No change in  programming    I am overall pleased with the patient progress.  Her blood pressure is controlled patient has not fallen down.  Pacemaker would be interrogated on next visit      Past Medical History:   Diagnosis Date    Abnormal cardiovascular stress test 03/21/2017    Allergic rhinitis     Anxiety     At high risk for falls 01/28/2022    Basal cell carcinoma (BCC) of face     Biliary dyskinesia     Bradycardia     Cardiomegaly     Cataract     Cervical spinal stenosis     Chronic constipation     Chronic insomnia     COVID-19 08/09/2022    Diagnosed with Covid on 7/7/2022 with lingering side effect of fatigue.    DDD (degenerative disc disease), cervical     C-spine, T-spine, L-Spine    DDD (degenerative disc disease), lumbar 07/25/2019    Degeneration of intervertebral disc of thoracic region 03/18/2017    Diplopia     Dr. Garciaram    Dysphagia 04/25/2019    Emphysema of lung     Fatigue 08/09/2022    Reports new onset fatigue, worse after Covid illness in 7/2022.         Fatty liver     Fracture of multiple ribs 07/2018    Left 4th-8th    Gastroesophageal reflux disease 12/04/2012    GERD (gastroesophageal reflux disease)     Hematuria 12/29/2014    History of recent fall     Hypertension     Hypothyroidism     Injury of back     Insomnia 03/07/2016    Kidney stone     Kidney stone     Left arm pain     Mild peripheral edema 08/09/2022    Patient reports occasional dependent edema when standing for extended period of time or when she is seated with legs not elevated.     Nasal fracture 07/2018    Nasal fracture 07/01/2018    Osteoarthritis     Osteoporosis     h/o tx with Fosamax but quit in 2012 due to fears of complications    Prediabetes     Rotator cuff tear     right    Rotator cuff tear     right    Seizure disorder     Dr. Shannon Bennett    Shortness of breath 05/08/2019    Sick sinus syndrome     Dr. Agrawal    Spinal stenosis of cervical region 02/11/2015    Squamous cell skin cancer, face     Traumatic brain  injury     from fall    Traumatic brain injury     from fall    Tubular adenoma of colon 10/14/2021    No further recall due to age    Urinary incontinence          Past Surgical History:   Procedure Laterality Date    BILATERAL SALPINGO OOPHORECTOMY      for benign cysts    BREAST CYST ASPIRATION Left     CARDIAC CATHETERIZATION  08/25/2009    25% LAD. L Cx luminal irregularities. Normal L main    CARDIAC CATHETERIZATION  03/29/2017    25% LAD. 10-20% LCX. ER 65%. Dr. Agrawal.     CARDIAC ELECTROPHYSIOLOGY PROCEDURE N/A 06/03/2021    Procedure: PPM generator change - dual    MDT;  Surgeon: Bharathi Mora MD;  Location: [facility] CATH INVASIVE LOCATION;  Service: Cardiovascular;  Laterality: N/A;    CARDIOVASCULAR STRESS TEST  2019    CATARACT EXTRACTION  2006    COSMETIC SURGERY      Lip    ECHO - CONVERTED  2019    ENDOSCOPY  05/17/2017    Normal, Dr. Gutierrez    ENDOSCOPY N/A 08/22/2019    Procedure: ESOPHAGOGASTRODUODENOSCOPY WITH DILATATION (BOUGIE #46,50);  Surgeon: Joaquin Story MD;  Location: [facility] ENDOSCOPY;  Service: Gastroenterology    INSERT / REPLACE / REMOVE PACEMAKER      KNEE ARTHROSCOPY Left 1992    OOPHORECTOMY      OTHER SURGICAL HISTORY Right 07/2017    Right eye, YAG Capsuloyomy , Dr. Lemus    PACEMAKER IMPLANTATION  2009    ROTATOR CUFF REPAIR Right     Dr. Goldberg    TONSILLECTOMY      TOTAL HIP ARTHROPLASTY Bilateral            Current Outpatient Medications:     Calcium Carbonate-Vitamin D3 600-400 MG-UNIT tablet, Take 1 tablet by mouth 2 (Two) Times a Day., Disp: , Rfl:     Elastic Bandages & Supports ([employer] Firm Compression Socks) misc, 2 each Daily. Please size appropriately for knee hi compression socks. 20-35mmHg. 2 pair each., Disp: 4 each, Rfl: 2    escitalopram (LEXAPRO) 5 MG tablet, Take 1 tablet by mouth Daily., Disp: , Rfl:     levothyroxine (SYNTHROID, LEVOTHROID) 75 MCG tablet, Take 1 tablet by mouth Daily., Disp: 90 tablet, Rfl: 3    losartan (COZAAR) 25 MG tablet, Take 1  tablet by mouth Daily., Disp: 90 tablet, Rfl: 3    Melatonin 10 MG capsule, Take 1 tablet by mouth every night at bedtime., Disp: 90 capsule, Rfl: 0    meloxicam (MOBIC) 15 MG tablet, Take 1 tablet by mouth Daily. Take with food, Disp: 30 tablet, Rfl: 0    metoprolol tartrate (LOPRESSOR) 50 MG tablet, Take 1 tablet by mouth 2 (Two) Times a Day., Disp: 180 tablet, Rfl: 3    midodrine (PROAMATINE) 5 MG tablet, TAKE 1 TABLET BY MOUTH EVERY DAY, Disp: 90 tablet, Rfl: 1    multivitamin with minerals tablet tablet, Take 1 tablet by mouth Daily., Disp: , Rfl:     omeprazole (priLOSEC) 40 MG capsule, Take 1 capsule by mouth Daily., Disp: , Rfl:     polyethylene glycol (MiraLax) 17 GM/SCOOP powder, Take 17 g by mouth Daily., Disp: , Rfl:     zonisamide (ZONEGRAN) 100 MG capsule, Take 3 capsules by mouth Every Evening. Take three 100 mg capsules may take four if needed, Disp: , Rfl:       Social History     Socioeconomic History    Marital status:    Tobacco Use    Smoking status: Never    Smokeless tobacco: Never    Tobacco comments:     Patient is advised not to smoke.   Vaping Use    Vaping status: Never Used   Substance and Sexual Activity    Alcohol use: No    Drug use: No    Sexual activity: Defer         Review of Systems   Constitutional: Negative for chills and fever.   HENT:  Negative for ear discharge and nosebleeds.    Eyes:  Negative for discharge and redness.   Cardiovascular:  Negative for chest pain, orthopnea, palpitations, paroxysmal nocturnal dyspnea and syncope.   Respiratory:  Negative for cough, shortness of breath and wheezing.    Endocrine: Negative for heat intolerance.   Skin:  Negative for rash.   Musculoskeletal:  Negative for arthritis and myalgias.   Gastrointestinal:  Negative for abdominal pain, melena, nausea and vomiting.   Genitourinary:  Negative for dysuria and hematuria.   Neurological:  Negative for dizziness, light-headedness, numbness and tremors.   Psychiatric/Behavioral:   "Negative for depression. The patient is not nervous/anxious.        Procedures    Procedures    No orders to display           Objective:    /77 (BP Location: Right arm, Patient Position: Sitting, Cuff Size: Large Adult)   Pulse 76   Resp 16   Ht 160 cm (62.99\")   Wt 86.6 kg (191 lb)   SpO2 96%   BMI 33.84 kg/m²         Constitutional:       Appearance: Well-developed.   Eyes:      General: No scleral icterus.        Right eye: No discharge.   HENT:      Head: Normocephalic and atraumatic.   Neck:      Thyroid: No thyromegaly.      Lymphadenopathy: No cervical adenopathy.   Pulmonary:      Effort: Pulmonary effort is normal. No respiratory distress.      Breath sounds: Normal breath sounds. No wheezing. No rales.   Cardiovascular:      Normal rate. Regular rhythm.      No gallop.    Edema:     Peripheral edema absent.   Abdominal:      Tenderness: There is no abdominal tenderness.   Skin:     Findings: No erythema or rash.   Neurological:      Mental Status: Alert and oriented to person, place, and time.             Assessment:       Diagnosis Plan   1. Essential hypertension        2. Sick sinus syndrome        3. Seizure disorder        4. Presence of cardiac pacemaker                 Plan:       MDM:    1.  Hypertension:    Blood pressure is low.  It is controlled.  Continue metoprolol and losartan    2.  S/p pacemaker:    Pacemaker would be interrogated on next visit recent interrogation was desirable      "

## 2024-03-08 ENCOUNTER — OFFICE VISIT (OUTPATIENT)
Dept: CARDIOLOGY | Facility: CLINIC | Age: 84
End: 2024-03-08
Payer: MEDICARE

## 2024-03-08 VITALS
HEIGHT: 63 IN | OXYGEN SATURATION: 96 % | DIASTOLIC BLOOD PRESSURE: 77 MMHG | BODY MASS INDEX: 33.84 KG/M2 | SYSTOLIC BLOOD PRESSURE: 131 MMHG | WEIGHT: 191 LBS | HEART RATE: 76 BPM | RESPIRATION RATE: 16 BRPM

## 2024-03-08 DIAGNOSIS — I49.5 SICK SINUS SYNDROME: Chronic | ICD-10-CM

## 2024-03-08 DIAGNOSIS — I10 ESSENTIAL HYPERTENSION: Primary | Chronic | ICD-10-CM

## 2024-03-08 DIAGNOSIS — G40.909 SEIZURE DISORDER: Chronic | ICD-10-CM

## 2024-03-08 DIAGNOSIS — Z95.0 PRESENCE OF CARDIAC PACEMAKER: ICD-10-CM

## 2024-03-08 PROCEDURE — 1159F MED LIST DOCD IN RCRD: CPT | Performed by: INTERNAL MEDICINE

## 2024-03-08 PROCEDURE — 3078F DIAST BP <80 MM HG: CPT | Performed by: INTERNAL MEDICINE

## 2024-03-08 PROCEDURE — 1160F RVW MEDS BY RX/DR IN RCRD: CPT | Performed by: INTERNAL MEDICINE

## 2024-03-08 PROCEDURE — 99213 OFFICE O/P EST LOW 20 MIN: CPT | Performed by: INTERNAL MEDICINE

## 2024-03-08 PROCEDURE — 3075F SYST BP GE 130 - 139MM HG: CPT | Performed by: INTERNAL MEDICINE

## 2024-03-14 ENCOUNTER — HOSPITAL ENCOUNTER (OUTPATIENT)
Dept: BONE DENSITY | Facility: HOSPITAL | Age: 84
Discharge: HOME | End: 2024-03-14
Payer: MEDICARE

## 2024-03-14 ENCOUNTER — APPOINTMENT (OUTPATIENT)
Dept: MAMMOGRAPHY | Facility: HOSPITAL | Age: 84
End: 2024-03-14
Payer: MEDICARE

## 2024-03-14 ENCOUNTER — HOSPITAL ENCOUNTER (OUTPATIENT)
Dept: MAMMOGRAPHY | Facility: HOSPITAL | Age: 84
Discharge: HOME | End: 2024-03-14
Payer: MEDICARE

## 2024-03-14 DIAGNOSIS — Z12.31 ENCOUNTER FOR SCREENING MAMMOGRAM FOR BREAST CANCER: ICD-10-CM

## 2024-03-14 DIAGNOSIS — M81.0 AGE-RELATED OSTEOPOROSIS WITHOUT CURRENT PATHOLOGICAL FRACTURE: ICD-10-CM

## 2024-03-14 PROCEDURE — 77080 DXA BONE DENSITY AXIAL: CPT

## 2024-03-14 PROCEDURE — 77063 BREAST TOMOSYNTHESIS BI: CPT

## 2024-03-14 PROCEDURE — 77067 SCR MAMMO BI INCL CAD: CPT

## 2024-04-09 ENCOUNTER — TELEPHONE (OUTPATIENT)
Dept: FAMILY MEDICINE CLINIC | Facility: CLINIC | Age: 84
End: 2024-04-09
Payer: MEDICARE

## 2024-04-09 NOTE — TELEPHONE ENCOUNTER
4/9/24 Spoke to pt in regards to overdue labs. Pt states she wants to wait until her next appointment for her labs as she has difficulty getting out

## 2024-04-16 ENCOUNTER — TELEPHONE (OUTPATIENT)
Dept: CARDIOLOGY | Facility: CLINIC | Age: 84
End: 2024-04-16
Payer: MEDICARE

## 2024-04-16 NOTE — TELEPHONE ENCOUNTER
Called and spoke to the patient and she states the device is not hooked up at this time and she will try to get it working  OK FOR THE HUB TO RELEASE        Zee AVELAR from Dr Agrawal and Dr Goodwin's office has not received a transmission from your home monitoring device. Please check all connections on the monitor and push the button to transmit. Please contact the office if you have any questions [phone]. Thank you for your assistance.  You may also contact [place] [phone]

## 2024-05-02 ENCOUNTER — TELEPHONE (OUTPATIENT)
Dept: CARDIOLOGY | Facility: CLINIC | Age: 84
End: 2024-05-02

## 2024-05-02 NOTE — TELEPHONE ENCOUNTER
Caller: ANASTASIA    Relationship: SELF    Best call back number: [phone]        What was the call regarding: PT WAS AT [facility] IN [place] WITH SWELLING IN HER LEGS-THEY BELIEVE SHE HAS FLUID AROUND HER HEART AND ADVISED HER TO FOLLOW UP WITH CARDIOLOGY ASAP- SCHEDULED FIRST AVAIL WITH DR. CARRERA IN [place] WHICH WAS 5/13/24

## 2024-05-09 NOTE — PROGRESS NOTES
Date of Office Visit: 2024  Encounter Provider: Dr. Adam Agrawal  Place of Service: [facility] CARDIOLOGY [place]  Patient Name: Anila Spencer  :1940  Chantell Larios APRN    Chief Complaint   Patient presents with    Follow-up    Hypertension    Leg Swelling    Shortness of Breath     History of Present Illness    Anila Spencer is a 83 y.o. female. Her past medical history significant for hypertension, sick sinus syndrome, tachybradycardia syndrome, history of syncope came today for follow-up.     Previously patient had repeated episodes of syncope. She had extensive workup which showed that she had underlying sick sinus syndrome. She underwent permanent  pacemaker implantation. Since then patient symptoms of syncope had completely resolved. Previously, patient was also evaluated with cardiac catheterization because of her symptoms of angina pectoris and she was noted to have no significant coronary artery disease. Her previous CAT scan of chest was also negative for pulmonary embolism and she had pulmonary function test which was negative for any significant pulmonary airway disease. It was noted that her symptoms of chest pain at that time was probably related to gastroesophageal reflux disease and she was appropriately treated.    In 2019, patient had an episode of syncope at home.  Patient underwent tilt table test and it showed hypotensive response to head up tilt.  Patient was started on midodrine 5 mg twice daily.  Echocardiogram showed normal left ventricular size and function.  LVEF was 60 to 65%.  Mild mitral regurgitation was noted.  Stress test was negative for ischemia.    Patient came today for unscheduled visit.  She was seen in the emergency room for symptom of shortness of breath and bilateral leg edema.  Patient came today for follow-up.  Patient does complain of chest pain.  He has leg edema.  Patient is short of breath.  No orthopnea or PND no syncope or  presyncope.    At this stage, I would recommend to proceed with echocardiogram and stress test.  I will start Lasix 20 mg daily.  Electrolytes and creatinine should be monitored.      Past Medical History:   Diagnosis Date    Abnormal cardiovascular stress test 03/21/2017    Allergic rhinitis     Anxiety     At high risk for falls 01/28/2022    Basal cell carcinoma (BCC) of face     Biliary dyskinesia     Bradycardia     Cardiomegaly     Cataract     Cervical spinal stenosis     Chronic constipation     Chronic insomnia     COVID-19 08/09/2022    Diagnosed with Covid on 7/7/2022 with lingering side effect of fatigue.    DDD (degenerative disc disease), cervical     C-spine, T-spine, L-Spine    DDD (degenerative disc disease), lumbar 07/25/2019    Degeneration of intervertebral disc of thoracic region 03/18/2017    Diplopia     Dr. Laird    Dysphagia 04/25/2019    Emphysema of lung     Fatigue 08/09/2022    Reports new onset fatigue, worse after Covid illness in 7/2022.         Fatty liver     Fracture of multiple ribs 07/2018    Left 4th-8th    Gastroesophageal reflux disease 12/04/2012    GERD (gastroesophageal reflux disease)     Hematuria 12/29/2014    History of recent fall     Hypertension     Hypothyroidism     Injury of back     Insomnia 03/07/2016    Kidney stone     Kidney stone     Left arm pain     Mild peripheral edema 08/09/2022    Patient reports occasional dependent edema when standing for extended period of time or when she is seated with legs not elevated.     Nasal fracture 07/2018    Nasal fracture 07/01/2018    Osteoarthritis     Osteoporosis     h/o tx with Fosamax but quit in 2012 due to fears of complications    Prediabetes     Rotator cuff tear     right    Rotator cuff tear     right    Seizure disorder     Dr. Shannon Bennett    Shortness of breath 05/08/2019    Sick sinus syndrome     Dr. Agrawal    Spinal stenosis of cervical region 02/11/2015    Squamous cell skin cancer, face     Traumatic  brain injury     from fall    Traumatic brain injury     from fall    Tubular adenoma of colon 10/14/2021    No further recall due to age    Urinary incontinence          Past Surgical History:   Procedure Laterality Date    BILATERAL SALPINGO OOPHORECTOMY      for benign cysts    BREAST CYST ASPIRATION Left     CARDIAC CATHETERIZATION  08/25/2009    25% LAD. L Cx luminal irregularities. Normal L main    CARDIAC CATHETERIZATION  03/29/2017    25% LAD. 10-20% LCX. ER 65%. Dr. Agrawal.     CARDIAC ELECTROPHYSIOLOGY PROCEDURE N/A 06/03/2021    Procedure: PPM generator change - dual    MDT;  Surgeon: Bharathi Mora MD;  Location: [facility] CATH INVASIVE LOCATION;  Service: Cardiovascular;  Laterality: N/A;    CARDIOVASCULAR STRESS TEST  2019    CATARACT EXTRACTION  2006    COSMETIC SURGERY      Lip    ECHO - CONVERTED  2019    ENDOSCOPY  05/17/2017    Normal, Dr. Gutierrez    ENDOSCOPY N/A 08/22/2019    Procedure: ESOPHAGOGASTRODUODENOSCOPY WITH DILATATION (BOUGIE #46,50);  Surgeon: Joaquin Story MD;  Location: [facility] ENDOSCOPY;  Service: Gastroenterology    INSERT / REPLACE / REMOVE PACEMAKER      KNEE ARTHROSCOPY Left 1992    OOPHORECTOMY      OTHER SURGICAL HISTORY Right 07/2017    Right eye, YAG Capsuloyomy , Dr. Lemus    PACEMAKER IMPLANTATION  2009    ROTATOR CUFF REPAIR Right     Dr. Goldberg    TONSILLECTOMY      TOTAL HIP ARTHROPLASTY Bilateral            Current Outpatient Medications:     Calcium Carbonate-Vitamin D3 600-400 MG-UNIT tablet, Take 1 tablet by mouth 2 (Two) Times a Day., Disp: , Rfl:     Elastic Bandages & Supports ([employer] Firm Compression Socks) misc, 2 each Daily. Please size appropriately for knee hi compression socks. 20-35mmHg. 2 pair each., Disp: 4 each, Rfl: 2    escitalopram (LEXAPRO) 5 MG tablet, Take 1 tablet by mouth Daily., Disp: , Rfl:     levothyroxine (SYNTHROID, LEVOTHROID) 75 MCG tablet, Take 1 tablet by mouth Daily., Disp: 90 tablet, Rfl: 3    losartan (COZAAR) 25 MG tablet,  Take 1 tablet by mouth Daily., Disp: 90 tablet, Rfl: 3    Melatonin 10 MG capsule, Take 1 tablet by mouth every night at bedtime., Disp: 90 capsule, Rfl: 0    metoprolol tartrate (LOPRESSOR) 50 MG tablet, Take 1 tablet by mouth 2 (Two) Times a Day., Disp: 180 tablet, Rfl: 3    midodrine (PROAMATINE) 5 MG tablet, TAKE 1 TABLET BY MOUTH EVERY DAY, Disp: 90 tablet, Rfl: 1    multivitamin with minerals tablet tablet, Take 1 tablet by mouth Daily., Disp: , Rfl:     omeprazole (priLOSEC) 40 MG capsule, Take 1 capsule by mouth Daily., Disp: , Rfl:     polyethylene glycol (MiraLax) 17 GM/SCOOP powder, Take 17 g by mouth Daily., Disp: , Rfl:     zonisamide (ZONEGRAN) 100 MG capsule, Take 3 capsules by mouth Every Evening. Take three 100 mg capsules may take four if needed, Disp: , Rfl:     furosemide (LASIX) 20 MG tablet, Take 1 tablet by mouth Daily., Disp: 90 tablet, Rfl: 0      Social History     Socioeconomic History    Marital status:    Tobacco Use    Smoking status: Never    Smokeless tobacco: Never    Tobacco comments:     Patient is advised not to smoke.   Vaping Use    Vaping status: Never Used   Substance and Sexual Activity    Alcohol use: No    Drug use: No    Sexual activity: Defer         Review of Systems   Constitutional: Negative for chills and fever.   HENT:  Negative for ear discharge and nosebleeds.    Eyes:  Negative for discharge and redness.   Cardiovascular:  Positive for leg swelling. Negative for chest pain, orthopnea, palpitations, paroxysmal nocturnal dyspnea and syncope.   Respiratory:  Positive for shortness of breath. Negative for cough and wheezing.    Endocrine: Negative for heat intolerance.   Skin:  Negative for rash.   Musculoskeletal:  Positive for arthritis, back pain and joint pain. Negative for myalgias.   Gastrointestinal:  Negative for abdominal pain, melena, nausea and vomiting.   Genitourinary:  Negative for dysuria and hematuria.   Neurological:  Negative for dizziness,  "light-headedness, numbness and tremors.   Psychiatric/Behavioral:  Negative for depression. The patient is not nervous/anxious.        Procedures    Procedures    No orders to display           Objective:    /80 (BP Location: Left arm, Patient Position: Sitting, Cuff Size: Large Adult)   Pulse 70   Ht 160 cm (63\")   Wt 86.6 kg (191 lb)   SpO2 98%   BMI 33.83 kg/m²         Constitutional:       Appearance: Well-developed.   Eyes:      General: No scleral icterus.        Right eye: No discharge.   HENT:      Head: Normocephalic and atraumatic.   Neck:      Thyroid: No thyromegaly.      Lymphadenopathy: No cervical adenopathy.   Pulmonary:      Effort: Pulmonary effort is normal. No respiratory distress.      Breath sounds: Normal breath sounds. No wheezing. No rales.   Cardiovascular:      Normal rate. Regular rhythm.      No gallop.    Edema:     Peripheral edema present.  Abdominal:      Tenderness: There is no abdominal tenderness.   Skin:     Findings: No erythema or rash.   Neurological:      Mental Status: Alert and oriented to person, place, and time.             Assessment:       Diagnosis Plan   1. Essential hypertension  Adult Transthoracic Echo Complete W/ Cont if Necessary Per Protocol    Stress Test With Myocardial Perfusion One Day    furosemide (LASIX) 20 MG tablet      2. Sick sinus syndrome  Adult Transthoracic Echo Complete W/ Cont if Necessary Per Protocol    Stress Test With Myocardial Perfusion One Day    furosemide (LASIX) 20 MG tablet      3. Presence of cardiac pacemaker  Adult Transthoracic Echo Complete W/ Cont if Necessary Per Protocol    Stress Test With Myocardial Perfusion One Day    furosemide (LASIX) 20 MG tablet      4. Dyspnea on exertion  Adult Transthoracic Echo Complete W/ Cont if Necessary Per Protocol    Stress Test With Myocardial Perfusion One Day    furosemide (LASIX) 20 MG tablet      5. Precordial chest pain  Adult Transthoracic Echo Complete W/ Cont if Necessary " Per Protocol    Stress Test With Myocardial Perfusion One Day    furosemide (LASIX) 20 MG tablet      6. Shortness of breath  Adult Transthoracic Echo Complete W/ Cont if Necessary Per Protocol    Stress Test With Myocardial Perfusion One Day    furosemide (LASIX) 20 MG tablet      7. Bilateral leg edema  Adult Transthoracic Echo Complete W/ Cont if Necessary Per Protocol    Stress Test With Myocardial Perfusion One Day    furosemide (LASIX) 20 MG tablet               Plan:       MDM:    1.  Shortness of breath:    I would start Lasix 20 mg daily.  Echocardiogram will be done stress test will be ordered    2.  Precordial chest pain:    Etiology is unclear previous cath is unremarkable I would proceed with stress test    3.  Bilateral leg edema:    Start Lasix    4.  Hypertension:    Blood pressure is elevated I would add some diuretics.    5.  S/p pacemaker:    Will be interrogated on next visit

## 2024-05-13 ENCOUNTER — OFFICE VISIT (OUTPATIENT)
Dept: CARDIOLOGY | Facility: CLINIC | Age: 84
End: 2024-05-13
Payer: MEDICARE

## 2024-05-13 VITALS
OXYGEN SATURATION: 98 % | DIASTOLIC BLOOD PRESSURE: 80 MMHG | BODY MASS INDEX: 33.84 KG/M2 | HEIGHT: 63 IN | WEIGHT: 191 LBS | SYSTOLIC BLOOD PRESSURE: 148 MMHG | HEART RATE: 70 BPM

## 2024-05-13 DIAGNOSIS — I49.5 SICK SINUS SYNDROME: Chronic | ICD-10-CM

## 2024-05-13 DIAGNOSIS — R60.0 BILATERAL LEG EDEMA: ICD-10-CM

## 2024-05-13 DIAGNOSIS — I10 ESSENTIAL HYPERTENSION: Primary | Chronic | ICD-10-CM

## 2024-05-13 DIAGNOSIS — R06.02 SHORTNESS OF BREATH: ICD-10-CM

## 2024-05-13 DIAGNOSIS — Z95.0 PRESENCE OF CARDIAC PACEMAKER: ICD-10-CM

## 2024-05-13 DIAGNOSIS — R06.09 DYSPNEA ON EXERTION: ICD-10-CM

## 2024-05-13 DIAGNOSIS — R07.2 PRECORDIAL CHEST PAIN: ICD-10-CM

## 2024-05-13 PROCEDURE — 99214 OFFICE O/P EST MOD 30 MIN: CPT | Performed by: INTERNAL MEDICINE

## 2024-05-13 PROCEDURE — 1160F RVW MEDS BY RX/DR IN RCRD: CPT | Performed by: INTERNAL MEDICINE

## 2024-05-13 PROCEDURE — 1159F MED LIST DOCD IN RCRD: CPT | Performed by: INTERNAL MEDICINE

## 2024-05-13 PROCEDURE — 3077F SYST BP >= 140 MM HG: CPT | Performed by: INTERNAL MEDICINE

## 2024-05-13 PROCEDURE — 3079F DIAST BP 80-89 MM HG: CPT | Performed by: INTERNAL MEDICINE

## 2024-05-13 RX ORDER — FUROSEMIDE 20 MG/1
20 TABLET ORAL DAILY
Qty: 90 TABLET | Refills: 0 | Status: SHIPPED | OUTPATIENT
Start: 2024-05-13

## 2024-05-15 ENCOUNTER — TELEPHONE (OUTPATIENT)
Dept: CARDIOLOGY | Facility: CLINIC | Age: 84
End: 2024-05-15
Payer: MEDICARE

## 2024-05-15 DIAGNOSIS — I10 ESSENTIAL HYPERTENSION: Chronic | ICD-10-CM

## 2024-05-15 RX ORDER — LOSARTAN POTASSIUM 25 MG/1
25 TABLET ORAL DAILY
Qty: 90 TABLET | Refills: 3 | Status: SHIPPED | OUTPATIENT
Start: 2024-05-15

## 2024-05-22 RX ORDER — MIDODRINE HYDROCHLORIDE 5 MG/1
TABLET ORAL
Qty: 90 TABLET | Refills: 1 | Status: SHIPPED | OUTPATIENT
Start: 2024-05-22

## 2024-06-13 ENCOUNTER — HOSPITAL ENCOUNTER (OUTPATIENT)
Dept: NUCLEAR MEDICINE | Facility: HOSPITAL | Age: 84
Discharge: HOME | End: 2024-06-13
Payer: MEDICARE

## 2024-06-13 ENCOUNTER — HOSPITAL ENCOUNTER (OUTPATIENT)
Dept: CARDIOLOGY | Facility: HOSPITAL | Age: 84
Discharge: HOME | End: 2024-06-13
Payer: MEDICARE

## 2024-06-13 VITALS — WEIGHT: 191 LBS | BODY MASS INDEX: 33.84 KG/M2 | HEIGHT: 63 IN

## 2024-06-13 DIAGNOSIS — R06.09 DYSPNEA ON EXERTION: ICD-10-CM

## 2024-06-13 DIAGNOSIS — R07.2 PRECORDIAL CHEST PAIN: ICD-10-CM

## 2024-06-13 DIAGNOSIS — R06.02 SHORTNESS OF BREATH: ICD-10-CM

## 2024-06-13 DIAGNOSIS — I49.5 SICK SINUS SYNDROME: Chronic | ICD-10-CM

## 2024-06-13 DIAGNOSIS — R60.0 BILATERAL LEG EDEMA: ICD-10-CM

## 2024-06-13 DIAGNOSIS — Z95.0 PRESENCE OF CARDIAC PACEMAKER: ICD-10-CM

## 2024-06-13 DIAGNOSIS — I10 ESSENTIAL HYPERTENSION: Chronic | ICD-10-CM

## 2024-06-13 LAB
BH CV REST NUCLEAR ISOTOPE DOSE: 11 MCI
BH CV STRESS BP STAGE 2: NORMAL
BH CV STRESS COMMENTS STAGE 1: NORMAL
BH CV STRESS DOSE REGADENOSON STAGE 1: 0.4
BH CV STRESS DURATION MIN STAGE 1: 0
BH CV STRESS DURATION SEC STAGE 1: 10
BH CV STRESS DURATION SEC STAGE 2: 0
BH CV STRESS HR STAGE 1: 80
BH CV STRESS HR STAGE 2: 83
BH CV STRESS HR STAGE 3: 76
BH CV STRESS NUCLEAR ISOTOPE DOSE: 33 MCI
BH CV STRESS PROTOCOL 1: NORMAL
BH CV STRESS RECOVERY BP: NORMAL MMHG
BH CV STRESS RECOVERY HR: 75 BPM
BH CV STRESS STAGE 1: 1
BH CV STRESS STAGE 2: 2
BH CV STRESS STAGE 3: 3
LV EF NUC BP: 64 %
MAXIMAL PREDICTED HEART RATE: 135 BPM
PERCENT MAX PREDICTED HR: 61.48 %
STRESS BASELINE BP: NORMAL MMHG
STRESS BASELINE HR: 70 BPM
STRESS PERCENT HR: 72 %
STRESS POST PEAK BP: NORMAL MMHG
STRESS POST PEAK HR: 83 BPM
STRESS TARGET HR: 115 BPM

## 2024-06-13 PROCEDURE — A9502 TC99M TETROFOSMIN: HCPCS | Performed by: INTERNAL MEDICINE

## 2024-06-13 PROCEDURE — 25010000002 REGADENOSON 0.4 MG/5ML SOLUTION: Performed by: INTERNAL MEDICINE

## 2024-06-13 PROCEDURE — 93017 CV STRESS TEST TRACING ONLY: CPT

## 2024-06-13 PROCEDURE — 0 TECHNETIUM TETROFOSMIN KIT: Performed by: INTERNAL MEDICINE

## 2024-06-13 PROCEDURE — 93306 TTE W/DOPPLER COMPLETE: CPT

## 2024-06-13 PROCEDURE — 93356 MYOCRD STRAIN IMG SPCKL TRCK: CPT

## 2024-06-13 PROCEDURE — 78452 HT MUSCLE IMAGE SPECT MULT: CPT

## 2024-06-13 RX ORDER — REGADENOSON 0.08 MG/ML
0.4 INJECTION, SOLUTION INTRAVENOUS
Status: COMPLETED | OUTPATIENT
Start: 2024-06-13 | End: 2024-06-13

## 2024-06-13 RX ADMIN — TETROFOSMIN 1 DOSE: 1.38 INJECTION, POWDER, LYOPHILIZED, FOR SOLUTION INTRAVENOUS at 08:59

## 2024-06-13 RX ADMIN — REGADENOSON 0.4 MG: 0.08 INJECTION, SOLUTION INTRAVENOUS at 10:47

## 2024-06-13 RX ADMIN — TETROFOSMIN 1 DOSE: 1.38 INJECTION, POWDER, LYOPHILIZED, FOR SOLUTION INTRAVENOUS at 10:47

## 2024-06-14 ENCOUNTER — TELEPHONE (OUTPATIENT)
Dept: CARDIOLOGY | Facility: CLINIC | Age: 84
End: 2024-06-14
Payer: MEDICARE

## 2024-06-14 LAB
AORTIC DIMENSIONLESS INDEX: 0.7 (DI)
BH CV ECHO LEFT VENTRICLE GLOBAL LONGITUDINAL STRAIN: -14.4 %
BH CV ECHO MEAS - AO MAX PG: 4 MMHG
BH CV ECHO MEAS - AO MEAN PG: 2.09 MMHG
BH CV ECHO MEAS - AO ROOT DIAM: 3.1 CM
BH CV ECHO MEAS - AO V2 MAX: 100.3 CM/SEC
BH CV ECHO MEAS - AO V2 VTI: 23 CM
BH CV ECHO MEAS - AVA(I,D): 2.01 CM2
BH CV ECHO MEAS - EDV(CUBED): 63.6 ML
BH CV ECHO MEAS - EDV(MOD-SP2): 86.6 ML
BH CV ECHO MEAS - EDV(MOD-SP4): 94.1 ML
BH CV ECHO MEAS - EF(MOD-SP2): 53.1 %
BH CV ECHO MEAS - EF(MOD-SP4): 51.1 %
BH CV ECHO MEAS - ESV(CUBED): 23.2 ML
BH CV ECHO MEAS - ESV(MOD-SP2): 40.6 ML
BH CV ECHO MEAS - ESV(MOD-SP4): 46 ML
BH CV ECHO MEAS - FS: 28.6 %
BH CV ECHO MEAS - IVS/LVPW: 1.1 CM
BH CV ECHO MEAS - IVSD: 1.31 CM
BH CV ECHO MEAS - LA DIMENSION: 3.8 CM
BH CV ECHO MEAS - LAT PEAK E' VEL: 6 CM/SEC
BH CV ECHO MEAS - LV DIASTOLIC VOL/BSA (35-75): 49.6 CM2
BH CV ECHO MEAS - LV MASS(C)D: 175.4 GRAMS
BH CV ECHO MEAS - LV MAX PG: 2.1 MMHG
BH CV ECHO MEAS - LV MEAN PG: 1.08 MMHG
BH CV ECHO MEAS - LV SYSTOLIC VOL/BSA (12-30): 24.3 CM2
BH CV ECHO MEAS - LV V1 MAX: 72.4 CM/SEC
BH CV ECHO MEAS - LV V1 VTI: 16.1 CM
BH CV ECHO MEAS - LVIDD: 4 CM
BH CV ECHO MEAS - LVIDS: 2.9 CM
BH CV ECHO MEAS - LVOT AREA: 2.9 CM2
BH CV ECHO MEAS - LVOT DIAM: 1.91 CM
BH CV ECHO MEAS - LVPWD: 1.19 CM
BH CV ECHO MEAS - MED PEAK E' VEL: 5 CM/SEC
BH CV ECHO MEAS - MR MAX PG: 27.5 MMHG
BH CV ECHO MEAS - MR MAX VEL: 262.1 CM/SEC
BH CV ECHO MEAS - MV A MAX VEL: 97.5 CM/SEC
BH CV ECHO MEAS - MV DEC SLOPE: 454.5 CM/SEC2
BH CV ECHO MEAS - MV DEC TIME: 0.1 SEC
BH CV ECHO MEAS - MV E MAX VEL: 47 CM/SEC
BH CV ECHO MEAS - MV E/A: 0.48
BH CV ECHO MEAS - MV MAX PG: 5.7 MMHG
BH CV ECHO MEAS - MV MEAN PG: 1.94 MMHG
BH CV ECHO MEAS - MV V2 VTI: 29.1 CM
BH CV ECHO MEAS - MVA(VTI): 1.59 CM2
BH CV ECHO MEAS - PULM A REVS DUR: 0.14 SEC
BH CV ECHO MEAS - PULM A REVS VEL: 18.4 CM/SEC
BH CV ECHO MEAS - PULM DIAS VEL: 45.1 CM/SEC
BH CV ECHO MEAS - PULM S/D: 1.25
BH CV ECHO MEAS - PULM SYS VEL: 56.3 CM/SEC
BH CV ECHO MEAS - RAP SYSTOLE: 3 MMHG
BH CV ECHO MEAS - RVSP: 25.7 MMHG
BH CV ECHO MEAS - SV(LVOT): 46.3 ML
BH CV ECHO MEAS - SV(MOD-SP2): 46 ML
BH CV ECHO MEAS - SV(MOD-SP4): 48.1 ML
BH CV ECHO MEAS - SVI(LVOT): 24.4 ML/M2
BH CV ECHO MEAS - SVI(MOD-SP2): 24.3 ML/M2
BH CV ECHO MEAS - SVI(MOD-SP4): 25.4 ML/M2
BH CV ECHO MEAS - TAPSE (>1.6): 1.8 CM
BH CV ECHO MEAS - TR MAX PG: 22.7 MMHG
BH CV ECHO MEAS - TR MAX VEL: 238 CM/SEC
BH CV ECHO MEASUREMENTS AVERAGE E/E' RATIO: 8.55
BH CV XLRA - RV BASE: 3.1 CM
BH CV XLRA - RV MID: 2.3 CM
BH CV XLRA - TDI S': 6 CM/SEC
LEFT ATRIUM VOLUME: 57 ML

## 2024-06-14 NOTE — TELEPHONE ENCOUNTER
Name: Facundo Spencerlis KURT      Relationship: Self      Best Callback Number: [phone]      HUB PROVIDED THE RELAY MESSAGE FROM THE OFFICE      PATIENT: HAS FURTHER QUESTIONS AND WOULD LIKE A CALL BACK AT THE FOLLOWING PHONE [phone]    ADDITIONAL INFORMATION:

## 2024-07-23 ENCOUNTER — TELEPHONE (OUTPATIENT)
Dept: CARDIOLOGY | Facility: CLINIC | Age: 84
End: 2024-07-23
Payer: MEDICARE

## 2024-07-23 NOTE — TELEPHONE ENCOUNTER
"PATIENT HAVING SAME SYMPTOMS THAT SHE HAS BEEN HAVING FOR SOME TIME. HAS HAD ECHO & STRESS TEST SINCE THEN. SHE WANTS TO KNOW IF SHE ECHO WAS FINE IN GENERAL OR FINE FOR HER AGE?     PATIENT IS STILL RETAINING FLUID EVEN ON WATER PILL, EDEMA IN LEGS, STILL HAVING HEADACHES, CHEST PAIN/PRESSURE, LETHARGY, WEAKNESS, NOT SLEEPING WELL DUE TO DISCOMFORT.     BP IS GETTING WORSE OVER LAST COUPLE OF WEEKS. PULSE STAYS \"NORMAL.\" BP TODAY /79 & HEART RATE 78 AND THAT'S HIGHEST THAT ITS EVER BEEN FOR HER. BP YESTERDAY /80, DAY BEFORE IT WAS 88/60. STAYING HIGH FOR MOST PART. GOES DOWN AFTER TAKING MEDS THEN GOES BACK UP AFTER A COUPLE OF HOURS. HAS SOA BUT NOT ALL THE TIME.   "

## 2024-08-05 PROBLEM — F41.1 GENERALIZED ANXIETY DISORDER: Status: ACTIVE | Noted: 2024-08-05

## 2024-08-05 PROBLEM — G40.209 COMPLEX PARTIAL EPILEPSY: Status: ACTIVE | Noted: 2024-08-05

## 2024-08-05 PROBLEM — R25.1 TREMOR: Status: ACTIVE | Noted: 2024-08-05

## 2024-08-05 RX ORDER — PRAMIPEXOLE DIHYDROCHLORIDE 0.25 MG/1
0.25 TABLET ORAL
COMMUNITY

## 2024-08-14 NOTE — PROGRESS NOTES
Office Note     Name: Anila Spencer    : 1940     MRN: [id number]     Chief Complaint  Follow-up (6 month follow up, runny nose, weakness, sob chest congestion)    Subjective     History of Present Illness:  Anila Spencer is a 83 y.o. female who presents today for 6 month follow up on LDL Cholesterol. Patient has been having runny nose, Sob, weakness, chest congestion  URI   This is a new problem. The current episode started in the past 7 days. The problem has been gradually improving. Associated symptoms include congestion and coughing. Pertinent negatives include no diarrhea, nausea, vomiting or wheezing. She has tried nothing for the symptoms. The treatment provided no relief.     History of Present Illness  The patient is an 83-year-old female who presents for evaluation of an upper respiratory infection. She is accompanied by her .    She began experiencing symptoms such as a runny nose, shortness of breath, weakness, and chest congestion on Saturday. By Monday, she started coughing up yellow mucus, although the runny nose has since subsided. She also reports difficulty breathing at times, which affects her ability to speak. She experiences postnasal drip, sinus pressure, and headaches. She felt feverish yesterday but was unable to confirm due to the absence of a thermometer. She has had similar symptoms in the past. Her caregiver expressed concern about her condition.     She reports feeling dizzy for the past few days, particularly when standing up, and has not taken any over-the-counter medication for these symptoms. She does not experience diarrhea, constipation, or nausea. She has no ear pain, pressure, or popping. She does not take any allergy medication.    A COVID-19 test was performed and returned negative.    She is seeking information about insurance coverage for a walker, as her current one is breaking. She has previously taken amoxicillin without any issues.    Supplemental  Information:  She had some leg swelling and was in the ER back in 05/2024, and all that was worked up and it was negative.    No Known Allergies      Current Outpatient Medications:     Calcium Carbonate-Vitamin D3 600-400 MG-UNIT tablet, Take 1 tablet by mouth 2 (Two) Times a Day., Disp: , Rfl:     Elastic Bandages & Supports ([employer] Firm Compression Socks) misc, 2 each Daily. Please size appropriately for knee hi compression socks. 20-35mmHg. 2 pair each., Disp: 4 each, Rfl: 2    escitalopram (LEXAPRO) 5 MG tablet, Take 1 tablet by mouth Daily., Disp: , Rfl:     furosemide (LASIX) 20 MG tablet, Take 1 tablet by mouth Daily., Disp: 90 tablet, Rfl: 0    levothyroxine (SYNTHROID, LEVOTHROID) 75 MCG tablet, Take 1 tablet by mouth Daily., Disp: 90 tablet, Rfl: 3    losartan (COZAAR) 25 MG tablet, Take 1 tablet by mouth Daily., Disp: 90 tablet, Rfl: 3    Melatonin 10 MG capsule, Take 1 tablet by mouth every night at bedtime., Disp: 90 capsule, Rfl: 0    metoprolol tartrate (LOPRESSOR) 50 MG tablet, Take 1 tablet by mouth 2 (Two) Times a Day., Disp: 180 tablet, Rfl: 3    midodrine (PROAMATINE) 5 MG tablet, TAKE 1 TABLET BY MOUTH EVERY DAY, Disp: 90 tablet, Rfl: 1    multivitamin with minerals tablet tablet, Take 1 tablet by mouth Daily., Disp: , Rfl:     omeprazole (priLOSEC) 40 MG capsule, Take 1 capsule by mouth Daily., Disp: , Rfl:     polyethylene glycol (MiraLax) 17 GM/SCOOP powder, Take 17 g by mouth Daily., Disp: , Rfl:     pramipexole (MIRAPEX) 0.25 MG tablet, Take 1 tablet by mouth every night at bedtime., Disp: , Rfl:     zonisamide (ZONEGRAN) 100 MG capsule, Take 3 capsules by mouth Every Evening. Take three 100 mg capsules may take four if needed, Disp: , Rfl:     amoxicillin (AMOXIL) 500 MG capsule, Take 1 capsule by mouth 3 (Three) Times a Day., Disp: 30 capsule, Rfl: 0    Review of Systems   Constitutional:  Positive for activity change and fatigue. Negative for chills and fever.   HENT:  Positive for  "congestion, postnasal drip and sinus pressure.    Respiratory:  Positive for cough and shortness of breath. Negative for wheezing.    Gastrointestinal:  Negative for constipation, diarrhea, nausea and vomiting.   Allergic/Immunologic: Positive for environmental allergies.   Neurological:  Positive for dizziness, weakness and headache. Negative for confusion.       Social History     Socioeconomic History    Marital status:    Tobacco Use    Smoking status: Never    Smokeless tobacco: Never    Tobacco comments:     Patient is advised not to smoke.   Vaping Use    Vaping status: Never Used   Substance and Sexual Activity    Alcohol use: No    Drug use: No    Sexual activity: Defer       Family History   Problem Relation Age of Onset    Heart disease Mother     Pancreatic cancer Mother     Prostate cancer Father     Breast cancer Sister         4 sisters have had    Pancreatic cancer Brother     Colon cancer Brother     Stroke Maternal Grandmother     Breast cancer Sister     Breast cancer Sister     Breast cancer Sister            2/15/2024     3:32 PM   PHQ-2/PHQ-9 Depression Screening   Little Interest or Pleasure in Doing Things 0-->not at all   Feeling Down, Depressed or Hopeless 0-->not at all   PHQ-9: Brief Depression Severity Measure Score 0       Fall Risk Screen:  ALFONSO Fall Risk Assessment was completed, and patient is at LOW risk for falls.Assessment completed on:2/15/2024      Objective     /70 (BP Location: Right arm, Patient Position: Sitting, Cuff Size: Large Adult)   Pulse 70   Temp 97.6 °F (36.4 °C) (Temporal)   Resp 18   Ht 160 cm (62.99\")   Wt 85.9 kg (189 lb 6.4 oz)   SpO2 97%   BMI 33.56 kg/m²     BP Readings from Last 2 Encounters:   08/15/24 119/70   05/13/24 148/80       Wt Readings from Last 2 Encounters:   08/15/24 85.9 kg (189 lb 6.4 oz)   06/13/24 86.6 kg (191 lb)                Physical Exam  Vitals and nursing note reviewed.   Constitutional:       General: She is not " in acute distress.     Appearance: She is well-groomed. She is ill-appearing. She is not toxic-appearing or diaphoretic.   HENT:      Head: Normocephalic and atraumatic.      Right Ear: There is impacted cerumen.      Left Ear: There is impacted cerumen.      Nose:      Right Sinus: Maxillary sinus tenderness present.      Left Sinus: Maxillary sinus tenderness present.      Mouth/Throat:      Lips: Pink. No lesions.   Eyes:      General: Lids are normal. Vision grossly intact. Allergic shiner present.      Extraocular Movements: Extraocular movements intact.      Pupils: Pupils are equal, round, and reactive to light.   Neck:      Vascular: No carotid bruit.   Cardiovascular:      Rate and Rhythm: Normal rate and regular rhythm.      Heart sounds: Normal heart sounds.   Pulmonary:      Effort: Pulmonary effort is normal.      Breath sounds: Normal breath sounds and air entry.   Musculoskeletal:      Right lower leg: No edema.      Left lower leg: No edema.      Comments: Broken walker for ambulation.      Skin:     General: Skin is warm and dry.   Neurological:      Mental Status: She is alert and oriented to person, place, and time. Mental status is at baseline.   Psychiatric:         Attention and Perception: Attention normal.         Mood and Affect: Mood normal.         Behavior: Behavior normal. Behavior is cooperative.       Derm Physical Exam  Physical Exam  Ears have a significant amount of wax. Eyes are red.  Lungs are clear.    Vital Signs  Oxygen level is at 97 on room air. Respirations are good.    Result Review :       Results  Laboratory Studies  Covid test was negative.    Assessment and Plan     Ear Cerumen Removal    Date/Time: 8/15/2024 4:15 PM    Performed by: Chantell Larios APRN  Authorized by: Chantell Larios APRN  Location details: right ear and left ear  Patient tolerance: patient tolerated the procedure well with no immediate complications  Comments: Joselito Arciniega MA  Procedure type:  irrigation   Sedation:  Patient sedated: no              Plan    Diagnoses and all orders for this visit:    1. Upper respiratory tract infection, unspecified type (Primary)  -     amoxicillin (AMOXIL) 500 MG capsule; Take 1 capsule by mouth 3 (Three) Times a Day.  Dispense: 30 capsule; Refill: 0  -     POCT SARS-CoV-2 + Flu Antigen AGUSTIN    2. Excessive cerumen in both ear canals  -     Ear Cerumen Removal    3. Limited mobility  -     Cancel: Walker  Walker Folding with Wheels  -     Walker  Walker Folding with Wheels    4. Primary osteoarthritis involving multiple joints  -     Cancel: Walker  Walker Folding with Wheels  -     Walker  Walker Folding with Wheels    5. Chronic low back pain with right-sided sciatica, unspecified back pain laterality  -     Cancel: Walker  Walker Folding with Wheels  -     Walker  Walker Folding with Wheels    6. Weakness of both lower extremities  -     Cancel: Walker  Walker Folding with Wheels  -     Walker  Walker Folding with Wheels    7. Seasonal allergies  Comments:  Begin Zyrtec 5mg daily for allergies       Assessment & Plan  1. Upper respiratory infection.  Symptoms started on Saturday with runny nose, shortness of breath, weakness, and chest congestion, progressing to coughing up yellow mucus by Monday. Postnasal drip, sinus pressure, and headaches are present, likely due to blocked eustachian tubes from earwax accumulation. Lungs sound clear. A prescription for amoxicillin has been provided. She is advised to take Zyrtec 5 mg daily to alleviate symptoms. Alternatively, Coricidin HBP can be used as a decongestant.     2. Dizziness.  She reports dizziness over the last few days, especially upon standing up. No over-the-counter medications have been taken for this.    3. Fatigue.  She reports significant fatigue and lack of energy. No over-the-counter medications have been taken for this.    4. Equipment management.  An order for a new  walker with wheels has been placed due to the current one being in poor condition and potentially unsafe.         Follow Up   Wrapup Tab  No follow-ups on file.     Patient was given instructions and counseling regarding her condition or for health maintenance advice. Please see specific information pulled into the AVS if appropriate.  Hand hygiene was performed during entrance to exam room and following assessment of patient. This document is intended for medical expert use only.       VALENTINO Babb, FNP-C  [address]  [facility]  [address] [place] [address] [address] [place] [id number] [phone]    Patient or patient representative verbalized consent for the use of Ambient Listening during the visit with  VALENTINO Babb for chart documentation. 8/15/2024  18:36 EDT

## 2024-08-15 ENCOUNTER — OFFICE VISIT (OUTPATIENT)
Dept: FAMILY MEDICINE CLINIC | Facility: CLINIC | Age: 84
End: 2024-08-15
Payer: MEDICARE

## 2024-08-15 VITALS
TEMPERATURE: 97.6 F | OXYGEN SATURATION: 97 % | HEIGHT: 63 IN | BODY MASS INDEX: 33.56 KG/M2 | HEART RATE: 70 BPM | SYSTOLIC BLOOD PRESSURE: 119 MMHG | DIASTOLIC BLOOD PRESSURE: 70 MMHG | WEIGHT: 189.4 LBS | RESPIRATION RATE: 18 BRPM

## 2024-08-15 DIAGNOSIS — R29.898 WEAKNESS OF BOTH LOWER EXTREMITIES: ICD-10-CM

## 2024-08-15 DIAGNOSIS — J06.9 UPPER RESPIRATORY TRACT INFECTION, UNSPECIFIED TYPE: Primary | ICD-10-CM

## 2024-08-15 DIAGNOSIS — M15.9 PRIMARY OSTEOARTHRITIS INVOLVING MULTIPLE JOINTS: ICD-10-CM

## 2024-08-15 DIAGNOSIS — J30.2 SEASONAL ALLERGIES: ICD-10-CM

## 2024-08-15 DIAGNOSIS — G89.29 CHRONIC LOW BACK PAIN WITH RIGHT-SIDED SCIATICA, UNSPECIFIED BACK PAIN LATERALITY: ICD-10-CM

## 2024-08-15 DIAGNOSIS — Z74.09 LIMITED MOBILITY: ICD-10-CM

## 2024-08-15 DIAGNOSIS — H61.23 EXCESSIVE CERUMEN IN BOTH EAR CANALS: ICD-10-CM

## 2024-08-15 DIAGNOSIS — M54.41 CHRONIC LOW BACK PAIN WITH RIGHT-SIDED SCIATICA, UNSPECIFIED BACK PAIN LATERALITY: ICD-10-CM

## 2024-08-15 LAB
EXPIRATION DATE: NORMAL
FLUAV AG UPPER RESP QL IA.RAPID: NOT DETECTED
FLUBV AG UPPER RESP QL IA.RAPID: NOT DETECTED
INTERNAL CONTROL: NORMAL
Lab: NORMAL
SARS-COV-2 AG UPPER RESP QL IA.RAPID: NOT DETECTED

## 2024-08-15 PROCEDURE — 69209 REMOVE IMPACTED EAR WAX UNI: CPT

## 2024-08-15 PROCEDURE — 87428 SARSCOV & INF VIR A&B AG IA: CPT

## 2024-08-15 PROCEDURE — 1125F AMNT PAIN NOTED PAIN PRSNT: CPT

## 2024-08-15 PROCEDURE — 3078F DIAST BP <80 MM HG: CPT

## 2024-08-15 PROCEDURE — 3074F SYST BP LT 130 MM HG: CPT

## 2024-08-15 PROCEDURE — 99214 OFFICE O/P EST MOD 30 MIN: CPT

## 2024-08-15 RX ORDER — AMOXICILLIN 500 MG/1
500 CAPSULE ORAL 3 TIMES DAILY
Qty: 30 CAPSULE | Refills: 0 | Status: SHIPPED | OUTPATIENT
Start: 2024-08-15

## 2024-08-21 ENCOUNTER — TELEPHONE (OUTPATIENT)
Dept: FAMILY MEDICINE CLINIC | Facility: CLINIC | Age: 84
End: 2024-08-21
Payer: MEDICARE

## 2024-08-21 NOTE — TELEPHONE ENCOUNTER
Amara, caregiver, is requesting pt & ot eval referral. States patient continues to decline. She is uncertain if patient is informing pcp. Stamina & gait are worsening. BP is high when sitting & bottoms out when standing. O2 is 94. Pulse stays between 70-71. MedAlert was called the last 2 nights due to patient sliding out of chair & not being able to get up. Caregiver asked patient why she had not refilled the lasix medication. Patient told Amara she wasn't told to refill the lasix. Please advise.

## 2024-08-22 NOTE — TELEPHONE ENCOUNTER
I called and spoke with Amara (Caregiver). She is going to reach out to Dr Agrawal office about the hypotension. Regarding the PT/OT referral, Amara is wondering if they could do a Video Visit with you to discuss this. Patient was just seen on 8/15/24 for URI, and has difficult getting patient here.

## 2024-08-23 RX ORDER — MIDODRINE HYDROCHLORIDE 5 MG/1
TABLET ORAL
Qty: 90 TABLET | Refills: 1 | Status: SHIPPED | OUTPATIENT
Start: 2024-08-23

## 2024-08-27 ENCOUNTER — HOME HEALTH ADMISSION (OUTPATIENT)
Dept: HOME HEALTH SERVICES | Facility: HOME HEALTHCARE | Age: 84
End: 2024-08-27
Payer: MEDICARE

## 2024-08-27 ENCOUNTER — TELEPHONE (OUTPATIENT)
Dept: CARDIOLOGY | Facility: CLINIC | Age: 84
End: 2024-08-27

## 2024-08-27 ENCOUNTER — TELEMEDICINE (OUTPATIENT)
Dept: FAMILY MEDICINE CLINIC | Facility: CLINIC | Age: 84
End: 2024-08-27
Payer: MEDICARE

## 2024-08-27 DIAGNOSIS — I95.9 HYPOTENSIVE EPISODE: ICD-10-CM

## 2024-08-27 DIAGNOSIS — R29.898 WEAKNESS OF BOTH LOWER EXTREMITIES: ICD-10-CM

## 2024-08-27 DIAGNOSIS — Z91.81 AT HIGH RISK FOR FALLS: ICD-10-CM

## 2024-08-27 DIAGNOSIS — Z95.0 PRESENCE OF CARDIAC PACEMAKER: ICD-10-CM

## 2024-08-27 DIAGNOSIS — R25.1 TREMOR: ICD-10-CM

## 2024-08-27 DIAGNOSIS — Z74.09 LIMITED MOBILITY: Primary | ICD-10-CM

## 2024-08-27 DIAGNOSIS — I51.7 CARDIOMEGALY: ICD-10-CM

## 2024-08-27 DIAGNOSIS — I49.5 SICK SINUS SYNDROME: Chronic | ICD-10-CM

## 2024-08-27 DIAGNOSIS — R60.0 BILATERAL LOWER EXTREMITY EDEMA: ICD-10-CM

## 2024-08-27 PROCEDURE — 99214 OFFICE O/P EST MOD 30 MIN: CPT

## 2024-08-27 PROCEDURE — 1125F AMNT PAIN NOTED PAIN PRSNT: CPT

## 2024-08-27 PROCEDURE — 1111F DSCHRG MED/CURRENT MED MERGE: CPT

## 2024-08-27 NOTE — PROGRESS NOTES
Subjective   Anila Spencer is a 83 y.o. female who presents today via video visit.   I performed this visit using real-time telehealth tools per patient request, including a videoconferencing connection between my location and the patient's location. Prior to initiating the services, I obtained the patient's informed verbal consent on 08/27/24  and answered all the questions the patient had about the telehealth interaction.    Originating Site (patient's location): Home    Distant Site (provider's location): [address]  [facility]  [address]  [place] [address] [phone]    History of Present Illness     Anila is being seen via MyChart video visit due to decreased mobility.  Her  Joaquin was in the room during the appointment as well as they are caregiver Amara Monreal.  Consent given to discussed with Amara present.    Amara reports that she attends to their care 3 days a week.  She reports being concerned regarding erratic blood pressure readings.  They have also reached out to cardiology, Dr. Lord.  AM - sitting 95/61 hr 76  Standing 50/34 hr 108 irregular, 96% oxygen.  No recent lab work on chart.  Unable to determine if patient is dehydrated or anemic which may be contributing to his symptoms.  Given the erratic nature of vital signs as well as patient reported dizziness and lightheadedness I encouraged family to seek evaluation in [facility].  Patient and caregiver prefer to await cardiology recommendation.    She reports being symptomatic with weakness, lightheadedness and dizziness when standing.  She has chronic edema to her lower extremities with increasing edema to the left.  Eileen reports negative clive's sign, no coughing or shortness of breath.    Amara is concerned regarding the swelling in her feet and reports keeping her feet elevated with heels dangling.  She reports that her heels are becoming soft  and raw.  Patient is not under the care of home health.      They are requesting home health to evaluate for skilled nursing as well as PT and OT for strength, endurance, home safety assessment and other modalities as needed.  They are also requesting assistance with future planning for anticipated decline in health.      Her  is chronically ill with end-stage renal disease and is on dialysis regularly.  He helps his much as he is able to.  EMERALD Maloney has the patient using a peddler to help with arm and feet mobility.    The caregiver reports that when the patient has to leave the home she is exhausted and takes a day to recover.  Her mobility is progressively becoming weaker and uses a walker for ambulation when she can tolerate it.    Restless leg medication was recently increased by neurology Dr. Hernández with resulting vivid dreams and patient reportedly sustained a fall which necessitated the assistance of paramedics to help lift her off the floor.          History of Present Illness      The following portions of the patient's history were reviewed and updated as appropriate: allergies, current medications, past family history, past medical history, past social history, past surgical history, and problem list.    Patient Active Problem List   Diagnosis    Cardiomegaly    Essential hypertension    Mitral valve insufficiency    Degeneration of intervertebral disc of thoracic region    Presence of cardiac pacemaker    Dyspnea on exertion    DDD (degenerative disc disease), lumbar    Sick sinus syndrome    PCO (posterior capsular opacification), bilateral    Diplopia    Glaucoma suspect    Pseudophakia of both eyes    Prediabetes    Hypothyroidism    Urinary incontinence    Squamous cell skin cancer, face    Spinal stenosis of cervical region    Seizure disorder    Age-related osteoporosis without current pathological fracture    Osteoarthritis    GERD (gastroesophageal reflux disease)    Fatty liver     Degeneration of intervertebral disc of thoracic region    DDD (degenerative disc disease), cervical    Chronic constipation    Cervical spinal stenosis    Biliary dyskinesia    Basal cell carcinoma (BCC) of face    Anxiety    Traumatic brain injury    Chronic insomnia    Class 1 obesity due to excess calories with serious comorbidity and body mass index (BMI) of 32.0 to 32.9 in adult    Elective replacement indicated for cardiac pacemaker battery at end of lifespan    Overactive bladder    Tubular adenoma of colon    At high risk for falls    Abnormal lung function test    Abnormal PFTs (pulmonary function tests)    Cardiac arrhythmia    Chronic pain of left knee    Dependent edema    Hypertrophic toenail    Primary osteoarthritis involving multiple joints    Restless legs    Muscle spasm of both lower legs    Limited mobility    Elevated LDL cholesterol level    Tremor    Complex partial epilepsy    Generalized anxiety disorder    Seasonal allergies       Current Outpatient Medications on File Prior to Visit   Medication Sig Dispense Refill    amoxicillin (AMOXIL) 500 MG capsule Take 1 capsule by mouth 3 (Three) Times a Day. 30 capsule 0    Calcium Carbonate-Vitamin D3 600-400 MG-UNIT tablet Take 1 tablet by mouth 2 (Two) Times a Day.      Elastic Bandages & Supports ([employer] Firm Compression Socks) misc 2 each Daily. Please size appropriately for knee hi compression socks. 20-35mmHg. 2 pair each. 4 each 2    escitalopram (LEXAPRO) 5 MG tablet Take 1 tablet by mouth Daily.      furosemide (LASIX) 20 MG tablet Take 1 tablet by mouth Daily. 90 tablet 0    levothyroxine (SYNTHROID, LEVOTHROID) 75 MCG tablet Take 1 tablet by mouth Daily. 90 tablet 3    losartan (COZAAR) 25 MG tablet Take 1 tablet by mouth Daily. 90 tablet 3    Melatonin 10 MG capsule Take 1 tablet by mouth every night at bedtime. 90 capsule 0    metoprolol tartrate (LOPRESSOR) 50 MG tablet Take 1 tablet by mouth 2 (Two) Times a Day. 180 tablet 3     midodrine (PROAMATINE) 5 MG tablet TAKE 1 TABLET BY MOUTH EVERY DAY 90 tablet 1    multivitamin with minerals tablet tablet Take 1 tablet by mouth Daily.      omeprazole (priLOSEC) 40 MG capsule Take 1 capsule by mouth Daily.      polyethylene glycol (MiraLax) 17 GM/SCOOP powder Take 17 g by mouth Daily.      pramipexole (MIRAPEX) 0.25 MG tablet Take 1 tablet by mouth every night at bedtime.      zonisamide (ZONEGRAN) 100 MG capsule Take 3 capsules by mouth Every Evening. Take three 100 mg capsules may take four if needed       No current facility-administered medications on file prior to visit.     Current outpatient and discharge medications have been reconciled for the patient.  Reviewed by: VALENTINO Babb      No Known Allergies    Review of Systems   Constitutional:  Positive for activity change and fatigue.   Respiratory:  Negative for cough and shortness of breath.    Cardiovascular:  Positive for leg swelling. Negative for chest pain and palpitations.   Genitourinary:  Negative for decreased urine volume.   Musculoskeletal:  Positive for arthralgias, gait problem and myalgias.   Neurological:  Positive for dizziness, weakness and light-headedness. Negative for syncope and confusion.     I have reviewed and confirmed the accuracy of the ROS as documented by the MA/LPN/RN VALENTINO Babb       Objective   There were no vitals filed for this visit.  Physical Exam  Vitals reviewed: Limited physical exam due to nature of video visit..   Constitutional:       General: She is not in acute distress.     Appearance: Normal appearance. She is not ill-appearing.   HENT:      Mouth/Throat:      Comments:     Pulmonary:      Effort: No respiratory distress.   Musculoskeletal:      Right lower leg: Swelling present. Pitting Edema present.      Left lower leg: Swelling present. Edema present.   Neurological:      Mental Status: She is alert and oriented to person, place, and time. Mental status is at baseline.    Psychiatric:         Attention and Perception: Attention normal.         Mood and Affect: Mood normal.         Speech: Speech normal.         Behavior: Behavior normal. Behavior is cooperative.         Thought Content: Thought content normal.       Physical Exam      Results      Assessment & Plan .  Problems Addressed this Visit          High    Presence of cardiac pacemaker    Relevant Orders    Ambulatory Referral to Home Health    Sick sinus syndrome (Chronic)    Relevant Orders    Ambulatory Referral to Home Health    At high risk for falls    Relevant Orders    Ambulatory Referral to Home Health       Medium    Cardiomegaly    Relevant Orders    Ambulatory Referral to Home Health       Low    Limited mobility - Primary    Relevant Orders    Ambulatory Referral to Home Health       Unprioritized    Tremor    Relevant Orders    Ambulatory Referral to Home Health     Other Visit Diagnoses       Weakness of both lower extremities        Relevant Orders    Ambulatory Referral to Home Health    Hypotensive episode        Bilateral lower extremity edema        Relevant Orders    Ambulatory Referral to Home Health          Diagnoses         Codes Comments    Limited mobility    -  Primary ICD-10-CM: Z74.09  ICD-9-CM: V49.89     Weakness of both lower extremities     ICD-10-CM: R29.898  ICD-9-CM: 729.89     Hypotensive episode     ICD-10-CM: I95.9  ICD-9-CM: 458.9     At high risk for falls     ICD-10-CM: Z91.81  ICD-9-CM: V15.88     Bilateral lower extremity edema     ICD-10-CM: R60.0  ICD-9-CM: 782.3     Sick sinus syndrome     ICD-10-CM: I49.5  ICD-9-CM: 427.81     Presence of cardiac pacemaker     ICD-10-CM: Z95.0  ICD-9-CM: V45.01     Cardiomegaly     ICD-10-CM: I51.7  ICD-9-CM: 429.3     Tremor     ICD-10-CM: R25.1  ICD-9-CM: 781.0            Diagnoses and all orders for this visit:    1. Limited mobility (Primary)  -     Ambulatory Referral to Home Health    2. Weakness of both lower extremities  -      Ambulatory Referral to Home Health    3. Hypotensive episode    4. At high risk for falls  -     Ambulatory Referral to Home Health    5. Bilateral lower extremity edema  -     Ambulatory Referral to Home Health    6. Sick sinus syndrome  -     Ambulatory Referral to Home Health    7. Presence of cardiac pacemaker  -     Ambulatory Referral to Home Health    8. Cardiomegaly  -     Ambulatory Referral to Home Health    9. Tremor  -     Ambulatory Referral to Home Health      Assessment & Plan             Time spent on evaluation, management, counseling, patient education, and coordination of care: 39  minutes, over half of which was spent in counseling and coordination  of care.    Patient or patient representative verbalized consent for the use of Ambient Listening during the visit with  VALENTINO Babb for chart documentation. 8/27/2024  13:18 EDT

## 2024-09-03 RX ORDER — ESCITALOPRAM OXALATE 5 MG/1
5 TABLET ORAL DAILY
Qty: 90 TABLET | Refills: 0 | Status: SHIPPED | OUTPATIENT
Start: 2024-09-03

## 2024-09-04 ENCOUNTER — TELEPHONE (OUTPATIENT)
Dept: CARDIOLOGY | Facility: CLINIC | Age: 84
End: 2024-09-04
Payer: MEDICARE

## 2024-09-04 NOTE — TELEPHONE ENCOUNTER
Patient is light headed , dizzy and feeling pressure this morning while trying to go to bathroom. Amara Avel called in to report that she is trying to get her an at home nurse.    Vitals this morning since having taken medications ,    Sitting 120/69 72 bpm 96% RA.  Sitting 117/62 71 bpm    Standing 75/47 78 bpm    Patient blood pressure drops when she stands so she doesn't have her stand long , I advised her to go to ER as discussed before if symptoms didn't improve. Please let me know if there is anything else you advise.

## 2024-09-09 ENCOUNTER — OFFICE VISIT (OUTPATIENT)
Dept: CARDIOLOGY | Facility: CLINIC | Age: 84
End: 2024-09-09
Payer: MEDICARE

## 2024-09-09 ENCOUNTER — CLINICAL SUPPORT NO REQUIREMENTS (OUTPATIENT)
Dept: CARDIOLOGY | Facility: CLINIC | Age: 84
End: 2024-09-09
Payer: MEDICARE

## 2024-09-09 VITALS
HEIGHT: 63 IN | BODY MASS INDEX: 32.25 KG/M2 | SYSTOLIC BLOOD PRESSURE: 143 MMHG | WEIGHT: 182 LBS | HEART RATE: 71 BPM | OXYGEN SATURATION: 98 % | RESPIRATION RATE: 18 BRPM | DIASTOLIC BLOOD PRESSURE: 85 MMHG

## 2024-09-09 DIAGNOSIS — I49.5 SICK SINUS SYNDROME: Chronic | ICD-10-CM

## 2024-09-09 DIAGNOSIS — Z95.0 PRESENCE OF CARDIAC PACEMAKER: ICD-10-CM

## 2024-09-09 DIAGNOSIS — I95.1 ORTHOSTATIC HYPOTENSION: Primary | Chronic | ICD-10-CM

## 2024-09-09 PROCEDURE — 99214 OFFICE O/P EST MOD 30 MIN: CPT | Performed by: NURSE PRACTITIONER

## 2024-09-09 PROCEDURE — 3079F DIAST BP 80-89 MM HG: CPT | Performed by: NURSE PRACTITIONER

## 2024-09-09 PROCEDURE — 1159F MED LIST DOCD IN RCRD: CPT | Performed by: NURSE PRACTITIONER

## 2024-09-09 PROCEDURE — 93000 ELECTROCARDIOGRAM COMPLETE: CPT | Performed by: NURSE PRACTITIONER

## 2024-09-09 PROCEDURE — 1160F RVW MEDS BY RX/DR IN RCRD: CPT | Performed by: NURSE PRACTITIONER

## 2024-09-09 PROCEDURE — 93280 PM DEVICE PROGR EVAL DUAL: CPT | Performed by: NURSE PRACTITIONER

## 2024-09-09 PROCEDURE — 3077F SYST BP >= 140 MM HG: CPT | Performed by: NURSE PRACTITIONER

## 2024-09-09 RX ORDER — MIDODRINE HYDROCHLORIDE 10 MG/1
10 TABLET ORAL
Qty: 90 TABLET | Refills: 3 | Status: SHIPPED | OUTPATIENT
Start: 2024-09-09

## 2024-09-09 NOTE — PROGRESS NOTES
Cardiology Office Follow Up Visit      Primary Care Provider:  Chantell Larios APRN    Reason for f/u:     Dizziness/lightheadedness  Orthostatic blood pressure changes      Subjective     CC:    Complains of intermittent dizziness and lightheadedness    History of Present Illness       Anila Spencer is a 83 y.o. female. Patient is a pleasant 83-year-old female who is known to have sick sinus syndrome, previous dual-chamber pacemaker, hypertension and periods of orthostatic hypotension.  She has had previous syncope.     She had a dual-chamber [employer] pacemaker implanted in June 2021.  She has had no recurrent syncope since that time.     In 2019 the patient underwent a tilt table test that showed a hypotensive response to head up tilt.  She was started on midodrine at that time.  Ejection fraction by echocardiogram at that time was 60 to 65% with mild MR.  She also had stress Myoview at that time that showed no reversible ischemia.     Patient has had recent episodes of dizziness and lightheadedness associated with low blood pressures when she is standing.  Having drop in systolic blood pressures into the 70s with ambulation.  Heart rate also going up at these times.    We have been slowly reducing medical therapy but the symptoms continue.  :    ASSESSMENT/PLAN:        Diagnoses and all orders for this visit:    1. Orthostatic hypotension (Primary)    2. Sick sinus syndrome    3. Presence of cardiac pacemaker           MEDICAL DECISION MAKING:      Patient's pacemaker was interrogated today there is stable device function battery is stable she has had no sustained A-fib.    Patient remains orthostatic with systolic blood pressure dropping into the upper 70s with standing.    We will go ahead and hold her metoprolol for now.  Midodrine dose will be increased to 10 mg 3 times daily.  I have asked her family member to continue to check her blood pressure periodically.  She will report any changes or  problems to us.  I made her a follow-up here in the office in 4 weeks if there is any worsening symptoms of asked her to contact the office sooner.  Have stressed the importance of the patient using her walker if she feels dizzy or lightheaded sitting down so she does not fall is encouraged.        Past Medical History:   Diagnosis Date    Abnormal cardiovascular stress test 03/21/2017    Allergic rhinitis     Anxiety     At high risk for falls 01/28/2022    Basal cell carcinoma (BCC) of face     Biliary dyskinesia     Bradycardia     Cardiomegaly     Cataract     Cervical spinal stenosis     Chronic constipation     Chronic insomnia     COVID-19 08/09/2022    Diagnosed with Covid on 7/7/2022 with lingering side effect of fatigue.    DDD (degenerative disc disease), cervical     C-spine, T-spine, L-Spine    DDD (degenerative disc disease), lumbar 07/25/2019    Degeneration of intervertebral disc of thoracic region 03/18/2017    Diplopia     Dr. Laird    Dysphagia 04/25/2019    Emphysema of lung     Fatigue 08/09/2022    Reports new onset fatigue, worse after Covid illness in 7/2022.         Fatty liver     Fracture of multiple ribs 07/2018    Left 4th-8th    Gastroesophageal reflux disease 12/04/2012    GERD (gastroesophageal reflux disease)     Hematuria 12/29/2014    History of recent fall     Hypertension     Hypothyroidism     Injury of back     Insomnia 03/07/2016    Kidney stone     Kidney stone     Left arm pain     Mild peripheral edema 08/09/2022    Patient reports occasional dependent edema when standing for extended period of time or when she is seated with legs not elevated.     Nasal fracture 07/2018    Nasal fracture 07/01/2018    Osteoarthritis     Osteoporosis     h/o tx with Fosamax but quit in 2012 due to fears of complications    Prediabetes     Rotator cuff tear     right    Rotator cuff tear     right    Seizure disorder     Dr. Shannon Bennett    Shortness of breath 05/08/2019    Sick sinus  syndrome     Dr. Agrawal    Spinal stenosis of cervical region 02/11/2015    Squamous cell skin cancer, face     Traumatic brain injury     from fall    Traumatic brain injury     from fall    Tubular adenoma of colon 10/14/2021    No further recall due to age    Urinary incontinence        Past Surgical History:   Procedure Laterality Date    BILATERAL SALPINGO OOPHORECTOMY      for benign cysts    BREAST CYST ASPIRATION Left     CARDIAC CATHETERIZATION  08/25/2009    25% LAD. L Cx luminal irregularities. Normal L main    CARDIAC CATHETERIZATION  03/29/2017    25% LAD. 10-20% LCX. ER 65%. Dr. Agrawal.     CARDIAC ELECTROPHYSIOLOGY PROCEDURE N/A 06/03/2021    Procedure: PPM generator change - dual    MDT;  Surgeon: Bharathi Mora MD;  Location: [facility] CATH INVASIVE LOCATION;  Service: Cardiovascular;  Laterality: N/A;    CARDIOVASCULAR STRESS TEST  2019    CATARACT EXTRACTION  2006    COSMETIC SURGERY      Lip    ECHO - CONVERTED  2019    ENDOSCOPY  05/17/2017    Normal, Dr. Gutierrez    ENDOSCOPY N/A 08/22/2019    Procedure: ESOPHAGOGASTRODUODENOSCOPY WITH DILATATION (BOUGIE #46,50);  Surgeon: Joaquin Story MD;  Location: [facility] ENDOSCOPY;  Service: Gastroenterology    INSERT / REPLACE / REMOVE PACEMAKER      KNEE ARTHROSCOPY Left 1992    OOPHORECTOMY      OTHER SURGICAL HISTORY Right 07/2017    Right eye, YAG Capsuloyomy , Dr. Lemus    PACEMAKER IMPLANTATION  2009    ROTATOR CUFF REPAIR Right     Dr. Goldberg    TONSILLECTOMY      TOTAL HIP ARTHROPLASTY Bilateral          Current Outpatient Medications:     Calcium Carbonate-Vitamin D3 600-400 MG-UNIT tablet, Take 1 tablet by mouth 2 (Two) Times a Day., Disp: , Rfl:     Elastic Bandages & Supports ([employer] Firm Compression Socks) misc, 2 each Daily. Please size appropriately for knee hi compression socks. 20-35mmHg. 2 pair each., Disp: 4 each, Rfl: 2    escitalopram (LEXAPRO) 5 MG tablet, Take 1 tablet by mouth Daily., Disp: 90 tablet, Rfl: 0    levothyroxine  (SYNTHROID, LEVOTHROID) 75 MCG tablet, Take 1 tablet by mouth Daily., Disp: 90 tablet, Rfl: 3    Melatonin 10 MG capsule, Take 1 tablet by mouth every night at bedtime., Disp: 90 capsule, Rfl: 0    metoprolol tartrate (LOPRESSOR) 50 MG tablet, Take 1 tablet by mouth 2 (Two) Times a Day. (Patient taking differently: Take 0.5 tablets by mouth 2 (Two) Times a Day. if bp sitting is above 110), Disp: 180 tablet, Rfl: 3    midodrine (PROAMATINE) 5 MG tablet, TAKE 1 TABLET BY MOUTH EVERY DAY (Patient taking differently: Take 1 tablet by mouth 3 (Three) Times a Day Before Meals.), Disp: 90 tablet, Rfl: 1    multivitamin with minerals tablet tablet, Take 1 tablet by mouth Daily., Disp: , Rfl:     omeprazole (priLOSEC) 40 MG capsule, Take 1 capsule by mouth Daily., Disp: , Rfl:     polyethylene glycol (MiraLax) 17 GM/SCOOP powder, Take 17 g by mouth Daily., Disp: , Rfl:     pramipexole (MIRAPEX) 0.25 MG tablet, Take 1 tablet by mouth every night at bedtime., Disp: , Rfl:     zonisamide (ZONEGRAN) 100 MG capsule, Take 2 capsules by mouth Every Evening. Take three 100 mg capsules may take four if needed, Disp: , Rfl:     Social History     Socioeconomic History    Marital status:    Tobacco Use    Smoking status: Never    Smokeless tobacco: Never    Tobacco comments:     Patient is advised not to smoke.   Vaping Use    Vaping status: Never Used   Substance and Sexual Activity    Alcohol use: No    Drug use: No    Sexual activity: Defer       Family History   Problem Relation Age of Onset    Heart disease Mother     Pancreatic cancer Mother     Prostate cancer Father     Breast cancer Sister         4 sisters have had    Pancreatic cancer Brother     Colon cancer Brother     Stroke Maternal Grandmother     Breast cancer Sister     Breast cancer Sister     Breast cancer Sister        The following portions of the patient's history were reviewed and updated as appropriate: allergies, current medications, past family  "history, past medical history, past social history, past surgical history and problem list.    Review of Systems   Neurological:  Positive for dizziness and light-headedness.     Pertinent items are noted in HPI, all other systems reviewed and negative      /85 (BP Location: Right arm, Patient Position: Sitting, Cuff Size: Adult)   Pulse 71   Resp 18   Ht 160 cm (63\")   Wt 82.6 kg (182 lb)   SpO2 98%   BMI 32.24 kg/m² .    Objective     Vitals reviewed.   Constitutional:       General: Not in acute distress.     Appearance: Normal appearance. Well-developed.   Eyes:      Pupils: Pupils are equal, round, and reactive to light.   HENT:      Head: Normocephalic and atraumatic.   Neck:      Vascular: No JVD.   Pulmonary:      Effort: Pulmonary effort is normal.      Breath sounds: Normal breath sounds.   Cardiovascular:      Normal rate. Regular rhythm.   Edema:     Peripheral edema absent.   Abdominal:      General: There is no distension.      Palpations: Abdomen is soft.      Tenderness: There is no abdominal tenderness.   Musculoskeletal: Normal range of motion.      Cervical back: Normal range of motion and neck supple. Skin:     General: Skin is warm and dry.   Neurological:      Mental Status: Alert and oriented to person, place, and time.           EKG ordered and reviewed by me in office    ECG 12 Lead    Date/Time: 9/9/2024 1:01 PM  Performed by: Zee Bunch APRN    Authorized by: Zee Bunch APRN  Comparison: compared with previous ECG   Similar to previous ECG  Rhythm: paced  Rate: normal  BPM: 71  Conduction: conduction normal  QRS axis: normal  Comments: Atrial paced rhythm with intact ventricular conduction.  No ST or T wave segment abnormalities.              In Office Device Interrogation: Reviewed    DEVICE INTERROGATION:  IN OFFICE    DEVICE TYPE:   Dual-chamber pacemaker    :   [employer]    BATTERY:  Stable    TIME TO ELECTIVE REPLACEMENT INDICATORS:   5.5 " years    CHARGE TIME:   Not applicable        LEAD DATA:   LEADS Reprogrammed for testing purposes    Atrial:   Patient is a pleasant 83-year-old female who is known to have sick sinus syndrome, previous dual-chamber pacemaker, hypertension and periods of orthostatic hypotension.  She has had previous syncope.     She had a dual-chamber [employer] pacemaker implanted in June 2021.  She has had no recurrent syncope since that time.     In 2019 the patient underwent a tilt table test that showed a hypotensive response to head up tilt.  She was started on midodrine at that time.  Ejection fraction by echocardiogram at that time was 60 to 65% with mild MR.  She also had stress Myoview at that time that showed no reversible ischemia.     She has been evaluated due to ongoing complaints of shortness of breath and had a PFT with referral to pulmonology.     Patient continues to report feelings of palpitations and feeling like her heart is beating fast typically related to periods of activity.  She denies chest pain or worsening dyspnea.  She complains of chronic dyspnea with levels of exertion but unchanged.  She has had no edema, near syncope or orthopnea        ASSESSMENT/PLAN: PAC ED mV, 468 ohms, 0.7 V@0.4 ms    Ventricular:     6.3 mV, 481 ohms, 1.2 V@0.4 ms    LV:      Pacemaker Dependent: No      Atrial pacing percentage: 98%    Ventricular pacing percentage: 5%      Arrhythmia Logbook Reviewed: No A-fib        Summary:    Stable Device Function    No significant arhythmia burden.     Battery status is stable.      NEXT IN OFFICE DEVICE CHECK DUE: 6 months   REMOTE DEVICE INTERROGATIONS: Ongoing

## 2024-09-10 ENCOUNTER — TELEPHONE (OUTPATIENT)
Dept: FAMILY MEDICINE CLINIC | Facility: CLINIC | Age: 84
End: 2024-09-10
Payer: MEDICARE

## 2024-09-10 NOTE — TELEPHONE ENCOUNTER
Caller: APRILIST    Relationship:     Best call back number: [phone]     What was the call regarding:   ORDERS FOR PHYSICAL THERAPY  ASSESSMENT

## 2024-10-03 ENCOUNTER — TELEPHONE (OUTPATIENT)
Dept: CARDIOLOGY | Facility: CLINIC | Age: 84
End: 2024-10-03
Payer: MEDICARE

## 2024-10-03 NOTE — TELEPHONE ENCOUNTER
PATIENT EXPERIENCING HTN, CHEST TIGHTNESS/PAIN, LIGHTHEADEDNESS. BEEN CREEPING UP SINCE LAST APPT. HAVING SOA AS WELL. BP YESTERDAY /? HASN'T HAD IT TAKEN YET TODAY BY VISITING NURSE. MONIQUE HAD TAKEN AWAY TWO BP MEDS AT LAST APPT AND INCREASED HER MIDODRINE.

## 2024-10-04 NOTE — PROGRESS NOTES
Date of Office Visit: 10/07/2024  Encounter Provider: Dr. Adam Agrawal  Place of Service: [facility] CARDIOLOGY [place]  Patient Name: Anila Spencer  :1940  Chantell Larios APRN    Chief Complaint   Patient presents with    Hypertension    Follow-up     History of Present Illness:    Anila Spencer is a 83 y.o. female. Her past medical history significant for hypertension, sick sinus syndrome, tachybradycardia syndrome, history of syncope came today for follow-up.     Previously patient had repeated episodes of syncope. She had extensive workup which showed that she had underlying sick sinus syndrome. She underwent permanent  pacemaker implantation. Since then patient symptoms of syncope had completely resolved. Previously, patient was also evaluated with cardiac catheterization because of her symptoms of angina pectoris and she was noted to have no significant coronary artery disease. Her previous CAT scan of chest was also negative for pulmonary embolism and she had pulmonary function test which was negative for any significant pulmonary airway disease. It was noted that her symptoms of chest pain at that time was probably related to gastroesophageal reflux disease and she was appropriately treated.    In 2019, patient had an episode of syncope at home.  Patient underwent tilt table test and it showed hypotensive response to head up tilt.  Patient was started on midodrine 5 mg twice daily.  Echocardiogram showed normal left ventricular size and function.  LVEF was 60 to 65%.  Mild mitral regurgitation was noted.  Stress test was negative for ischemia.    Patient came today for follow-up.  Recently patient is noted to have low blood pressure.  Patient has been taken off Toprol-XL and losartan.  Midodrine was increased.  Patient was noted to have orthostatic hypotension.  Patient came today and her blood pressures are now stabilized and running a little bit high.  Patient denies any  symptom of chest pain or tightness or heaviness.  No fall is reported.  No syncope.    Recent pacemaker interrogation was satisfactory.    I will restart Toprol-XL but at a smaller dose of 12.5 mg twice daily.  Losartan would be continued to be withheld..  Patient is advised to increase fluid intake.  Blood pressure monitoring is recommended      Past Medical History:   Diagnosis Date    Abnormal cardiovascular stress test 03/21/2017    Allergic rhinitis     Anxiety     At high risk for falls 01/28/2022    Basal cell carcinoma (BCC) of face     Biliary dyskinesia     Bradycardia     Cardiomegaly     Cataract     Cervical spinal stenosis     Chronic constipation     Chronic insomnia     COVID-19 08/09/2022    Diagnosed with Covid on 7/7/2022 with lingering side effect of fatigue.    DDD (degenerative disc disease), cervical     C-spine, T-spine, L-Spine    DDD (degenerative disc disease), lumbar 07/25/2019    Degeneration of intervertebral disc of thoracic region 03/18/2017    Diplopia     Dr. Laird    Dysphagia 04/25/2019    Emphysema of lung     Fatigue 08/09/2022    Reports new onset fatigue, worse after Covid illness in 7/2022.         Fatty liver     Fracture of multiple ribs 07/2018    Left 4th-8th    Gastroesophageal reflux disease 12/04/2012    GERD (gastroesophageal reflux disease)     Hematuria 12/29/2014    History of recent fall     Hypertension     Hypothyroidism     Injury of back     Insomnia 03/07/2016    Kidney stone     Kidney stone     Left arm pain     Mild peripheral edema 08/09/2022    Patient reports occasional dependent edema when standing for extended period of time or when she is seated with legs not elevated.     Nasal fracture 07/2018    Nasal fracture 07/01/2018    Osteoarthritis     Osteoporosis     h/o tx with Fosamax but quit in 2012 due to fears of complications    Prediabetes     Rotator cuff tear     right    Rotator cuff tear     right    Seizure disorder     Dr. Shannon Bennett     Shortness of breath 05/08/2019    Sick sinus syndrome     Dr. Agrawal    Spinal stenosis of cervical region 02/11/2015    Squamous cell skin cancer, face     Traumatic brain injury     from fall    Traumatic brain injury     from fall    Tubular adenoma of colon 10/14/2021    No further recall due to age    Urinary incontinence          Past Surgical History:   Procedure Laterality Date    BILATERAL SALPINGO OOPHORECTOMY      for benign cysts    BREAST CYST ASPIRATION Left     CARDIAC CATHETERIZATION  08/25/2009    25% LAD. L Cx luminal irregularities. Normal L main    CARDIAC CATHETERIZATION  03/29/2017    25% LAD. 10-20% LCX. ER 65%. Dr. Agrawal.     CARDIAC ELECTROPHYSIOLOGY PROCEDURE N/A 06/03/2021    Procedure: PPM generator change - dual    MDT;  Surgeon: Bharathi Mora MD;  Location: [facility] CATH INVASIVE LOCATION;  Service: Cardiovascular;  Laterality: N/A;    CARDIOVASCULAR STRESS TEST  2019    CATARACT EXTRACTION  2006    COSMETIC SURGERY      Lip    ECHO - CONVERTED  2019    ENDOSCOPY  05/17/2017    Normal, Dr. Gutierrez    ENDOSCOPY N/A 08/22/2019    Procedure: ESOPHAGOGASTRODUODENOSCOPY WITH DILATATION (BOUGIE #46,50);  Surgeon: Joaquin Story MD;  Location: [facility] ENDOSCOPY;  Service: Gastroenterology    INSERT / REPLACE / REMOVE PACEMAKER      KNEE ARTHROSCOPY Left 1992    OOPHORECTOMY      OTHER SURGICAL HISTORY Right 07/2017    Right eye, YAG Capsuloyomy , Dr. Lemus    PACEMAKER IMPLANTATION  2009    ROTATOR CUFF REPAIR Right     Dr. Goldberg    TONSILLECTOMY      TOTAL HIP ARTHROPLASTY Bilateral            Current Outpatient Medications:     Calcium Carbonate-Vitamin D3 600-400 MG-UNIT tablet, Take 1 tablet by mouth 2 (Two) Times a Day., Disp: , Rfl:     Elastic Bandages & Supports ([employer] Firm Compression Socks) misc, 2 each Daily. Please size appropriately for knee hi compression socks. 20-35mmHg. 2 pair each., Disp: 4 each, Rfl: 2    escitalopram (LEXAPRO) 5 MG tablet, Take 1 tablet by mouth  Daily., Disp: 90 tablet, Rfl: 0    levothyroxine (SYNTHROID, LEVOTHROID) 75 MCG tablet, Take 1 tablet by mouth Daily., Disp: 90 tablet, Rfl: 3    Melatonin 10 MG capsule, Take 1 tablet by mouth every night at bedtime., Disp: 90 capsule, Rfl: 0    midodrine (PROAMATINE) 10 MG tablet, Take 1 tablet by mouth 3 (Three) Times a Day Before Meals., Disp: 90 tablet, Rfl: 3    multivitamin with minerals tablet tablet, Take 1 tablet by mouth Daily., Disp: , Rfl:     omeprazole (priLOSEC) 40 MG capsule, Take 1 capsule by mouth Daily., Disp: , Rfl:     polyethylene glycol (MiraLax) 17 GM/SCOOP powder, Take 17 g by mouth Daily., Disp: , Rfl:     pramipexole (MIRAPEX) 0.25 MG tablet, Take 1 tablet by mouth every night at bedtime., Disp: , Rfl:     zonisamide (ZONEGRAN) 100 MG capsule, Take 2 capsules by mouth Every Evening. Take three 100 mg capsules may take four if needed, Disp: , Rfl:     metoprolol succinate XL (TOPROL-XL) 25 MG 24 hr tablet, Take 0.5 tablets by mouth 2 (Two) Times a Day., Disp: 90 tablet, Rfl: 3      Social History     Socioeconomic History    Marital status:    Tobacco Use    Smoking status: Never    Smokeless tobacco: Never    Tobacco comments:     Patient is advised not to smoke.   Vaping Use    Vaping status: Never Used   Substance and Sexual Activity    Alcohol use: No    Drug use: No    Sexual activity: Defer         Review of Systems   Constitutional: Negative for chills and fever.   HENT:  Negative for ear discharge and nosebleeds.    Eyes:  Negative for discharge and redness.   Cardiovascular:  Negative for chest pain, orthopnea, palpitations, paroxysmal nocturnal dyspnea and syncope.   Respiratory:  Positive for shortness of breath. Negative for cough and wheezing.    Endocrine: Negative for heat intolerance.   Skin:  Negative for rash.   Musculoskeletal:  Positive for arthritis, back pain and joint pain. Negative for myalgias.   Gastrointestinal:  Negative for abdominal pain, melena, nausea  "and vomiting.   Genitourinary:  Negative for dysuria and hematuria.   Neurological:  Positive for dizziness. Negative for light-headedness, numbness and tremors.   Psychiatric/Behavioral:  Negative for depression. The patient is not nervous/anxious.        Procedures    Procedures    No orders to display           Objective:    /78 (BP Location: Right arm, Patient Position: Sitting, Cuff Size: Large Adult)   Pulse 71   Resp 16   Ht 160 cm (62.99\")   Wt 82.6 kg (182 lb)   SpO2 97%   BMI 32.25 kg/m²         Constitutional:       Appearance: Well-developed.   Eyes:      General: No scleral icterus.        Right eye: No discharge.   HENT:      Head: Normocephalic and atraumatic.   Neck:      Thyroid: No thyromegaly.      Lymphadenopathy: No cervical adenopathy.   Pulmonary:      Effort: Pulmonary effort is normal. No respiratory distress.      Breath sounds: Normal breath sounds. No wheezing. No rales.   Cardiovascular:      Normal rate. Regular rhythm.      No gallop.    Edema:     Peripheral edema absent.   Abdominal:      Tenderness: There is no abdominal tenderness.   Skin:     Findings: No erythema or rash.   Neurological:      Mental Status: Alert and oriented to person, place, and time.             Assessment:       Diagnosis Plan   1. Essential hypertension  metoprolol succinate XL (TOPROL-XL) 25 MG 24 hr tablet      2. Sick sinus syndrome  metoprolol succinate XL (TOPROL-XL) 25 MG 24 hr tablet      3. Presence of cardiac pacemaker  metoprolol succinate XL (TOPROL-XL) 25 MG 24 hr tablet      4. Orthostatic hypotension  metoprolol succinate XL (TOPROL-XL) 25 MG 24 hr tablet               Plan:       MDM:    1.  Status post pacemaker:    Pacemaker is functioning appropriately on recent interrogation.  Pacemaker is not interrogated on this admission.    2.  Orthostatic hypotension.    Continue midodrine and blood pressure is much better now.    3.  Essential hypertension:    Start Toprol-XL 12.5 mg " twice daily.

## 2024-10-07 ENCOUNTER — OFFICE VISIT (OUTPATIENT)
Dept: CARDIOLOGY | Facility: CLINIC | Age: 84
End: 2024-10-07
Payer: MEDICARE

## 2024-10-07 VITALS
RESPIRATION RATE: 16 BRPM | OXYGEN SATURATION: 97 % | WEIGHT: 182 LBS | DIASTOLIC BLOOD PRESSURE: 78 MMHG | HEIGHT: 63 IN | SYSTOLIC BLOOD PRESSURE: 137 MMHG | HEART RATE: 71 BPM | BODY MASS INDEX: 32.25 KG/M2

## 2024-10-07 DIAGNOSIS — I10 ESSENTIAL HYPERTENSION: Primary | Chronic | ICD-10-CM

## 2024-10-07 DIAGNOSIS — Z95.0 PRESENCE OF CARDIAC PACEMAKER: ICD-10-CM

## 2024-10-07 DIAGNOSIS — I95.1 ORTHOSTATIC HYPOTENSION: ICD-10-CM

## 2024-10-07 DIAGNOSIS — I49.5 SICK SINUS SYNDROME: Chronic | ICD-10-CM

## 2024-10-07 PROCEDURE — 3075F SYST BP GE 130 - 139MM HG: CPT | Performed by: INTERNAL MEDICINE

## 2024-10-07 PROCEDURE — 1159F MED LIST DOCD IN RCRD: CPT | Performed by: INTERNAL MEDICINE

## 2024-10-07 PROCEDURE — 1160F RVW MEDS BY RX/DR IN RCRD: CPT | Performed by: INTERNAL MEDICINE

## 2024-10-07 PROCEDURE — 3078F DIAST BP <80 MM HG: CPT | Performed by: INTERNAL MEDICINE

## 2024-10-07 PROCEDURE — 99214 OFFICE O/P EST MOD 30 MIN: CPT | Performed by: INTERNAL MEDICINE

## 2024-10-07 RX ORDER — METOPROLOL SUCCINATE 25 MG/1
12.5 TABLET, EXTENDED RELEASE ORAL 2 TIMES DAILY
Qty: 90 TABLET | Refills: 3 | Status: SHIPPED | OUTPATIENT
Start: 2024-10-07

## 2024-10-21 DIAGNOSIS — R30.0 DYSURIA: Primary | ICD-10-CM

## 2024-10-23 DIAGNOSIS — R30.0 DYSURIA: ICD-10-CM

## 2024-10-27 LAB
APPEARANCE UR: ABNORMAL
BACTERIA #/AREA URNS HPF: ABNORMAL /[HPF]
BACTERIA UR CULT: ABNORMAL
BILIRUB UR QL STRIP: NEGATIVE
CASTS URNS QL MICRO: ABNORMAL /LPF
COLOR UR: YELLOW
CRYSTALS URNS MICRO: ABNORMAL
EPI CELLS #/AREA URNS HPF: ABNORMAL /HPF (ref 0–10)
GLUCOSE UR QL STRIP: NEGATIVE
HGB UR QL STRIP: ABNORMAL
KETONES UR QL STRIP: ABNORMAL
LEUKOCYTE ESTERASE UR QL STRIP: ABNORMAL
MICRO URNS: ABNORMAL
NITRITE UR QL STRIP: NEGATIVE
OTHER ANTIBIOTIC SUSC ISLT: ABNORMAL
PH UR STRIP: 6.5 [PH] (ref 5–7.5)
PROT UR QL STRIP: ABNORMAL
RBC #/AREA URNS HPF: ABNORMAL /HPF (ref 0–2)
SP GR UR STRIP: 1.02 (ref 1–1.03)
UNIDENT CRYS URNS QL MICRO: PRESENT
URINALYSIS REFLEX: ABNORMAL
UROBILINOGEN UR STRIP-MCNC: 1 MG/DL (ref 0.2–1)
WBC #/AREA URNS HPF: >30 /HPF (ref 0–5)

## 2024-10-28 RX ORDER — NITROFURANTOIN 25; 75 MG/1; MG/1
100 CAPSULE ORAL 2 TIMES DAILY
Qty: 14 CAPSULE | Refills: 0 | Status: SHIPPED | OUTPATIENT
Start: 2024-10-28 | End: 2024-11-04

## 2024-12-02 RX ORDER — ESCITALOPRAM OXALATE 5 MG/1
5 TABLET ORAL DAILY
Qty: 90 TABLET | Refills: 0 | Status: SHIPPED | OUTPATIENT
Start: 2024-12-02 | End: 2024-12-04 | Stop reason: SDUPTHER

## 2024-12-04 RX ORDER — ESCITALOPRAM OXALATE 5 MG/1
5 TABLET ORAL DAILY
Qty: 90 TABLET | Refills: 0 | Status: SHIPPED | OUTPATIENT
Start: 2024-12-04

## 2024-12-04 NOTE — TELEPHONE ENCOUNTER
Caller: Anila Spencer    Relationship: Self    Best call back number: [phone]    Requested Prescriptions:   Requested Prescriptions     Pending Prescriptions Disp Refills    escitalopram (LEXAPRO) 5 MG tablet 90 tablet 0     Sig: Take 1 tablet by mouth Daily.        Pharmacy where request should be sent: [facility]/PHARMACY #[id number] - [place], [place] [address] - [phone]  - [phone] FX     Last office visit with prescribing clinician: 8/15/2024   Last telemedicine visit with prescribing clinician: 8/27/2024   Next office visit with prescribing clinician: 2/25/2025     Additional details provided by patient: 90 DAY SUPPLY        ANURAG [place]   12/04/24 09:56 EST

## 2024-12-16 ENCOUNTER — TELEPHONE (OUTPATIENT)
Dept: FAMILY MEDICINE CLINIC | Facility: CLINIC | Age: 84
End: 2024-12-16
Payer: MEDICARE

## 2024-12-16 NOTE — TELEPHONE ENCOUNTER
Patient stated that is having bi lateral leg swelling. She is unable to get her compression stockings on. She is having some pitting as well. She does not take a water pill. I scheduled her appointment with you on the 19th at 330 and advised her I would send message and that she needs to stay off her feet and keep them elevated.

## 2024-12-17 RX ORDER — FUROSEMIDE 20 MG/1
20 TABLET ORAL DAILY PRN
Qty: 10 TABLET | Refills: 0 | Status: SHIPPED | OUTPATIENT
Start: 2024-12-17 | End: 2024-12-27

## 2024-12-17 NOTE — TELEPHONE ENCOUNTER
Patient was notified of information she wants to try the water pill first and see you on the 19th

## 2025-01-17 DIAGNOSIS — Z95.0 PRESENCE OF CARDIAC PACEMAKER: ICD-10-CM

## 2025-01-17 DIAGNOSIS — I95.1 ORTHOSTATIC HYPOTENSION: ICD-10-CM

## 2025-01-17 DIAGNOSIS — I49.5 SICK SINUS SYNDROME: Chronic | ICD-10-CM

## 2025-01-17 DIAGNOSIS — I10 ESSENTIAL HYPERTENSION: Chronic | ICD-10-CM

## 2025-01-20 RX ORDER — METOPROLOL SUCCINATE 25 MG/1
12.5 TABLET, EXTENDED RELEASE ORAL DAILY
Qty: 90 TABLET | Refills: 3 | Status: SHIPPED | OUTPATIENT
Start: 2025-01-20

## 2025-01-21 ENCOUNTER — OFFICE VISIT (OUTPATIENT)
Dept: FAMILY MEDICINE CLINIC | Facility: CLINIC | Age: 85
End: 2025-01-21
Payer: MEDICARE

## 2025-01-21 VITALS
RESPIRATION RATE: 20 BRPM | BODY MASS INDEX: 35.86 KG/M2 | HEIGHT: 63 IN | SYSTOLIC BLOOD PRESSURE: 172 MMHG | OXYGEN SATURATION: 99 % | HEART RATE: 70 BPM | TEMPERATURE: 96.9 F | WEIGHT: 202.4 LBS | DIASTOLIC BLOOD PRESSURE: 82 MMHG

## 2025-01-21 DIAGNOSIS — R53.81 PHYSICAL DECONDITIONING: ICD-10-CM

## 2025-01-21 DIAGNOSIS — I10 ESSENTIAL HYPERTENSION: Chronic | ICD-10-CM

## 2025-01-21 DIAGNOSIS — R60.0 BILATERAL EDEMA OF LOWER EXTREMITY: Primary | ICD-10-CM

## 2025-01-21 DIAGNOSIS — R68.89 ACTIVITY INTOLERANCE: ICD-10-CM

## 2025-01-21 DIAGNOSIS — I49.5 SICK SINUS SYNDROME: Chronic | ICD-10-CM

## 2025-01-21 PROCEDURE — G2211 COMPLEX E/M VISIT ADD ON: HCPCS

## 2025-01-21 PROCEDURE — 99214 OFFICE O/P EST MOD 30 MIN: CPT

## 2025-01-21 PROCEDURE — 1125F AMNT PAIN NOTED PAIN PRSNT: CPT

## 2025-01-21 RX ORDER — FUROSEMIDE 20 MG/1
20 TABLET ORAL DAILY PRN
Qty: 30 TABLET | Refills: 0 | Status: SHIPPED | OUTPATIENT
Start: 2025-01-21

## 2025-01-21 RX ORDER — FUROSEMIDE 20 MG/1
20 TABLET ORAL DAILY PRN
Qty: 90 TABLET | OUTPATIENT
Start: 2025-01-21

## 2025-01-21 NOTE — ASSESSMENT & PLAN NOTE
Her blood pressure was recorded as 172/82 during this visit, which is elevated. However, it is important to note that she has been without her antihypertensive medications for a few days. Her last recorded blood pressure in October 2024 was 137/78. She is advised to monitor her blood pressure daily and consult with Dr. Agrawal if it remains elevated. No changes to her current medication regimen will be made at this time.

## 2025-01-21 NOTE — ASSESSMENT & PLAN NOTE
Her blood work results are within normal limits. The swelling in her legs does not appear to be severe at this time. A prescription for Lasix 20 mg, to be taken as needed for swelling, will be provided and sent to [facility]. She is advised to elevate her legs, utilize TEE hose, and monitor her dietary salt intake, particularly from canned and frozen foods.

## 2025-01-21 NOTE — PROGRESS NOTES
Office Note     Name: Anila Spencer    : 1940     MRN: [id number]     Chief Complaint  Leg Swelling    Subjective     Anila Spencer presents to [facility] for an acute complaint of  lower extremity edema and hypertension.    Leg Swelling  Symptoms are recurrent.   Onset was 6 to12 months.   Symptoms occur constantly.   Symptoms have been coming and going since onset.   Symptoms include myalgias and weakness.    Pertinent negative symptoms include no chest pain and no cough.   Symptoms comment: SOB.   Aggravating factors include nothing.   Treatments tried include rest and immobilization.   Improvement on treatment was no relief.   Treatment and/or Medications comments include: Lasix     History of Present Illness  The patient is an 84-year-old female who presents for evaluation of lower extremity edema and hypertension.    She reports experiencing variable leg swelling.  She has been managing her leg swelling with Lasix, which she finds effective. She also uses knee-high socks when assistance is available and elevates her legs while seated. She has been adhering to a low-salt diet as recommended by her physician. She reports no chest pain.      She has been experiencing intermittent weakness, which has been more pronounced today.  She recounts an incident today where she was unable to ascend a curb without assistance, necessitating her 's help. She also mentions difficulty in maneuvering her walker and had to walk behind a truck to find a more level path.     She received home visits from a physical therapist who has instructed her on exercises, which she has been diligently performing. She expresses interest in having a home health aide assist with bathing, as she occasionally requires help to enter and exit the shower due to instability and fear of falling.     She has a history of hypertension and has been experiencing issues with her pharmacy, [facility],  in obtaining her medical records from [facility]. This has resulted in a 4-day period without her blood pressure medication, which she believes may have contributed to her elevated blood pressure today.  She takes metoprolol 12.5 mg, which she takes twice daily. She is not currently taking the other formulation of metoprolol. She is also on midodrine, administered 3 times daily, for orthostatic hypotension. Her blood pressure regimen was adjusted due to low readings, including discontinuation of losartan.  She has instructed her caregiver to monitor her blood pressure daily.    MEDICATIONS  Current: Metoprolol, midodrine  Discontinued: Losartan     https://[email or web address].org/content/dam/CRC/[email or web address]/[email or web address]      Current Outpatient Medications:     Calcium Carbonate-Vitamin D3 600-400 MG-UNIT tablet, Take 1 tablet by mouth 2 (Two) Times a Day., Disp: , Rfl:     Elastic Bandages & Supports ([facility] Firm Compression Socks) misc, 2 each Daily. Please size appropriately for knee hi compression socks. 20-35mmHg. 2 pair each., Disp: 4 each, Rfl: 2    escitalopram (LEXAPRO) 5 MG tablet, Take 1 tablet by mouth Daily., Disp: 90 tablet, Rfl: 0    furosemide (LASIX) 20 MG tablet, Take 1 tablet by mouth Daily As Needed (edema)., Disp: 30 tablet, Rfl: 0    levothyroxine (SYNTHROID, LEVOTHROID) 75 MCG tablet, Take 1 tablet by mouth Daily., Disp: 90 tablet, Rfl: 3    Melatonin 10 MG capsule, Take 1 tablet by mouth every night at bedtime., Disp: 90 capsule, Rfl: 0    metoprolol succinate XL (TOPROL-XL) 25 MG 24 hr tablet, TAKE 1/2 TABLET BY MOUTH TWICE DAILY, Disp: 90 tablet, Rfl: 3    midodrine (PROAMATINE) 10 MG tablet, Take 1 tablet by mouth 3 (Three) Times a Day Before Meals., Disp: 90 tablet, Rfl: 3    multivitamin with minerals tablet tablet, Take 1 tablet by mouth Daily., Disp: , Rfl:     omeprazole (priLOSEC) 40 MG capsule, Take 1 capsule by mouth Daily., Disp: , Rfl:     polyethylene glycol (MiraLax) 17 GM/SCOOP powder, Take 17 g by  "mouth Daily., Disp: , Rfl:     zonisamide (ZONEGRAN) 100 MG capsule, Take 2 capsules by mouth Every Evening. Take three 100 mg capsules may take four if needed, Disp: , Rfl:     pramipexole (MIRAPEX) 0.25 MG tablet, Take 1 tablet by mouth every night at bedtime. (Patient not taking: Reported on 2025), Disp: , Rfl:     No Known Allergies          2025    11:52 AM   PHQ-2/PHQ-9 Depression Screening   Little interest or pleasure in doing things Not at all   Feeling down, depressed, or hopeless Not at all   How difficult have these problems made it for you to do your work, take care of things at home, or get along with other people? Not difficult at all       Review of Systems   Respiratory:  Negative for cough, shortness of breath and wheezing.    Cardiovascular:  Positive for leg swelling. Negative for chest pain and palpitations.   Musculoskeletal:  Positive for arthralgias, gait problem and myalgias.   Neurological:  Positive for weakness.       Objective     /82   Pulse 70   Temp 96.9 °F (36.1 °C) (Infrared)   Resp 20   Ht 160 cm (62.99\")   Wt 91.8 kg (202 lb 6.4 oz)   SpO2 99%   BMI 35.86 kg/m²          Physical Exam  Vitals and nursing note reviewed.   Constitutional:       General: She is not in acute distress.     Appearance: Normal appearance. She is not ill-appearing, toxic-appearing or diaphoretic.   Cardiovascular:      Rate and Rhythm: Normal rate and regular rhythm.      Heart sounds: Normal heart sounds.   Pulmonary:      Effort: Pulmonary effort is normal.      Breath sounds: Normal breath sounds.   Abdominal:      General: Abdomen is flat. Bowel sounds are normal.      Palpations: Abdomen is soft.   Musculoskeletal:      Right lower le+ Edema present.      Left lower le+ Edema present.   Neurological:      Mental Status: She is alert and oriented to person, place, and time. Mental status is at baseline.      Motor: Weakness and tremor present.      Gait: Gait abnormal. "   Psychiatric:         Mood and Affect: Mood normal.         Behavior: Behavior normal.         Thought Content: Thought content normal.       Derm Physical Exam  Physical Exam      Vital Signs  Blood pressure is 172/82.     Result Review   Results       Assessment and Plan       Assessment & Plan  Bilateral edema of lower extremity  Her blood work results are within normal limits. The swelling in her legs does not appear to be severe at this time. A prescription for Lasix 20 mg, to be taken as needed for swelling, will be provided and sent to [facility]. She is advised to elevate her legs, utilize TEE hose, and monitor her dietary salt intake, particularly from canned and frozen foods.       Essential hypertension  Her blood pressure was recorded as 172/82 during this visit, which is elevated. However, it is important to note that she has been without her antihypertensive medications for a few days. Her last recorded blood pressure in October 2024 was 137/78. She is advised to monitor her blood pressure daily and consult with Dr. Agrawal if it remains elevated. No changes to her current medication regimen will be made at this time.             Physical deconditioning    Orders:    Ambulatory Referral to Home Health    Sick sinus syndrome    Orders:    Ambulatory Referral to Home Health    Activity intolerance    Orders:    Ambulatory Referral to Home Health      Assessment & Plan  1. Lower extremity edema.  Her blood work results are within normal limits. The swelling in her legs does not appear to be severe at this time. A prescription for Lasix 20 mg, to be taken as needed for swelling, will be provided and sent to [facility]. She is advised to elevate her legs, utilize TEE hose, and monitor her dietary salt intake, particularly from canned and frozen foods.    2. Hypertension.  Her blood pressure was recorded as 172/82 during this visit, which is elevated. However, it is important to note that she has been without  her antihypertensive medications for a few days. Her last recorded blood pressure in October 2024 was 137/78. She is advised to monitor her blood pressure daily and consult with Dr. Agrawal if it remains elevated. No changes to her current medication regimen will be made at this time.      Procedures     {Instructions Charge Capture  Follow-up Communications     Wrapup Tab  No follow-ups on file.     Patient Instructions   Make appointment with Dr. Agrawal for hypertension management if uncontrolled.     Patient was given instructions and counseling regarding her condition or for health maintenance advice. Please see specific information pulled into the AVS if appropriate.  Hand hygiene was performed during entrance to exam room and following assessment of patient. This document is intended for medical expert use only.     EMR Dragon/Transcription disclaimer:   Much of this encounter note is an electronic transcription/translation of spoken language to printed text. The electronic translation of spoken language may permit erroneous, or at times, nonsensical words or phrases to be inadvertently transcribed.      VALENTINO Babb, FNP-C  [facility]  [address]  [facility]  [address] [place] [address] DR ANA LOWE [address] [place] [id number] [phone]    Patient or patient representative verbalized consent for the use of Ambient Listening during the visit with  VALENTINO Babb for chart documentation. 1/21/2025  14:34 EST

## 2025-01-23 ENCOUNTER — HOME CARE VISIT (OUTPATIENT)
Dept: HOME HEALTH SERVICES | Facility: HOME HEALTHCARE | Age: 85
End: 2025-01-23

## 2025-01-23 DIAGNOSIS — E03.9 HYPOTHYROIDISM, UNSPECIFIED TYPE: Chronic | ICD-10-CM

## 2025-01-23 RX ORDER — ESCITALOPRAM OXALATE 5 MG/1
5 TABLET ORAL DAILY
Qty: 90 TABLET | Refills: 0 | Status: SHIPPED | OUTPATIENT
Start: 2025-01-23

## 2025-01-23 RX ORDER — LEVOTHYROXINE SODIUM 75 UG/1
75 TABLET ORAL DAILY
Qty: 90 TABLET | Refills: 3 | Status: SHIPPED | OUTPATIENT
Start: 2025-01-23

## 2025-01-27 RX ORDER — MIDODRINE HYDROCHLORIDE 10 MG/1
10 TABLET ORAL
Qty: 270 TABLET | Refills: 1 | Status: SHIPPED | OUTPATIENT
Start: 2025-01-27

## 2025-01-28 ENCOUNTER — TELEPHONE (OUTPATIENT)
Dept: CARDIOLOGY | Facility: CLINIC | Age: 85
End: 2025-01-28

## 2025-01-28 NOTE — TELEPHONE ENCOUNTER
Caller: Anila Spencer    Relationship to patient: Self    Best call back number: [phone]    Patient is needing: PT CALLING BECAUSE SHE WAS SEEN IN [facility] ED LAST NIGHT. STATES HER BP WAS OVER 200. TOLD HER TO CONTACT HER CARDIOLOGIST. ED NOTE IN CHART. PLEASE CALL PT TO SCHEDULE.

## 2025-01-29 ENCOUNTER — TELEPHONE (OUTPATIENT)
Dept: FAMILY MEDICINE CLINIC | Facility: CLINIC | Age: 85
End: 2025-01-29
Payer: MEDICARE

## 2025-01-29 DIAGNOSIS — E03.9 HYPOTHYROIDISM, UNSPECIFIED TYPE: Chronic | ICD-10-CM

## 2025-01-29 RX ORDER — LEVOTHYROXINE SODIUM 75 UG/1
75 TABLET ORAL DAILY
Qty: 90 TABLET | Refills: 3 | Status: SHIPPED | OUTPATIENT
Start: 2025-01-29

## 2025-01-29 NOTE — TELEPHONE ENCOUNTER
Caller: Anila Spencer    Relationship: Self    Best call back number: [phone]       What was the call regarding: PATIENT NO LONGER USES [facility] AND WANTS THE RX MOVED TO [facility] BELOW. PATIENT HAS ONE PILL LEFT       levothyroxine (SYNTHROID, LEVOTHROID) 75 MCG tablet   [id number] - [place], [place]  [address] [address] [address] AT [facility] OF  &  - [phone]  - [phone]

## 2025-01-30 RX ORDER — ESCITALOPRAM OXALATE 5 MG/1
5 TABLET ORAL DAILY
Qty: 90 TABLET | Refills: 0 | Status: SHIPPED | OUTPATIENT
Start: 2025-01-30

## 2025-01-30 RX ORDER — LEVOTHYROXINE SODIUM 75 UG/1
75 TABLET ORAL DAILY
Qty: 90 TABLET | Refills: 3 | Status: SHIPPED | OUTPATIENT
Start: 2025-01-30

## 2025-02-25 ENCOUNTER — OFFICE VISIT (OUTPATIENT)
Dept: FAMILY MEDICINE CLINIC | Facility: CLINIC | Age: 85
End: 2025-02-25
Payer: MEDICARE

## 2025-02-25 VITALS
OXYGEN SATURATION: 96 % | RESPIRATION RATE: 18 BRPM | HEART RATE: 69 BPM | SYSTOLIC BLOOD PRESSURE: 170 MMHG | DIASTOLIC BLOOD PRESSURE: 84 MMHG | HEIGHT: 63 IN | BODY MASS INDEX: 37.28 KG/M2 | WEIGHT: 210.4 LBS | TEMPERATURE: 97.3 F

## 2025-02-25 DIAGNOSIS — Z91.81 AT HIGH RISK FOR FALLS: ICD-10-CM

## 2025-02-25 DIAGNOSIS — R29.898 WEAKNESS OF BOTH LOWER EXTREMITIES: ICD-10-CM

## 2025-02-25 DIAGNOSIS — I10 ESSENTIAL HYPERTENSION: Chronic | ICD-10-CM

## 2025-02-25 DIAGNOSIS — E03.9 HYPOTHYROIDISM, UNSPECIFIED TYPE: Chronic | ICD-10-CM

## 2025-02-25 DIAGNOSIS — R25.1 TREMOR: ICD-10-CM

## 2025-02-25 DIAGNOSIS — R60.0 BILATERAL EDEMA OF LOWER EXTREMITY: ICD-10-CM

## 2025-02-25 DIAGNOSIS — R53.81 PHYSICAL DECONDITIONING: ICD-10-CM

## 2025-02-25 DIAGNOSIS — E03.9 ACQUIRED HYPOTHYROIDISM: Chronic | ICD-10-CM

## 2025-02-25 DIAGNOSIS — F41.9 ANXIETY: Chronic | ICD-10-CM

## 2025-02-25 DIAGNOSIS — K21.9 GASTROESOPHAGEAL REFLUX DISEASE WITHOUT ESOPHAGITIS: Chronic | ICD-10-CM

## 2025-02-25 DIAGNOSIS — Z74.09 DECREASED MOBILITY AND ENDURANCE: ICD-10-CM

## 2025-02-25 DIAGNOSIS — I49.5 SICK SINUS SYNDROME: ICD-10-CM

## 2025-02-25 DIAGNOSIS — M51.34 DEGENERATION OF INTERVERTEBRAL DISC OF THORACIC REGION: ICD-10-CM

## 2025-02-25 DIAGNOSIS — Z00.00 MEDICARE ANNUAL WELLNESS VISIT, SUBSEQUENT: Primary | ICD-10-CM

## 2025-02-25 DIAGNOSIS — G40.909 SEIZURE DISORDER: Chronic | ICD-10-CM

## 2025-02-25 PROCEDURE — 1126F AMNT PAIN NOTED NONE PRSNT: CPT

## 2025-02-25 PROCEDURE — 99214 OFFICE O/P EST MOD 30 MIN: CPT

## 2025-02-25 PROCEDURE — G0439 PPPS, SUBSEQ VISIT: HCPCS

## 2025-02-25 PROCEDURE — G2211 COMPLEX E/M VISIT ADD ON: HCPCS

## 2025-02-25 PROCEDURE — 1170F FXNL STATUS ASSESSED: CPT

## 2025-02-25 RX ORDER — ESCITALOPRAM OXALATE 5 MG/1
5 TABLET ORAL DAILY
Qty: 90 TABLET | Refills: 3 | Status: SHIPPED | OUTPATIENT
Start: 2025-02-25

## 2025-02-25 RX ORDER — LOSARTAN POTASSIUM 25 MG/1
25 TABLET ORAL DAILY
Qty: 90 TABLET | Refills: 1 | Status: SHIPPED | OUTPATIENT
Start: 2025-02-25

## 2025-02-25 RX ORDER — LEVOTHYROXINE SODIUM 75 UG/1
75 TABLET ORAL DAILY
Qty: 90 TABLET | Refills: 3 | Status: SHIPPED | OUTPATIENT
Start: 2025-02-25

## 2025-02-25 NOTE — ASSESSMENT & PLAN NOTE
Hypertension controlled with diet, lifestyle and medication.    Will check labs and refill medication as needed.       Orders:    Comprehensive Metabolic Panel; Future    Lipid Panel With LDL / HDL Ratio; Future    CBC & Differential; Future

## 2025-02-25 NOTE — PROGRESS NOTES
Subjective   The ABCs of the Annual Wellness Visit  Medicare Wellness Visit      Anila Spencer is a 84 y.o. patient who presents for a Medicare Wellness Visit.    Patient Care Team:  Chantell Larios APRN as PCP - General (Nurse Practitioner)  Shannon Bennett DO as Consulting Physician (Neurology)  Adam Agrawal MD as Consulting Physician (Cardiology)  León Berg MD (Orthopedic Surgery)  Joaquin Story MD as Consulting Physician (Gastroenterology)  Banet, Duane Edward, MD as Consulting Physician (Dermatology)  Bart Longo MD as Consulting Physician (Urology)    The following portions of the patient's history were reviewed and   updated as appropriate: allergies, current medications, past family history, past medical history, past social history, past surgical history, and problem list.    Compared to one year ago, the patient's physical   health is worse.  Compared to one year ago, the patient's mental   health is the same.    Recent Hospitalizations:  She was not admitted to the hospital during the last year.     Current Medical Providers:  Patient Care Team:  Chantell Larios APRN as PCP - General (Nurse Practitioner)  Shannon Bennett DO as Consulting Physician (Neurology)  Adam Agrawal MD as Consulting Physician (Cardiology)  León Berg MD (Orthopedic Surgery)  Joaquin Story MD as Consulting Physician (Gastroenterology)  Banet, Duane Edward, MD as Consulting Physician (Dermatology)  Bart Longo MD as Consulting Physician (Urology)  [facility], Podiatry      Outpatient Medications Prior to Visit   Medication Sig Dispense Refill    Calcium Carbonate-Vitamin D3 600-400 MG-UNIT tablet Take 1 tablet by mouth 2 (Two) Times a Day.      Elastic Bandages & Supports ([employer] Firm Compression Socks) misc 2 each Daily. Please size appropriately for knee hi compression socks. 20-35mmHg. 2 pair each. 4 each 2    furosemide (LASIX) 20 MG tablet Take 1 tablet  by mouth Daily As Needed (edema). 30 tablet 0    Melatonin 10 MG capsule Take 1 tablet by mouth every night at bedtime. 90 capsule 0    metoprolol succinate XL (TOPROL-XL) 25 MG 24 hr tablet TAKE 1/2 TABLET BY MOUTH TWICE DAILY 90 tablet 3    multivitamin with minerals tablet tablet Take 1 tablet by mouth Daily.      omeprazole (priLOSEC) 40 MG capsule Take 1 capsule by mouth Daily.      polyethylene glycol (MiraLax) 17 GM/SCOOP powder Take 17 g by mouth Daily.      pramipexole (MIRAPEX) 0.25 MG tablet Take 1 tablet by mouth every night at bedtime.      escitalopram (LEXAPRO) 5 MG tablet Take 1 tablet by mouth Daily. 90 tablet 0    escitalopram (LEXAPRO) 5 MG tablet Take 1 tablet by mouth Daily. Indications: Generalized Anxiety Disorder 90 tablet 0    levothyroxine (SYNTHROID, LEVOTHROID) 75 MCG tablet Take 1 tablet by mouth Daily. 90 tablet 3    levothyroxine (SYNTHROID, LEVOTHROID) 75 MCG tablet Take 1 tablet by mouth Daily. Indications: Underactive Thyroid 90 tablet 3    midodrine (PROAMATINE) 10 MG tablet TAKE 1 TABLET BY MOUTH 3 (THREE) TIMES A DAY BEFORE MEALS. (Patient not taking: Reported on 2/25/2025) 270 tablet 1    zonisamide (ZONEGRAN) 100 MG capsule Take 2 capsules by mouth Every Evening. Take three 100 mg capsules may take four if needed       No facility-administered medications prior to visit.     No opioid medication identified on active medication list. I have reviewed chart for other potential  high risk medication/s and harmful drug interactions in the elderly.      Aspirin is not on active medication list.  Aspirin use is not indicated based on review of current medical condition/s. Risk of harm outweighs potential benefits.  .    Patient Active Problem List   Diagnosis    Cardiomegaly    Essential hypertension    Mitral valve insufficiency    Degeneration of intervertebral disc of thoracic region    Presence of cardiac pacemaker    Dyspnea on exertion    DDD (degenerative disc disease), lumbar     Sick sinus syndrome    PCO (posterior capsular opacification), bilateral    Diplopia    Glaucoma suspect    Pseudophakia of both eyes    Prediabetes    Hypothyroidism    Urinary incontinence    Squamous cell skin cancer, face    Spinal stenosis of cervical region    Seizure disorder    Age-related osteoporosis without current pathological fracture    Osteoarthritis    GERD (gastroesophageal reflux disease)    Fatty liver    Degeneration of intervertebral disc of thoracic region    DDD (degenerative disc disease), cervical    Chronic constipation    Cervical spinal stenosis    Biliary dyskinesia    Basal cell carcinoma (BCC) of face    Anxiety    Traumatic brain injury    Chronic insomnia    Class 1 obesity due to excess calories with serious comorbidity and body mass index (BMI) of 32.0 to 32.9 in adult    Elective replacement indicated for cardiac pacemaker battery at end of lifespan    Overactive bladder    Orthostatic hypotension    Tubular adenoma of colon    At high risk for falls    Abnormal lung function test    Abnormal PFTs (pulmonary function tests)    Cardiac arrhythmia    Bilateral edema of lower extremity    Chronic pain of left knee    Dependent edema    Hypertrophic toenail    Primary osteoarthritis involving multiple joints    Restless legs    Muscle spasm of both lower legs    Limited mobility    Elevated LDL cholesterol level    Tremor    Complex partial epilepsy    Generalized anxiety disorder    Seasonal allergies    Activity intolerance    Physical deconditioning     Advance Care Planning Advance Directive is on file.  ACP discussion was held with the patient during this visit. Patient has an advance directive in EMR which is still valid.             Objective   Vitals:    02/25/25 1349   BP: 170/84   BP Location: Right arm   Patient Position: Sitting   Cuff Size: Large Adult   Pulse: 69   Resp: 18   Temp: 97.3 °F (36.3 °C)   TempSrc: Temporal   SpO2: 96%   Weight: 95.4 kg (210 lb 6.4 oz)  "  Height: 160 cm (62.99\")   PainSc: 0-No pain       Estimated body mass index is 37.28 kg/m² as calculated from the following:    Height as of this encounter: 160 cm (62.99\").    Weight as of this encounter: 95.4 kg (210 lb 6.4 oz).    Class 2 Severe Obesity (BMI >=35 and <=39.9). Obesity-related health conditions include the following: none. Obesity is unchanged. BMI is is above average; BMI management plan is completed. We discussed portion control and increasing exercise.           Does the patient have evidence of cognitive impairment? No                                                                                                Health  Risk Assessment    Smoking Status:  Social History     Tobacco Use   Smoking Status Never   Smokeless Tobacco Never   Tobacco Comments    Patient is advised not to smoke.     Alcohol Consumption:  Social History     Substance and Sexual Activity   Alcohol Use No       Fall Risk Screen  STEADI Fall Risk Assessment was completed, and patient is at LOW risk for falls.Assessment completed on:2025    Depression Screening   Little interest or pleasure in doing things? Not at all   Feeling down, depressed, or hopeless? Not at all   PHQ-2 Total Score 0      Health Habits and Functional and Cognitive Screenin/25/2025     1:48 PM   Functional & Cognitive Status   Do you have difficulty preparing food and eating? Yes   Do you have difficulty bathing yourself, getting dressed or grooming yourself? Yes   Do you have difficulty using the toilet? Yes   Do you have difficulty moving around from place to place? Yes   Do you have trouble with steps or getting out of a bed or a chair? Yes   Current Diet Unhealthy Diet   Dental Exam Up to date   Eye Exam Up to date   Exercise (times per week) 0 times per week   Current Exercises Include No Regular Exercise   Do you need help using the phone?  No   Are you deaf or do you have serious difficulty hearing?  No   Do you need help to go " to places out of walking distance? Yes   Do you need help shopping? Yes   Do you need help preparing meals?  Yes   Do you need help with housework?  Yes   Do you need help with laundry? Yes   Do you need help taking your medications? Yes   Do you need help managing money? No   Do you ever drive or ride in a car without wearing a seat belt? No   Have you felt unusual stress, anger or loneliness in the last month? No   Who do you live with? Spouse   If you need help, do you have trouble finding someone available to you? No   Have you been bothered in the last four weeks by sexual problems? No   Do you have difficulty concentrating, remembering or making decisions? No           Age-appropriate Screening Schedule:  Refer to the list below for future screening recommendations based on patient's age, sex and/or medical conditions. Orders for these recommended tests are listed in the plan section. The patient has been provided with a written plan.    Health Maintenance List  Health Maintenance   Topic Date Due    RSV Vaccine - Adults (1 - 1-dose 75+ series) 02/25/2026 (Originally 11/1/2013)    ANNUAL WELLNESS VISIT  02/25/2026    BMI FOLLOWUP  02/25/2026    DXA SCAN  03/14/2026    TDAP/TD VACCINES (4 - Td or Tdap) 04/10/2028    COVID-19 Vaccine  Completed    INFLUENZA VACCINE  Completed    Pneumococcal Vaccine 50+  Completed    ZOSTER VACCINE  Completed                                                                                                                                                CMS Preventative Services Quick Reference  Risk Factors Identified During Encounter  None Identified    The above risks/problems have been discussed with the patient.  Pertinent information has been shared with the patient in the After Visit Summary.  An After Visit Summary and PPPS were made available to the patient.    Follow Up:   Next Medicare Wellness visit to be scheduled in 1 year.         Additional E&M Note during same  encounter follows:  Patient has additional, significant, and separately identifiable condition(s)/problem(s) that require work above and beyond the Medicare Wellness Visit     Chief Complaint  Medicare Wellness-subsequent    Subjective    HPI  Anila is also being seen today for additional medical problem/s.       The patient is an 84-year-old female who presents for an annual Medicare wellness visit. She is accompanied by her .    She reports a fluctuating health status, with some days being more favorable than others. She has been experiencing persistent leg edema, which she manages with Lasix as needed, having used it twice recently. The swelling in her legs remains stable, although it exhibits a pattern of increase and decrease. She has been applying lotion to her legs intermittently, with assistance from her daughters over the weekend, due to dry skin. She has been experiencing intermittent chest pain, which she attributes to elevated blood pressure. She also reports dyspnea following ambulation, necessitating rest before resuming activity. She underwent a stress test in 06/2024, which yielded normal results. She acknowledges a lack of physical fitness and difficulty with prolonged walking. She has not experienced any recent seizures and believes her medication is effectively controlling this condition. She does not require home health services at present. She has arranged for a friend to assist with grocery shopping every 2 weeks. She has not experienced any falls and is vigilant about fall prevention. She does not report any palpitations, cough, shortness of breath, muscle aches, constipation, or diarrhea. She is currently on metoprolol 12.5 mg daily, having halved the dose from 25 mg. She is also taking calcium and vitamin D supplements for bone health, Lexapro 5 mg for mood regulation, levothyroxine, zonisamide, omeprazole for acid reflux, and midodrine 3 times daily before meals. She takes one  "promethazine suppository at bedtime and uses MiraLAX as needed, although she has not required it recently. She has discontinued losartan 25 mg daily due to low blood pressure but still has some remaining.    She experienced anxiety following familial bereavements, which was not adequately managed with Lexapro 5 mg every other day, prompting a return to daily dosing. She reports improved anxiety control with this regimen.    She experiences periodic back pain, which she attributes to arthritis.    She reports a gradual worsening of tremors, particularly in her left arm, which will be addressed by Dr. Lebron at her next appointment.    MEDICATIONS  Current: Lasix, metoprolol, calcium, vitamin D, Lexapro, levothyroxine, zonisamide, pramipexole, MiraLAX, omeprazole, midodrine.  Discontinued: Losartan.  Review of Systems   Respiratory:  Negative for cough and shortness of breath.    Cardiovascular:  Negative for chest pain, palpitations and leg swelling.   Musculoskeletal:  Positive for arthralgias, gait problem and myalgias.          Objective   Vital Signs:  /84 (BP Location: Right arm, Patient Position: Sitting, Cuff Size: Large Adult)   Pulse 69   Temp 97.3 °F (36.3 °C) (Temporal)   Resp 18   Ht 160 cm (62.99\")   Wt 95.4 kg (210 lb 6.4 oz)   SpO2 96%   BMI 37.28 kg/m²   Physical Exam  Vitals and nursing note reviewed.   Constitutional:       General: She is not in acute distress.     Appearance: Normal appearance. She is not ill-appearing, toxic-appearing or diaphoretic.   Cardiovascular:      Rate and Rhythm: Normal rate and regular rhythm.      Heart sounds: Normal heart sounds.   Pulmonary:      Effort: Pulmonary effort is normal.      Breath sounds: Normal breath sounds.   Abdominal:      General: Abdomen is flat. Bowel sounds are normal.      Palpations: Abdomen is soft.   Musculoskeletal:      Right lower le+ Edema present.      Left lower le+ Edema present.   Neurological:      Mental " Status: She is alert and oriented to person, place, and time. Mental status is at baseline.      Motor: Weakness and tremor present.      Gait: Gait abnormal.   Psychiatric:         Mood and Affect: Mood normal.         Behavior: Behavior normal.         Thought Content: Thought content normal.           Vital Signs  Blood pressure is 170/84.    The following data was reviewed by: VALENTINO Babb on 02/25/2025:  Data reviewed : Consultant notes Dr. Agrawal OV notes      Results                Assessment and Plan        1. Annual Medicare wellness visit.  Her last consultation with Dr. Agrawal was on 10/07/2024, during which her blood pressure was recorded as 137/78. She was prescribed Toprol, half a tablet, and losartan was discontinued due to low blood pressure. However, given the current elevation in her blood pressure, it is deemed necessary to reinstate the losartan 25 mg daily regimen. The target blood pressure is set at 130/80, with a maximum allowable limit of 130/90. A comprehensive blood workup will be ordered, including a complete blood count, metabolic profile, cholesterol panel, and thyroid function tests. A referral to  will be made to explore potential avenues of assistance.    2. Hypertension.  Her blood pressure today is elevated at 170/84. She is advised to restart losartan 25 mg daily to help manage her blood pressure. The goal is to achieve a blood pressure of 130/80, not exceeding 130/90. She is currently on metoprolol 12.5 mg daily, which will not be increased due to its potential to slow down the heart rate.    3. Edema.  She uses Lasix as needed for leg swelling, which she has used twice recently. She is advised to continue monitoring the swelling and use Lasix as required.    4. Hypothyroidism.  Her thyroid function was last checked in July 2023 and showed underactive thyroid. Thyroid function tests will be included in the upcoming blood work to monitor her condition.    5.  Anxiety.  She is currently taking Lexapro 5 mg daily, which has been effective in managing her anxiety. She is advised to continue this regimen.    6. Gastroesophageal Reflux Disease (GERD).  She is on omeprazole for acid reflux, prescribed by Dr. Story. She is advised to check her current supply and contact Dr. Story for a refill if needed.    7. Seizure Disorder.  She reports no recent seizures and states that her current medication is effective. She is advised to contact Dr. Hernandez for a refill of zonisamide as she is running low.    8. Tremors.  She reports worsening tremors, especially in her left arm. She is advised to follow up with Dr. Acosta for further evaluation and management.    9. Back Pain.  She experiences periodic back pain, likely due to arthritis. She is advised to continue monitoring her symptoms and report any significant changes.    10. Medication Management.  Prescriptions for Lexapro 5 mg, Lasix, levothyroxine, and zonisamide will be sent to [facility]. She is advised to inform the pharmacy if any medications were mistakenly sent to [facility].      Assessment & Plan  Medicare annual wellness visit, subsequent    Orders:    Ambulatory Referral to Social Care Services (Amb Case Mgmt)    Decreased mobility and endurance    Orders:    Ambulatory Referral to Social Care Services (Amb Case Mgmt)    Essential hypertension  Hypertension controlled with diet, lifestyle and medication.    Will check labs and refill medication as needed.       Orders:    Comprehensive Metabolic Panel; Future    Lipid Panel With LDL / HDL Ratio; Future    CBC & Differential; Future    Acquired hypothyroidism    Orders:    TSH Rfx On Abnormal To Free T4; Future    Anxiety         Hypothyroidism, unspecified type    Orders:    levothyroxine (SYNTHROID, LEVOTHROID) 75 MCG tablet; Take 1 tablet by mouth Daily.    Seizure disorder  Seizure free   Orders:    Ambulatory Referral to Social Care Services (Amb Case  Mgmt)    Gastroesophageal reflux disease without esophagitis  Avoid eating or drinking approximately three hours before bed.  Avoid tobacco use, spicy foods, alcohol, caffeinated beverages or other foods that irritate GERD.  Sleep elevated 10-15 degrees.  Take medication as directed (if ordered).         Bilateral edema of lower extremity    Orders:    Ambulatory Referral to Social Care Services (Amb Case Mgmt)    Physical deconditioning    Orders:    Ambulatory Referral to Social Care Services (Amb Case Mgmt)    Sick sinus syndrome    Orders:    Ambulatory Referral to Social Care Services (Amb Case Mgmt)    Weakness of both lower extremities    Orders:    Ambulatory Referral to Social Care Services (Amb Case Mgmt)    At high risk for falls    Orders:    Ambulatory Referral to Social Care Services (Amb Case Mgmt)    Tremor    Orders:    Ambulatory Referral to Social Care Services (Amb Case Mgmt)    Degeneration of intervertebral disc of thoracic region    Orders:    Ambulatory Referral to Social Care Services (Amb Case Mgmt)                 Follow Up   No follow-ups on file.  Patient was given instructions and counseling regarding her condition or for health maintenance advice. Please see specific information pulled into the AVS if appropriate.  Patient or patient representative verbalized consent for the use of Ambient Listening during the visit with  VALENTINO Babb for chart documentation. 2/25/2025  14:45 EST

## 2025-02-26 ENCOUNTER — REFERRAL TRIAGE (OUTPATIENT)
Age: 85
End: 2025-02-26
Payer: MEDICARE

## 2025-02-28 ENCOUNTER — PATIENT OUTREACH (OUTPATIENT)
Age: 85
End: 2025-02-28
Payer: MEDICARE

## 2025-02-28 NOTE — OUTREACH NOTE
Social Work Assessment  Questions/Answers      Flowsheet Row Most Recent Value   Referral Source outpatient staff, outpatient clinic, physician   Reason for Consult community resources   Preferred Language English   Advance Care Planning Reviewed no concerns identified   Decision Making Considerations patient/family ability to make health care decisions, patient/family capacity to make sound judgments, patient/ for healthcare needs, patient/family management of healthcare needs   People in Home spouse   Current Living Arrangements home   Potentially Unsafe Housing Conditions none   In the past 12 months has the electric, gas, oil, or water company threatened to shut off services in your home? No   Primary Care Provided by self, spouse/significant other, child(judy), friend   Provides Primary Care For no one   Family Caregiver if Needed child(judy), adult, friend(s)   Quality of Family Relationships helpful, involved   Able to Return to Prior Arrangements yes   Employment Status retired   Source of Income social security   Medications independent   Meal Preparation assistive person   Housekeeping assistive person   Laundry assistive person   Shopping assistive person   If for any reason you need help with day-to-day activities such as bathing, preparing meals, shopping, managing finances, etc., do you get the help you need? I could use a little more help          SDOH updated and reviewed with the patient during this program:  Financial Resource Strain: Low Risk  (3/17/2023)    Overall Financial Resource Strain (CARDIA)     Difficulty of Paying Living Expenses: Not very hard      --     Food Insecurity: No Food Insecurity (2/28/2025)    Hunger Vital Sign     Worried About Running Out of Food in the Last Year: Never true     Ran Out of Food in the Last Year: Never true      --     Housing Stability: Not At Risk (2/28/2025)    Housing Stability     Current Living Arrangements: home     Potentially Unsafe  Housing Conditions: none      --     Transportation Needs: No Transportation Needs (2/28/2025)    PRAPARE - Transportation     Lack of Transportation (Medical): No     Lack of Transportation (Non-Medical): No     Patient Outreach    SW received referral via PCP re: in home care services. SW called and spoke to patient via telephone. Patient and spouse live in a home together. Patient has good family support from family and friends. SW provided education on [facility] and PP care giving options. Agreeable for this SW to MAIL care giving resources to patient. Denied referral to [facility] at this time due to extensive wait list. No additional concerns noted. This SW to continue to monitor x 30 days to ensure no additional needs arise prior to DC.    Mary FRANCIS -   Ambulatory Case Management    2/28/2025, 15:27 EST

## 2025-03-28 ENCOUNTER — PATIENT OUTREACH (OUTPATIENT)
Age: 85
End: 2025-03-28
Payer: MEDICARE

## 2025-03-28 NOTE — OUTREACH NOTE
Care Coordination    Per faina review, no additional needs noted in the past 30 days. SW to discharge. Please re consult SW if additional needs arise.       Mary FRANCIS -   Ambulatory Case Management    3/28/2025, 08:16 EDT

## 2025-04-01 ENCOUNTER — OFFICE (AMBULATORY)
Dept: URBAN - METROPOLITAN AREA CLINIC 64 | Facility: CLINIC | Age: 85
End: 2025-04-01
Payer: MEDICARE

## 2025-04-01 VITALS
HEART RATE: 70 BPM | WEIGHT: 200 LBS | SYSTOLIC BLOOD PRESSURE: 137 MMHG | DIASTOLIC BLOOD PRESSURE: 85 MMHG | HEIGHT: 64 IN

## 2025-04-01 DIAGNOSIS — R13.10 DYSPHAGIA, UNSPECIFIED: ICD-10-CM

## 2025-04-01 DIAGNOSIS — K21.9 GASTRO-ESOPHAGEAL REFLUX DISEASE WITHOUT ESOPHAGITIS: ICD-10-CM

## 2025-04-01 PROCEDURE — 99212 OFFICE O/P EST SF 10 MIN: CPT | Performed by: NURSE PRACTITIONER

## 2025-04-01 RX ORDER — OMEPRAZOLE 40 MG/1
40 CAPSULE, DELAYED RELEASE ORAL
Qty: 90 | Refills: 3 | Status: ACTIVE
Start: 2025-04-01

## 2025-04-09 ENCOUNTER — TELEPHONE (OUTPATIENT)
Dept: FAMILY MEDICINE CLINIC | Facility: CLINIC | Age: 85
End: 2025-04-09

## 2025-04-09 NOTE — TELEPHONE ENCOUNTER
Caller: Anila Spencer    Relationship to patient: Self    Best call back number: [phone]          Patient is needing: PATIENT CALLING TO SEE IF HER LAB ORDERS WERE FAXED TO [facility]. PLEASE CALL BACK TO ADVISE

## 2025-04-14 NOTE — TELEPHONE ENCOUNTER
Caller: Anila Spencer    Relationship to Patient: Self    Phone Number: [phone]     Reason for Call: PATIENT CALLING STATING THAT THE [facility] WAS UNABLE TO COMPLETE HER LABS DUE TO THE ORDERS NOT BEING SIGNED. PATIENT WILL BE GOING BACK TODAY TO TRY AND GET HER LABS SHE WOULD LIKE FOR THEM TO BE RESENT.    PLEASE CALL PATIENT TO LET HER KNOW WHEN THIS HAS BEEN FAXED.

## 2025-04-24 NOTE — PROGRESS NOTES
Date of Office Visit: 2025  Encounter Provider: Dr. Adam Agrawal  Place of Service: [facility] CARDIOLOGY [place]  Patient Name: Anila Spencer  :1940  Chantell Larios APRN    Chief Complaint   Patient presents with    Hypertension    Pacemaker Check    Follow-up     History of Present Illness    Anila Spencer is a 84 y.o. female. Her past medical history significant for hypertension, sick sinus syndrome, tachybradycardia syndrome, history of syncope came today for follow-up.     Previously patient had repeated episodes of syncope. She had extensive workup which showed that she had underlying sick sinus syndrome. She underwent permanent  pacemaker implantation. Since then patient symptoms of syncope had completely resolved. Previously, patient was also evaluated with cardiac catheterization because of her symptoms of angina pectoris and she was noted to have no significant coronary artery disease. Her previous CAT scan of chest was also negative for pulmonary embolism and she had pulmonary function test which was negative for any significant pulmonary airway disease. It was noted that her symptoms of chest pain at that time was probably related to gastroesophageal reflux disease and she was appropriately treated.    In 2019, patient had an episode of syncope at home.  Patient underwent tilt table test and it showed hypotensive response to head up tilt.  Patient was started on midodrine 5 mg twice daily.  Echocardiogram showed normal left ventricular size and function.  LVEF was 60 to 65%.  Mild mitral regurgitation was noted.  Stress test was negative for ischemia.    Patient came today for follow-up.  Patient reports bilateral leg edema.  Patient is very limited in her activities.  Patient is limited to wheelchair.  Blood pressure is slightly high but blood pressure at home are within desirable range patient denies any fall.  No orthopnea PND.  Patient  is in the  hospital.    Pacemaker is interrogated and it is functioning appropriately.  Patient has 4.5-year left on the pacemaker generator life.  Patient is requiring 98% of pacing in the atrium no atrial fibrillation noted    At this stage, I would recommend to increase Lasix to 40 mg daily.  Monitor the blood pressure.  Continue midodrine.  Pacemaker functioning appropriately no change in programming      Past Medical History:   Diagnosis Date    Abnormal cardiovascular stress test 03/21/2017    Allergic rhinitis     Anxiety     At high risk for falls 01/28/2022    Basal cell carcinoma (BCC) of face     Biliary dyskinesia     Bradycardia     Cardiomegaly     Cataract     Cervical spinal stenosis     Chronic constipation     Chronic insomnia     COVID-19 08/09/2022    Diagnosed with Covid on 7/7/2022 with lingering side effect of fatigue.    DDD (degenerative disc disease), cervical     C-spine, T-spine, L-Spine    DDD (degenerative disc disease), lumbar 07/25/2019    Degeneration of intervertebral disc of thoracic region 03/18/2017    Diplopia     Dr. Laird    Dysphagia 04/25/2019    Emphysema of lung     Fatigue 08/09/2022    Reports new onset fatigue, worse after Covid illness in 7/2022.         Fatty liver     Fracture of multiple ribs 07/2018    Left 4th-8th    Gastroesophageal reflux disease 12/04/2012    GERD (gastroesophageal reflux disease)     Hematuria 12/29/2014    History of recent fall     Hypertension     Hypothyroidism     Injury of back     Insomnia 03/07/2016    Kidney stone     Kidney stone     Left arm pain     Mild peripheral edema 08/09/2022    Patient reports occasional dependent edema when standing for extended period of time or when she is seated with legs not elevated.     Nasal fracture 07/2018    Nasal fracture 07/01/2018    Osteoarthritis     Osteoporosis     h/o tx with Fosamax but quit in 2012 due to fears of complications    Prediabetes     Rotator cuff tear     right    Rotator cuff tear      right    Seizure disorder     Dr. Shannon Bennett    Shortness of breath 05/08/2019    Sick sinus syndrome     Dr. Agrawal    Spinal stenosis of cervical region 02/11/2015    Squamous cell skin cancer, face     Traumatic brain injury     from fall    Traumatic brain injury     from fall    Tubular adenoma of colon 10/14/2021    No further recall due to age    Urinary incontinence          Past Surgical History:   Procedure Laterality Date    BILATERAL SALPINGO OOPHORECTOMY      for benign cysts    BREAST CYST ASPIRATION Left     CARDIAC CATHETERIZATION  08/25/2009    25% LAD. L Cx luminal irregularities. Normal L main    CARDIAC CATHETERIZATION  03/29/2017    25% LAD. 10-20% LCX. ER 65%. Dr. Agrawal.     CARDIAC ELECTROPHYSIOLOGY PROCEDURE N/A 06/03/2021    Procedure: PPM generator change - dual    MDT;  Surgeon: Bharathi Mora MD;  Location: [facility] CATH INVASIVE LOCATION;  Service: Cardiovascular;  Laterality: N/A;    CARDIOVASCULAR STRESS TEST  2019    CATARACT EXTRACTION  2006    COSMETIC SURGERY      Lip    ECHO - CONVERTED  2019    ENDOSCOPY  05/17/2017    Normal, Dr. Gutierrez    ENDOSCOPY N/A 08/22/2019    Procedure: ESOPHAGOGASTRODUODENOSCOPY WITH DILATATION (BOUGIE #46,50);  Surgeon: Joaquin Story MD;  Location: [facility] ENDOSCOPY;  Service: Gastroenterology    INSERT / REPLACE / REMOVE PACEMAKER      KNEE ARTHROSCOPY Left 1992    OOPHORECTOMY      OTHER SURGICAL HISTORY Right 07/2017    Right eye, YAG Capsuloyomy , Dr. Lemus    PACEMAKER IMPLANTATION  2009    ROTATOR CUFF REPAIR Right     Dr. Goldberg    TONSILLECTOMY      TOTAL HIP ARTHROPLASTY Bilateral            Current Outpatient Medications:     Calcium Carbonate-Vitamin D3 600-400 MG-UNIT tablet, Take 1 tablet by mouth 2 (Two) Times a Day., Disp: , Rfl:     Elastic Bandages & Supports ([employer] Firm Compression Socks) misc, 2 each Daily. Please size appropriately for knee hi compression socks. 20-35mmHg. 2 pair each., Disp: 4 each, Rfl: 2     escitalopram (LEXAPRO) 5 MG tablet, Take 1 tablet by mouth Daily., Disp: 90 tablet, Rfl: 3    furosemide (LASIX) 40 MG tablet, Take 1 tablet by mouth Daily., Disp: 90 tablet, Rfl: 1    levothyroxine (SYNTHROID, LEVOTHROID) 75 MCG tablet, Take 1 tablet by mouth Daily., Disp: 90 tablet, Rfl: 3    losartan (COZAAR) 25 MG tablet, Take 1 tablet by mouth Daily. for blood pressure., Disp: 90 tablet, Rfl: 1    Melatonin 10 MG capsule, Take 1 tablet by mouth every night at bedtime., Disp: 90 capsule, Rfl: 0    metoprolol succinate XL (TOPROL-XL) 25 MG 24 hr tablet, TAKE 1/2 TABLET BY MOUTH TWICE DAILY, Disp: 90 tablet, Rfl: 3    midodrine (PROAMATINE) 10 MG tablet, TAKE 1 TABLET BY MOUTH 3 (THREE) TIMES A DAY BEFORE MEALS., Disp: 270 tablet, Rfl: 1    multivitamin with minerals tablet tablet, Take 1 tablet by mouth Daily., Disp: , Rfl:     omeprazole (priLOSEC) 40 MG capsule, Take 1 capsule by mouth Daily., Disp: , Rfl:     polyethylene glycol (MiraLax) 17 GM/SCOOP powder, Take 17 g by mouth Daily., Disp: , Rfl:     pramipexole (MIRAPEX) 0.25 MG tablet, Take 1 tablet by mouth every night at bedtime., Disp: , Rfl:     zonisamide (ZONEGRAN) 100 MG capsule, Take 2 capsules by mouth Every Evening. Take three 100 mg capsules may take four if needed, Disp: , Rfl:       Social History     Socioeconomic History    Marital status:    Tobacco Use    Smoking status: Never    Smokeless tobacco: Never    Tobacco comments:     Patient is advised not to smoke.   Vaping Use    Vaping status: Never Used   Substance and Sexual Activity    Alcohol use: No    Drug use: No    Sexual activity: Defer         Review of Systems   Constitutional: Negative for chills and fever.   HENT:  Negative for ear discharge and nosebleeds.    Eyes:  Negative for discharge and redness.   Cardiovascular:  Positive for leg swelling. Negative for chest pain, orthopnea, palpitations, paroxysmal nocturnal dyspnea and syncope.   Respiratory:  Positive for  "shortness of breath. Negative for cough and wheezing.    Endocrine: Negative for heat intolerance.   Skin:  Negative for rash.   Musculoskeletal:  Negative for arthritis and myalgias.   Gastrointestinal:  Negative for abdominal pain, melena, nausea and vomiting.   Genitourinary:  Negative for dysuria and hematuria.   Neurological:  Negative for dizziness, light-headedness, numbness and tremors.   Psychiatric/Behavioral:  Negative for depression. The patient is not nervous/anxious.        Procedures    Procedures    No orders to display           Objective:    /77 (BP Location: Right arm, Patient Position: Sitting, Cuff Size: Large Adult)   Pulse 74   Resp 16   Ht 160 cm (62.99\")   Wt 95.3 kg (210 lb)   SpO2 97%   BMI 37.21 kg/m²         Constitutional:       Appearance: Well-developed.   Eyes:      General: No scleral icterus.        Right eye: No discharge.   HENT:      Head: Normocephalic and atraumatic.   Neck:      Thyroid: No thyromegaly.      Lymphadenopathy: No cervical adenopathy.   Pulmonary:      Effort: Pulmonary effort is normal. No respiratory distress.      Breath sounds: Normal breath sounds. No wheezing. No rales.   Cardiovascular:      Normal rate. Regular rhythm.      No gallop.    Edema:     Peripheral edema present.  Abdominal:      Tenderness: There is no abdominal tenderness.   Skin:     Findings: No erythema or rash.   Neurological:      Mental Status: Alert and oriented to person, place, and time.             Assessment:       Diagnosis Plan   1. Essential hypertension  furosemide (LASIX) 40 MG tablet      2. Sick sinus syndrome  furosemide (LASIX) 40 MG tablet      3. Presence of cardiac pacemaker  furosemide (LASIX) 40 MG tablet      4. Bilateral leg edema  furosemide (LASIX) 40 MG tablet               Plan:       MDM:    1.  Bilateral leg edema:    Patient has leg edema increase Lasix to 40 mg daily    2.  Status post pacemaker:    It is functioning appropriately no change in " programming    3.  Hypertension:    Blood pressure is slightly high but I would not recommend any change because of risk of fall.

## 2025-04-25 ENCOUNTER — OFFICE VISIT (OUTPATIENT)
Dept: CARDIOLOGY | Facility: CLINIC | Age: 85
End: 2025-04-25
Payer: MEDICARE

## 2025-04-25 VITALS
SYSTOLIC BLOOD PRESSURE: 154 MMHG | DIASTOLIC BLOOD PRESSURE: 77 MMHG | WEIGHT: 210 LBS | HEIGHT: 63 IN | OXYGEN SATURATION: 97 % | BODY MASS INDEX: 37.21 KG/M2 | HEART RATE: 74 BPM | RESPIRATION RATE: 16 BRPM

## 2025-04-25 DIAGNOSIS — I49.5 SICK SINUS SYNDROME: Chronic | ICD-10-CM

## 2025-04-25 DIAGNOSIS — I10 ESSENTIAL HYPERTENSION: Primary | Chronic | ICD-10-CM

## 2025-04-25 DIAGNOSIS — Z95.0 PRESENCE OF CARDIAC PACEMAKER: ICD-10-CM

## 2025-04-25 DIAGNOSIS — R60.0 BILATERAL LEG EDEMA: ICD-10-CM

## 2025-04-25 RX ORDER — FUROSEMIDE 40 MG/1
40 TABLET ORAL DAILY
Qty: 90 TABLET | Refills: 1 | Status: SHIPPED | OUTPATIENT
Start: 2025-04-25

## 2025-07-02 RX ORDER — ESCITALOPRAM OXALATE 5 MG/1
5 TABLET ORAL DAILY
Qty: 90 TABLET | Refills: 3 | Status: SHIPPED | OUTPATIENT
Start: 2025-07-02

## 2025-07-17 ENCOUNTER — OFFICE VISIT (OUTPATIENT)
Dept: FAMILY MEDICINE CLINIC | Facility: CLINIC | Age: 85
End: 2025-07-17
Payer: MEDICARE

## 2025-07-17 VITALS
DIASTOLIC BLOOD PRESSURE: 79 MMHG | RESPIRATION RATE: 18 BRPM | OXYGEN SATURATION: 98 % | WEIGHT: 210 LBS | HEIGHT: 63 IN | HEART RATE: 70 BPM | TEMPERATURE: 96.8 F | BODY MASS INDEX: 37.21 KG/M2 | SYSTOLIC BLOOD PRESSURE: 158 MMHG

## 2025-07-17 DIAGNOSIS — R32 URINARY INCONTINENCE, UNSPECIFIED TYPE: ICD-10-CM

## 2025-07-17 DIAGNOSIS — Z74.09 DECREASED MOBILITY AND ENDURANCE: ICD-10-CM

## 2025-07-17 DIAGNOSIS — R60.0 BILATERAL LOWER EXTREMITY EDEMA: Primary | ICD-10-CM

## 2025-07-17 DIAGNOSIS — I10 ESSENTIAL HYPERTENSION: ICD-10-CM

## 2025-07-17 PROCEDURE — 1126F AMNT PAIN NOTED NONE PRSNT: CPT

## 2025-07-17 PROCEDURE — 99214 OFFICE O/P EST MOD 30 MIN: CPT

## 2025-07-17 PROCEDURE — G2211 COMPLEX E/M VISIT ADD ON: HCPCS

## 2025-07-17 NOTE — PROGRESS NOTES
Chief Complaint  Edema and Hospital Bed    Subjective      History of Present Illness:  Anila Spencer is a 84 y.o. female who presents to [facility] for Edema in bilateral legs and order for Hospital Bed    Leg Swelling  Chronicity:  New  Onset:  At an unknown time  Frequency:  Constantly  Progression since onset:  Unchanged  Symptoms: myalgias and weakness    Symptoms: no chest pain and no cough    Aggravating factors:  Nothing  Treatments tried:  Nothing  Improvement on treatment:  No relief      History of Present Illness  The patient is an 84-year-old female who presents for hospital bed necessity and bilateral lower extremity edema. She is under the care of cardiology and was previously evaluated by him in April regarding cardiac function and edema. She is limited in activities and is confined to a wheelchair. Blood pressure is elevated at 158/79. She has a pacemaker, which is functioning appropriately. No atrial fibrillation noted. At the 04/25/2025 appointment with cardiology, recommendation was given to increase Lasix to 40 mg daily, monitor blood pressure, and continue midodrine. Blood pressure was elevated at the cardiology appointment in April at 154/77. Cardiology did not want to change medications due to the given risk of fall.    She is seeking a hospital bed as she finds it difficult to get into her current bed. She recently moved to [facility], an assisted living facility, where she receives help with bathing twice a week, laundry, and apartment cleaning. She reports no skin breakdown. She has not been able to turn from one side to the next in bed, but she can hold on to the rail and use pillows for support.    She has been taking Lasix 40 mg daily as prescribed but it has not reduced her swelling. She always keeps her feet elevated when sitting and does not experience any tingling sensation. She has tried wearing knee-high compression socks, but they did not  alleviate the swelling. The swelling in her feet remains constant throughout the day. She reports no history of varicose veins. She experiences foot pain occasionally. She has started attending an exercise class at [employer]. She is unsure about the sodium content in her diet but notes that the food at [employer] tastes bland. She has not experienced any falls in recent years. She has urinary incontinence.        Patient Active Problem List   Diagnosis    Cardiomegaly    Essential hypertension    Mitral valve insufficiency    Degeneration of intervertebral disc of thoracic region    Presence of cardiac pacemaker    Dyspnea on exertion    DDD (degenerative disc disease), lumbar    Sick sinus syndrome    PCO (posterior capsular opacification), bilateral    Diplopia    Glaucoma suspect    Pseudophakia of both eyes    Prediabetes    Hypothyroidism    Urinary incontinence    Squamous cell skin cancer, face    Spinal stenosis of cervical region    Seizure disorder    Age-related osteoporosis without current pathological fracture    Osteoarthritis    GERD (gastroesophageal reflux disease)    Fatty liver    Degeneration of intervertebral disc of thoracic region    DDD (degenerative disc disease), cervical    Chronic constipation    Cervical spinal stenosis    Biliary dyskinesia    Basal cell carcinoma (BCC) of face    Anxiety    Traumatic brain injury    Chronic insomnia    Class 1 obesity due to excess calories with serious comorbidity and body mass index (BMI) of 32.0 to 32.9 in adult    Elective replacement indicated for cardiac pacemaker battery at end of lifespan    Overactive bladder    Orthostatic hypotension    Tubular adenoma of colon    At high risk for falls    Abnormal lung function test    Abnormal PFTs (pulmonary function tests)    Cardiac arrhythmia    Bilateral edema of lower extremity    Chronic pain of left knee    Dependent edema    Hypertrophic toenail    Primary osteoarthritis involving multiple  joints    Restless legs    Muscle spasm of both lower legs    Limited mobility    Elevated LDL cholesterol level    Tremor    Complex partial epilepsy    Generalized anxiety disorder    Seasonal allergies    Activity intolerance    Physical deconditioning       No Known Allergies    Social History     Socioeconomic History    Marital status:    Tobacco Use    Smoking status: Never    Smokeless tobacco: Never    Tobacco comments:     Patient is advised not to smoke.   Vaping Use    Vaping status: Never Used   Substance and Sexual Activity    Alcohol use: No    Drug use: No    Sexual activity: Defer       Family History   Problem Relation Age of Onset    Heart disease Mother     Pancreatic cancer Mother     Prostate cancer Father     Breast cancer Sister         4 sisters have had    Pancreatic cancer Brother     Colon cancer Brother     Stroke Maternal Grandmother     Breast cancer Sister     Breast cancer Sister     Breast cancer Sister        Review of Systems   Respiratory:  Negative for cough, shortness of breath and wheezing.    Cardiovascular:  Positive for leg swelling. Negative for chest pain and palpitations.   Musculoskeletal:  Positive for arthralgias, gait problem and myalgias.   Neurological:  Positive for weakness.           2/25/2025     1:49 PM   PHQ-2/PHQ-9 Depression Screening   Little interest or pleasure in doing things Not at all   Feeling down, depressed, or hopeless Not at all   How difficult have these problems made it for you to do your work, take care of things at home, or get along with other people? Not difficult at all       Fall Risk Screen:  STEADI Fall Risk Assessment was completed, and patient is at LOW risk for falls.Assessment completed on:2/25/2025    Objective       Current Outpatient Medications:     Calcium Carbonate-Vitamin D3 600-400 MG-UNIT tablet, Take 1 tablet by mouth 2 (Two) Times a Day., Disp: , Rfl:     Elastic Bandages & Supports ([employer] Firm Compression Socks)  "misc, 2 each Daily. Please size appropriately for knee hi compression socks. 20-35mmHg. 2 pair each., Disp: 4 each, Rfl: 2    escitalopram (LEXAPRO) 5 MG tablet, TAKE 1 TABLET BY MOUTH DAILY, Disp: 90 tablet, Rfl: 3    furosemide (LASIX) 40 MG tablet, Take 1 tablet by mouth Daily., Disp: 90 tablet, Rfl: 1    levothyroxine (SYNTHROID, LEVOTHROID) 75 MCG tablet, Take 1 tablet by mouth Daily., Disp: 90 tablet, Rfl: 3    losartan (COZAAR) 25 MG tablet, Take 1 tablet by mouth Daily. for blood pressure., Disp: 90 tablet, Rfl: 1    Melatonin 10 MG capsule, Take 1 tablet by mouth every night at bedtime., Disp: 90 capsule, Rfl: 0    metoprolol succinate XL (TOPROL-XL) 25 MG 24 hr tablet, TAKE 1/2 TABLET BY MOUTH TWICE DAILY, Disp: 90 tablet, Rfl: 3    multivitamin with minerals tablet tablet, Take 1 tablet by mouth Daily., Disp: , Rfl:     omeprazole (priLOSEC) 40 MG capsule, Take 1 capsule by mouth Daily., Disp: , Rfl:     polyethylene glycol (MiraLax) 17 GM/SCOOP powder, Take 17 g by mouth Daily., Disp: , Rfl:     zonisamide (ZONEGRAN) 100 MG capsule, Take 2 capsules by mouth Every Evening. Take three 100 mg capsules may take four if needed, Disp: , Rfl:     midodrine (PROAMATINE) 10 MG tablet, TAKE 1 TABLET BY MOUTH 3 (THREE) TIMES A DAY BEFORE MEALS. (Patient not taking: Reported on 7/17/2025), Disp: 270 tablet, Rfl: 1    pramipexole (MIRAPEX) 0.25 MG tablet, Take 1 tablet by mouth every night at bedtime. (Patient not taking: Reported on 7/17/2025), Disp: , Rfl:     Vitals:    07/17/25 1530   BP: 158/79   Pulse: 70   Resp: 18   Temp: 96.8 °F (36 °C)   TempSrc: Temporal   SpO2: 98%   Weight: 95.3 kg (210 lb)   Height: 160 cm (62.99\")                  Physical Exam  Vitals and nursing note reviewed.   Constitutional:       General: She is not in acute distress.     Appearance: Normal appearance. She is not ill-appearing, toxic-appearing or diaphoretic.   Cardiovascular:      Rate and Rhythm: Normal rate and regular rhythm. "      Heart sounds: Normal heart sounds.   Pulmonary:      Effort: Pulmonary effort is normal.      Breath sounds: Normal breath sounds.   Abdominal:      General: Abdomen is flat. Bowel sounds are normal.      Palpations: Abdomen is soft.   Musculoskeletal:      Right lower le+ Edema present.      Left lower le+ Edema present.   Neurological:      Mental Status: She is alert and oriented to person, place, and time. Mental status is at baseline.      Motor: Weakness and tremor present.      Gait: Gait abnormal.   Psychiatric:         Mood and Affect: Mood normal.         Behavior: Behavior normal.         Thought Content: Thought content normal.       Derm Physical Exam  Physical Exam      Result Review :     The PHQ has not been completed during this encounter.       Results  Labs      Labs:       Imaging:   No valid procedures specified.        Data reviewed:             Plan      Assessment & Plan  Bilateral lower extremity edema         Decreased mobility and endurance    Orders:    Hospital Bed    Urinary incontinence, unspecified type    Orders:    Hospital Bed    Essential hypertension           Assessment & Plan  1. Bilateral lower extremity edema.  - Advised to continue Lasix 40 mg daily and wear knee-high compression socks every morning before standing up.  - Encouraged to engage in foot pedal exercises; follow-up with cardiology if swelling persists or worsens.    2. Hospital bed necessity.  - An order for a hospital bed with an adjustable base, head, and legs will be placed.  - A dry pressure pad for the mattress will also be provided.  - Discussed the need for a fully electric bed to assist with mobility.  - Referral for the hospital bed will be made to ensure insurance coverage.    3. Hypertension.  - Blood pressure remains elevated at 158/79 mmHg.  - Risk of falls associated with changing medications was discussed; current medication regimen will be maintained.  - Advised to monitor blood  pressure regularly.  - Follow-up with cardiology scheduled for 10/31/2025.    4. Urinary incontinence.  - Reported experiencing urinary incontinence.  - No new treatment was initiated during this visit.  - Discussed the importance of managing incontinence and ensuring safety.       Follow Up   Wrapup Tab  No follow-ups on file.     There are no Patient Instructions on file for this visit.  Patient was given instructions and counseling regarding her condition or for health maintenance advice. Please see specific information pulled into the AVS if appropriate.  Hand hygiene was performed during entrance to exam room and following assessment of patient. This document is intended for medical expert use only.     EMR Dragon/Transcription disclaimer:   Much of this encounter note is an electronic transcription/translation of spoken language to printed text. The electronic translation of spoken language may permit erroneous, or at times, nonsensical words or phrases to be inadvertently transcribed.        VALENTINO Babb, FNP-C  [address]  [facility]  [address] [place] [address] DR ANA LOWE [address] [place] [id number] [phone]    Patient or patient representative verbalized consent for the use of Ambient Listening during the visit with  VALENTINO Babb for chart documentation. 7/21/2025  20:02 EDT

## 2025-08-04 LAB
MC_CV_MDC_IDC_RATE_1: 160
MC_CV_MDC_IDC_ZONE_ID: 1
MDC_IDC_MSMT_BATTERY_REMAINING_LONGEVITY: 54 MO
MDC_IDC_MSMT_BATTERY_REMAINING_PERCENTAGE: 85 %
MDC_IDC_MSMT_BATTERY_STATUS: NORMAL
MDC_IDC_MSMT_LEADCHNL_RA_DTM: NORMAL
MDC_IDC_MSMT_LEADCHNL_RA_IMPEDANCE_VALUE: 426
MDC_IDC_MSMT_LEADCHNL_RA_PACING_THRESHOLD_AMPLITUDE: 0.8
MDC_IDC_MSMT_LEADCHNL_RA_PACING_THRESHOLD_POLARITY: NORMAL
MDC_IDC_MSMT_LEADCHNL_RA_PACING_THRESHOLD_PULSEWIDTH: 0.4
MDC_IDC_MSMT_LEADCHNL_RV_DTM: NORMAL
MDC_IDC_MSMT_LEADCHNL_RV_IMPEDANCE_VALUE: 489
MDC_IDC_MSMT_LEADCHNL_RV_PACING_THRESHOLD_AMPLITUDE: 1.4
MDC_IDC_MSMT_LEADCHNL_RV_PACING_THRESHOLD_POLARITY: NORMAL
MDC_IDC_MSMT_LEADCHNL_RV_PACING_THRESHOLD_PULSEWIDTH: 0.4
MDC_IDC_MSMT_LEADCHNL_RV_SENSING_INTR_AMPL: 6.6
MDC_IDC_PG_IMPLANT_DTM: NORMAL
MDC_IDC_PG_MFG: NORMAL
MDC_IDC_PG_MODEL: NORMAL
MDC_IDC_PG_SERIAL: NORMAL
MDC_IDC_PG_TYPE: NORMAL
MDC_IDC_SESS_DTM: NORMAL
MDC_IDC_SESS_TYPE: NORMAL
MDC_IDC_SET_BRADY_AT_MODE_SWITCH_RATE: 170
MDC_IDC_SET_BRADY_LOWRATE: 70
MDC_IDC_SET_BRADY_MAX_SENSOR_RATE: 120
MDC_IDC_SET_BRADY_MAX_TRACKING_RATE: 120
MDC_IDC_SET_BRADY_MODE: NORMAL
MDC_IDC_SET_BRADY_PAV_DELAY: 250
MDC_IDC_SET_BRADY_SAV_DELAY: 240
MDC_IDC_SET_LEADCHNL_RA_PACING_AMPLITUDE: 2
MDC_IDC_SET_LEADCHNL_RA_PACING_POLARITY: NORMAL
MDC_IDC_SET_LEADCHNL_RA_PACING_PULSEWIDTH: 0.4
MDC_IDC_SET_LEADCHNL_RA_SENSING_POLARITY: NORMAL
MDC_IDC_SET_LEADCHNL_RA_SENSING_SENSITIVITY: 0.25
MDC_IDC_SET_LEADCHNL_RV_PACING_AMPLITUDE: 2.2
MDC_IDC_SET_LEADCHNL_RV_PACING_POLARITY: NORMAL
MDC_IDC_SET_LEADCHNL_RV_PACING_PULSEWIDTH: 0.4
MDC_IDC_SET_LEADCHNL_RV_SENSING_POLARITY: NORMAL
MDC_IDC_SET_LEADCHNL_RV_SENSING_SENSITIVITY: 0.6
MDC_IDC_SET_ZONE_STATUS: NORMAL
MDC_IDC_SET_ZONE_TYPE: NORMAL
MDC_IDC_STAT_AT_BURDEN_PERCENT: 0
MDC_IDC_STAT_BRADY_RA_PERCENT_PACED: 100
MDC_IDC_STAT_BRADY_RV_PERCENT_PACED: 2

## (undated) DEVICE — 3M™ IOBAN™ 2 ANTIMICROBIAL INCISE DRAPE 6650EZ: Brand: IOBAN™ 2

## (undated) DEVICE — CABL BIPOL W/ALLGTR CLIP/SM 12FT

## (undated) DEVICE — PK ENDO GI 50

## (undated) DEVICE — DRSNG WND BORDR/ADHS NONADHR/GZ LF 4X4IN STRL

## (undated) DEVICE — 3M™ PATIENT PLATE, CORDED, SPLIT, LARGE, 40 PER CASE, 1179: Brand: 3M™

## (undated) DEVICE — Device

## (undated) DEVICE — SUT MNCRYL 2/0 CT1 36IN

## (undated) DEVICE — PAPR PRNT PK SONY W RIBN UPC55

## (undated) DEVICE — PENCL HND ROCKRSWTCH HOLSTR EZ CLEAN TP CRD 10FT

## (undated) DEVICE — SUT ETHIB 0/0 MO6 I8IN CX45D

## (undated) DEVICE — PACEMAKER CDS: Brand: MEDLINE INDUSTRIES, INC.

## (undated) DEVICE — ADHS LIQ MASTISOL 2/3ML

## (undated) DEVICE — 3M™ STERI-STRIP™ REINFORCED ADHESIVE SKIN CLOSURES, R1547, 1/2 IN X 4 IN (12 MM X 100 MM), 6 STRIPS/ENVELOPE: Brand: 3M™ STERI-STRIP™

## (undated) DEVICE — BITEBLOCK ENDO W/STRAP 60F A/ LF DISP

## (undated) DEVICE — ELECTRD DEFIB M/FUNC PROPADZ RADIOL 2PK

## (undated) DEVICE — SUT MNCRYL 3/0 SH 27 IN Y416H